# Patient Record
Sex: MALE | Race: WHITE | NOT HISPANIC OR LATINO | Employment: FULL TIME | URBAN - METROPOLITAN AREA
[De-identification: names, ages, dates, MRNs, and addresses within clinical notes are randomized per-mention and may not be internally consistent; named-entity substitution may affect disease eponyms.]

---

## 2017-03-05 DIAGNOSIS — I10 HYPERTENSION GOAL BP (BLOOD PRESSURE) < 140/90: ICD-10-CM

## 2017-03-05 NOTE — TELEPHONE ENCOUNTER
amLODIPine (NORVASC) 10 MG tablet  Last Written Prescription Date: 3/3/16  Last Fill Quantity: 90, # refills: 3    lisinopril (PRINIVIL,ZESTRIL) 20 MG tablet      Last Written Prescription Date: 2/18/16  Last Fill Quantity: 90, # refills: 3  Last Office Visit with [facility] [place] [facility], [facility] or [facility] prescribing provider: 10/27/16       Potassium   Date Value Ref Range Status   12/23/2015 4.0 3.4 - 5.3 mmol/L Final     Creatinine   Date Value Ref Range Status   12/23/2015 1.05 0.66 - 1.25 mg/dL Final     BP Readings from Last 3 Encounters:   10/27/16 136/80   06/21/16 128/80   03/29/16 128/76

## 2017-03-06 RX ORDER — LISINOPRIL 20 MG/1
20 TABLET ORAL DAILY
Qty: 90 TABLET | Refills: 1 | Status: SHIPPED | OUTPATIENT
Start: 2017-03-06 | End: 2017-05-30

## 2017-03-06 RX ORDER — AMLODIPINE BESYLATE 10 MG/1
10 TABLET ORAL DAILY
Qty: 90 TABLET | Refills: 1 | Status: SHIPPED | OUTPATIENT
Start: 2017-03-06 | End: 2017-05-30

## 2017-05-30 DIAGNOSIS — I10 HYPERTENSION GOAL BP (BLOOD PRESSURE) < 140/90: ICD-10-CM

## 2017-05-30 RX ORDER — LISINOPRIL 20 MG/1
20 TABLET ORAL DAILY
Qty: 90 TABLET | Refills: 1 | Status: SHIPPED | OUTPATIENT
Start: 2017-05-30 | End: 2018-03-27

## 2017-05-30 RX ORDER — AMLODIPINE BESYLATE 10 MG/1
10 TABLET ORAL DAILY
Qty: 90 TABLET | Refills: 1 | Status: SHIPPED | OUTPATIENT
Start: 2017-05-30 | End: 2018-03-27

## 2017-05-30 NOTE — TELEPHONE ENCOUNTER
Lisinopril 20 mg      Last Written Prescription Date: 3/6/17  Last Fill Quantity: 90, # refills: 1  Last Office Visit with [facility], [facility] or [facility] prescribing provider: 10/27/16       Potassium   Date Value Ref Range Status   12/23/2015 4.0 3.4 - 5.3 mmol/L Final     Creatinine   Date Value Ref Range Status   12/23/2015 1.05 0.66 - 1.25 mg/dL Final     BP Readings from Last 3 Encounters:   10/27/16 136/80   06/21/16 128/80   03/29/16 128/76     Amlodipine 10 mg      Last Written Prescription Date: 3/6/17  Last Fill Quantity: 90,  # refills: 1   Last Office Visit with [facility], [facility] or [facility] prescribing provider: 10/27/16

## 2017-07-25 ENCOUNTER — TELEPHONE (OUTPATIENT)
Dept: SURGERY | Facility: CLINIC | Age: 52
End: 2017-07-25

## 2017-07-25 NOTE — TELEPHONE ENCOUNTER
I spoke to Sarath today. He is a patient of Dr Prajapati's. He is S/P Lap bilateral inguinal hernia on 12-29-15 with Dr Prajapati. 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 He says that if he pushes on the right inguinal area , it is very tender. He does not have pain otherwise. He does not really see a bulge at that site. I let Sarath that this certainly could be musculoskeletal. He is going out of town but I made an appointment for him when he returns. Petra RODRIGUEZ RN

## 2017-07-25 NOTE — TELEPHONE ENCOUNTER
Reason for Call:  Patient is calling in states that he feels like he has another hernia     Is this possible? He had hernia surgery 2015    Detailed comments: please advise above and how to proceed. appt?    Phone Number Patient can be reached at: Home number on file [phone] (home)    Best Time: any    Can we leave a detailed message on this number? YES    Call taken on 7/25/2017 at 8:04 AM by Soraya Morales

## 2017-08-16 ENCOUNTER — OFFICE VISIT (OUTPATIENT)
Dept: SURGERY | Facility: CLINIC | Age: 52
End: 2017-08-16

## 2017-08-16 VITALS
SYSTOLIC BLOOD PRESSURE: 142 MMHG | HEART RATE: 91 BPM | BODY MASS INDEX: 27.77 KG/M2 | RESPIRATION RATE: 16 BRPM | HEIGHT: 78 IN | WEIGHT: 240 LBS | DIASTOLIC BLOOD PRESSURE: 78 MMHG

## 2017-08-16 DIAGNOSIS — G89.18 POST-OP PAIN: Primary | ICD-10-CM

## 2017-08-16 DIAGNOSIS — S76.211A GROIN STRAIN, RIGHT, INITIAL ENCOUNTER: ICD-10-CM

## 2017-08-16 PROCEDURE — 99212 OFFICE O/P EST SF 10 MIN: CPT | Performed by: SURGERY

## 2017-08-16 RX ORDER — HYDROCODONE BITARTRATE AND ACETAMINOPHEN 5; 325 MG/1; MG/1
1-2 TABLET ORAL EVERY 6 HOURS PRN
Qty: 45 TABLET | Refills: 0 | Status: SHIPPED | OUTPATIENT
Start: 2017-08-16 | End: 2018-05-11

## 2017-08-16 NOTE — NURSING NOTE
"Chief Complaint   Patient presents with     Consult     Right Inguinal Hernia- Surgery 12/29/2015       Initial /78 (BP Location: Right arm, Patient Position: Chair, Cuff Size: Adult Regular)  Pulse 91  Resp 16  Ht 2.083 m (6' 10\")  Wt 108.9 kg (240 lb)  BMI 25.09 kg/m2 Estimated body mass index is 25.09 kg/(m^2) as calculated from the following:    Height as of this encounter: 2.083 m (6' 10\").    Weight as of this encounter: 108.9 kg (240 lb).  BP completed using cuff size: regular      Marisabel Doherty CMA     "

## 2017-08-16 NOTE — MR AVS SNAPSHOT
"              After Visit Summary   2017    Sarath Gray    MRN: [id number]           Patient Information     Date Of Birth          1965        Visit Information        Provider Department      2017 7:30 AM Andrew Prajapati MD [facility]        Today's Diagnoses     Post-op pain    -  1    Groin strain, right, initial encounter          Care Instructions    Per Physician's instructions            Follow-ups after your visit        Who to contact     If you have questions or need follow up information about today's clinic visit or your schedule please contact [facility] directly at [phone].  Normal or non-critical lab and imaging results will be communicated to you by [employer], letter or phone within 4 business days after the clinic has received the results. If you do not hear from us within 7 days, please contact the clinic through [employer] or phone. If you have a critical or abnormal lab result, we will notify you by phone as soon as possible.  Submit refill requests through [facility] [employer] [facility] or call your pharmacy and they will forward the refill request to us. Please allow 3 business days for your refill to be completed.          Additional Information About Your Visit        MyChart Information     [facility] lets you send messages to your doctor, view your test results, renew your prescriptions, schedule appointments and more. To sign up, go to [email or web address] . Click on \"Log in\" on the left side of the screen, which will take you to the Welcome page. Then click on \"Sign up Now\" on the right side of the page.     You will be asked to enter the access code listed below, as well as some personal information. Please follow the directions to create your username and password.     Your access code is: [email or web address]  Expires: 2017  7:42 AM     Your access code will  in 90 days. If you need help or a new code, please call your [facility] or [phone].      " "  Care EveryWhere ID     This is your Care EveryWhere ID. This could be used by other organizations to access your [place] medical records  [id number]        Your Vitals Were     Pulse Respirations Height BMI (Body Mass Index)          91 16 2.083 m (6' 10\") 25.09 kg/m2         Blood Pressure from Last 3 Encounters:   08/16/17 142/78   10/27/16 136/80   06/21/16 128/80    Weight from Last 3 Encounters:   08/16/17 108.9 kg (240 lb)   10/27/16 108 kg (238 lb)   06/21/16 108 kg (238 lb)              Today, you had the following     No orders found for display         Today's Medication Changes          These changes are accurate as of: 8/16/17  7:42 AM.  If you have any questions, ask your nurse or doctor.               Start taking these medicines.        Dose/Directions    HYDROcodone-acetaminophen 5-325 MG per tablet   Commonly known as:  NORCO   Used for:  Post-op pain   Started by:  Andrew Prajapati MD        Dose:  1-2 tablet   Take 1-2 tablets by mouth every 6 hours as needed   Quantity:  45 tablet   Refills:  0            Where to get your medicines      Some of these will need a paper prescription and others can be bought over the counter.  Ask your nurse if you have questions.     Bring a paper prescription for each of these medications     HYDROcodone-acetaminophen 5-325 MG per tablet                Primary Care Provider Office Phone # Fax #    Maik Smith Smith [phone] [address] [place], [place] [address]        Equal Access to Services     QIANA MORAN AH: Hadolena messero Soaramis, waaxda luqadaha, qaybta kaalmada adeegyada, waxshannon dominik hemphill. So [place] [phone].    ATENCIÓN: Si habla español, tiene a alexandra disposición servicios gratuitos de asistencia lingüística. Llame al [phone].    We comply with applicable federal civil rights laws and [place] laws. We do not discriminate on the basis of race, color, national origin, age, disability sex, " sexual orientation or gender identity.            Thank you!     Thank you for choosing [facility]  for your care. Our goal is always to provide you with excellent care. Hearing back from our patients is one way we can continue to improve our services. Please take a few minutes to complete the written survey that you may receive in the mail after your visit with us. Thank you!             Your Updated Medication List - Protect others around you: Learn how to safely use, store and throw away your medicines at www.disposemymeds.org.          This list is accurate as of: 8/16/17  7:42 AM.  Always use your most recent med list.                   Brand Name Dispense Instructions for use Diagnosis    albuterol 108 (90 BASE) MCG/ACT Inhaler    albuterol    1 Inhaler    Inhale 2 puffs into the lungs every 4 hours as needed for shortness of breath / dyspnea or wheezing    Pneumonia of right lung due to infectious organism, unspecified part of lung       amLODIPine 10 MG tablet    NORVASC    90 tablet    Take 1 tablet (10 mg) by mouth daily    Hypertension goal BP (blood pressure) < 140/90       HYDROcodone-acetaminophen 5-325 MG per tablet    NORCO    45 tablet    Take 1-2 tablets by mouth every 6 hours as needed    Post-op pain       lisinopril 20 MG tablet    PRINIVIL/ZESTRIL    90 tablet    Take 1 tablet (20 mg) by mouth daily    Hypertension goal BP (blood pressure) < 140/90       order for DME      Equipment being ordered: CPAP Patient Sarath Gray was set up at [facility]  on April 21, 2016. Patient received a Resmed AirSense 10 Auto. Pressures were set at Auto 5 - 15 cm H2O.   Patient?s ramp is 5 cm H2O for Auto and FLEX/EPR is 2.  Patient received a [employer] Mask name: SIMPLUS  Full Face mask Size Large, heated tubing and heated humidifier.  Patient is enrolled in the STM Program and does not need to meet compliance. Patient has a follow up on June 14TH AT 9 AM with YADIRA Resendez.   Jennifer  Flaco        order for DME     1 Device    Equipment being ordered: Other: CPM machine for home use. Set max tolerance and increase 3-5 degrees/day as tolerated. Use up to 6-8 hours/day as tolerated. Treatment Diagnosis: left TKA    S/P total knee arthroplasty, left

## 2017-08-16 NOTE — PROGRESS NOTES
"51-year-old male is now 18 months out from a laparoscopic bilateral inguinal hernia repair. At that time, his right hernia was much larger than the left. He now reports right groin pain over the past several months. Patient also reports an occasional palpable cord running parallel to the inguinal ligament along with some pain in his right testicle. This usually comes on after a full day of working. Pain keeps him awake at night. He denies palpable masses.      Exam:  Patient Vitals for the past 24 hrs:   BP Pulse Resp Height Weight   08/16/17 0729 142/78 91 16 2.083 m (6' 10\") 108.9 kg (240 lb)     Bilateral groins without palpable reducible masses. No palpable cords or tissue swelling.      Assessment and plan: 51-year-old male with right groin pain. Patient does not remember any inciting event, this could still be results of a groin strain. I cannot appreciate a hernia recurrence. Patient only takes ibuprofen for occasional muscle aches so I gave him some Vicodin to aid in sleeping and with the localized pain. I advised him that I expect this would resolve on its own if it was a strain. If after another couple of months he continues to have the pain, which can possibly put him in the CT scanner to better evaluate the abdominal wall.    Andrew Prajapati MD   "

## 2017-10-03 ENCOUNTER — OFFICE VISIT (OUTPATIENT)
Dept: FAMILY MEDICINE | Facility: CLINIC | Age: 52
End: 2017-10-03

## 2017-10-03 VITALS
DIASTOLIC BLOOD PRESSURE: 88 MMHG | HEART RATE: 86 BPM | TEMPERATURE: 98 F | HEIGHT: 78 IN | WEIGHT: 240 LBS | SYSTOLIC BLOOD PRESSURE: 138 MMHG | BODY MASS INDEX: 27.77 KG/M2

## 2017-10-03 DIAGNOSIS — N41.0 ACUTE PROSTATITIS: ICD-10-CM

## 2017-10-03 DIAGNOSIS — R39.9 UTI SYMPTOMS: Primary | ICD-10-CM

## 2017-10-03 LAB
ALBUMIN UR-MCNC: NEGATIVE MG/DL
APPEARANCE UR: CLEAR
BILIRUB UR QL STRIP: NEGATIVE
COLOR UR AUTO: YELLOW
GLUCOSE UR STRIP-MCNC: NEGATIVE MG/DL
HGB UR QL STRIP: ABNORMAL
KETONES UR STRIP-MCNC: NEGATIVE MG/DL
LEUKOCYTE ESTERASE UR QL STRIP: NEGATIVE
NITRATE UR QL: NEGATIVE
PH UR STRIP: 6.5 PH (ref 5–7)
RBC #/AREA URNS AUTO: NORMAL /HPF
SOURCE: ABNORMAL
SP GR UR STRIP: 1.01 (ref 1–1.03)
UROBILINOGEN UR STRIP-ACNC: 0.2 EU/DL (ref 0.2–1)
WBC #/AREA URNS AUTO: NORMAL /HPF

## 2017-10-03 PROCEDURE — 81001 URINALYSIS AUTO W/SCOPE: CPT | Performed by: PHYSICIAN ASSISTANT

## 2017-10-03 PROCEDURE — 99213 OFFICE O/P EST LOW 20 MIN: CPT | Performed by: PHYSICIAN ASSISTANT

## 2017-10-03 RX ORDER — LEVOFLOXACIN 750 MG/1
750 TABLET, FILM COATED ORAL DAILY
Qty: 10 TABLET | Refills: 0 | Status: SHIPPED | OUTPATIENT
Start: 2017-10-03 | End: 2018-05-11

## 2017-10-03 ASSESSMENT — ENCOUNTER SYMPTOMS
DIARRHEA: 0
DYSURIA: 1
COUGH: 0
NERVOUS/ANXIOUS: 0
HALLUCINATIONS: 0
SENSORY CHANGE: 0
WHEEZING: 0
ABDOMINAL PAIN: 1
PALPITATIONS: 0
BACK PAIN: 0
FOCAL WEAKNESS: 0
DIAPHORESIS: 0
BLURRED VISION: 0
BLOOD IN STOOL: 0
SHORTNESS OF BREATH: 0
SEIZURES: 0
EYE REDNESS: 0
WEIGHT LOSS: 0
DIZZINESS: 0
WEAKNESS: 0
ORTHOPNEA: 0
HEADACHES: 0
SPUTUM PRODUCTION: 0
INSOMNIA: 0
CONSTIPATION: 0
NAUSEA: 0
HEARTBURN: 0
FREQUENCY: 1
MYALGIAS: 0
LOSS OF CONSCIOUSNESS: 0
FEVER: 0
HEMOPTYSIS: 0
TINGLING: 0
CHILLS: 0
DEPRESSION: 0
NEUROLOGICAL NEGATIVE: 1
VOMITING: 0
NECK PAIN: 0
EYE DISCHARGE: 0
EYE PAIN: 0
PHOTOPHOBIA: 0
DOUBLE VISION: 0
SORE THROAT: 0

## 2017-10-03 ASSESSMENT — LIFESTYLE VARIABLES: SUBSTANCE_ABUSE: 0

## 2017-10-03 NOTE — PROGRESS NOTES
HPI    SUBJECTIVE:   Sarath Gray is a 52 year old male who presents to clinic today for urinary frequency, dysuria for the last week or so. He developed sweats last night but is not sure if he had a fever      Genitourinary symptoms      Duration: 2-3 weeks, worse in the last week    Description:  dysuria, hesitancy and back pain, urgency, flank pain    Intensity:  moderate    Accompanying signs and symptoms (fever/discharge/nausea/vomiting/back or abdominal pain):  Sweating, lower abdominal pain    History (frequent UTI's/kidney stones/prostate problems): None  Sexually active: no     Precipitating or alleviating factors: None    Therapies tried and outcome: increase fluid intake   Outcome:             Problem list and histories reviewed & adjusted, as indicated.  Additional history: as documented    Patient Active Problem List   Diagnosis     Esophageal reflux     Tobacco use disorder     Impotence of organic origin     Low back pain     CARDIOVASCULAR SCREENING; LDL GOAL LESS THAN 130     History of PSVT (paroxysmal supraventricular tachycardia)     S/P total knee arthroplasty     Left knee DJD     Essential hypertension, benign     CARROLL (obstructive sleep apnea)     Past Surgical History:   Procedure Laterality Date     ARTHROPLASTY KNEE  6/11/2014    Procedure: ARTHROPLASTY KNEE;  Surgeon: Maik Jeong MD;  Location: [facility]     ARTHROSCOPY KNEE WITH MEDIAL MENISCECTOMY  12/19/2013    Procedure: ARTHROSCOPY KNEE WITH MEDIAL MENISCECTOMY;  Left Knee Arthroscopy With Intraarticular Debridement.;  Surgeon: Maik Jeong MD;  Location: [facility]     COLONOSCOPY  2002     EXAM UNDER ANESTHESIA, MANIPULATE JOINT (LOCATION)  6/26/2014    Procedure: EXAM UNDER ANESTHESIA, MANIPULATE JOINT (LOCATION);  Surgeon: Maik Jeong MD;  Location: [facility]     EXAM UNDER ANESTHESIA, MANIPULATE JOINT (LOCATION) Left 8/28/2014    Procedure: EXAM UNDER ANESTHESIA, MANIPULATE JOINT (LOCATION);  Surgeon: Maik Jeong  MD JULIANA;  Location: [facility]     EXAM UNDER ANESTHESIA, MANIPULATE JOINT (LOCATION) Left 12/31/2014    Procedure: EXAM UNDER ANESTHESIA, MANIPULATE JOINT (LOCATION);  Surgeon: Maik Jeong MD;  Location: [facility] OR     H ABLATION SVT  2008    AVNRT     INJECT EPIDURAL LUMBAR  12/16/2011    Procedure:INJECT EPIDURAL LUMBAR; MICKY-Dr. Smith; Surgeon:GENERIC ANESTHESIA PROVIDER; Location:[facility]     INJECT EPIDURAL LUMBAR  1/27/2012    Procedure:INJECT EPIDURAL LUMBAR; MICKY with Flouro--; Surgeon:GENERIC ANESTHESIA PROVIDER; Location:[facility]     LAPAROSCOPIC HERNIORRHAPHY INGUINAL BILATERAL Bilateral 12/29/2015    Procedure: LAPAROSCOPIC HERNIORRHAPHY INGUINAL BILATERAL;  Surgeon: Andrew Prajapati MD;  Location: [facility]     Lumbar back fusion  1995, 1998    first was A&P     Lumbar back procedure  1999    Hardware removal     spinal stimulator insertion  2004    also, revised it that year also. never seemed to help well.        Social History   Substance Use Topics     Smoking status: Light Tobacco Smoker     Packs/day: 1.00     Years: 18.00     Smokeless tobacco: Never Used      Comment: Started at age 30.      Alcohol use 0.0 oz/week     0 Standard drinks or equivalent per week      Comment: occassional beer     Family History   Problem Relation Age of Onset     Cardiovascular Paternal Grandfather      C.A.D. Father      CEREBROVASCULAR DISEASE Father      Allergies Father      Anesthesia Reaction Father      Cardiovascular Father      Circulatory Father      dvt     HEART DISEASE Father          Current Outpatient Prescriptions   Medication Sig Dispense Refill     levofloxacin (LEVAQUIN) 750 MG tablet Take 1 tablet (750 mg) by mouth daily 10 tablet 0     HYDROcodone-acetaminophen (NORCO) 5-325 MG per tablet Take 1-2 tablets by mouth every 6 hours as needed 45 tablet 0     amLODIPine (NORVASC) 10 MG tablet Take 1 tablet (10 mg) by mouth daily 90 tablet 1     lisinopril (PRINIVIL/ZESTRIL) 20 MG tablet Take 1 tablet (20 mg)  by mouth daily 90 tablet 1     albuterol (ALBUTEROL) 108 (90 BASE) MCG/ACT inhaler Inhale 2 puffs into the lungs every 4 hours as needed for shortness of breath / dyspnea or wheezing 1 Inhaler 11     order for DME Equipment being ordered: CPAP Patient Sarath Gray was [facility] up at [facility]  on April 21, 2016. Patient received a Resmed AirSense 10 Auto. Pressures were set at Auto 5 - 15 cm H2O.   Patient s ramp is 5 cm H2O for Auto and FLEX/EPR is 2.  Patient received a [employer] Mask name: SIMPLUS  Full Face mask Size Large, heated tubing and heated humidifier.  Patient is enrolled in the STM Program and does not need to meet compliance. Patient has a follow up on June 14TH AT 9 AM with YADIRA Resendez.    Jennifer Sam       ORDER FOR DME Equipment being ordered: Other: CPM machine for home use. Set max tolerance and increase 3-5 degrees/day as tolerated. Use up to 6-8 hours/day as tolerated.  Treatment Diagnosis: left TKA 1 Device 0     Allergies   Allergen Reactions     Persantine [Dipyridamole] Other (See Comments)     Nervous and anxiety     Labs reviewed in EPIC      Reviewed and updated as needed this visit by clinical staff     Reviewed and updated as needed this visit by Provider             Review of Systems   Constitutional: Negative for chills, diaphoresis, fever, malaise/fatigue and weight loss.   HENT: Negative for congestion, ear discharge, ear pain, hearing loss, nosebleeds and sore throat.    Eyes: Negative for blurred vision, double vision, photophobia, pain, discharge and redness.   Respiratory: Negative for cough, hemoptysis, sputum production, shortness of breath and wheezing.    Cardiovascular: Negative for chest pain, palpitations, orthopnea and leg swelling.   Gastrointestinal: Positive for abdominal pain. Negative for blood in stool, constipation, diarrhea, heartburn, melena, nausea and vomiting.   Genitourinary: Positive for dysuria, frequency and urgency.   Musculoskeletal: Negative  for back pain, joint pain, myalgias and neck pain.   Skin: Negative for itching and rash.   Neurological: Negative.  Negative for dizziness, tingling, sensory change, focal weakness, seizures, loss of consciousness, weakness and headaches.   Endo/Heme/Allergies: Negative.    Psychiatric/Behavioral: Negative for depression, hallucinations, substance abuse and suicidal ideas. The patient is not nervous/anxious and does not have insomnia.          Physical Exam   Constitutional: He is oriented to person, place, and time and well-developed, well-nourished, and in no distress.   HENT:   Head: Normocephalic and atraumatic.   Right Ear: External ear normal.   Left Ear: External ear normal.   Nose: Nose normal.   Mouth/Throat: Oropharynx is clear and moist.   Eyes: Conjunctivae and EOM are normal. Pupils are equal, round, and reactive to light. Right eye exhibits no discharge. Left eye exhibits no discharge. No scleral icterus.   Neck: Normal range of motion. Neck supple. No thyromegaly present.   Cardiovascular: Normal rate, regular rhythm, normal heart sounds and intact distal pulses.  Exam reveals no gallop and no friction rub.    No murmur heard.  Pulmonary/Chest: Effort normal and breath sounds normal. No respiratory distress. He has no wheezes. He has no rales. He exhibits no tenderness.   Abdominal: Soft. Bowel sounds are normal. He exhibits no distension and no mass. There is no hepatosplenomegaly. There is tenderness in the suprapubic area. There is no rebound, no guarding and no CVA tenderness. No hernia.   Musculoskeletal: Normal range of motion. He exhibits no edema or tenderness.   Lymphadenopathy:     He has no cervical adenopathy.   Neurological: He is alert and oriented to person, place, and time. He has normal reflexes. No cranial nerve deficit. He exhibits normal muscle tone. Gait normal. Coordination normal.   Skin: Skin is warm and dry. No rash noted. No erythema.   Psychiatric: Mood, memory, affect and  judgment normal.               (R39.9) UTI symptoms  (primary encounter diagnosis)  Comment:   Plan: UA reflex to Microscopic and Culture, Urine         Microscopic, levofloxacin (LEVAQUIN) 750 MG         tablet            (N41.0) Acute prostatitis  Comment:   Plan:

## 2017-10-03 NOTE — MR AVS SNAPSHOT
"              After Visit Summary   10/3/2017    Sarath Gray    MRN: [id number]           Patient Information     Date Of Birth          1965        Visit Information        Provider Department      10/3/2017 10:40 AM Jeffy Acosta PA-C [facility]        Today's Diagnoses     UTI symptoms    -  1    Acute prostatitis           Follow-ups after your visit        Follow-up notes from your care team     Return if symptoms worsen or fail to improve.      Who to contact     If you have questions or need follow up information about today's clinic visit or your schedule please contact [facility] directly at [phone].  Normal or non-critical lab and imaging results will be communicated to you by "[employer]hart, letter or phone within 4 business days after the clinic has received the results. If you do not hear from us within 7 days, please contact the clinic through "[employer]hart or phone. If you have a critical or abnormal lab result, we will notify you by phone as soon as possible.  Submit refill requests through [employer] or call your pharmacy and they will forward the refill request to us. Please allow 3 business days for your refill to be completed.          Additional Information About Your Visit        MyChart Information     [employer] lets you send messages to your doctor, view your test results, renew your prescriptions, schedule appointments and more. To sign up, go to [email or web address] . Click on \"Log in\" on the left side of the screen, which will take you to the Welcome page. Then click on \"Sign up Now\" on the right side of the page.     You will be asked to enter the access code listed below, as well as some personal information. Please follow the directions to create your username and password.     Your access code is: [email or web address]  Expires: 2017  7:42 AM     Your access code will  in 90 days. If you need help or a new code, please call your [place] " "clinic or [phone].        Care EveryWhere ID     This is your Care EveryWhere ID. This could be used by other organizations to access your [facility] medical records  [id number]        Your Vitals Were     Pulse Temperature Height BMI (Body Mass Index)          86 98  F (36.7  C) (Tympanic) 6' 10\" (2.083 m) 25.09 kg/m2         Blood Pressure from Last 3 Encounters:   10/03/17 138/88   08/16/17 142/78   10/27/16 136/80    Weight from Last 3 Encounters:   10/03/17 240 lb (108.9 kg)   08/16/17 240 lb (108.9 kg)   10/27/16 238 lb (108 kg)              We Performed the Following     UA reflex to Microscopic and Culture     Urine Microscopic          Today's Medication Changes          These changes are accurate as of: 10/3/17 12:20 PM.  If you have any questions, ask your nurse or doctor.               Start taking these medicines.        Dose/Directions    levofloxacin 750 MG tablet   Commonly known as:  LEVAQUIN   Used for:  UTI symptoms   Started by:  Jeffy Acosta PA-C        Dose:  750 mg   Take 1 tablet (750 mg) by mouth daily   Quantity:  10 tablet   Refills:  0            Where to get your medicines      These medications were sent to [address] [address]     Phone:  [phone]     levofloxacin 750 MG tablet                Primary Care Provider Office Phone # Fax #    Maik Smith [phone]       [address] [place] [address]        Equal Access to Services     ELIZABETH MORAN : Hadii joann Colby, waaxda luqadaha, qaybta kaaldallas parham. So [facility] [phone].    ATENCIÓN: Si habla español, tiene a alexandra disposición servicios gratuitos de asistencia lingüística. Llame al [phone].    We comply with applicable federal civil rights laws and [place] laws. We do not discriminate on the basis of race, color, national origin, age, disability, sex, " sexual orientation, or gender identity.            Thank you!     Thank you for choosing [facility]  for your care. Our goal is always to provide you with excellent care. Hearing back from our patients is one way we can continue to improve our services. Please take a few minutes to complete the written survey that you may receive in the mail after your visit with us. Thank you!             Your Updated Medication List - Protect others around you: Learn how to safely use, store and throw away your medicines at www.disposemymeds.org.          This list is accurate as of: 10/3/17 12:20 PM.  Always use your most recent med list.                   Brand Name Dispense Instructions for use Diagnosis    albuterol 108 (90 BASE) MCG/ACT Inhaler    PROAIR HFA    1 Inhaler    Inhale 2 puffs into the lungs every 4 hours as needed for shortness of breath / dyspnea or wheezing    Pneumonia of right lung due to infectious organism, unspecified part of lung       amLODIPine 10 MG tablet    NORVASC    90 tablet    Take 1 tablet (10 mg) by mouth daily    Hypertension goal BP (blood pressure) < 140/90       HYDROcodone-acetaminophen 5-325 MG per tablet    NORCO    45 tablet    Take 1-2 tablets by mouth every 6 hours as needed    Post-op pain       levofloxacin 750 MG tablet    LEVAQUIN    10 tablet    Take 1 tablet (750 mg) by mouth daily    UTI symptoms       lisinopril 20 MG tablet    PRINIVIL/ZESTRIL    90 tablet    Take 1 tablet (20 mg) by mouth daily    Hypertension goal BP (blood pressure) < 140/90       order for DME      Equipment being ordered: CPAP Patient Sarath Gray was set up at [facility]  on April 21, 2016. Patient received a Resmed AirSense 10 Auto. Pressures were set at Auto 5 - 15 cm H2O.   Patient?s ramp is 5 cm H2O for Auto and FLEX/EPR is 2.  Patient received a [employer] Mask name: SIMPLUS  Full Face mask Size Large, heated tubing and heated humidifier.  Patient is enrolled in the [facility]  Program and does not need to meet compliance. Patient has a follow up on June 14TH AT 9 AM with YADIRA Resendez.   Jennifer Sam        order for DME     1 Device    Equipment being ordered: Other: CPM machine for home use. Set max tolerance and increase 3-5 degrees/day as tolerated. Use up to 6-8 hours/day as tolerated. Treatment Diagnosis: left TKA    S/P total knee arthroplasty, left

## 2017-10-03 NOTE — NURSING NOTE
"Chief Complaint   Patient presents with     UTI       Initial /88 (BP Location: Right arm, Patient Position: Chair, Cuff Size: Adult Large)  Pulse 86  Temp 98  F (36.7  C) (Tympanic)  Ht 6' 10\" (2.083 m)  Wt 240 lb (108.9 kg)  BMI 25.09 kg/m2 Estimated body mass index is 25.09 kg/(m^2) as calculated from the following:    Height as of this encounter: 6' 10\" (2.083 m).    Weight as of this encounter: 240 lb (108.9 kg).  Medication Reconciliation: complete    Health Maintenance that is potentially due pending provider review:  NONE        Is there anyone who you would like to be able to receive your results? No  If yes have patient fill out ARIS      "

## 2018-03-26 ENCOUNTER — TELEPHONE (OUTPATIENT)
Dept: FAMILY MEDICINE | Facility: CLINIC | Age: 53
End: 2018-03-26

## 2018-03-26 DIAGNOSIS — Z12.12 SCREENING FOR MALIGNANT NEOPLASM OF THE RECTUM: Primary | ICD-10-CM

## 2018-03-26 NOTE — TELEPHONE ENCOUNTER
Order placed and signed.  left message for patient to return call.  I need to know if he wants instructions mailed to him or will he pick it up/  Alfreda Nam RN

## 2018-03-26 NOTE — TELEPHONE ENCOUNTER
Reason for Call:  Other     Detailed comments: Patient needs a order for a colonoscopy - please call patient with info     Phone Number Patient can be reached at: Home number on file [phone] (home)    Best Time:     Can we leave a detailed message on this number? YES    Call taken on 3/26/2018 at 3:53 PM by Jennifer Chou

## 2018-03-27 DIAGNOSIS — I10 HYPERTENSION GOAL BP (BLOOD PRESSURE) < 140/90: ICD-10-CM

## 2018-03-27 RX ORDER — LISINOPRIL 20 MG/1
20 TABLET ORAL DAILY
Qty: 30 TABLET | Refills: 0 | Status: SHIPPED | OUTPATIENT
Start: 2018-03-27 | End: 2018-05-11

## 2018-03-27 RX ORDER — AMLODIPINE BESYLATE 10 MG/1
10 TABLET ORAL DAILY
Qty: 30 TABLET | Refills: 0 | Status: SHIPPED | OUTPATIENT
Start: 2018-03-27 | End: 2018-05-11

## 2018-03-27 NOTE — LETTER
[facility]  [address] [place] [id number]  Phone: [phone]  Fax: [phone]       March 27, 2018         Sarath Gray  [address] [place] [id number]            Dear Sarath:    We are concerned about your health care.  We recently provided you with medication refills.  Many medications require routine follow-up with your doctor.    Your prescription(s) have been refilled for amlodipine and lisinopril so you may have time for the above noted follow-up. Please call to schedule soon so we can assure you have an appointment before your next refills are needed.    Thank you,      Maik Smith MD/ Alfreda Nam RN

## 2018-03-27 NOTE — TELEPHONE ENCOUNTER
"Requested Prescriptions   Pending Prescriptions Disp Refills     amLODIPine (NORVASC) 10 MG tablet 90 tablet 1     Sig: Take 1 tablet (10 mg) by mouth daily    Calcium Channel Blockers Protocol  Failed    3/27/2018  2:33 PM       Failed - Normal serum creatinine on file in past 12 months    Recent Labs   Lab Test  12/23/15   1514   CR  1.05            Passed - Blood pressure under 140/90 in past 12 months    BP Readings from Last 3 Encounters:   10/03/17 138/88   08/16/17 142/78   10/27/16 136/80                Passed - Recent (12 mo) or future (30 days) visit within the authorizing provider's specialty    Patient had office visit in the last 12 months or has a visit in the next 30 days with authorizing provider or within the authorizing provider's specialty.  See \"Patient Info\" tab in inbasket, or \"Choose Columns\" in Meds & Orders section of the refill encounter.           Passed - Patient is age 18 or older   Last Written Prescription Date:  05/30/17  Last Fill Quantity: 90,  # refills: 1   Last office visit: 10/3/2017 with prescribing provider:  10/03/17   Future Office Visit:         lisinopril (PRINIVIL/ZESTRIL) 20 MG tablet 90 tablet 1     Sig: Take 1 tablet (20 mg) by mouth daily    ACE Inhibitors (Including Combos) Protocol Failed    3/27/2018  2:33 PM       Failed - Normal serum creatinine on file in past 12 months    Recent Labs   Lab Test  12/23/15   1514   CR  1.05            Failed - Normal serum potassium on file in past 12 months    Recent Labs   Lab Test  12/23/15   1514   POTASSIUM  4.0            Passed - Blood pressure under 140/90 in past 12 months    BP Readings from Last 3 Encounters:   10/03/17 138/88   08/16/17 142/78   10/27/16 136/80                Passed - Recent (12 mo) or future (30 days) visit within the authorizing provider's specialty    Patient had office visit in the last 12 months or has a visit in the next 30 days with authorizing provider or within the authorizing provider's " "specialty.  See \"Patient Info\" tab in inbasket, or \"Choose Columns\" in Meds & Orders section of the refill encounter.           Passed - Patient is age 18 or older        Last Written Prescription Date:  05/30/17  Last Fill Quantity: 90,  # refills: 1   Last office visit: 10/3/2017 with prescribing provider:  10/03/17   Future Office Visit:      "

## 2018-04-14 ENCOUNTER — ANESTHESIA EVENT (OUTPATIENT)
Dept: GASTROENTEROLOGY | Facility: CLINIC | Age: 53
End: 2018-04-14
Payer: COMMERCIAL

## 2018-04-14 ASSESSMENT — LIFESTYLE VARIABLES: TOBACCO_USE: 1

## 2018-04-14 NOTE — ANESTHESIA PREPROCEDURE EVALUATION
Anesthesia Evaluation     . Pt has had prior anesthetic.     No history of anesthetic complications          ROS/MED HX    ENT/Pulmonary: Comment: Hypoxia  PE    (+)sleep apnea, tobacco use, Current use 1 packs/day  , . .    Neurologic:  - neg neurologic ROS     Cardiovascular: Comment: Ablation for SVT  Atypical CP    (+) hypertension----. : . . . :. . Previous cardiac testing date:results:Stress Testdate:9=17-09 results:IMPRESSIONS:   I.  Adenosine Test   1.  Adenosine infusion dictated under separate cover.   2.  ECG report done under separate cover.      II.  Myocardial Perfusion Scintigraphy   1.  Myocardial perfusion using single isotope technique was probably   normal.    2.  Gating demonstrated normal left ventricular systolic contraction   and wall thickening, both at rest and post stress.    3.  The ejection fraction was 49% at rest and 56% post stress.    4.  The left ventricle was enlarged (end diastolic volume 212 mL).     5.  No previous study was available for comparison. ECG reviewed date:12-23-15 results:Sinus Rhythm   -Right atrial enlargement.    Right atrial abnormality and rotation -possible pulmonary disease.     ABNORMAL    date: results:          METS/Exercise Tolerance:  >4 METS   Hematologic:  - neg hematologic  ROS       Musculoskeletal: Comment: LBP  Spinal cord stimulator  (+) , , other musculoskeletal- LBP      GI/Hepatic:     (+) GERD Asymptomatic on medication,       Renal/Genitourinary: Comment: impotence        Endo:  - neg endo ROS       Psychiatric:  - neg psychiatric ROS       Infectious Disease:  - neg infectious disease ROS       Malignancy:      - no malignancy   Other:    (+) H/O Chronic Pain,                   Physical Exam  Normal systems: cardiovascular and pulmonary    Airway   Mallampati: II  TM distance: >3 FB  Neck ROM: full    Dental   Comment: Poor dentition     Cardiovascular       Pulmonary                     Anesthesia Plan      History & Physical  Review  History and physical reviewed and following examination; no interval change.    ASA Status:  3 .    NPO Status:  > 8 hours    Plan for General and MAC with Propofol and Intravenous induction. Reason for MAC:  Deep or markedly invasive procedure (G8)  PONV prophylaxis:  Ondansetron (or other 5HT-3) and Dexamethasone or Solumedrol       Postoperative Care  Postoperative pain management:  IV analgesics and Oral pain medications.      Consents  Anesthetic plan, risks, benefits and alternatives discussed with:  Patient..                          .

## 2018-04-18 ENCOUNTER — ANESTHESIA (OUTPATIENT)
Dept: GASTROENTEROLOGY | Facility: CLINIC | Age: 53
End: 2018-04-18
Payer: COMMERCIAL

## 2018-04-18 ENCOUNTER — SURGERY (OUTPATIENT)
Age: 53
End: 2018-04-18

## 2018-04-18 ENCOUNTER — HOSPITAL ENCOUNTER (OUTPATIENT)
Facility: CLINIC | Age: 53
Discharge: HOME | End: 2018-04-18
Attending: SURGERY | Admitting: SURGERY
Payer: COMMERCIAL

## 2018-04-18 VITALS
TEMPERATURE: 98 F | WEIGHT: 245 LBS | BODY MASS INDEX: 28.35 KG/M2 | SYSTOLIC BLOOD PRESSURE: 140 MMHG | HEIGHT: 78 IN | RESPIRATION RATE: 20 BRPM | DIASTOLIC BLOOD PRESSURE: 90 MMHG | OXYGEN SATURATION: 97 %

## 2018-04-18 LAB — COLONOSCOPY: NORMAL

## 2018-04-18 PROCEDURE — 25000128 H RX IP 250 OP 636: Performed by: NURSE ANESTHETIST, CERTIFIED REGISTERED

## 2018-04-18 PROCEDURE — 25000125 ZZHC RX 250: Performed by: NURSE ANESTHETIST, CERTIFIED REGISTERED

## 2018-04-18 PROCEDURE — 45378 DIAGNOSTIC COLONOSCOPY: CPT | Performed by: SURGERY

## 2018-04-18 PROCEDURE — G0105 COLORECTAL SCRN; HI RISK IND: HCPCS | Performed by: SURGERY

## 2018-04-18 PROCEDURE — 37000008 ZZH ANESTHESIA TECHNICAL FEE, 1ST 30 MIN: Performed by: SURGERY

## 2018-04-18 RX ORDER — LIDOCAINE 40 MG/G
CREAM TOPICAL
Status: DISCONTINUED | OUTPATIENT
Start: 2018-04-18 | End: 2018-04-18 | Stop reason: HOSPADM

## 2018-04-18 RX ORDER — ONDANSETRON 2 MG/ML
4 INJECTION INTRAMUSCULAR; INTRAVENOUS
Status: DISCONTINUED | OUTPATIENT
Start: 2018-04-18 | End: 2018-04-18 | Stop reason: HOSPADM

## 2018-04-18 RX ORDER — PROPOFOL 10 MG/ML
INJECTION, EMULSION INTRAVENOUS CONTINUOUS PRN
Status: DISCONTINUED | OUTPATIENT
Start: 2018-04-18 | End: 2018-04-18

## 2018-04-18 RX ORDER — LIDOCAINE HYDROCHLORIDE 10 MG/ML
INJECTION, SOLUTION INFILTRATION; PERINEURAL PRN
Status: DISCONTINUED | OUTPATIENT
Start: 2018-04-18 | End: 2018-04-18

## 2018-04-18 RX ORDER — SODIUM CHLORIDE, SODIUM LACTATE, POTASSIUM CHLORIDE, CALCIUM CHLORIDE 600; 310; 30; 20 MG/100ML; MG/100ML; MG/100ML; MG/100ML
INJECTION, SOLUTION INTRAVENOUS CONTINUOUS
Status: DISCONTINUED | OUTPATIENT
Start: 2018-04-18 | End: 2018-04-18 | Stop reason: HOSPADM

## 2018-04-18 RX ORDER — PROPOFOL 10 MG/ML
INJECTION, EMULSION INTRAVENOUS PRN
Status: DISCONTINUED | OUTPATIENT
Start: 2018-04-18 | End: 2018-04-18

## 2018-04-18 RX ADMIN — LIDOCAINE HYDROCHLORIDE 50 MG: 10 INJECTION, SOLUTION INFILTRATION; PERINEURAL at 07:33

## 2018-04-18 RX ADMIN — LIDOCAINE HYDROCHLORIDE 1 ML: 10 INJECTION, SOLUTION EPIDURAL; INFILTRATION; INTRACAUDAL; PERINEURAL at 06:49

## 2018-04-18 RX ADMIN — PROPOFOL 100 MG: 10 INJECTION, EMULSION INTRAVENOUS at 07:33

## 2018-04-18 RX ADMIN — SODIUM CHLORIDE, POTASSIUM CHLORIDE, SODIUM LACTATE AND CALCIUM CHLORIDE: 600; 310; 30; 20 INJECTION, SOLUTION INTRAVENOUS at 06:49

## 2018-04-18 RX ADMIN — PROPOFOL 200 MCG/KG/MIN: 10 INJECTION, EMULSION INTRAVENOUS at 07:35

## 2018-04-18 NOTE — ANESTHESIA POSTPROCEDURE EVALUATION
Patient: Sarath Gray    Procedure(s):  colonoscopy - Wound Class: II-Clean Contaminated    Diagnosis:screening  Diagnosis Additional Information: No value filed.    Anesthesia Type:  General, MAC    Note:  Anesthesia Post Evaluation    Patient location during evaluation: Bedside  Patient participation: Able to fully participate in evaluation  Level of consciousness: sleepy but conscious  Pain management: adequate  Airway patency: patent  Cardiovascular status: stable  Respiratory status: nasal cannula  Hydration status: stable  PONV: none     Anesthetic complications: None          Last vitals:  Vitals:    04/18/18 0636 04/18/18 0800   BP: (!) 152/95 118/89   Resp: 18 20   Temp: 36.7  C (98  F)    SpO2: 98% 98%         Electronically Signed By: MADDIE Wells CRNA  April 18, 2018  8:13 AM

## 2018-04-18 NOTE — ANESTHESIA CARE TRANSFER NOTE
Patient: Sarath Gray    Procedure(s):  colonoscopy - Wound Class: II-Clean Contaminated    Diagnosis: screening  Diagnosis Additional Information: No value filed.    Anesthesia Type:   General, MAC     Note:  Airway :Nasal Cannula  Patient transferred to:Phase II  Handoff Report: Identifed the Patient, Identified the Reponsible Provider, Reviewed the pertinent medical history, Discussed the surgical course, Reviewed Intra-OP anesthesia mangement and issues during anesthesia, Set expectations for post-procedure period and Allowed opportunity for questions and acknowledgement of understanding      Vitals: (Last set prior to Anesthesia Care Transfer)    CRNA VITALS  4/18/2018 0724 - 4/18/2018 0754      4/18/2018             Pulse: 75    Ht Rate: 74    SpO2: 97 %                Electronically Signed By: MADDIE Wells CRNA  April 18, 2018  7:54 AM

## 2018-05-11 ENCOUNTER — OFFICE VISIT (OUTPATIENT)
Dept: FAMILY MEDICINE | Facility: CLINIC | Age: 53
End: 2018-05-11
Payer: COMMERCIAL

## 2018-05-11 ENCOUNTER — RADIANT APPOINTMENT (OUTPATIENT)
Dept: GENERAL RADIOLOGY | Facility: CLINIC | Age: 53
End: 2018-05-11
Attending: FAMILY MEDICINE
Payer: COMMERCIAL

## 2018-05-11 VITALS
HEART RATE: 84 BPM | SYSTOLIC BLOOD PRESSURE: 168 MMHG | DIASTOLIC BLOOD PRESSURE: 92 MMHG | HEIGHT: 78 IN | BODY MASS INDEX: 29.04 KG/M2 | WEIGHT: 251 LBS | TEMPERATURE: 97.7 F

## 2018-05-11 DIAGNOSIS — F17.200 TOBACCO USE DISORDER: ICD-10-CM

## 2018-05-11 DIAGNOSIS — M25.532 PAIN IN BOTH WRISTS: ICD-10-CM

## 2018-05-11 DIAGNOSIS — J18.9 PNEUMONIA OF RIGHT LUNG DUE TO INFECTIOUS ORGANISM, UNSPECIFIED PART OF LUNG: ICD-10-CM

## 2018-05-11 DIAGNOSIS — Z00.00 ROUTINE GENERAL MEDICAL EXAMINATION AT A HEALTH CARE FACILITY: Primary | ICD-10-CM

## 2018-05-11 DIAGNOSIS — M25.531 PAIN IN BOTH WRISTS: ICD-10-CM

## 2018-05-11 DIAGNOSIS — I10 HYPERTENSION GOAL BP (BLOOD PRESSURE) < 140/90: ICD-10-CM

## 2018-05-11 DIAGNOSIS — Z13.6 CARDIOVASCULAR SCREENING; LDL GOAL LESS THAN 130: Chronic | ICD-10-CM

## 2018-05-11 LAB
ANION GAP SERPL CALCULATED.3IONS-SCNC: 6 MMOL/L (ref 3–14)
BUN SERPL-MCNC: 15 MG/DL (ref 7–30)
CALCIUM SERPL-MCNC: 8.8 MG/DL (ref 8.5–10.1)
CHLORIDE SERPL-SCNC: 105 MMOL/L (ref 94–109)
CHOLEST SERPL-MCNC: 199 MG/DL
CO2 SERPL-SCNC: 25 MMOL/L (ref 20–32)
CREAT SERPL-MCNC: 0.99 MG/DL (ref 0.66–1.25)
GFR SERPL CREATININE-BSD FRML MDRD: 79 ML/MIN/1.7M2
GLUCOSE SERPL-MCNC: 85 MG/DL (ref 70–99)
HDLC SERPL-MCNC: 70 MG/DL
LDLC SERPL CALC-MCNC: 112 MG/DL
NONHDLC SERPL-MCNC: 129 MG/DL
POTASSIUM SERPL-SCNC: 3.8 MMOL/L (ref 3.4–5.3)
SODIUM SERPL-SCNC: 136 MMOL/L (ref 133–144)
TRIGL SERPL-MCNC: 83 MG/DL

## 2018-05-11 PROCEDURE — 80061 LIPID PANEL: CPT | Performed by: FAMILY MEDICINE

## 2018-05-11 PROCEDURE — 73110 X-RAY EXAM OF WRIST: CPT | Mod: LT

## 2018-05-11 PROCEDURE — 80048 BASIC METABOLIC PNL TOTAL CA: CPT | Performed by: FAMILY MEDICINE

## 2018-05-11 PROCEDURE — 87389 HIV-1 AG W/HIV-1&-2 AB AG IA: CPT | Performed by: FAMILY MEDICINE

## 2018-05-11 PROCEDURE — 36415 COLL VENOUS BLD VENIPUNCTURE: CPT | Performed by: FAMILY MEDICINE

## 2018-05-11 PROCEDURE — 99396 PREV VISIT EST AGE 40-64: CPT | Performed by: FAMILY MEDICINE

## 2018-05-11 RX ORDER — ALBUTEROL SULFATE 90 UG/1
2 AEROSOL, METERED RESPIRATORY (INHALATION) EVERY 4 HOURS PRN
Qty: 1 INHALER | Refills: 11 | Status: SHIPPED | OUTPATIENT
Start: 2018-05-11 | End: 2019-11-27

## 2018-05-11 RX ORDER — AMLODIPINE BESYLATE 10 MG/1
10 TABLET ORAL DAILY
Qty: 90 TABLET | Refills: 3 | Status: SHIPPED | OUTPATIENT
Start: 2018-05-11 | End: 2019-05-22

## 2018-05-11 RX ORDER — LISINOPRIL 20 MG/1
20 TABLET ORAL DAILY
Qty: 90 TABLET | Refills: 3 | Status: SHIPPED | OUTPATIENT
Start: 2018-05-11 | End: 2019-05-22

## 2018-05-11 ASSESSMENT — PATIENT HEALTH QUESTIONNAIRE - PHQ9
10. IF YOU CHECKED OFF ANY PROBLEMS, HOW DIFFICULT HAVE THESE PROBLEMS MADE IT FOR YOU TO DO YOUR WORK, TAKE CARE OF THINGS AT HOME, OR GET ALONG WITH OTHER PEOPLE: NOT DIFFICULT AT ALL
SUM OF ALL RESPONSES TO PHQ QUESTIONS 1-9: 3
SUM OF ALL RESPONSES TO PHQ QUESTIONS 1-9: 3

## 2018-05-11 NOTE — MR AVS SNAPSHOT
After Visit Summary   5/11/2018    Sarath Gray    MRN: [id number]           Patient Information     Date Of Birth          1965        Visit Information        Provider Department      5/11/2018 10:40 AM Maik Smith MD [facility]        Today's Diagnoses     Routine general medical examination at a health care facility    -  1    Hypertension goal BP (blood pressure) < 140/90        Pneumonia of right lung due to infectious organism, unspecified part of lung        Pain in both wrists        CARDIOVASCULAR SCREENING; LDL GOAL LESS THAN 130        Tobacco use disorder          Care Instructions      Preventive Health Recommendations  Male Ages 50 - 64    Yearly exam:             See your health care provider every year in order to  o   Review health changes.   o   Discuss preventive care.    o   Review your medicines if your doctor has prescribed any.     Have a cholesterol test every 5 years, or more frequently if you are at risk for high cholesterol/heart disease.     Have a diabetes test (fasting glucose) every three years. If you are at risk for diabetes, you should have this test more often.     Have a colonoscopy at age 50, or have a yearly FIT test (stool test). These exams will check for colon cancer.      Talk with your health care provider about whether or not a prostate cancer screening test (PSA) is right for you.    You should be tested each year for STDs (sexually transmitted diseases), if you re at risk.     Shots: Get a flu shot each year. Get a tetanus shot every 10 years.     Nutrition:    Eat at least 5 servings of fruits and vegetables daily.     Eat whole-grain bread, whole-wheat pasta and brown rice instead of white grains and rice.     Talk to your provider about Calcium and Vitamin D.     Lifestyle    Exercise for at least 150 minutes a week (30 minutes a day, 5 days a week). This will help you control your weight and prevent disease.     Limit  alcohol to one drink per day.     No smoking.     Wear sunscreen to prevent skin cancer.     See your dentist every six months for an exam and cleaning.     See your eye doctor every 1 to 2 years.  ASSESSMENT/PLAN:                                                    Lifestyle recommendations:stop smoking/tobacco use  regular exercise, at least 150 minutes per week (average 30 minutes 5 times a week)  The following exams/tests were recommended and discussed for health maintenance:  Colon cancer screening recommended 5-10 years  PSA optional screening for prostate cancer.     (Z00.00) Routine general medical examination at a health care facility  (primary encounter diagnosis)    (I10) Hypertension goal BP (blood pressure) < 140/90  Comment: high today  Plan: amLODIPine (NORVASC) 10 MG tablet, lisinopril         (PRINIVIL/ZESTRIL) 20 MG tablet        REfills on both medications.   MOnitor blood pressure when you are on your medications.   Monitor BP periodically.  This can be done at home, in clinic, at our pharmacy, at a store or by neighbor or relative with a blood pressure cuff.  You should be sitting and relaxed for several minutes when taking blood pressure.   Goal BP (most readings) should be less than 140/90    Normal blood pressure is 120/80.    If your blood pressure is consistently at or above the goal, some changes should be made with lifestyle or medication to keep blood pressure under good control.    High blood pressure over time can lead to eye and kidney damage and increase risk for heart attacks and strokes.   Let us know if readings are often high, so we can help get your blood pressure under good control.      (J18.9) Pneumonia of right lung due to infectious organism, unspecified part of lung  Comment:   Plan: albuterol (PROAIR HFA) 108 (90 Base) MCG/ACT         Inhaler        REfill on prn albuteral inhaler use.     (M25.531,  M25.532) Pain in both wrists  Comment: possible osteoarthritis of wrists.   There may be soft tissue component like tendonitis.   Plan: XR Wrist Left G/E 3 Views        Check xrays today of left wrist (the worst one) looking for arthritis.      We could do consult with hand surgeon.     Pain and anti-inflammatory medication options that can help for the wrist pain:  Ibuprofen 200mg OTC may be taken up to 4 pills 4 times a day to the maximum of 3200mg or 16 pills daily as needed for pain.     Naproxen (Aleve) OTC may be taken up to 2 pills 2 times a day to the maximum of 4 pills daily as needed for pain.     Aspirin may be taken up to 2-3 pills every 4 hours as needed for pain.     Take only one type of these at a time and take with food as they all can irritate stomach and cause ulcers.      Other pain medication:  Acetaminophen (Tylenol) may be taken as needed for pain and fever.  The maximum doses are listed below, around 3000mg a day.  Regular strength pills are 325mg.  The maximum daily dose is two pills (650mg) every 4 to 6 hours up to 3250mg a day.   Extra strength pills are 500mg each.  The maximum dose is two pills (1000mg) every 6 hours as needed up to 3000mg a day.   Extended release pills are 650mg each.  The maximum dose is two pills (1300mg) every 8 hours as needed up to 3900mg a day.     Watch out for acetaminophen in other over the counter or prescription medications.      Too much acetaminophen can lead to serious liver damage.  It is OK to take acetaminophen with above anti-inflammatory medications.      (Z13.6) CARDIOVASCULAR SCREENING; LDL GOAL LESS THAN 130  Comment:   Plan: Check fasting blood tests today.     (F17.200) Tobacco use disorder  Comment:   Plan: I recommended quitting smoking.  Reviewed various medications available to help quit smoking including Chantix, Zyban or nicotine replacement products.  Offered assistance in stopping smoking when needed.           Follow-ups after your visit        Future tests that were ordered for you today     Open Future  "Orders        Priority Expected Expires Ordered    **Hepatitis C Screen Reflex to RNA FUTURE anytime Routine 2018    XR Wrist Left G/E 3 Views Routine 2018            Who to contact     If you have questions or need follow up information about today's clinic visit or your schedule please contact [facility] directly at [phone].  Normal or non-critical lab and imaging results will be communicated to you by [employer], letter or phone within 4 business days after the clinic has received the results. If you do not hear from us within 7 days, please contact the clinic through [employer] or phone. If you have a critical or abnormal lab result, we will notify you by phone as soon as possible.  Submit refill requests through [employer] or call your pharmacy and they will forward the refill request to us. Please allow 3 business days for your refill to be completed.          Additional Information About Your Visit        [employer] Information     [employer] lets you send messages to your doctor, view your test results, renew your prescriptions, schedule appointments and more. To sign up, go to [email or web address] . Click on \"Log in\" on the left side of the screen, which will take you to the Welcome page. Then click on \"Sign up Now\" on the right side of the page.     You will be asked to enter the access code listed below, as well as some personal information. Please follow the directions to create your username and password.     Your access code is: [email or web address]  Expires: 2018 11:48 AM     Your access code will  in 90 days. If you need help or a new code, please call your [facility] clinic or [phone].        Care EveryWhere ID     This is your Care EveryWhere ID. This could be used by other organizations to access your [facility] medical records  [id number]        Your Vitals Were     Pulse Temperature Height BMI (Body Mass Index)          84 97.7 " " F (36.5  C) (Tympanic) 6' 10\" (2.083 m) 26.25 kg/m2         Blood Pressure from Last 3 Encounters:   05/11/18 (!) 168/92   04/18/18 140/90   10/03/17 138/88    Weight from Last 3 Encounters:   05/11/18 251 lb (113.9 kg)   04/18/18 245 lb (111.1 kg)   10/03/17 240 lb (108.9 kg)              We Performed the Following     Basic metabolic panel     HIV Antigen Antibody Combo     Lipid panel reflex to direct LDL Fasting          Today's Medication Changes          These changes are accurate as of 5/11/18 11:48 AM.  If you have any questions, ask your nurse or doctor.               Stop taking these medicines if you haven't already. Please contact your care team if you have questions.     HYDROcodone-acetaminophen 5-325 MG per tablet   Commonly known as:  NORCO   Stopped by:  Maik Smith MD           levofloxacin 750 MG tablet   Commonly known as:  LEVAQUIN   Stopped by:  Maik Smith MD                Where to get your medicines      These medications were sent to [address] [address]     Phone:  [phone]     albuterol 108 (90 Base) MCG/ACT Inhaler    amLODIPine 10 MG tablet    lisinopril 20 MG tablet                Primary Care Provider Office Phone # Fax #    Maik Smith [phone] [address] [address]        Equal Access to Services     [facility] Center[facility]OL : Hadii aad ku hadasho Soomaali, waaxda luqadaha, qaybta kaalmada adeegyada, waxay dominik haylibby herrmann . So [facility] [phone].    ATENCIÓN: Si habla español, tiene a alexandra disposición servicios gratuitos de asistencia lingüística. Llame al [phone].    We comply with applicable federal civil rights laws and [place] laws. We do not discriminate on the basis of race, color, national origin, age, disability, sex, sexual orientation, or gender identity.            Thank you!     Thank you for choosing [facility] [place]  for your care. Our goal is always to provide you with excellent care. Hearing back from our patients is one way we can continue to improve our services. Please take a few minutes to complete the written survey that you may receive in the mail after your visit with us. Thank you!             Your Updated Medication List - Protect others around you: Learn how to safely use, store and throw away your medicines at www.disposemymeds.org.          This list is accurate as of 5/11/18 11:48 AM.  Always use your most recent med list.                   Brand Name Dispense Instructions for use Diagnosis    albuterol 108 (90 Base) MCG/ACT Inhaler    PROAIR HFA    1 Inhaler    Inhale 2 puffs into the lungs every 4 hours as needed for shortness of breath / dyspnea or wheezing    Pneumonia of right lung due to infectious organism, unspecified part of lung       amLODIPine 10 MG tablet    NORVASC    90 tablet    Take 1 tablet (10 mg) by mouth daily    Hypertension goal BP (blood pressure) < 140/90       lisinopril 20 MG tablet    PRINIVIL/ZESTRIL    90 tablet    Take 1 tablet (20 mg) by mouth daily    Hypertension goal BP (blood pressure) < 140/90       order for DME      Equipment being ordered: CPAP Patient Sarath Gray was [facility]  on April 21, 2016. Patient received a Resmed AirSense 10 Auto. Pressures were set at Auto 5 - 15 cm H2O.   Patient?s ramp is 5 cm H2O for Auto and FLEX/EPR is 2.  Patient received a [employer] Mask name: SIMPLUS  Full Face mask Size Large, heated tubing and heated humidifier.  Patient is enrolled in the [employer] Program and does not need to meet compliance. Patient has a follow up on June 14TH AT 9 AM with YADIRA Resendez.   Jennifer Sam        order for DME     1 Device    Equipment being ordered: Other: CPM machine for home use. Set max tolerance and increase 3-5 degrees/day as tolerated. Use up to 6-8 hours/day as tolerated. Treatment Diagnosis: left TKA    S/P total  knee arthroplasty, left

## 2018-05-11 NOTE — PATIENT INSTRUCTIONS
Preventive Health Recommendations  Male Ages 50   64    Yearly exam:             See your health care provider every year in order to  o   Review health changes.   o   Discuss preventive care.    o   Review your medicines if your doctor has prescribed any.     Have a cholesterol test every 5 years, or more frequently if you are at risk for high cholesterol/heart disease.     Have a diabetes test (fasting glucose) every three years. If you are at risk for diabetes, you should have this test more often.     Have a colonoscopy at age 50, or have a yearly FIT test (stool test). These exams will check for colon cancer.      Talk with your health care provider about whether or not a prostate cancer screening test (PSA) is right for you.    You should be tested each year for STDs (sexually transmitted diseases), if you re at risk.     Shots: Get a flu shot each year. Get a tetanus shot every 10 years.     Nutrition:    Eat at least 5 servings of fruits and vegetables daily.     Eat whole-grain bread, whole-wheat pasta and brown rice instead of white grains and rice.     Talk to your provider about Calcium and Vitamin D.     Lifestyle    Exercise for at least 150 minutes a week (30 minutes a day, 5 days a week). This will help you control your weight and prevent disease.     Limit alcohol to one drink per day.     No smoking.     Wear sunscreen to prevent skin cancer.     See your dentist every six months for an exam and cleaning.     See your eye doctor every 1 to 2 years.  ASSESSMENT/PLAN:                                                    Lifestyle recommendations:stop smoking/tobacco use  regular exercise, at least 150 minutes per week (average 30 minutes 5 times a week)  The following exams/tests were recommended and discussed for health maintenance:  Colon cancer screening recommended 5-10 years  PSA optional screening for prostate cancer.     (Z00.00) Routine general medical examination at a health care facility   (primary encounter diagnosis)    (I10) Hypertension goal BP (blood pressure) < 140/90  Comment: high today  Plan: amLODIPine (NORVASC) 10 MG tablet, lisinopril         (PRINIVIL/ZESTRIL) 20 MG tablet        REfills on both medications.   MOnitor blood pressure when you are on your medications.   Monitor BP periodically.  This can be done at home, in clinic, at our pharmacy, at a store or by neighbor or relative with a blood pressure cuff.  You should be sitting and relaxed for several minutes when taking blood pressure.   Goal BP (most readings) should be less than 140/90    Normal blood pressure is 120/80.    If your blood pressure is consistently at or above the goal, some changes should be made with lifestyle or medication to keep blood pressure under good control.    High blood pressure over time can lead to eye and kidney damage and increase risk for heart attacks and strokes.   Let us know if readings are often high, so we can help get your blood pressure under good control.      (J18.9) Pneumonia of right lung due to infectious organism, unspecified part of lung  Comment:   Plan: albuterol (PROAIR HFA) 108 (90 Base) MCG/ACT         Inhaler        REfill on prn albuteral inhaler use.     (M25.531,  M25.532) Pain in both wrists  Comment: possible osteoarthritis of wrists.  There may be soft tissue component like tendonitis.   Plan: XR Wrist Left G/E 3 Views        Check xrays today of left wrist (the worst one) looking for arthritis.      We could do consult with hand surgeon.     Pain and anti-inflammatory medication options that can help for the wrist pain:  Ibuprofen 200mg OTC may be taken up to 4 pills 4 times a day to the maximum of 3200mg or 16 pills daily as needed for pain.     Naproxen (Aleve) OTC may be taken up to 2 pills 2 times a day to the maximum of 4 pills daily as needed for pain.     Aspirin may be taken up to 2-3 pills every 4 hours as needed for pain.     Take only one type of these at a  time and take with food as they all can irritate stomach and cause ulcers.      Other pain medication:  Acetaminophen (Tylenol) may be taken as needed for pain and fever.  The maximum doses are listed below, around 3000mg a day.  Regular strength pills are 325mg.  The maximum daily dose is two pills (650mg) every 4 to 6 hours up to 3250mg a day.   Extra strength pills are 500mg each.  The maximum dose is two pills (1000mg) every 6 hours as needed up to 3000mg a day.   Extended release pills are 650mg each.  The maximum dose is two pills (1300mg) every 8 hours as needed up to 3900mg a day.     Watch out for acetaminophen in other over the counter or prescription medications.      Too much acetaminophen can lead to serious liver damage.  It is OK to take acetaminophen with above anti-inflammatory medications.      (Z13.6) CARDIOVASCULAR SCREENING; LDL GOAL LESS THAN 130  Comment:   Plan: Check fasting blood tests today.     (F17.200) Tobacco use disorder  Comment:   Plan: I recommended quitting smoking.  Reviewed various medications available to help quit smoking including Chantix, Zyban or nicotine replacement products.  Offered assistance in stopping smoking when needed.

## 2018-05-11 NOTE — PROGRESS NOTES
SUBJECTIVE:   CC: Sarath Gray is an 52 year old male who presents for preventative health visit.   Chief Complaint   Patient presents with     Physical     Hypertension      Left knee after replacement does not bend well.   He has seen ortho about this.   Gets wrist pain from pushing himself off the ground.  Can get swelling in wrists.  Just bought some wrist sleeves.  Location: radial thumb.  He feels it is strain.    Taking ibuprofen 600-800 in AM.  Taking 2-3 times a day.     Needs blood pressure refilled.  He has been out of one medication for blood pressure for 5 days.   Has not checked blood pressure in a while.     Healthy Habits:  Annual Exam:  Getting at least 3 servings of Calcium per day:: Yes  Bi-annual eye exam:: NO  Dental care twice a year:: NO  Sleep apnea or symptoms of sleep apnea:: Sleep apnea-uses CPAP every night.   Diet:: Regular (no restrictions)  Taking medications regularly:: Yes  Medication side effects:: None  Additional concerns today:: YES  PHQ-2 Score: 3  If you checked off any problems, how difficult have these problems made it for you to do your work, take care of things at home, or get along with other people?: Not difficult at all  PHQ9 TOTAL SCORE: 3    No current exercise.  Works construction.     Hypertension Follow-up      Outpatient blood pressures are not being checked.    Low Salt Diet: no added salt    Today's PHQ-2 Score:   PHQ-2 ( 1999 [employer]) 5/11/2018 10/25/2013   Q1: Little interest or pleasure in doing things - 0   Q2: Feeling down, depressed or hopeless - 0   PHQ-2 Score - 0   Q1: Little interest or pleasure in doing things Nearly every day -   Q2: Feeling down, depressed or hopeless Not at all -   PHQ-2 Score 3 -       Abuse: Current or Past(Physical, Sexual or Emotional)- No  Do you feel safe in your environment - Yes    Social History   Substance Use Topics     Smoking status: Light Tobacco Smoker     Packs/day: 1.00     Years: 18.00     Smokeless tobacco:  Never Used      Comment: Started at age 30.      Alcohol use 0.0 oz/week     0 Standard drinks or equivalent per week      Comment: occassional beer    Continues to smoke 1ppd.   Has thoughts about quitting.  Has not tried medication in the past.  Had some prescription which he ha not used.     Feels he is under a lot of stress.     If you drink alcohol do you typically have >3 drinks per day or >7 drinks per week? No                      Last PSA: No results found for: PSA  Patient Active Problem List    Diagnosis Date Noted     CARROLL (obstructive sleep apnea) 03/29/2016     Priority: Medium     4/11/2016. AHI 57, positional effect noted, lowest oxygen saturation was 79, time below 88% was 32 minutes  1/25/2001. AHI was 8 events per hour. Respiratory arousal index was 45, lowest oxygen saturation was 85%. He weighed 235 lbs       Essential hypertension, benign 01/08/2016     Priority: Medium     S/P total knee arthroplasty 06/11/2014     Priority: Medium     Left knee DJD 06/11/2014     Priority: Medium     History of PSVT (paroxysmal supraventricular tachycardia) 05/01/2012     Priority: Medium     WITH ABLATION 2008       CARDIOVASCULAR SCREENING; LDL GOAL LESS THAN 130 10/31/2010     Priority: Medium     Low back pain 01/23/2009     Priority: Medium     12/5/2011:Three prior low back surgeries.          Impotence of organic origin 04/02/2008     Priority: Medium     Esophageal reflux 02/24/2005     Priority: Medium     Tobacco use disorder 02/24/2005     Priority: Medium      Family history:  CV disease: father  Prostate cancer: no  Colon cancer: no    Multivitamin or Vit D use: MVI daily, B complex.     Vaccines:current     Past Colon cancer screening:April, 2018, normal.  Was advised 5 year follow-up due to sister having polyps.     ROS:  General: No change in weight, sleep or appetite.  Normal energy.  No fever or chills, POSITIVE for:, tired all the time from working.  10 hour days and side work on weekends.  "  Eyes: Negative for vision changes or eye problems  ENT: No problems with ears, nose or throat.  No difficulty swallowing.  Resp: POSITIVE for:, dyspnea on exertion, not feeling limiited with his breathing.   CV: No chest pains or palpitations  GI: POSITIVE for:, heartburn or reflux, taking omeprazole daily.  Bowels OK.   : No urinary frequency or dysuria, bladder or kidney problems  Musculoskeletal: see above   Neurologic: POSITIVE for:, Hx headaches, infrequent  Psychiatric: No problems with anxiety, depression or mental health  Heme/immune/allergy: POSITIVE  for:, pulmonary embolism in distant past after surgery.   Endocrine: No history of thyroid disease, diabetes or other endocrine disorders  Skin: No rashes,worrisome lesions or skin problems    OBJECTIVE:                                                    OBJECTIVE:Blood pressure (!) 168/92, pulse 84, temperature 97.7  F (36.5  C), temperature source Tympanic, height 6' 10\" (2.083 m), weight 251 lb (113.9 kg). BMI=Body mass index is 26.25 kg/(m^2).  GENERAL APPEARANCE ADULT: Alert, no acute distress  EYES: PERRL, EOM normal, conjunctiva and lids normal  HENT: Ears and TMs normal, oral mucosa and posterior oropharynx normal  NECK: No adenopathy,masses or thyromegaly  RESP: lungs clear to auscultation   CV: normal rate, regular rhythm, no murmur or gallop  ABDOMEN: soft, no organomegaly or masses , scars mid abdomen from previous surgery.  Some ventral hernia.    (male): declined  MS: He has tenderness both radial wrists and decreased range of motion both wrists       with pain particularly in flexion.  Finkelstein's test equivocal.    No pain in finger joints.     ASSESSMENT/PLAN:                                                    Lifestyle recommendations:stop smoking/tobacco use  regular exercise, at least 150 minutes per week (average 30 minutes 5 times a week)  The following exams/tests were recommended and discussed for health maintenance:  Colon " cancer screening recommended 5-10 years  PSA optional screening for prostate cancer.     (Z00.00) Routine general medical examination at a health care facility  (primary encounter diagnosis)    (I10) Hypertension goal BP (blood pressure) < 140/90  Comment: high today  Plan: amLODIPine (NORVASC) 10 MG tablet, lisinopril         (PRINIVIL/ZESTRIL) 20 MG tablet        REfills on both medications.   MOnitor blood pressure when you are on your medications.   Monitor BP periodically.  This can be done at home, in clinic, at our pharmacy, at a store or by neighbor or relative with a blood pressure cuff.  You should be sitting and relaxed for several minutes when taking blood pressure.   Goal BP (most readings) should be less than 140/90    Normal blood pressure is 120/80.    If your blood pressure is consistently at or above the goal, some changes should be made with lifestyle or medication to keep blood pressure under good control.    High blood pressure over time can lead to eye and kidney damage and increase risk for heart attacks and strokes.   Let us know if readings are often high, so we can help get your blood pressure under good control.      (J18.9) Pneumonia of right lung due to infectious organism, unspecified part of lung  Comment:   Plan: albuterol (PROAIR HFA) 108 (90 Base) MCG/ACT         Inhaler        REfill on prn albuteral inhaler use.     (M25.531,  M25.532) Pain in both wrists  Comment: possible osteoarthritis of wrists.  There may be soft tissue component like tendonitis.   Plan: XR Wrist Left G/E 3 Views        Check xrays today of left wrist (the worst one) looking for arthritis.      We could do consult with hand surgeon.     Pain and anti-inflammatory medication options that can help for the wrist pain:  Ibuprofen 200mg OTC may be taken up to 4 pills 4 times a day to the maximum of 3200mg or 16 pills daily as needed for pain.     Naproxen (Aleve) OTC may be taken up to 2 pills 2 times a day to the  "maximum of 4 pills daily as needed for pain.     Aspirin may be taken up to 2-3 pills every 4 hours as needed for pain.     Take only one type of these at a time and take with food as they all can irritate stomach and cause ulcers.      Other pain medication:  Acetaminophen (Tylenol) may be taken as needed for pain and fever.  The maximum doses are listed below, around 3000mg a day.  Regular strength pills are 325mg.  The maximum daily dose is two pills (650mg) every 4 to 6 hours up to 3250mg a day.   Extra strength pills are 500mg each.  The maximum dose is two pills (1000mg) every 6 hours as needed up to 3000mg a day.   Extended release pills are 650mg each.  The maximum dose is two pills (1300mg) every 8 hours as needed up to 3900mg a day.     Watch out for acetaminophen in other over the counter or prescription medications.      Too much acetaminophen can lead to serious liver damage.  It is OK to take acetaminophen with above anti-inflammatory medications.      (Z13.6) CARDIOVASCULAR SCREENING; LDL GOAL LESS THAN 130  Comment:   Plan: Check fasting blood tests today.     (F17.200) Tobacco use disorder  Comment:   Plan: I recommended quitting smoking.  Reviewed various medications available to help quit smoking including Chantix, Zyban or nicotine replacement products.  Offered assistance in stopping smoking when needed.         COUNSELING:       reports that he has been smoking.  He has a 18.00 pack-year smoking history. He has never used smokeless tobacco.  Tobacco Cessation Action Plan: Information offered: Patient not interested at this time  Estimated body mass index is 25 kg/(m^2) as calculated from the following:    Height as of 4/18/18: 6' 11\" (2.108 m).    Weight as of 4/18/18: 245 lb (111.1 kg).   Weight management plan: Discussed healthy diet and exercise guidelines and patient will follow up in 12 months in clinic to re-evaluate.    Counseling Resources:  ATP IV Guidelines  Pooled Cohorts Equation " Calculator  FRAX Risk Assessment  ICSI Preventive Guidelines  Dietary Guidelines for Americans, 2010  USDA's MyPlate  ASA Prophylaxis  Lung CA Screening    Maik Smith MD    [facility]

## 2018-05-12 ASSESSMENT — PATIENT HEALTH QUESTIONNAIRE - PHQ9: SUM OF ALL RESPONSES TO PHQ QUESTIONS 1-9: 3

## 2018-05-15 LAB — HIV 1+2 AB+HIV1 P24 AG SERPL QL IA: NONREACTIVE

## 2018-05-16 DIAGNOSIS — E78.00 ELEVATED LDL CHOLESTEROL LEVEL: ICD-10-CM

## 2018-05-16 DIAGNOSIS — M19.031 OSTEOARTHRITIS OF RIGHT WRIST, UNSPECIFIED OSTEOARTHRITIS TYPE: Primary | ICD-10-CM

## 2018-05-16 DIAGNOSIS — E78.00 ELEVATED LDL CHOLESTEROL LEVEL: Primary | ICD-10-CM

## 2018-05-16 RX ORDER — ATORVASTATIN CALCIUM 20 MG/1
20 TABLET, FILM COATED ORAL DAILY
Qty: 30 TABLET | Refills: 11 | Status: CANCELLED | OUTPATIENT
Start: 2018-05-16

## 2018-05-16 RX ORDER — ATORVASTATIN CALCIUM 20 MG/1
20 TABLET, FILM COATED ORAL DAILY
Qty: 90 TABLET | Refills: 3 | Status: SHIPPED | OUTPATIENT
Start: 2018-05-16 | End: 2019-06-14

## 2018-05-16 NOTE — PROGRESS NOTES
Please call.  HIV test for AIDS virus is negative.   LDL is a little high, other lipids are OK.   New cholesterol guidelines (from AHA/ACC pooled risk calculation) estimates 10 year risk of having a heart attack at about 11.6%.  Any risk over 7.5% is considered significant and statin type medication is recommended to prevent heart attacks.    Things that can decrease his risk for heart attacks include quitting smoking and taking medication to lower cholesterol.   We could send prescription for atorvastatin 20mg daily if he would like.    prescription #30 with 11 refills.     The 10-year ASCVD risk score (Fentonjohn MINOR Jr, et al., 2013) is: 11.6%    Values used to calculate the score:      Age: 52 years      Sex: Male      Is Non- : No      Diabetic: No      Tobacco smoker: Yes      Systolic Blood Pressure: 168 mmHg      Is BP treated: Yes      HDL Cholesterol: 70 mg/dL      Total Cholesterol: 199 mg/dL

## 2018-05-16 NOTE — TELEPHONE ENCOUNTER
Per lab result on 5/15/2018:     Notes Recorded by Maik Smith MD on 5/15/2018 at 9:09 PM  Please call.  HIV test for AIDS virus is negative.   LDL is a little high, other lipids are OK.   New cholesterol guidelines (from AHA/ACC pooled risk calculation) estimates 10 year risk of having a heart attack at about 11.6%.  Any risk over 7.5% is considered significant and statin type medication is recommended to prevent heart attacks.    Things that can decrease his risk for heart attacks include quitting smoking and taking medication to lower cholesterol.   We could send prescription for atorvastatin 20mg daily if he would like.    prescription #30 with 11 refills.     Would like [facility].    Prescription is pended and pharmacy selected.     Jacquie Villagomez M.A.

## 2018-05-16 NOTE — PROGRESS NOTES
Please call.  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    PLAN: He could see hand surgeon if desired for consultation.  They can do injections.  We can do referral if desired.   The over the counter pain medications we discussed at his clinic visit can also help.  See AVS.

## 2018-07-30 ENCOUNTER — TELEPHONE (OUTPATIENT)
Dept: FAMILY MEDICINE | Facility: CLINIC | Age: 53
End: 2018-07-30

## 2018-07-30 NOTE — TELEPHONE ENCOUNTER
I am reviewing charts today and noticed that your blood pressure was high at your last visit in clinic. We are concerned about providing good health care. If you check your blood pressure at home, please send me a few readings.  If not, you can schedule an appointment with the clinic RN for a blood pressure check.  You can call [phone] to schedule.  There is no charge for the blood pressure check in clinic.   left message for patient to return call.  Alfreda Nam RN

## 2018-09-12 ENCOUNTER — TRANSFERRED RECORDS (OUTPATIENT)
Dept: HEALTH INFORMATION MANAGEMENT | Facility: CLINIC | Age: 53
End: 2018-09-12

## 2018-09-17 ENCOUNTER — TELEPHONE (OUTPATIENT)
Dept: FAMILY MEDICINE | Facility: CLINIC | Age: 53
End: 2018-09-17

## 2018-12-24 ENCOUNTER — TELEPHONE (OUTPATIENT)
Dept: FAMILY MEDICINE | Facility: CLINIC | Age: 53
End: 2018-12-24

## 2018-12-24 NOTE — TELEPHONE ENCOUNTER
Panel Management Review      Patient has the following on his problem list:     Hypertension   Last three blood pressure readings:  BP Readings from Last 3 Encounters:   05/11/18 (!) 168/92   04/18/18 140/90   10/03/17 138/88     Blood pressure: FAILED    HTN Guidelines:  Age 18-59 BP range:  Less than 140/90  Age 60-85 with Diabetes:  Less than 140/90  Age 60-85 without Diabetes:  less than 150/90      Composite cancer screening  Chart review shows that this patient is due/due soon for the following None  Summary:    Patient is due/failing the following:   BP CHECK    Action needed:   Patient needs nurse only appointment. Or to call with home readings.    Type of outreach:    Phone, left message for patient to call back.     Questions for provider review:    None                                                                                                                                    Roro Lancaster MA

## 2019-02-01 ENCOUNTER — ANCILLARY PROCEDURE (OUTPATIENT)
Dept: GENERAL RADIOLOGY | Facility: CLINIC | Age: 54
End: 2019-02-01
Payer: COMMERCIAL

## 2019-02-01 ENCOUNTER — HOSPITAL ENCOUNTER (OUTPATIENT)
Dept: ULTRASOUND IMAGING | Facility: CLINIC | Age: 54
Discharge: HOME | End: 2019-02-01
Attending: FAMILY MEDICINE | Admitting: FAMILY MEDICINE
Payer: COMMERCIAL

## 2019-02-01 ENCOUNTER — OFFICE VISIT (OUTPATIENT)
Dept: FAMILY MEDICINE | Facility: CLINIC | Age: 54
End: 2019-02-01
Payer: COMMERCIAL

## 2019-02-01 VITALS
SYSTOLIC BLOOD PRESSURE: 158 MMHG | OXYGEN SATURATION: 99 % | BODY MASS INDEX: 26.56 KG/M2 | WEIGHT: 254 LBS | DIASTOLIC BLOOD PRESSURE: 88 MMHG | HEART RATE: 79 BPM | TEMPERATURE: 97.3 F | RESPIRATION RATE: 14 BRPM

## 2019-02-01 DIAGNOSIS — M62.838 SPASM OF MUSCLE: ICD-10-CM

## 2019-02-01 DIAGNOSIS — R07.89 ATYPICAL CHEST PAIN: Primary | ICD-10-CM

## 2019-02-01 DIAGNOSIS — R10.11 RUQ ABDOMINAL PAIN: ICD-10-CM

## 2019-02-01 DIAGNOSIS — R07.89 ATYPICAL CHEST PAIN: ICD-10-CM

## 2019-02-01 DIAGNOSIS — R10.13 EPIGASTRIC PAIN: Primary | ICD-10-CM

## 2019-02-01 LAB
ALBUMIN SERPL-MCNC: 4.1 G/DL (ref 3.4–5)
ALP SERPL-CCNC: 41 U/L (ref 40–150)
ALT SERPL W P-5'-P-CCNC: 37 U/L (ref 0–70)
ANION GAP SERPL CALCULATED.3IONS-SCNC: 3 MMOL/L (ref 3–14)
AST SERPL W P-5'-P-CCNC: 22 U/L (ref 0–45)
BILIRUB SERPL-MCNC: 0.4 MG/DL (ref 0.2–1.3)
BUN SERPL-MCNC: 17 MG/DL (ref 7–30)
CALCIUM SERPL-MCNC: 9.1 MG/DL (ref 8.5–10.1)
CHLORIDE SERPL-SCNC: 102 MMOL/L (ref 94–109)
CO2 SERPL-SCNC: 30 MMOL/L (ref 20–32)
CREAT SERPL-MCNC: 0.98 MG/DL (ref 0.66–1.25)
D DIMER PPP FEU-MCNC: <0.3 UG/ML FEU (ref 0–0.5)
ERYTHROCYTE [DISTWIDTH] IN BLOOD BY AUTOMATED COUNT: 13.7 % (ref 10–15)
GFR SERPL CREATININE-BSD FRML MDRD: 87 ML/MIN/{1.73_M2}
GLUCOSE SERPL-MCNC: 90 MG/DL (ref 70–99)
HCT VFR BLD AUTO: 44.5 % (ref 40–53)
HGB BLD-MCNC: 15.1 G/DL (ref 13.3–17.7)
MCH RBC QN AUTO: 30.4 PG (ref 26.5–33)
MCHC RBC AUTO-ENTMCNC: 33.9 G/DL (ref 31.5–36.5)
MCV RBC AUTO: 90 FL (ref 78–100)
PLATELET # BLD AUTO: 218 10E9/L (ref 150–450)
POTASSIUM SERPL-SCNC: 4 MMOL/L (ref 3.4–5.3)
PROT SERPL-MCNC: 7.6 G/DL (ref 6.8–8.8)
RBC # BLD AUTO: 4.97 10E12/L (ref 4.4–5.9)
SODIUM SERPL-SCNC: 135 MMOL/L (ref 133–144)
WBC # BLD AUTO: 8.9 10E9/L (ref 4–11)

## 2019-02-01 PROCEDURE — 85379 FIBRIN DEGRADATION QUANT: CPT | Performed by: NURSE PRACTITIONER

## 2019-02-01 PROCEDURE — 85027 COMPLETE CBC AUTOMATED: CPT | Performed by: NURSE PRACTITIONER

## 2019-02-01 PROCEDURE — 76700 US EXAM ABDOM COMPLETE: CPT

## 2019-02-01 PROCEDURE — 99214 OFFICE O/P EST MOD 30 MIN: CPT | Performed by: NURSE PRACTITIONER

## 2019-02-01 PROCEDURE — 80053 COMPREHEN METABOLIC PANEL: CPT | Performed by: NURSE PRACTITIONER

## 2019-02-01 PROCEDURE — 71046 X-RAY EXAM CHEST 2 VIEWS: CPT

## 2019-02-01 PROCEDURE — 93000 ELECTROCARDIOGRAM COMPLETE: CPT | Performed by: NURSE PRACTITIONER

## 2019-02-01 PROCEDURE — 36415 COLL VENOUS BLD VENIPUNCTURE: CPT | Performed by: NURSE PRACTITIONER

## 2019-02-01 RX ORDER — OMEPRAZOLE 40 MG/1
40 CAPSULE, DELAYED RELEASE ORAL 2 TIMES DAILY
Qty: 60 CAPSULE | Refills: 3 | Status: SHIPPED | OUTPATIENT
Start: 2019-02-01 | End: 2019-06-12

## 2019-02-01 RX ORDER — OMEPRAZOLE 40 MG/1
40 CAPSULE, DELAYED RELEASE ORAL DAILY
Qty: 31 CAPSULE | Refills: 5 | Status: SHIPPED | OUTPATIENT
Start: 2019-02-01 | End: 2019-02-01

## 2019-02-01 RX ORDER — CYCLOBENZAPRINE HCL 10 MG
10 TABLET ORAL 3 TIMES DAILY PRN
Qty: 30 TABLET | Refills: 0 | Status: SHIPPED | OUTPATIENT
Start: 2019-02-01 | End: 2019-02-11

## 2019-02-01 RX ORDER — MELOXICAM 15 MG/1
TABLET ORAL
COMMUNITY
Start: 2019-01-29 | End: 2020-12-14

## 2019-02-01 NOTE — PROGRESS NOTES
SUBJECTIVE:   Sarath Gray is a 53 year old male who presents to clinic today for the following health issues:      Chest pain SYMPTOMS      Duration: 2 days     Description  Right sided chest pain    Severity: severe    Accompanying signs and symptoms: None    History (predisposing factors):  none    Precipitating or alleviating factors: None    Therapies tried and outcome:  none      PE after surgery in the 90's  No family history of clot.  Right sided   Movement doesn't affect it.   Feels like someone is stabbing in the right upper chest.   No chest pain   Smoker for years.   No cold like symptoms  No GI / symptoms.   Sleep ok. Not waking up.   Sleeping in a recliner. Heart burn occasionally.   No constipation. Looser stools lately  Epigastric and RUQ discomfort.     -------------------------------------    Problem list and histories reviewed & adjusted, as indicated.  Additional history: as documented    Labs reviewed in EPIC    Reviewed and updated as needed this visit by clinical staff  Allergies  Meds       Reviewed and updated as needed this visit by Provider         ROS:   ROS: 10 point ROS neg other than the symptoms noted above in the HPI.      OBJECTIVE:                                                    /88   Pulse 79   Temp 97.3  F (36.3  C) (Tympanic)   Resp 14   Wt 115.2 kg (254 lb)   SpO2 99%   BMI 26.56 kg/m    Body mass index is 26.56 kg/m .   GENERAL: healthy, alert, well nourished, well hydrated, no distress  HENT: ear canals- normal; TMs- normal; Nose- normal; Mouth- no ulcers, no lesions  NECK: no tenderness, no adenopathy, no asymmetry, no masses, no stiffness; thyroid- normal to palpation  RESP: lungs clear to auscultation - no rales, no rhonchi, no wheezes  CV: regular rates and rhythm, normal S1 S2, no S3 or S4 and no murmur, no click or rub -  ABDOMEN: RUQ and EPIGASTRIC  Tenderness, mild RLQ tenderness unchanged from post surgical pain.  no  hepatosplenomegaly, no  masses, normal bowel sounds  MUSC Spasming right anterior chest with movement.       Diagnostic Test Results:  EKG, CHEST XRAY, LABS pending      ASSESSMENT/PLAN:                                                    1. Atypical chest pain  - EKG 12-lead complete w/read - Clinics  - XR Chest 2 Views; Future  - CBC with platelets  - Comprehensive metabolic panel  - D dimer, quantitative    2. RUQ abdominal pain  - US Abdomen Complete; Future      Follow up with Provider - To [facility] for US. CT declined today.   Will contact with results as they become available.   Call or return to the clinic with any worsening of symptoms or no resolution. Patient/Parent verbalized understanding and is in agreement. Medication side effects reviewed.      See Patient Instructions    MADDIE Silveira [facility]

## 2019-02-01 NOTE — PROGRESS NOTES
Patient notified of results.   POC discussed.  Avoid NSAIDS stop meloxicam  Increase omeprazole to 40 mg bid.   Start flexeril 10 mg tid for spasming  simethicon for gas recommended.   Call or return to the clinic with any worsening of symptoms or no resolution. Patient/Parent verbalized understanding and is in agreement.   Thanks Rosalia Velasquez P-BC

## 2019-02-01 NOTE — PATIENT INSTRUCTIONS
Patient Education     * Abdominal Pain, Unknown Cause [Male]  Based on your visit today, the exact cause of your abdominal (stomach) pain is not clear. Your exam and tests do not indicate a dangerous cause at this time. However, the signs of a serious problem may take more time to appear. Although your exam was reassuring today, sometimes early in the course of many conditions, exam and lab tests can appear normal. Therefore, it is important for you to watch for any new symptoms or worsening of your condition.  Causes:  It may not be obvious what caused your symptoms. Pay attention to things that do seem to make your symptoms worse or better and discuss this with your doctor when you follow up.  Diagnosis:  The evaluation of abdominal pain in the emergency department may only require an exam by the doctor or it may include blood, urine or imaging studies, depending on many factors. Sometimes exams and tests can identify a cause but in many cases, a clear cause is not found. Further testing at follow up visits may help to suggest a clear diagnosis.  Home Care:    Rest as much as possible until your next exam.    Try to avoid any medications (unless otherwise directed by your doctor), foods, activities, or other factors that you may have contributed to your symptoms.    Try to eat foods that you know that you have tolerated well in the past. Certain diets may be recommended for some conditions that cause abdominal pain. However, since the cause of your symptoms may not be clear, discuss your diet more with your primary care provider or specialist for further recommendations.     Eating several small meals per day as opposed to 2 or 3 larger meals may help.    Monitor closely for anything that may make your symptoms worse or better. Pay close attention to symptoms below that may indicate worsening of your condition.  Follow Up And Precautions:  See your doctor or this facility as instructed (or sooner, if your  symptoms are not improving). In some cases, you may need more testing.  Contact Your Doctor Or Seek Medical Attention  if any of the following occur:    Pain is becoming worse    You are unable to take your medications because of too much vomiting    Swelling of the abdomen    Fever of 101 F (38.3 C) or higher, or as directed by your health care provider    Blood in vomit or bowel movements (dark red or black color)    Jaundice (yellow color of eyes and skin)    New onset of weakness, dizziness or fainting    New onset of chest, arm, back, neck or jaw pain    0692-5486 [employer]. [address] [place] [address]. All rights reserved. This information is not intended as a substitute for professional medical care. Always follow your healthcare professional's instructions.  This information has been modified by your health care provider with permission from the publisher.  Modifications clinically reviewed by Dr. Jamil Hicks on 7/20/18.

## 2019-04-02 ENCOUNTER — TRANSFERRED RECORDS (OUTPATIENT)
Dept: HEALTH INFORMATION MANAGEMENT | Facility: CLINIC | Age: 54
End: 2019-04-02

## 2019-05-22 DIAGNOSIS — I10 HYPERTENSION GOAL BP (BLOOD PRESSURE) < 140/90: ICD-10-CM

## 2019-05-22 RX ORDER — AMLODIPINE BESYLATE 10 MG/1
10 TABLET ORAL DAILY
Qty: 90 TABLET | Refills: 0 | Status: SHIPPED | OUTPATIENT
Start: 2019-05-22 | End: 2019-08-11

## 2019-05-22 RX ORDER — LISINOPRIL 20 MG/1
20 TABLET ORAL DAILY
Qty: 90 TABLET | Refills: 0 | Status: SHIPPED | OUTPATIENT
Start: 2019-05-22 | End: 2019-08-11

## 2019-06-12 DIAGNOSIS — R10.13 EPIGASTRIC PAIN: ICD-10-CM

## 2019-06-12 RX ORDER — OMEPRAZOLE 40 MG/1
40 CAPSULE, DELAYED RELEASE ORAL 2 TIMES DAILY
Qty: 180 CAPSULE | Refills: 3 | Status: SHIPPED | OUTPATIENT
Start: 2019-06-12 | End: 2020-07-28

## 2019-06-12 NOTE — TELEPHONE ENCOUNTER
"Requested Prescriptions   Pending Prescriptions Disp Refills     omeprazole (PRILOSEC) 40 MG DR capsule [Pharmacy Med Name: OMEPRAZOLE 40 MG CAPSULE DR] 60 capsule 3     Sig: Take 1 capsule (40 mg) by mouth 2 times daily       PPI Protocol Passed - 6/12/2019  8:57 AM        Passed - Not on Clopidogrel (unless Pantoprazole ordered)        Passed - No diagnosis of osteoporosis on record        Passed - Recent (12 mo) or future (30 days) visit within the authorizing provider's specialty     Patient had office visit in the last 12 months or has a visit in the next 30 days with authorizing provider or within the authorizing provider's specialty.  See \"Patient Info\" tab in inbasket, or \"Choose Columns\" in Meds & Orders section of the refill encounter.              Passed - Medication is active on med list        Passed - Patient is age 18 or older        Last Written Prescription Date:  2/1/12  Last Fill Quantity: 60,  # refills: 3   Last office visit: 2/1/2019 with prescribing provider:     Future Office Visit:      "

## 2019-06-14 DIAGNOSIS — E78.00 ELEVATED LDL CHOLESTEROL LEVEL: ICD-10-CM

## 2019-06-14 RX ORDER — ATORVASTATIN CALCIUM 20 MG/1
20 TABLET, FILM COATED ORAL DAILY
Qty: 90 TABLET | Refills: 0 | Status: SHIPPED | OUTPATIENT
Start: 2019-06-14 | End: 2019-12-23

## 2019-06-19 ENCOUNTER — TRANSFERRED RECORDS (OUTPATIENT)
Dept: HEALTH INFORMATION MANAGEMENT | Facility: CLINIC | Age: 54
End: 2019-06-19

## 2019-07-05 ENCOUNTER — ALLIED HEALTH/NURSE VISIT (OUTPATIENT)
Dept: FAMILY MEDICINE | Facility: CLINIC | Age: 54
End: 2019-07-05
Payer: COMMERCIAL

## 2019-07-05 VITALS — HEART RATE: 104 BPM | DIASTOLIC BLOOD PRESSURE: 70 MMHG | SYSTOLIC BLOOD PRESSURE: 136 MMHG

## 2019-07-05 DIAGNOSIS — I10 HYPERTENSION GOAL BP (BLOOD PRESSURE) < 140/90: Primary | ICD-10-CM

## 2019-07-05 PROCEDURE — 99207 ZZC NO CHARGE NURSE ONLY: CPT | Performed by: FAMILY MEDICINE

## 2019-08-11 DIAGNOSIS — I10 HYPERTENSION GOAL BP (BLOOD PRESSURE) < 140/90: ICD-10-CM

## 2019-08-12 RX ORDER — LISINOPRIL 20 MG/1
TABLET ORAL
Qty: 30 TABLET | Refills: 0 | Status: SHIPPED | OUTPATIENT
Start: 2019-08-12 | End: 2019-10-21

## 2019-08-12 RX ORDER — AMLODIPINE BESYLATE 10 MG/1
TABLET ORAL
Qty: 30 TABLET | Refills: 0 | Status: SHIPPED | OUTPATIENT
Start: 2019-08-12 | End: 2019-10-21

## 2019-08-12 NOTE — TELEPHONE ENCOUNTER
"Requested Prescriptions   Pending Prescriptions Disp Refills     amLODIPine (NORVASC) 10 MG tablet [Pharmacy Med Name: AMLODIPINE BESYLATE 10 MG TABLET] 45 tablet      Sig: Take 1 Tablet BY MOUTH EVERY DAY  Your Provider has asked you to make an Appointment for more refills of this medication*       Calcium Channel Blockers Protocol  Passed - 8/11/2019  8:00 AM        Passed - Blood pressure under 140/90 in past 12 months     BP Readings from Last 3 Encounters:   07/05/19 136/70   02/01/19 158/88   05/11/18 (!) 168/92                 Passed - Recent (12 mo) or future (30 days) visit within the authorizing provider's specialty     Patient had office visit in the last 12 months or has a visit in the next 30 days with authorizing provider or within the authorizing provider's specialty.  See \"Patient Info\" tab in inbasket, or \"Choose Columns\" in Meds & Orders section of the refill encounter.      Last Written Prescription Date:  5/25/19  Last Fill Quantity: 90,  # refills: 0   Last office visit: 2/1/2019 with prescribing provider:     Future Office Visit:                Passed - Medication is active on med list        Passed - Patient is age 18 or older        Passed - Normal serum creatinine on file in past 12 months     Recent Labs   Lab Test 02/01/19  1000   CR 0.98             lisinopril (PRINIVIL/ZESTRIL) 20 MG tablet [Pharmacy Med Name: LISINOPRIL 20 MG TABLET] 45 tablet      Sig: Take 1 Tablet BY MOUTH EVERY DAY  Your Provider has asked you to make an Appointment for more refills of this medication*       ACE Inhibitors (Including Combos) Protocol Passed - 8/11/2019  8:00 AM        Passed - Blood pressure under 140/90 in past 12 months     BP Readings from Last 3 Encounters:   07/05/19 136/70   02/01/19 158/88   05/11/18 (!) 168/92                 Passed - Recent (12 mo) or future (30 days) visit within the authorizing provider's specialty     Patient had office visit in the last 12 months or has a visit in the " "next 30 days with authorizing provider or within the authorizing provider's specialty.  See \"Patient Info\" tab in inbasket, or \"Choose Columns\" in Meds & Orders section of the refill encounter.      Last Written Prescription Date:  5/22/19  Last Fill Quantity: 90,  # refills: 0   Last office visit: 2/1/2019 with prescribing provider:     Future Office Visit:                Passed - Medication is active on med list        Passed - Patient is age 18 or older        Passed - Normal serum creatinine on file in past 12 months     Recent Labs   Lab Test 02/01/19  1000   CR 0.98             Passed - Normal serum potassium on file in past 12 months     Recent Labs   Lab Test 02/01/19  1000   POTASSIUM 4.0               "

## 2019-10-21 DIAGNOSIS — I10 HYPERTENSION GOAL BP (BLOOD PRESSURE) < 140/90: ICD-10-CM

## 2019-10-21 RX ORDER — AMLODIPINE BESYLATE 10 MG/1
TABLET ORAL
Qty: 30 TABLET | Refills: 0 | Status: SHIPPED | OUTPATIENT
Start: 2019-10-21 | End: 2019-11-22

## 2019-10-21 RX ORDER — LISINOPRIL 20 MG/1
TABLET ORAL
Qty: 30 TABLET | Refills: 0 | Status: SHIPPED | OUTPATIENT
Start: 2019-10-21 | End: 2019-11-22

## 2019-11-27 ENCOUNTER — OFFICE VISIT (OUTPATIENT)
Dept: FAMILY MEDICINE | Facility: CLINIC | Age: 54
End: 2019-11-27
Payer: COMMERCIAL

## 2019-11-27 ENCOUNTER — ANCILLARY PROCEDURE (OUTPATIENT)
Dept: GENERAL RADIOLOGY | Facility: CLINIC | Age: 54
End: 2019-11-27
Attending: NURSE PRACTITIONER
Payer: COMMERCIAL

## 2019-11-27 VITALS
SYSTOLIC BLOOD PRESSURE: 144 MMHG | WEIGHT: 249 LBS | OXYGEN SATURATION: 95 % | RESPIRATION RATE: 24 BRPM | TEMPERATURE: 98.3 F | HEART RATE: 82 BPM | BODY MASS INDEX: 26.04 KG/M2 | DIASTOLIC BLOOD PRESSURE: 88 MMHG

## 2019-11-27 DIAGNOSIS — Z72.0 TOBACCO ABUSE: ICD-10-CM

## 2019-11-27 DIAGNOSIS — R05.9 COUGH: Primary | ICD-10-CM

## 2019-11-27 DIAGNOSIS — J18.9 PNEUMONIA OF RIGHT MIDDLE LOBE DUE TO INFECTIOUS ORGANISM: ICD-10-CM

## 2019-11-27 DIAGNOSIS — R05.9 COUGH: ICD-10-CM

## 2019-11-27 LAB
ANION GAP SERPL CALCULATED.3IONS-SCNC: 5 MMOL/L (ref 3–14)
BUN SERPL-MCNC: 10 MG/DL (ref 7–30)
CALCIUM SERPL-MCNC: 9.1 MG/DL (ref 8.5–10.1)
CHLORIDE SERPL-SCNC: 105 MMOL/L (ref 94–109)
CO2 SERPL-SCNC: 28 MMOL/L (ref 20–32)
CREAT SERPL-MCNC: 0.85 MG/DL (ref 0.66–1.25)
ERYTHROCYTE [DISTWIDTH] IN BLOOD BY AUTOMATED COUNT: 14 % (ref 10–15)
GFR SERPL CREATININE-BSD FRML MDRD: >90 ML/MIN/{1.73_M2}
GLUCOSE SERPL-MCNC: 96 MG/DL (ref 70–99)
HCT VFR BLD AUTO: 43.9 % (ref 40–53)
HGB BLD-MCNC: 14.8 G/DL (ref 13.3–17.7)
MCH RBC QN AUTO: 30.3 PG (ref 26.5–33)
MCHC RBC AUTO-ENTMCNC: 33.7 G/DL (ref 31.5–36.5)
MCV RBC AUTO: 90 FL (ref 78–100)
PLATELET # BLD AUTO: 190 10E9/L (ref 150–450)
POTASSIUM SERPL-SCNC: 4.3 MMOL/L (ref 3.4–5.3)
RBC # BLD AUTO: 4.88 10E12/L (ref 4.4–5.9)
SODIUM SERPL-SCNC: 138 MMOL/L (ref 133–144)
WBC # BLD AUTO: 6.2 10E9/L (ref 4–11)

## 2019-11-27 PROCEDURE — 99214 OFFICE O/P EST MOD 30 MIN: CPT | Performed by: NURSE PRACTITIONER

## 2019-11-27 PROCEDURE — 85027 COMPLETE CBC AUTOMATED: CPT | Performed by: NURSE PRACTITIONER

## 2019-11-27 PROCEDURE — 80048 BASIC METABOLIC PNL TOTAL CA: CPT | Performed by: NURSE PRACTITIONER

## 2019-11-27 PROCEDURE — 36415 COLL VENOUS BLD VENIPUNCTURE: CPT | Performed by: NURSE PRACTITIONER

## 2019-11-27 PROCEDURE — 71046 X-RAY EXAM CHEST 2 VIEWS: CPT

## 2019-11-27 RX ORDER — CODEINE PHOSPHATE AND GUAIFENESIN 10; 100 MG/5ML; MG/5ML
1-2 SOLUTION ORAL
Qty: 180 ML | Refills: 0 | Status: SHIPPED | OUTPATIENT
Start: 2019-11-27 | End: 2020-04-29

## 2019-11-27 RX ORDER — DOXYCYCLINE 100 MG/1
100 CAPSULE ORAL 2 TIMES DAILY
Qty: 20 CAPSULE | Refills: 0 | Status: SHIPPED | OUTPATIENT
Start: 2019-11-27 | End: 2020-04-29

## 2019-11-27 RX ORDER — PREDNISONE 20 MG/1
TABLET ORAL
Qty: 20 TABLET | Refills: 0 | Status: ON HOLD | OUTPATIENT
Start: 2019-11-27 | End: 2020-05-03

## 2019-11-27 RX ORDER — ALBUTEROL SULFATE 90 UG/1
2 AEROSOL, METERED RESPIRATORY (INHALATION) EVERY 4 HOURS PRN
Qty: 1 INHALER | Refills: 11 | Status: SHIPPED | OUTPATIENT
Start: 2019-11-27 | End: 2020-06-22

## 2019-11-27 ASSESSMENT — PAIN SCALES - GENERAL: PAINLEVEL: NO PAIN (0)

## 2019-11-27 NOTE — PATIENT INSTRUCTIONS
CXR showed right middle lobe pneumonia   Waiting for final reviewed per radiology   Prednisone taper   Doxycycline twice a day for 10 days   Albuterol inhaler take scheduled four times a day for first 3 days   Robitussin w codeine for cough       Would recommend follow next week for recheck   Call to schedule     I recommend that you have pulmonary function testing   Would recommend physical and recheck BP

## 2019-11-27 NOTE — PROGRESS NOTES
robitussiSUBJECTIVE   Sarath Gray is a 54 year old male who presents with     ENT Symptoms             Symptoms: cc Present Absent Comment   Fever/Chills  x  At night   Fatigue  x     Muscle Aches   x    Eye Irritation   x    Sneezing   x    Nasal Herminio/Drg  x     Sinus Pressure/Pain  x     Loss of smell   x    Dental pain  x     Sore Throat  x  dry   Swollen Glands   x    Ear Pain/Fullness       Cough x      Wheeze x      Chest Pain x      Shortness of breath x      Rash       Other         Symptom duration:  worsen the last 4 days   Symptom severity:  severe   Treatments tried:  ibuprfen, dayquil and nyquil   Contacts:  none     Reports that he had a head couple weeks   Feeling very short of breath        PCP   Maik Smith [phone]    Health Maintenance        Health Maintenance Due   Topic Date Due     HEPATITIS C SCREENING  1965     ZOSTER IMMUNIZATION (1 of 2) 09/14/2015     PHQ-2  01/01/2019     PREVENTIVE CARE VISIT  05/11/2019     INFLUENZA VACCINE (1) 09/01/2019       HPI        Patient Active Problem List   Diagnosis     Esophageal reflux     Tobacco use disorder     Impotence of organic origin     Low back pain     CARDIOVASCULAR SCREENING; LDL GOAL LESS THAN 130     History of PSVT (paroxysmal supraventricular tachycardia)     S/P total knee arthroplasty     Left knee DJD     Essential hypertension, benign     CARROLL (obstructive sleep apnea)     Current Outpatient Medications   Medication     albuterol (PROAIR HFA) 108 (90 Base) MCG/ACT inhaler     amLODIPine (NORVASC) 10 MG tablet     atorvastatin (LIPITOR) 20 MG tablet     doxycycline hyclate (VIBRAMYCIN) 100 MG capsule     guaiFENesin-codeine (ROBITUSSIN AC) 100-10 MG/5ML solution     lisinopril (PRINIVIL/ZESTRIL) 20 MG tablet     meloxicam (MOBIC) 15 MG tablet     omeprazole (PRILOSEC) 40 MG DR capsule     order for DME     ORDER FOR DME     predniSONE (DELTASONE) 20 MG tablet     No current facility-administered medications for  this visit.      Facility-Administered Medications Ordered in Other Visits   Medication     iopamidol (ISOVUE-370) 76% solution 80 mL     sodium chloride 0.9 % for CT scan flush dose 80 mL       Patient Active Problem List   Diagnosis     Esophageal reflux     Tobacco use disorder     Impotence of organic origin     Low back pain     CARDIOVASCULAR SCREENING; LDL GOAL LESS THAN 130     History of PSVT (paroxysmal supraventricular tachycardia)     S/P total knee arthroplasty     Left knee DJD     Essential hypertension, benign     CARROLL (obstructive sleep apnea)     Past Surgical History:   Procedure Laterality Date     ARTHROPLASTY KNEE  6/11/2014    Procedure: ARTHROPLASTY KNEE;  Surgeon: Maik Jeong MD;  Location: [facility]     ARTHROSCOPY KNEE WITH MEDIAL MENISCECTOMY  12/19/2013    Procedure: ARTHROSCOPY KNEE WITH MEDIAL MENISCECTOMY;  Left Knee Arthroscopy With Intraarticular Debridement.;  Surgeon: Maik Jeong MD;  Location: [facility]     COLONOSCOPY  2002     COLONOSCOPY N/A 4/18/2018    Procedure: COLONOSCOPY;  colonoscopy;  Surgeon: Nirmal Schneider MD;  Location: [facility]     EXAM UNDER ANESTHESIA, MANIPULATE JOINT (LOCATION)  6/26/2014    Procedure: EXAM UNDER ANESTHESIA, MANIPULATE JOINT (LOCATION);  Surgeon: Maik Jeong MD;  Location: [facility]     EXAM UNDER ANESTHESIA, MANIPULATE JOINT (LOCATION) Left 8/28/2014    Procedure: EXAM UNDER ANESTHESIA, MANIPULATE JOINT (LOCATION);  Surgeon: Maik Jeong MD;  Location: [facility]     EXAM UNDER ANESTHESIA, MANIPULATE JOINT (LOCATION) Left 12/31/2014    Procedure: EXAM UNDER ANESTHESIA, MANIPULATE JOINT (LOCATION);  Surgeon: Maik Jeong MD;  Location: [facility]     H ABLATION SVT  2008    AVNRT     INJECT EPIDURAL LUMBAR  12/16/2011    Procedure:INJECT EPIDURAL LUMBAR; MICKY-Dr. Smith; Surgeon:GENERIC ANESTHESIA PROVIDER; Location:[facility]     INJECT EPIDURAL LUMBAR  1/27/2012    Procedure:INJECT EPIDURAL LUMBAR; MICKY with Flouro--;  Surgeon:GENERIC ANESTHESIA PROVIDER; Location:[facility]     LAPAROSCOPIC HERNIORRHAPHY INGUINAL BILATERAL Bilateral 12/29/2015    Procedure: LAPAROSCOPIC HERNIORRHAPHY INGUINAL BILATERAL;  Surgeon: Andrew Prajapati MD;  Location: [facility]     Lumbar back fusion  1995, 1998    first was A&P     Lumbar back procedure  1999    Hardware removal     spinal stimulator insertion  2004    also, revised it that year also. never seemed to help well.        Social History     Tobacco Use     Smoking status: Light Tobacco Smoker     Packs/day: 1.00     Years: 18.00     Pack years: 18.00     Smokeless tobacco: Never Used     Tobacco comment: Started at age 30.    Substance Use Topics     Alcohol use: Yes     Alcohol/week: 0.0 standard drinks     Comment: occassional beer     Family History   Problem Relation Age of Onset     Cardiovascular Paternal Grandfather      C.A.D. Father 36        MI at age 36-37     Cerebrovascular Disease Father      Allergies Father      Anesthesia Reaction Father      Prostate Cancer No family hx of      Colon Cancer No family hx of            Reviewed and updated:  Tobacco  Allergies  Meds  Med Hx  Surg Hx  Fam Hx  Soc Hx     ROS:  Constitutional, HEENT, cardiovascular, pulmonary, gi and gu systems are negative, except as otherwise noted.    PHYSICAL EXAM   BP (!) 144/88   Pulse 82   Temp 98.3  F (36.8  C) (Tympanic)   Resp 24   Wt 112.9 kg (249 lb)   SpO2 95%   BMI 26.04 kg/m    Body mass index is 26.04 kg/m .  GENERAL: healthy, alert and no distress  NECK: no adenopathy, no asymmetry, masses, or scars and thyroid normal to palpation  RESP: lungs clear to auscultation - no rales, rhonchi or wheezes and rhonchi bibasilar  CV: regular rate and rhythm, normal S1 S2, no S3 or S4, no murmur, click or rub, no peripheral edema and peripheral pulses strong  ABDOMEN: soft, nontender, no hepatosplenomegaly, no masses and bowel sounds normal  MS: no gross musculoskeletal defects noted, no edema  CHEST TWO  VIEWS   11/27/2019 9:07 AM      HISTORY: Cough.     COMPARISON: 2/1/2019 x-ray.                                                                      IMPRESSION: Small band of atelectasis or fibrosis in the right lung  base unchanged. No focal infiltrates or evidence of pleural fluid.  Possible mild hyperinflation. Nodular density left suprahilar region  is stable. No acute disease. No significant change.    Assessment & Plan   Cough  (primary encounter diagnosis)  Tobacco abuse  Pneumonia of right middle lobe due to infectious organism (H)    CXR showed right middle lobe pneumonia   Waiting for final reviewed per radiology   Prednisone taper   Doxycycline twice a day for 10 days   Albuterol inhaler take scheduled four times a day for first 3 days   Robitussin w codeine for cough       Would recommend follow next week for recheck   Call to schedule     I recommend that you have pulmonary function testing   Would recommend physical and recheck BP    Tobacco Cessation:   reports that he has been smoking. He has a 18.00 pack-year smoking history. He has never used smokeless tobacco.  Tobacco Cessation Action Plan: Information offered: Patient not interested at this time        Patient Instructions   CXR showed right middle lobe pneumonia   Waiting for final reviewed per radiology   Prednisone taper   Doxycycline twice a day for 10 days   Albuterol inhaler take scheduled four times a day for first 3 days   Robitussin w codeine for cough       Would recommend follow next week for recheck   Call to schedule     I recommend that you have pulmonary function testing   Would recommend physical and recheck BP             Return in about 2 weeks (around 12/11/2019).    Jana Velasquez NP  [facility]      Risks, benefits, side effects and rationale for treatment plan fully discussed with the patient and understanding expressed.

## 2019-11-27 NOTE — NURSING NOTE
"Chief Complaint   Patient presents with     Breathing Problem     cough     BP (!) 144/88   Pulse 82   Temp 98.3  F (36.8  C) (Tympanic)   Resp 24   Wt 112.9 kg (249 lb)   SpO2 95%   BMI 26.04 kg/m   Estimated body mass index is 26.04 kg/m  as calculated from the following:    Height as of 5/11/18: 2.083 m (6' 10\").    Weight as of this encounter: 112.9 kg (249 lb).  Patient presents to the clinic using No DME      Health Maintenance that is potentially due pending provider review:    Health Maintenance Due   Topic Date Due     HEPATITIS C SCREENING  1965     ZOSTER IMMUNIZATION (1 of 2) 09/14/2015     PHQ-2  01/01/2019     PREVENTIVE CARE VISIT  05/11/2019     INFLUENZA VACCINE (1) 09/01/2019        n/a        "

## 2019-12-16 ENCOUNTER — OFFICE VISIT (OUTPATIENT)
Dept: FAMILY MEDICINE | Facility: CLINIC | Age: 54
End: 2019-12-16
Payer: COMMERCIAL

## 2019-12-16 VITALS
DIASTOLIC BLOOD PRESSURE: 78 MMHG | WEIGHT: 249.6 LBS | OXYGEN SATURATION: 99 % | BODY MASS INDEX: 28.88 KG/M2 | HEART RATE: 77 BPM | TEMPERATURE: 96.6 F | HEIGHT: 78 IN | RESPIRATION RATE: 17 BRPM | SYSTOLIC BLOOD PRESSURE: 138 MMHG

## 2019-12-16 DIAGNOSIS — J18.9 PNEUMONIA OF RIGHT MIDDLE LOBE DUE TO INFECTIOUS ORGANISM: Primary | ICD-10-CM

## 2019-12-16 PROCEDURE — 99213 OFFICE O/P EST LOW 20 MIN: CPT | Performed by: PHYSICIAN ASSISTANT

## 2019-12-16 RX ORDER — CODEINE PHOSPHATE AND GUAIFENESIN 10; 100 MG/5ML; MG/5ML
1-2 SOLUTION ORAL EVERY 4 HOURS PRN
Qty: 50 ML | Refills: 0 | Status: SHIPPED | OUTPATIENT
Start: 2019-12-16 | End: 2020-04-29

## 2019-12-16 RX ORDER — METHYLPREDNISOLONE 4 MG
TABLET, DOSE PACK ORAL
Qty: 21 TABLET | Refills: 0 | Status: SHIPPED | OUTPATIENT
Start: 2019-12-16 | End: 2020-04-29

## 2019-12-16 RX ORDER — LEVOFLOXACIN 750 MG/1
750 TABLET, FILM COATED ORAL DAILY
Qty: 10 TABLET | Refills: 0 | Status: SHIPPED | OUTPATIENT
Start: 2019-12-16 | End: 2020-04-29

## 2019-12-16 ASSESSMENT — ENCOUNTER SYMPTOMS
CHILLS: 1
COUGH: 1
EYES NEGATIVE: 1
GASTROINTESTINAL NEGATIVE: 1
SHORTNESS OF BREATH: 1
HEADACHES: 1
MYALGIAS: 1
FEVER: 1
CARDIOVASCULAR NEGATIVE: 1

## 2019-12-16 ASSESSMENT — MIFFLIN-ST. JEOR: SCORE: 2168.93

## 2019-12-16 NOTE — PROGRESS NOTES
SUBJECTIVE:   Sarath Gray is a 54 year old male presenting with a chief complaint of   Chief Complaint   Patient presents with     URI     2 weeks ago was diagnosed with pneumonia on right frontal lobe, was better but doesn't think it went away been not feeling well 1 month.       He is an established patient of [facility].    URI Adult  Patient presents for follow up to his dx'd right mid lobe pneumonia.  Patient states he only took half the abx, all cough syrup and all steroids.  Patient states his symptoms are better but still has exact same symptoms.  No change in cough, yellow productive cough, still smoking, no fever. Some SOB but inhaler helps.        Review of Systems   Constitutional: Positive for chills and fever.   Eyes: Negative.    Respiratory: Positive for cough and shortness of breath.    Cardiovascular: Negative.  Negative for chest pain.   Gastrointestinal: Negative.    Genitourinary: Negative.    Musculoskeletal: Positive for myalgias.   Skin: Negative.    Neurological: Positive for headaches.   All other systems reviewed and are negative.      Past Medical History:   Diagnosis Date     PE (pulmonary embolism)     years ago, apparently no cause found, does smoke     Respiratory complications March 2005     Family History   Problem Relation Age of Onset     Cardiovascular Paternal Grandfather      C.A.D. Father 36        MI at age 36-37     Cerebrovascular Disease Father      Allergies Father      Anesthesia Reaction Father      Prostate Cancer No family hx of      Colon Cancer No family hx of        Social History     Tobacco Use     Smoking status: Light Tobacco Smoker     Packs/day: 1.00     Years: 18.00     Pack years: 18.00     Smokeless tobacco: Never Used     Tobacco comment: Started at age 30.    Substance Use Topics     Alcohol use: Yes     Alcohol/week: 0.0 standard drinks     Comment: occassional beer       OBJECTIVE  /78 (BP Location: Right arm, Patient Position: Sitting, Cuff  "Size: Adult Large)   Pulse 77   Temp 96.6  F (35.9  C) (Tympanic)   Resp 17   Ht 2.083 m (6' 10\")   Wt 113.2 kg (249 lb 9.6 oz)   SpO2 99%   BMI 26.10 kg/m      Physical Exam  Vitals signs and nursing note reviewed.   Constitutional:       Appearance: Normal appearance. He is normal weight.   HENT:      Head: Normocephalic and atraumatic.      Right Ear: Tympanic membrane, ear canal and external ear normal.      Left Ear: Tympanic membrane, ear canal and external ear normal.      Nose: Nose normal.      Mouth/Throat:      Mouth: Mucous membranes are moist.      Pharynx: Oropharynx is clear.   Eyes:      Extraocular Movements: Extraocular movements intact.      Conjunctiva/sclera: Conjunctivae normal.   Neck:      Musculoskeletal: Normal range of motion and neck supple. No neck rigidity or muscular tenderness.   Cardiovascular:      Rate and Rhythm: Normal rate and regular rhythm.      Pulses: Normal pulses.      Heart sounds: Normal heart sounds.   Pulmonary:      Effort: Pulmonary effort is normal. No respiratory distress.      Breath sounds: Normal breath sounds. No stridor. No wheezing, rhonchi or rales.   Lymphadenopathy:      Cervical: No cervical adenopathy.   Skin:     General: Skin is warm and dry.      Capillary Refill: Capillary refill takes less than 2 seconds.   Neurological:      General: No focal deficit present.      Mental Status: He is alert and oriented to person, place, and time.   Psychiatric:         Mood and Affect: Mood normal.         Behavior: Behavior normal.         Labs:  No results found for this or any previous visit (from the past 24 hour(s)).    X-Ray was not done.    ASSESSMENT:      ICD-10-CM    1. Pneumonia of right middle lobe due to infectious organism (H) J18.1 levofloxacin (LEVAQUIN) 750 MG tablet     methylPREDNISolone (MEDROL DOSEPAK) 4 MG tablet therapy pack     guaiFENesin-codeine (ROBITUSSIN AC) 100-10 MG/5ML solution        Medical Decision Making:    Differential " Diagnosis:  URI Adult/Peds:  Pneumonia    Serious Comorbid Conditions:  Adult:  COPD    PLAN:    Discussed the importance of finishing abx.  Levaquin 750 x 10 days, medrol dose mamta, rf on robitussin ac.      Followup:    If not improving or if condition worsens, follow up with your Primary Care Provider, If not improving or if conditions worsens over the next 12-24 hours, go to the Emergency Department    Patient Instructions     Patient Education     Pneumonia (Adult)  Pneumonia is an infection deep within the lungs. It is in the small air sacs (alveoli). Pneumonia may be caused by a virus or bacteria. Pneumonia caused by bacteria is usually treated with an antibiotic. Severe cases may need to be treated in the hospital. Milder cases can be treated at home. Symptoms usually start to get better during the first 2 days of treatment.    Home care  Follow these guidelines when caring for yourself at home:    Rest at home for the first 2 to 3 days, or until you feel stronger. Don t let yourself get overly tired when you go back to your activities.    Stay away from cigarette smoke - yours or other people s.    You may use acetaminophen or ibuprofen to control fever or pain, unless another medicine was prescribed. If you have chronic liver or kidney disease, talk with your healthcare provider before using these medicines. Also talk with your provider if you ve had a stomach ulcer or gastrointestinal bleeding. Don t give aspirin to anyone younger than 18 years of age who is ill with a fever. It may cause severe liver damage.    Your appetite may be poor, so a light diet is fine.    Drink 6 to 8 glasses of fluids every day to make sure you are getting enough fluids. Beverages can include water, sport drinks, sodas without caffeine, juices, tea, or soup. Fluids will help loosen secretions in the lung. This will make it easier for you to cough up the phlegm (sputum). If you also have heart or kidney disease, check with your  healthcare provider before you drink extra fluids.    Take antibiotic medicine prescribed until it is all gone, even if you are feeling better after a few days.  Follow-up care  Follow up with your healthcare provider in the next 2 to 3 days, or as advised. This is to be sure the medicine is helping you get better.  If you are 65 or older, you should get a pneumococcal vaccine and a yearly flu (influenza) shot. You should also get these vaccines if you have chronic lung disease like asthma, emphysema, or COPD. Recently, a second type of pneumonia vaccine has become available for everyone over 65 years old. This is in addition to the previous vaccine. Ask your provider about this.  When to seek medical advice  Call your healthcare provider right away if any of these occur:    You don t get better within the first 48 hours of treatment    Shortness of breath gets worse    Rapid breathing (more than 25 breaths per minute)    Coughing up blood    Chest pain gets worse with breathing    Fever of 100.4 F (38 C) or higher that doesn t get better with fever medicine    Weakness, dizziness, or fainting that gets worse    Thirst or dry mouth that gets worse    Sinus pain, headache, or a stiff neck    Chest pain not caused by coughing  Date Last Reviewed: 1/1/2017 2000-2018 [employer]. [address] [place] [address], [place] [address]. All rights reserved. This information is not intended as a substitute for professional medical care. Always follow your healthcare professional's instructions.

## 2019-12-16 NOTE — PATIENT INSTRUCTIONS
Patient Education     Pneumonia (Adult)  Pneumonia is an infection deep within the lungs. It is in the small air sacs (alveoli). Pneumonia may be caused by a virus or bacteria. Pneumonia caused by bacteria is usually treated with an antibiotic. Severe cases may need to be treated in the hospital. Milder cases can be treated at home. Symptoms usually start to get better during the first 2 days of treatment.    Home care  Follow these guidelines when caring for yourself at home:    Rest at home for the first 2 to 3 days, or until you feel stronger. Don t let yourself get overly tired when you go back to your activities.    Stay away from cigarette smoke - yours or other people s.    You may use acetaminophen or ibuprofen to control fever or pain, unless another medicine was prescribed. If you have chronic liver or kidney disease, talk with your healthcare provider before using these medicines. Also talk with your provider if you ve had a stomach ulcer or gastrointestinal bleeding. Don t give aspirin to anyone younger than 18 years of age who is ill with a fever. It may cause severe liver damage.    Your appetite may be poor, so a light diet is fine.    Drink 6 to 8 glasses of fluids every day to make sure you are getting enough fluids. Beverages can include water, sport drinks, sodas without caffeine, juices, tea, or soup. Fluids will help loosen secretions in the lung. This will make it easier for you to cough up the phlegm (sputum). If you also have heart or kidney disease, check with your healthcare provider before you drink extra fluids.    Take antibiotic medicine prescribed until it is all gone, even if you are feeling better after a few days.  Follow-up care  Follow up with your healthcare provider in the next 2 to 3 days, or as advised. This is to be sure the medicine is helping you get better.  If you are 65 or older, you should get a pneumococcal vaccine and a yearly flu (influenza) shot. You should also  get these vaccines if you have chronic lung disease like asthma, emphysema, or COPD. Recently, a second type of pneumonia vaccine has become available for everyone over 65 years old. This is in addition to the previous vaccine. Ask your provider about this.  When to seek medical advice  Call your healthcare provider right away if any of these occur:    You don t get better within the first 48 hours of treatment    Shortness of breath gets worse    Rapid breathing (more than 25 breaths per minute)    Coughing up blood    Chest pain gets worse with breathing    Fever of 100.4 F (38 C) or higher that doesn t get better with fever medicine    Weakness, dizziness, or fainting that gets worse    Thirst or dry mouth that gets worse    Sinus pain, headache, or a stiff neck    Chest pain not caused by coughing  Date Last Reviewed: 1/1/2017 2000-2018 [employer]. [address] [place] [address], [place] [address]. All rights reserved. This information is not intended as a substitute for professional medical care. Always follow your healthcare professional's instructions.

## 2019-12-16 NOTE — NURSING NOTE
"Chief Complaint   Patient presents with     URI     2 weeks ago was diagnosed with pneumonia on right frontal lobe, was better but doesn't think it went away been not feeling well 1 month.       Initial /78 (BP Location: Right arm, Patient Position: Sitting, Cuff Size: Adult Large)   Pulse 77   Temp 96.6  F (35.9  C) (Tympanic)   Resp 17   Ht 2.083 m (6' 10\")   Wt 113.2 kg (249 lb 9.6 oz)   SpO2 99%   BMI 26.10 kg/m   Estimated body mass index is 26.1 kg/m  as calculated from the following:    Height as of this encounter: 2.083 m (6' 10\").    Weight as of this encounter: 113.2 kg (249 lb 9.6 oz).    Patient presents to the clinic using No DME    Health Maintenance that is potentially due pending provider review:  NONE    n/a    Is there anyone who you would like to be able to receive your results? No  If yes have patient fill out [facility]  Nena Roa CMA      "

## 2019-12-23 DIAGNOSIS — E78.00 ELEVATED LDL CHOLESTEROL LEVEL: ICD-10-CM

## 2019-12-23 RX ORDER — ATORVASTATIN CALCIUM 20 MG/1
20 TABLET, FILM COATED ORAL DAILY
Qty: 30 TABLET | Refills: 0 | Status: SHIPPED | OUTPATIENT
Start: 2019-12-23 | End: 2020-01-24

## 2019-12-23 NOTE — TELEPHONE ENCOUNTER
"Requested Prescriptions   Pending Prescriptions Disp Refills     atorvastatin (LIPITOR) 20 MG tablet [Pharmacy Med Name: ATORVASTATIN CALCIUM 20 MG TABLET] 90 tablet 0     Sig: Take 1 tablet (20 mg) by mouth daily DUE FOR FASTING LABS June 2019. NO FURTHER REFILLs       Statins Protocol Failed - 12/23/2019  8:53 AM        Failed - LDL on file in past 12 months     Recent Labs   Lab Test 05/11/18  1156   *             Passed - No abnormal creatine kinase in past 12 months     No lab results found.             Passed - Recent (12 mo) or future (30 days) visit within the authorizing provider's specialty     Patient has had an office visit with the authorizing provider or a provider within the authorizing providers department within the previous 12 mos or has a future within next 30 days. See \"Patient Info\" tab in inbasket, or \"Choose Columns\" in Meds & Orders section of the refill encounter.              Passed - Medication is active on med list        Passed - Patient is age 18 or older        Last Written Prescription Date:  6/14/19  Last Fill Quantity: 90,  # refills: 0  Last office visit: 12/16/2019 with prescribing provider:     Future Office Visit:      "

## 2019-12-27 DIAGNOSIS — E78.00 ELEVATED LDL CHOLESTEROL LEVEL: ICD-10-CM

## 2019-12-27 LAB
CHOLEST SERPL-MCNC: 157 MG/DL
HDLC SERPL-MCNC: 72 MG/DL
LDLC SERPL CALC-MCNC: 74 MG/DL
NONHDLC SERPL-MCNC: 85 MG/DL
TRIGL SERPL-MCNC: 57 MG/DL

## 2019-12-27 PROCEDURE — 36415 COLL VENOUS BLD VENIPUNCTURE: CPT | Performed by: FAMILY MEDICINE

## 2019-12-27 PROCEDURE — 80061 LIPID PANEL: CPT | Performed by: FAMILY MEDICINE

## 2019-12-27 NOTE — RESULT ENCOUNTER NOTE
"Please call.  Lipid tests including total cholesterol, triglycerides, HDL (\"good cholesterol\") and LDL (\"bad cholesterol\") are normal."

## 2019-12-30 DIAGNOSIS — I10 HYPERTENSION GOAL BP (BLOOD PRESSURE) < 140/90: ICD-10-CM

## 2019-12-30 RX ORDER — LISINOPRIL 20 MG/1
20 TABLET ORAL DAILY
Qty: 90 TABLET | Refills: 0 | Status: SHIPPED | OUTPATIENT
Start: 2019-12-30 | End: 2020-03-30

## 2019-12-30 RX ORDER — AMLODIPINE BESYLATE 10 MG/1
10 TABLET ORAL DAILY
Qty: 90 TABLET | Refills: 0 | Status: SHIPPED | OUTPATIENT
Start: 2019-12-30 | End: 2020-03-30

## 2020-01-24 DIAGNOSIS — E78.00 ELEVATED LDL CHOLESTEROL LEVEL: ICD-10-CM

## 2020-01-24 RX ORDER — ATORVASTATIN CALCIUM 20 MG/1
20 TABLET, FILM COATED ORAL DAILY
Qty: 90 TABLET | Refills: 3 | Status: SHIPPED | OUTPATIENT
Start: 2020-01-24 | End: 2021-03-29

## 2020-01-24 NOTE — TELEPHONE ENCOUNTER
"Requested Prescriptions   Pending Prescriptions Disp Refills     atorvastatin (LIPITOR) 20 MG tablet [Pharmacy Med Name: ATORVASTATIN CALCIUM 20 MG TABLET] 30 tablet 0     Sig: Take 1 tablet (20 mg) by mouth daily       Statins Protocol Passed - 1/24/2020 12:52 PM        Passed - LDL on file in past 12 months     Recent Labs   Lab Test 12/27/19  0734   LDL 74             Passed - No abnormal creatine kinase in past 12 months     No lab results found.             Passed - Recent (12 mo) or future (30 days) visit within the authorizing provider's specialty     Patient has had an office visit with the authorizing provider or a provider within the authorizing providers department within the previous 12 mos or has a future within next 30 days. See \"Patient Info\" tab in inbasket, or \"Choose Columns\" in Meds & Orders section of the refill encounter.              Passed - Medication is active on med list        Passed - Patient is age 18 or older        atorvastatin (LIPITOR) 20 MG tablet  Last Written Prescription Date:  12/23/2019  Last Fill Quantity: 30 tablet,  # refills: 0   Last office visit: 12/16/2019 with prescribing provider:  DESMOND Posada   Future Office Visit:      Betina Ward RT (R) (M)    "

## 2020-03-03 ENCOUNTER — TRANSFERRED RECORDS (OUTPATIENT)
Dept: HEALTH INFORMATION MANAGEMENT | Facility: CLINIC | Age: 55
End: 2020-03-03

## 2020-03-30 DIAGNOSIS — I10 HYPERTENSION GOAL BP (BLOOD PRESSURE) < 140/90: ICD-10-CM

## 2020-03-30 RX ORDER — AMLODIPINE BESYLATE 10 MG/1
10 TABLET ORAL DAILY
Qty: 90 TABLET | Refills: 0 | Status: SHIPPED | OUTPATIENT
Start: 2020-03-30 | End: 2020-07-27

## 2020-03-30 RX ORDER — LISINOPRIL 20 MG/1
20 TABLET ORAL DAILY
Qty: 90 TABLET | Refills: 0 | Status: SHIPPED | OUTPATIENT
Start: 2020-03-30 | End: 2020-07-27

## 2020-04-29 ENCOUNTER — TELEPHONE (OUTPATIENT)
Dept: FAMILY MEDICINE | Facility: CLINIC | Age: 55
End: 2020-04-29

## 2020-04-29 ENCOUNTER — ANESTHESIA EVENT (OUTPATIENT)
Dept: SURGERY | Facility: CLINIC | Age: 55
DRG: 343 | End: 2020-04-29
Payer: COMMERCIAL

## 2020-04-29 ENCOUNTER — APPOINTMENT (OUTPATIENT)
Dept: CT IMAGING | Facility: CLINIC | Age: 55
DRG: 343 | End: 2020-04-29
Attending: PHYSICIAN ASSISTANT
Payer: COMMERCIAL

## 2020-04-29 ENCOUNTER — HOSPITAL ENCOUNTER (INPATIENT)
Facility: CLINIC | Age: 55
LOS: 1 days | Discharge: HOME | DRG: 343 | End: 2020-04-30
Attending: PHYSICIAN ASSISTANT | Admitting: SURGERY
Payer: COMMERCIAL

## 2020-04-29 ENCOUNTER — ANESTHESIA (OUTPATIENT)
Dept: SURGERY | Facility: CLINIC | Age: 55
DRG: 343 | End: 2020-04-29
Payer: COMMERCIAL

## 2020-04-29 DIAGNOSIS — K35.80 ACUTE APPENDICITIS: ICD-10-CM

## 2020-04-29 DIAGNOSIS — K35.209 ACUTE APPENDICITIS WITH GENERALIZED PERITONITIS, UNSPECIFIED WHETHER ABSCESS PRESENT, UNSPECIFIED WHETHER GANGRENE PRESENT, UNSPECIFIED WHETHER PERFORATION PRESENT: Primary | ICD-10-CM

## 2020-04-29 DIAGNOSIS — K35.80 ACUTE APPENDICITIS, UNSPECIFIED ACUTE APPENDICITIS TYPE: ICD-10-CM

## 2020-04-29 PROBLEM — K35.32 ACUTE PERFORATED APPENDICITIS: Status: ACTIVE | Noted: 2020-04-29

## 2020-04-29 LAB
ALBUMIN SERPL-MCNC: 3.5 G/DL (ref 3.4–5)
ALBUMIN UR-MCNC: NEGATIVE MG/DL
ALP SERPL-CCNC: 50 U/L (ref 40–150)
ALT SERPL W P-5'-P-CCNC: 28 U/L (ref 0–70)
ANION GAP SERPL CALCULATED.3IONS-SCNC: 5 MMOL/L (ref 3–14)
APPEARANCE UR: CLEAR
AST SERPL W P-5'-P-CCNC: 16 U/L (ref 0–45)
BASOPHILS # BLD AUTO: 0.1 10E9/L (ref 0–0.2)
BASOPHILS NFR BLD AUTO: 0.3 %
BILIRUB SERPL-MCNC: 0.5 MG/DL (ref 0.2–1.3)
BILIRUB UR QL STRIP: NEGATIVE
BUN SERPL-MCNC: 10 MG/DL (ref 7–30)
CALCIUM SERPL-MCNC: 9 MG/DL (ref 8.5–10.1)
CHLORIDE SERPL-SCNC: 104 MMOL/L (ref 94–109)
CO2 SERPL-SCNC: 27 MMOL/L (ref 20–32)
COLOR UR AUTO: ABNORMAL
CREAT SERPL-MCNC: 0.91 MG/DL (ref 0.66–1.25)
DIFFERENTIAL METHOD BLD: ABNORMAL
EOSINOPHIL # BLD AUTO: 0.2 10E9/L (ref 0–0.7)
EOSINOPHIL NFR BLD AUTO: 1.2 %
ERYTHROCYTE [DISTWIDTH] IN BLOOD BY AUTOMATED COUNT: 13.2 % (ref 10–15)
GFR SERPL CREATININE-BSD FRML MDRD: >90 ML/MIN/{1.73_M2}
GLUCOSE SERPL-MCNC: 93 MG/DL (ref 70–99)
GLUCOSE UR STRIP-MCNC: NEGATIVE MG/DL
HCT VFR BLD AUTO: 44.9 % (ref 40–53)
HEMOCCULT STL QL: NORMAL
HGB BLD-MCNC: 14.9 G/DL (ref 13.3–17.7)
HGB UR QL STRIP: NEGATIVE
IMM GRANULOCYTES # BLD: 0.1 10E9/L (ref 0–0.4)
IMM GRANULOCYTES NFR BLD: 0.4 %
INTERNAL QC OK POCT: YES
KETONES UR STRIP-MCNC: NEGATIVE MG/DL
LEUKOCYTE ESTERASE UR QL STRIP: NEGATIVE
LIPASE SERPL-CCNC: 67 U/L (ref 73–393)
LYMPHOCYTES # BLD AUTO: 1.3 10E9/L (ref 0.8–5.3)
LYMPHOCYTES NFR BLD AUTO: 7.7 %
MCH RBC QN AUTO: 30.5 PG (ref 26.5–33)
MCHC RBC AUTO-ENTMCNC: 33.2 G/DL (ref 31.5–36.5)
MCV RBC AUTO: 92 FL (ref 78–100)
MONOCYTES # BLD AUTO: 1.6 10E9/L (ref 0–1.3)
MONOCYTES NFR BLD AUTO: 9.7 %
MUCOUS THREADS #/AREA URNS LPF: PRESENT /LPF
NEUTROPHILS # BLD AUTO: 13.2 10E9/L (ref 1.6–8.3)
NEUTROPHILS NFR BLD AUTO: 80.7 %
NITRATE UR QL: NEGATIVE
NRBC # BLD AUTO: 0 10*3/UL
NRBC BLD AUTO-RTO: 0 /100
PH UR STRIP: 5 PH (ref 5–7)
PLATELET # BLD AUTO: 188 10E9/L (ref 150–450)
POTASSIUM SERPL-SCNC: 4 MMOL/L (ref 3.4–5.3)
PROT SERPL-MCNC: 7.5 G/DL (ref 6.8–8.8)
RBC # BLD AUTO: 4.89 10E12/L (ref 4.4–5.9)
RBC #/AREA URNS AUTO: 3 /HPF (ref 0–2)
SODIUM SERPL-SCNC: 136 MMOL/L (ref 133–144)
SOURCE: ABNORMAL
SP GR UR STRIP: 1.02 (ref 1–1.03)
TEST CARD LOT NUMBER: NORMAL
UROBILINOGEN UR STRIP-MCNC: 0 MG/DL (ref 0–2)
WBC # BLD AUTO: 16.3 10E9/L (ref 4–11)
WBC #/AREA URNS AUTO: 2 /HPF (ref 0–5)

## 2020-04-29 PROCEDURE — 83690 ASSAY OF LIPASE: CPT | Performed by: PHYSICIAN ASSISTANT

## 2020-04-29 PROCEDURE — 25000128 H RX IP 250 OP 636: Performed by: SURGERY

## 2020-04-29 PROCEDURE — 12000000 ZZH R&B MED SURG/OB

## 2020-04-29 PROCEDURE — 71000014 ZZH RECOVERY PHASE 1 LEVEL 2 FIRST HR: Performed by: SURGERY

## 2020-04-29 PROCEDURE — 80053 COMPREHEN METABOLIC PANEL: CPT | Performed by: PHYSICIAN ASSISTANT

## 2020-04-29 PROCEDURE — 25000132 ZZH RX MED GY IP 250 OP 250 PS 637: Performed by: SURGERY

## 2020-04-29 PROCEDURE — 25000128 H RX IP 250 OP 636: Performed by: PHYSICIAN ASSISTANT

## 2020-04-29 PROCEDURE — 88304 TISSUE EXAM BY PATHOLOGIST: CPT | Performed by: SURGERY

## 2020-04-29 PROCEDURE — 27110028 ZZH OR GENERAL SUPPLY NON-STERILE: Performed by: SURGERY

## 2020-04-29 PROCEDURE — 27210794 ZZH OR GENERAL SUPPLY STERILE: Performed by: SURGERY

## 2020-04-29 PROCEDURE — 82272 OCCULT BLD FECES 1-3 TESTS: CPT | Performed by: PHYSICIAN ASSISTANT

## 2020-04-29 PROCEDURE — 25000566 ZZH SEVOFLURANE, EA 15 MIN: Performed by: SURGERY

## 2020-04-29 PROCEDURE — 81001 URINALYSIS AUTO W/SCOPE: CPT | Performed by: PHYSICIAN ASSISTANT

## 2020-04-29 PROCEDURE — 44970 LAPAROSCOPY APPENDECTOMY: CPT | Mod: AS | Performed by: PHYSICIAN ASSISTANT

## 2020-04-29 PROCEDURE — 96375 TX/PRO/DX INJ NEW DRUG ADDON: CPT | Performed by: PHYSICIAN ASSISTANT

## 2020-04-29 PROCEDURE — 25800025 ZZH RX 258: Performed by: SURGERY

## 2020-04-29 PROCEDURE — 25000128 H RX IP 250 OP 636: Performed by: NURSE ANESTHETIST, CERTIFIED REGISTERED

## 2020-04-29 PROCEDURE — 25800030 ZZH RX IP 258 OP 636: Performed by: NURSE ANESTHETIST, CERTIFIED REGISTERED

## 2020-04-29 PROCEDURE — 96365 THER/PROPH/DIAG IV INF INIT: CPT | Mod: 59 | Performed by: PHYSICIAN ASSISTANT

## 2020-04-29 PROCEDURE — 99285 EMERGENCY DEPT VISIT HI MDM: CPT | Mod: Z6 | Performed by: PHYSICIAN ASSISTANT

## 2020-04-29 PROCEDURE — 25000125 ZZHC RX 250: Performed by: PHYSICIAN ASSISTANT

## 2020-04-29 PROCEDURE — 74177 CT ABD & PELVIS W/CONTRAST: CPT

## 2020-04-29 PROCEDURE — 37000009 ZZH ANESTHESIA TECHNICAL FEE, EACH ADDTL 15 MIN: Performed by: SURGERY

## 2020-04-29 PROCEDURE — 0DTJ4ZZ RESECTION OF APPENDIX, PERCUTANEOUS ENDOSCOPIC APPROACH: ICD-10-PCS | Performed by: SURGERY

## 2020-04-29 PROCEDURE — 25800030 ZZH RX IP 258 OP 636: Performed by: PHYSICIAN ASSISTANT

## 2020-04-29 PROCEDURE — 40000305 ZZH STATISTIC PRE PROC ASSESS I: Performed by: SURGERY

## 2020-04-29 PROCEDURE — 25000125 ZZHC RX 250: Performed by: NURSE ANESTHETIST, CERTIFIED REGISTERED

## 2020-04-29 PROCEDURE — 36000056 ZZH SURGERY LEVEL 3 1ST 30 MIN: Performed by: SURGERY

## 2020-04-29 PROCEDURE — 85025 COMPLETE CBC W/AUTO DIFF WBC: CPT | Performed by: PHYSICIAN ASSISTANT

## 2020-04-29 PROCEDURE — 99285 EMERGENCY DEPT VISIT HI MDM: CPT | Mod: 25 | Performed by: PHYSICIAN ASSISTANT

## 2020-04-29 PROCEDURE — 88304 TISSUE EXAM BY PATHOLOGIST: CPT | Mod: 26 | Performed by: SURGERY

## 2020-04-29 PROCEDURE — 25000125 ZZHC RX 250: Performed by: SURGERY

## 2020-04-29 PROCEDURE — 99221 1ST HOSP IP/OBS SF/LOW 40: CPT | Mod: 57 | Performed by: SURGERY

## 2020-04-29 PROCEDURE — 71000015 ZZH RECOVERY PHASE 1 LEVEL 2 EA ADDTL HR: Performed by: SURGERY

## 2020-04-29 PROCEDURE — 37000008 ZZH ANESTHESIA TECHNICAL FEE, 1ST 30 MIN: Performed by: SURGERY

## 2020-04-29 PROCEDURE — 25800030 ZZH RX IP 258 OP 636: Performed by: EMERGENCY MEDICINE

## 2020-04-29 PROCEDURE — 44970 LAPAROSCOPY APPENDECTOMY: CPT | Performed by: SURGERY

## 2020-04-29 PROCEDURE — 96376 TX/PRO/DX INJ SAME DRUG ADON: CPT | Performed by: PHYSICIAN ASSISTANT

## 2020-04-29 PROCEDURE — 96361 HYDRATE IV INFUSION ADD-ON: CPT | Performed by: PHYSICIAN ASSISTANT

## 2020-04-29 PROCEDURE — 36000058 ZZH SURGERY LEVEL 3 EA 15 ADDTL MIN: Performed by: SURGERY

## 2020-04-29 RX ORDER — NALOXONE HYDROCHLORIDE 0.4 MG/ML
.1-.4 INJECTION, SOLUTION INTRAMUSCULAR; INTRAVENOUS; SUBCUTANEOUS
Status: DISCONTINUED | OUTPATIENT
Start: 2020-04-29 | End: 2020-04-30 | Stop reason: HOSPADM

## 2020-04-29 RX ORDER — HYDROMORPHONE HYDROCHLORIDE 1 MG/ML
.3-.5 INJECTION, SOLUTION INTRAMUSCULAR; INTRAVENOUS; SUBCUTANEOUS EVERY 5 MIN PRN
Status: DISCONTINUED | OUTPATIENT
Start: 2020-04-29 | End: 2020-04-29 | Stop reason: HOSPADM

## 2020-04-29 RX ORDER — KETOROLAC TROMETHAMINE 30 MG/ML
30 INJECTION, SOLUTION INTRAMUSCULAR; INTRAVENOUS
Status: DISCONTINUED | OUTPATIENT
Start: 2020-04-29 | End: 2020-04-29

## 2020-04-29 RX ORDER — METOCLOPRAMIDE HYDROCHLORIDE 5 MG/ML
10 INJECTION INTRAMUSCULAR; INTRAVENOUS EVERY 6 HOURS PRN
Status: DISCONTINUED | OUTPATIENT
Start: 2020-04-29 | End: 2020-04-30 | Stop reason: HOSPADM

## 2020-04-29 RX ORDER — LIDOCAINE 40 MG/G
CREAM TOPICAL
Status: DISCONTINUED | OUTPATIENT
Start: 2020-04-29 | End: 2020-04-30 | Stop reason: HOSPADM

## 2020-04-29 RX ORDER — PROPOFOL 10 MG/ML
INJECTION, EMULSION INTRAVENOUS PRN
Status: DISCONTINUED | OUTPATIENT
Start: 2020-04-29 | End: 2020-04-29

## 2020-04-29 RX ORDER — LISINOPRIL 20 MG/1
20 TABLET ORAL DAILY
Status: DISCONTINUED | OUTPATIENT
Start: 2020-04-30 | End: 2020-04-30 | Stop reason: HOSPADM

## 2020-04-29 RX ORDER — HYDROMORPHONE HYDROCHLORIDE 1 MG/ML
.3-.5 INJECTION, SOLUTION INTRAMUSCULAR; INTRAVENOUS; SUBCUTANEOUS EVERY 10 MIN PRN
Status: DISCONTINUED | OUTPATIENT
Start: 2020-04-29 | End: 2020-04-29 | Stop reason: HOSPADM

## 2020-04-29 RX ORDER — NALOXONE HYDROCHLORIDE 0.4 MG/ML
.1-.4 INJECTION, SOLUTION INTRAMUSCULAR; INTRAVENOUS; SUBCUTANEOUS
Status: DISCONTINUED | OUTPATIENT
Start: 2020-04-29 | End: 2020-04-29

## 2020-04-29 RX ORDER — ALBUTEROL SULFATE 0.83 MG/ML
2.5 SOLUTION RESPIRATORY (INHALATION) EVERY 4 HOURS PRN
Status: DISCONTINUED | OUTPATIENT
Start: 2020-04-29 | End: 2020-04-30 | Stop reason: HOSPADM

## 2020-04-29 RX ORDER — GLYCOPYRROLATE 0.2 MG/ML
INJECTION, SOLUTION INTRAMUSCULAR; INTRAVENOUS PRN
Status: DISCONTINUED | OUTPATIENT
Start: 2020-04-29 | End: 2020-04-29

## 2020-04-29 RX ORDER — AMLODIPINE BESYLATE 10 MG/1
10 TABLET ORAL DAILY
Status: DISCONTINUED | OUTPATIENT
Start: 2020-04-30 | End: 2020-04-30 | Stop reason: HOSPADM

## 2020-04-29 RX ORDER — HYDROMORPHONE HYDROCHLORIDE 1 MG/ML
0.5 INJECTION, SOLUTION INTRAMUSCULAR; INTRAVENOUS; SUBCUTANEOUS ONCE
Status: COMPLETED | OUTPATIENT
Start: 2020-04-29 | End: 2020-04-29

## 2020-04-29 RX ORDER — NALOXONE HYDROCHLORIDE 0.4 MG/ML
.1-.4 INJECTION, SOLUTION INTRAMUSCULAR; INTRAVENOUS; SUBCUTANEOUS
Status: DISCONTINUED | OUTPATIENT
Start: 2020-04-29 | End: 2020-04-29 | Stop reason: HOSPADM

## 2020-04-29 RX ORDER — LIDOCAINE HYDROCHLORIDE 10 MG/ML
INJECTION, SOLUTION INFILTRATION; PERINEURAL PRN
Status: DISCONTINUED | OUTPATIENT
Start: 2020-04-29 | End: 2020-04-29

## 2020-04-29 RX ORDER — MELOXICAM 7.5 MG/1
15 TABLET ORAL DAILY
Status: DISCONTINUED | OUTPATIENT
Start: 2020-04-29 | End: 2020-04-29

## 2020-04-29 RX ORDER — PROCHLORPERAZINE MALEATE 5 MG
10 TABLET ORAL EVERY 6 HOURS PRN
Status: DISCONTINUED | OUTPATIENT
Start: 2020-04-29 | End: 2020-04-30 | Stop reason: HOSPADM

## 2020-04-29 RX ORDER — SODIUM CHLORIDE, SODIUM LACTATE, POTASSIUM CHLORIDE, CALCIUM CHLORIDE 600; 310; 30; 20 MG/100ML; MG/100ML; MG/100ML; MG/100ML
INJECTION, SOLUTION INTRAVENOUS CONTINUOUS
Status: DISCONTINUED | OUTPATIENT
Start: 2020-04-29 | End: 2020-04-29 | Stop reason: HOSPADM

## 2020-04-29 RX ORDER — ALBUTEROL SULFATE 0.83 MG/ML
2.5 SOLUTION RESPIRATORY (INHALATION) EVERY 4 HOURS PRN
Status: DISCONTINUED | OUTPATIENT
Start: 2020-04-29 | End: 2020-04-29 | Stop reason: HOSPADM

## 2020-04-29 RX ORDER — SODIUM CHLORIDE 9 MG/ML
1000 INJECTION, SOLUTION INTRAVENOUS CONTINUOUS
Status: DISCONTINUED | OUTPATIENT
Start: 2020-04-29 | End: 2020-04-30 | Stop reason: HOSPADM

## 2020-04-29 RX ORDER — ONDANSETRON 2 MG/ML
4 INJECTION INTRAMUSCULAR; INTRAVENOUS ONCE
Status: COMPLETED | OUTPATIENT
Start: 2020-04-29 | End: 2020-04-29

## 2020-04-29 RX ORDER — HYDRALAZINE HYDROCHLORIDE 20 MG/ML
2.5-5 INJECTION INTRAMUSCULAR; INTRAVENOUS EVERY 10 MIN PRN
Status: DISCONTINUED | OUTPATIENT
Start: 2020-04-29 | End: 2020-04-29 | Stop reason: HOSPADM

## 2020-04-29 RX ORDER — ONDANSETRON 2 MG/ML
4 INJECTION INTRAMUSCULAR; INTRAVENOUS EVERY 30 MIN PRN
Status: DISCONTINUED | OUTPATIENT
Start: 2020-04-29 | End: 2020-04-29 | Stop reason: HOSPADM

## 2020-04-29 RX ORDER — IOPAMIDOL 755 MG/ML
100 INJECTION, SOLUTION INTRAVASCULAR ONCE
Status: COMPLETED | OUTPATIENT
Start: 2020-04-29 | End: 2020-04-29

## 2020-04-29 RX ORDER — ACETAMINOPHEN 325 MG/1
975 TABLET ORAL EVERY 8 HOURS
Status: DISCONTINUED | OUTPATIENT
Start: 2020-04-29 | End: 2020-04-30 | Stop reason: HOSPADM

## 2020-04-29 RX ORDER — MEPERIDINE HYDROCHLORIDE 25 MG/ML
12.5 INJECTION INTRAMUSCULAR; INTRAVENOUS; SUBCUTANEOUS EVERY 5 MIN PRN
Status: DISCONTINUED | OUTPATIENT
Start: 2020-04-29 | End: 2020-04-29 | Stop reason: HOSPADM

## 2020-04-29 RX ORDER — ATORVASTATIN CALCIUM 20 MG/1
20 TABLET, FILM COATED ORAL EVERY EVENING
Status: DISCONTINUED | OUTPATIENT
Start: 2020-04-29 | End: 2020-04-30 | Stop reason: HOSPADM

## 2020-04-29 RX ORDER — KETOROLAC TROMETHAMINE 30 MG/ML
30 INJECTION, SOLUTION INTRAMUSCULAR; INTRAVENOUS EVERY 6 HOURS
Status: DISCONTINUED | OUTPATIENT
Start: 2020-04-29 | End: 2020-04-30 | Stop reason: HOSPADM

## 2020-04-29 RX ORDER — MEPERIDINE HYDROCHLORIDE 25 MG/ML
12.5 INJECTION INTRAMUSCULAR; INTRAVENOUS; SUBCUTANEOUS
Status: DISCONTINUED | OUTPATIENT
Start: 2020-04-29 | End: 2020-04-29 | Stop reason: HOSPADM

## 2020-04-29 RX ORDER — ONDANSETRON 2 MG/ML
4 INJECTION INTRAMUSCULAR; INTRAVENOUS EVERY 6 HOURS PRN
Status: DISCONTINUED | OUTPATIENT
Start: 2020-04-29 | End: 2020-04-30 | Stop reason: HOSPADM

## 2020-04-29 RX ORDER — HYDROMORPHONE HYDROCHLORIDE 1 MG/ML
0.5 INJECTION, SOLUTION INTRAMUSCULAR; INTRAVENOUS; SUBCUTANEOUS
Status: DISCONTINUED | OUTPATIENT
Start: 2020-04-29 | End: 2020-04-30 | Stop reason: HOSPADM

## 2020-04-29 RX ORDER — ALBUTEROL SULFATE 90 UG/1
2 AEROSOL, METERED RESPIRATORY (INHALATION) EVERY 4 HOURS PRN
Status: DISCONTINUED | OUTPATIENT
Start: 2020-04-29 | End: 2020-04-30 | Stop reason: HOSPADM

## 2020-04-29 RX ORDER — ACETAMINOPHEN 325 MG/1
650 TABLET ORAL EVERY 4 HOURS PRN
Status: DISCONTINUED | OUTPATIENT
Start: 2020-05-02 | End: 2020-04-30 | Stop reason: HOSPADM

## 2020-04-29 RX ORDER — FENTANYL CITRATE 50 UG/ML
INJECTION, SOLUTION INTRAMUSCULAR; INTRAVENOUS PRN
Status: DISCONTINUED | OUTPATIENT
Start: 2020-04-29 | End: 2020-04-29

## 2020-04-29 RX ORDER — DEXAMETHASONE SODIUM PHOSPHATE 4 MG/ML
INJECTION, SOLUTION INTRA-ARTICULAR; INTRALESIONAL; INTRAMUSCULAR; INTRAVENOUS; SOFT TISSUE PRN
Status: DISCONTINUED | OUTPATIENT
Start: 2020-04-29 | End: 2020-04-29

## 2020-04-29 RX ORDER — FENTANYL CITRATE 50 UG/ML
25-50 INJECTION, SOLUTION INTRAMUSCULAR; INTRAVENOUS
Status: DISCONTINUED | OUTPATIENT
Start: 2020-04-29 | End: 2020-04-29 | Stop reason: HOSPADM

## 2020-04-29 RX ORDER — NEOSTIGMINE METHYLSULFATE 1 MG/ML
VIAL (ML) INJECTION PRN
Status: DISCONTINUED | OUTPATIENT
Start: 2020-04-29 | End: 2020-04-29

## 2020-04-29 RX ORDER — METOCLOPRAMIDE 10 MG/1
10 TABLET ORAL EVERY 6 HOURS PRN
Status: DISCONTINUED | OUTPATIENT
Start: 2020-04-29 | End: 2020-04-30 | Stop reason: HOSPADM

## 2020-04-29 RX ORDER — ONDANSETRON 4 MG/1
4 TABLET, ORALLY DISINTEGRATING ORAL EVERY 30 MIN PRN
Status: DISCONTINUED | OUTPATIENT
Start: 2020-04-29 | End: 2020-04-29 | Stop reason: HOSPADM

## 2020-04-29 RX ORDER — ONDANSETRON 4 MG/1
4 TABLET, ORALLY DISINTEGRATING ORAL EVERY 6 HOURS PRN
Status: DISCONTINUED | OUTPATIENT
Start: 2020-04-29 | End: 2020-04-30 | Stop reason: HOSPADM

## 2020-04-29 RX ORDER — KETOROLAC TROMETHAMINE 30 MG/ML
30 INJECTION, SOLUTION INTRAMUSCULAR; INTRAVENOUS EVERY 6 HOURS PRN
Status: DISCONTINUED | OUTPATIENT
Start: 2020-04-29 | End: 2020-04-29 | Stop reason: HOSPADM

## 2020-04-29 RX ORDER — KETOROLAC TROMETHAMINE 15 MG/ML
15 INJECTION, SOLUTION INTRAMUSCULAR; INTRAVENOUS ONCE
Status: COMPLETED | OUTPATIENT
Start: 2020-04-29 | End: 2020-04-29

## 2020-04-29 RX ORDER — DOCUSATE SODIUM 100 MG/1
100 CAPSULE, LIQUID FILLED ORAL 2 TIMES DAILY
Status: DISCONTINUED | OUTPATIENT
Start: 2020-04-29 | End: 2020-04-30 | Stop reason: HOSPADM

## 2020-04-29 RX ORDER — OXYCODONE HYDROCHLORIDE 5 MG/1
5-10 TABLET ORAL
Status: DISCONTINUED | OUTPATIENT
Start: 2020-04-29 | End: 2020-04-30 | Stop reason: HOSPADM

## 2020-04-29 RX ORDER — ONDANSETRON 2 MG/ML
INJECTION INTRAMUSCULAR; INTRAVENOUS PRN
Status: DISCONTINUED | OUTPATIENT
Start: 2020-04-29 | End: 2020-04-29

## 2020-04-29 RX ADMIN — OMEPRAZOLE 40 MG: 20 CAPSULE, DELAYED RELEASE ORAL at 19:46

## 2020-04-29 RX ADMIN — Medication 5 MG: at 13:45

## 2020-04-29 RX ADMIN — KETOROLAC TROMETHAMINE 15 MG: 15 INJECTION, SOLUTION INTRAMUSCULAR; INTRAVENOUS at 09:51

## 2020-04-29 RX ADMIN — HYDROMORPHONE HYDROCHLORIDE 0.5 MG: 1 INJECTION, SOLUTION INTRAMUSCULAR; INTRAVENOUS; SUBCUTANEOUS at 10:37

## 2020-04-29 RX ADMIN — ONDANSETRON 4 MG: 2 INJECTION INTRAMUSCULAR; INTRAVENOUS at 09:36

## 2020-04-29 RX ADMIN — DOCUSATE SODIUM 100 MG: 100 CAPSULE, LIQUID FILLED ORAL at 19:46

## 2020-04-29 RX ADMIN — KETOROLAC TROMETHAMINE 30 MG: 30 INJECTION, SOLUTION INTRAMUSCULAR at 16:53

## 2020-04-29 RX ADMIN — FENTANYL CITRATE 100 MCG: 50 INJECTION, SOLUTION INTRAMUSCULAR; INTRAVENOUS at 13:53

## 2020-04-29 RX ADMIN — KETOROLAC TROMETHAMINE 30 MG: 30 INJECTION, SOLUTION INTRAMUSCULAR at 22:41

## 2020-04-29 RX ADMIN — ATORVASTATIN CALCIUM 20 MG: 20 TABLET, FILM COATED ORAL at 19:47

## 2020-04-29 RX ADMIN — FENTANYL CITRATE 100 MCG: 50 INJECTION, SOLUTION INTRAMUSCULAR; INTRAVENOUS at 12:13

## 2020-04-29 RX ADMIN — TAZOBACTAM SODIUM AND PIPERACILLIN SODIUM 4.5 G: 500; 4 INJECTION, SOLUTION INTRAVENOUS at 11:07

## 2020-04-29 RX ADMIN — ACETAMINOPHEN 975 MG: 325 TABLET, FILM COATED ORAL at 16:53

## 2020-04-29 RX ADMIN — MEPERIDINE HYDROCHLORIDE 50 MG: 25 INJECTION, SOLUTION INTRAMUSCULAR; INTRAVENOUS; SUBCUTANEOUS at 13:57

## 2020-04-29 RX ADMIN — TAZOBACTAM SODIUM AND PIPERACILLIN SODIUM 4.5 G: 500; 4 INJECTION, SOLUTION INTRAVENOUS at 16:53

## 2020-04-29 RX ADMIN — SODIUM CHLORIDE 71 ML: 9 INJECTION, SOLUTION INTRAVENOUS at 10:14

## 2020-04-29 RX ADMIN — ROCURONIUM BROMIDE 30 MG: 10 INJECTION INTRAVENOUS at 12:17

## 2020-04-29 RX ADMIN — DEXAMETHASONE SODIUM PHOSPHATE 4 MG: 4 INJECTION, SOLUTION INTRA-ARTICULAR; INTRALESIONAL; INTRAMUSCULAR; INTRAVENOUS; SOFT TISSUE at 13:38

## 2020-04-29 RX ADMIN — SODIUM CHLORIDE: 9 INJECTION, SOLUTION INTRAVENOUS at 12:13

## 2020-04-29 RX ADMIN — DEXTROSE AND SODIUM CHLORIDE: 5; 450 INJECTION, SOLUTION INTRAVENOUS at 16:53

## 2020-04-29 RX ADMIN — IOPAMIDOL 100 ML: 755 INJECTION, SOLUTION INTRAVENOUS at 10:14

## 2020-04-29 RX ADMIN — PROPOFOL 200 MG: 10 INJECTION, EMULSION INTRAVENOUS at 12:17

## 2020-04-29 RX ADMIN — OXYCODONE HYDROCHLORIDE 10 MG: 5 TABLET ORAL at 16:53

## 2020-04-29 RX ADMIN — SODIUM CHLORIDE 500 ML: 9 INJECTION, SOLUTION INTRAVENOUS at 09:36

## 2020-04-29 RX ADMIN — SODIUM CHLORIDE, POTASSIUM CHLORIDE, SODIUM LACTATE AND CALCIUM CHLORIDE: 600; 310; 30; 20 INJECTION, SOLUTION INTRAVENOUS at 13:47

## 2020-04-29 RX ADMIN — MIDAZOLAM 2 MG: 1 INJECTION INTRAMUSCULAR; INTRAVENOUS at 13:56

## 2020-04-29 RX ADMIN — HYDROMORPHONE HYDROCHLORIDE 1 MG: 1 INJECTION, SOLUTION INTRAMUSCULAR; INTRAVENOUS; SUBCUTANEOUS at 11:00

## 2020-04-29 RX ADMIN — MIDAZOLAM 2 MG: 1 INJECTION INTRAMUSCULAR; INTRAVENOUS at 12:13

## 2020-04-29 RX ADMIN — FENTANYL CITRATE 150 MCG: 50 INJECTION, SOLUTION INTRAMUSCULAR; INTRAVENOUS at 12:17

## 2020-04-29 RX ADMIN — TAZOBACTAM SODIUM AND PIPERACILLIN SODIUM 4.5 G: 500; 4 INJECTION, SOLUTION INTRAVENOUS at 22:43

## 2020-04-29 RX ADMIN — MIDAZOLAM 2 MG: 1 INJECTION INTRAMUSCULAR; INTRAVENOUS at 13:53

## 2020-04-29 RX ADMIN — LIDOCAINE HYDROCHLORIDE 50 MG: 10 INJECTION, SOLUTION INFILTRATION; PERINEURAL at 12:17

## 2020-04-29 RX ADMIN — ROCURONIUM BROMIDE 50 MG: 10 INJECTION INTRAVENOUS at 12:51

## 2020-04-29 RX ADMIN — HYDROMORPHONE HYDROCHLORIDE 0.5 MG: 1 INJECTION, SOLUTION INTRAMUSCULAR; INTRAVENOUS; SUBCUTANEOUS at 14:57

## 2020-04-29 RX ADMIN — OXYCODONE HYDROCHLORIDE 10 MG: 5 TABLET ORAL at 20:08

## 2020-04-29 RX ADMIN — SODIUM CHLORIDE 1000 ML: 9 INJECTION, SOLUTION INTRAVENOUS at 10:52

## 2020-04-29 RX ADMIN — ONDANSETRON 4 MG: 2 INJECTION INTRAMUSCULAR; INTRAVENOUS at 13:38

## 2020-04-29 RX ADMIN — GLYCOPYRROLATE 1 MG: 0.2 INJECTION, SOLUTION INTRAMUSCULAR; INTRAVENOUS at 13:45

## 2020-04-29 ASSESSMENT — ENCOUNTER SYMPTOMS
CONSTIPATION: 1
ABDOMINAL DISTENTION: 0
CARDIOVASCULAR NEGATIVE: 1
RESPIRATORY NEGATIVE: 1
NEUROLOGICAL NEGATIVE: 1
BACK PAIN: 1
DIARRHEA: 0
RECTAL PAIN: 0
BLOOD IN STOOL: 0
APPETITE CHANGE: 1
ANAL BLEEDING: 0
VOMITING: 0
ACTIVITY CHANGE: 1
NAUSEA: 1
ABDOMINAL PAIN: 1
FEVER: 0

## 2020-04-29 ASSESSMENT — MIFFLIN-ST. JEOR: SCORE: 2216.1

## 2020-04-29 ASSESSMENT — LIFESTYLE VARIABLES: TOBACCO_USE: 1

## 2020-04-29 ASSESSMENT — ACTIVITIES OF DAILY LIVING (ADL): ADLS_ACUITY_SCORE: 10

## 2020-04-29 NOTE — OP NOTE
Procedure Date: 04/29/2020      PREOPERATIVE DIAGNOSIS:  Acute appendicitis with perforation.      POSTOPERATIVE DIAGNOSIS:  Acute appendicitis with perforation.      PROCEDURE PERFORMED:  Laparoscopic appendectomy.      SURGEON:  Alexis Maher MD      FIRST ASSISTANT:  TASHI Novak.  His assistance was crucial in completing this case laparoscopically due to his expertise with laparoscopic instrumentation, exposure, and assistance with wound closure.      ANESTHESIA:  General.      INDICATIONS:  This 54-year-old man presented to the hospital complaining of back pain for 2 weeks, but in the last 2 days, he developed right lower quadrant pain which was quite severe and brought him to the hospital.  A CT scan showed acute appendicitis with likely perforation.  He was also noted to have a white count of 16,000.  A diagnosis of acute appendicitis was made and the patient was counseled about risks, benefits, alternatives and postoperative expectations.  He consented to the procedure and all questions were answered.      DESCRIPTION OF PROCEDURE:  The patient was brought to the operating room and placed on the table in the supine position.  After induction of adequate general endotracheal anesthesia, and a mandatory 15-minute pause, the patient's abdomen was clipped of extraneous hairs and then prepped and draped in the usual sterile fashion.  A surgical timeout was performed at this point to identify both the patient and the proposed procedure.  All present were in agreement.      A small midline incision was made about 5 cm above the umbilicus and dissection carried down through subcutaneous tissues to the level of the fascia.  The fascia was incised in the midline and the peritoneal cavity bluntly entered.  The Donna trocar was placed and the abdomen insufflated to 15 mm of pressure with CO2 gas.  Two further 5 mm ports were placed.  We could see where his mesh had been placed for his previous laparoscopic  preperitoneal inguinal hernia repair.  We stayed away from that and placed a 5 mm port in the midline well above the bladder.  This was done under direct vision.  A second 5 mm port was placed in the left lower quadrant under direct vision.  The patient was then placed in a reverse Trendelenburg position with the left side rotated downward.  The cecum was immediately noted and we were able to follow it inferiorly to an obviously indurated area of tissue.  The terminal ileum was stuck to an inflammatory process somewhere between it and the pelvic sidewall.  The terminal ileum was dissected medially.  The appendix was identified.  It was intensely adherent to the surrounding tissues.  Very carefully and slowly, we dissected the appendix off of surrounding tissues using either blunt dissection or the LigaSure as necessary.  We started actually by transecting the appendix at the cecum using the Endo-MAGDALENA stapler.  We then worked from distal to proximal and proximal to distal in removing the appendix, being careful to identify any nearby structures.  Eventually, the gallbladder was removed and placed into an Endobag.  The appendix was removed from the body and taken off the field.  We then inspected the staple line, and found it to be intact.  At this point, we irrigated the entire abdomen with about 4 liters of saline.  I should mention that as we dissected the appendix from the terminal ileum, we did encounter a perforated segment of the appendix, which had some stool in it.  This was suctioned away.  We concentrated all of our irrigation in this area.  At the end of the case, there was no bleeding and all irrigant returned as clear.  Both of the 5 mm ports were removed under direct vision.  No bleeding was seen.  The Donna was removed and the abdomen deflated.  The supraumbilical midline fascia was closed with a couple of interrupted 0 Vicryl figure-of-eight sutures.  The previously placed stay sutures were tied.  All  the wounds were infiltrated with Marcaine with epinephrine and closed with subcuticular 4-0 Monocryl sutures.  Surgical glue was applied to act as the final dressing.  The patient was then awakened from the anesthetic and after mandatory 15 minute wait in the operating room, he was transported to the Same Day Surgery Unit to wake up and recover.  Because his appendix was perforated, I elected to admit him to the hospital for antibiotic treatment.  I anticipate a 2- to 3-day hospitalization.  At the time of this dictation, the patient was awake, alert and had tolerated the procedure well.  There were no complications.  Needle, sponge, and instrument counts were correct x2.      ESTIMATED BLOOD LOSS:  5 mL or less.         CASTILLO HOOKS MD             D: 2020   T: 2020   MT: VICKY      Name:     SAMEERA MUNOZ   MRN:      [id number]        Account:        [id number]   :      1965           Procedure Date: 2020      Document: [id number]

## 2020-04-29 NOTE — PROGRESS NOTES
"[facility] ADMISSION NOTE    Patient admitted to room 2400 at approximately 1605 via cart from surgery. Patient was accompanied by transport tech.     Verbal SBAR report received from RACHID Alvarez prior to patient arrival.     Patient ambulated to bed with stand-by assist. Patient alert and oriented X 3. Pain is not well controlled.  Medication(s) being used: narcotic analgesics including hydromorphone (Dilaudid). 0-10 Pain Scale: 6. Admission vital signs: Blood pressure (!) 157/86, pulse 80, temperature 98.2  F (36.8  C), temperature source Oral, resp. rate 18, height 2.083 m (6' 10\"), weight 117.9 kg (260 lb), SpO2 96 %. Patient was oriented to plan of care, call light, bed controls, tv, telephone, bathroom and visiting hours.     Risk Assessment    The following safety risks were identified during admission: fall. Yellow risk band applied: YES.     Skin Initial Assessment    Patient refused skin assessment.     Education    Patient has a Spivey to Observation order: No  Observation education completed and documented: N/A      Latoya Louis RN    "

## 2020-04-29 NOTE — ED PROVIDER NOTES
History     Chief Complaint   Patient presents with     Abdominal Pain     RLQ pain started Monday, worse today, thinks appy, sent down from [facility]     Constipation     HPI  Sarath Gray is a 54 year old male with history of obstructive sleep apnea, essential hypertension, history of paroxysmal supraventricular tachycardia, low back pain, esophageal reflux, and history of tobacco use.  Past surgeries include knee arthroplasty, lumbar back procedure, spinal stimulator insertion, laparoscopic inguinal hernia repair bilaterally, and history of SVT ablation. Patient presents to the Emergency department today with right lower quadrant pain that has progressively worsened over the past 3 days. Patient states he has noticed lower back pain that started last week and has also worsened over the past 3 days. Patient also states that the pain is radiating into his right groin today. Patient states some nausea and darker stools with concerns for possible constipation, but states he had a darker small stool this morning. Patient states decreased appetite and activity due to pain. Patient denies fevers, shortness of breath, heart racing or skipping beats, chest pain, bloody stools, vomiting or diarrhea, hematuria, urinary symptoms.  Patient denies any testicular pain or swelling.      Allergies:  Allergies   Allergen Reactions     Persantine [Dipyridamole] Other (See Comments)     Nervous and anxiety       Problem List:    Patient Active Problem List    Diagnosis Date Noted     Acute appendicitis with generalized peritonitis, unspecified whether abscess present, unspecified whether gangrene present, unspecified whether perforation present 04/29/2020     Priority: Medium     Added automatically from request for surgery [id number]       CARROLL (obstructive sleep apnea) 03/29/2016     Priority: Medium     4/11/2016. AHI 57, positional effect noted, lowest oxygen saturation was 79, time below 88% was 32 minutes  1/25/2001. AHI was 8  events per hour. Respiratory arousal index was 45, lowest oxygen saturation was 85%. He weighed 235 lbs       Essential hypertension, benign 01/08/2016     Priority: Medium     S/P total knee arthroplasty 06/11/2014     Priority: Medium     Left knee DJD 06/11/2014     Priority: Medium     History of PSVT (paroxysmal supraventricular tachycardia) 05/01/2012     Priority: Medium     WITH ABLATION 2008       CARDIOVASCULAR SCREENING; LDL GOAL LESS THAN 130 10/31/2010     Priority: Medium     Low back pain 01/23/2009     Priority: Medium     12/5/2011:Three prior low back surgeries.          Impotence of organic origin 04/02/2008     Priority: Medium     Esophageal reflux 02/24/2005     Priority: Medium     Tobacco use disorder 02/24/2005     Priority: Medium        Past Medical History:    Past Medical History:   Diagnosis Date     PE (pulmonary embolism)      Respiratory complications March 2005       Past Surgical History:    Past Surgical History:   Procedure Laterality Date     ARTHROPLASTY KNEE  6/11/2014    Procedure: ARTHROPLASTY KNEE;  Surgeon: Maik Jeong MD;  Location: [facility]     ARTHROSCOPY KNEE WITH MEDIAL MENISCECTOMY  12/19/2013    Procedure: ARTHROSCOPY KNEE WITH MEDIAL MENISCECTOMY;  Left Knee Arthroscopy With Intraarticular Debridement.;  Surgeon: Maik Jeong MD;  Location: [facility] OR     COLONOSCOPY  2002     COLONOSCOPY N/A 4/18/2018    Procedure: COLONOSCOPY;  colonoscopy;  Surgeon: Nirmal Schneider MD;  Location: [facility] GI     EXAM UNDER ANESTHESIA, MANIPULATE JOINT (LOCATION)  6/26/2014    Procedure: EXAM UNDER ANESTHESIA, MANIPULATE JOINT (LOCATION);  Surgeon: Maik Jeong MD;  Location: [facility] OR     EXAM UNDER ANESTHESIA, MANIPULATE JOINT (LOCATION) Left 8/28/2014    Procedure: EXAM UNDER ANESTHESIA, MANIPULATE JOINT (LOCATION);  Surgeon: Maik Jeong MD;  Location: [facility] OR     EXAM UNDER ANESTHESIA, MANIPULATE JOINT (LOCATION) Left 12/31/2014    Procedure: EXAM UNDER  ANESTHESIA, MANIPULATE JOINT (LOCATION);  Surgeon: Maik Jeong MD;  Location: [facility] OR     H ABLATION SVT  2008    AVNRT     INJECT EPIDURAL LUMBAR  12/16/2011    Procedure:INJECT EPIDURAL LUMBAR; MICKY-Dr. Smith; Surgeon:GENERIC ANESTHESIA PROVIDER; Location:[facility] OR     INJECT EPIDURAL LUMBAR  1/27/2012    Procedure:INJECT EPIDURAL LUMBAR; MICKY with Flouro--; Surgeon:GENERIC ANESTHESIA PROVIDER; Location:[facility]     LAPAROSCOPIC HERNIORRHAPHY INGUINAL BILATERAL Bilateral 12/29/2015    Procedure: LAPAROSCOPIC HERNIORRHAPHY INGUINAL BILATERAL;  Surgeon: Andrew Prajapati MD;  Location: [facility]     Lumbar back fusion  1995, 1998    first was A&P     Lumbar back procedure  1999    Hardware removal     spinal stimulator insertion  2004    also, revised it that year also. never seemed to help well.        Family History:    Family History   Problem Relation Age of Onset     Cardiovascular Paternal Grandfather      C.A.D. Father 36        MI at age 36-37     Cerebrovascular Disease Father      Allergies Father      Anesthesia Reaction Father      Prostate Cancer No family hx of      Colon Cancer No family hx of        Social History:  Marital Status:  Single [1]  Social History     Tobacco Use     Smoking status: Light Tobacco Smoker     Packs/day: 1.00     Years: 18.00     Pack years: 18.00     Smokeless tobacco: Never Used     Tobacco comment: Started at age 30.    Substance Use Topics     Alcohol use: Yes     Alcohol/week: 0.0 standard drinks     Comment: occassional beer     Drug use: No        Medications:    No current outpatient medications on file.        Review of Systems   Constitutional: Positive for activity change and appetite change. Negative for fever.   Respiratory: Negative.    Cardiovascular: Negative.    Gastrointestinal: Positive for abdominal pain, constipation (possibly; patient states he has been taking stool softener for this over the past few days. ) and nausea. Negative for abdominal distention,  "anal bleeding, blood in stool, diarrhea, rectal pain and vomiting.   Genitourinary: Negative.    Musculoskeletal: Positive for back pain.   Neurological: Negative.    All other systems reviewed and are negative.      Physical Exam   BP: (!) 156/97  Pulse: 98  Heart Rate: 106  Temp: 98.6  F (37  C)  Resp: 16  Height: 208.3 cm (6' 10\")  Weight: 115.7 kg (255 lb)  SpO2: 98 %      Physical Exam  Vitals signs and nursing note reviewed.   Constitutional:       General: He is not in acute distress.     Appearance: He is well-developed and normal weight. He is ill-appearing. He is not toxic-appearing.   HENT:      Head: Normocephalic and atraumatic.   Eyes:      General: No scleral icterus.     Extraocular Movements: Extraocular movements intact.      Pupils: Pupils are equal, round, and reactive to light.   Cardiovascular:      Rate and Rhythm: Normal rate and regular rhythm.      Heart sounds: Normal heart sounds. No murmur. No friction rub. No gallop.    Pulmonary:      Effort: Pulmonary effort is normal.      Breath sounds: Normal breath sounds.   Abdominal:      General: Abdomen is flat. Bowel sounds are decreased. There is no distension or abdominal bruit.      Palpations: Abdomen is soft. There is no hepatomegaly, splenomegaly, mass or pulsatile mass.      Tenderness: There is abdominal tenderness in the right lower quadrant and suprapubic area. There is guarding and rebound. There is no right CVA tenderness or left CVA tenderness. Positive signs include McBurney's sign, psoas sign and obturator sign. Negative signs include Karimi's sign and Rovsing's sign.      Hernia: No hernia is present.   Genitourinary:     Rectum: Normal. Guaiac result negative. No mass or tenderness.   Skin:     General: Skin is warm.      Capillary Refill: Capillary refill takes less than 2 seconds.      Findings: No rash.   Neurological:      General: No focal deficit present.      Mental Status: He is alert and oriented to person, place, " and time.   Psychiatric:         Mood and Affect: Mood normal.         Behavior: Behavior normal.         ED Course        Procedures              Critical Care time:    Consulted with Dr. Maher at 10:45am regarding acute appendicitis with likely perforation.  Dr. Maher states he would come down and evaluate the emergency department and plan to take him to the OR for surgery.  Patient given Zosyn 4.5mg IV one time dose in the Emergency Room per Dr. Maher recommendation.            Results for orders placed or performed during the hospital encounter of 04/29/20 (from the past 24 hour(s))   CBC with platelets differential   Result Value Ref Range    WBC 16.3 (H) 4.0 - 11.0 10e9/L    RBC Count 4.89 4.4 - 5.9 10e12/L    Hemoglobin 14.9 13.3 - 17.7 g/dL    Hematocrit 44.9 40.0 - 53.0 %    MCV 92 78 - 100 fl    MCH 30.5 26.5 - 33.0 pg    MCHC 33.2 31.5 - 36.5 g/dL    RDW 13.2 10.0 - 15.0 %    Platelet Count 188 150 - 450 10e9/L    Diff Method Automated Method     % Neutrophils 80.7 %    % Lymphocytes 7.7 %    % Monocytes 9.7 %    % Eosinophils 1.2 %    % Basophils 0.3 %    % Immature Granulocytes 0.4 %    Nucleated RBCs 0 0 /100    Absolute Neutrophil 13.2 (H) 1.6 - 8.3 10e9/L    Absolute Lymphocytes 1.3 0.8 - 5.3 10e9/L    Absolute Monocytes 1.6 (H) 0.0 - 1.3 10e9/L    Absolute Eosinophils 0.2 0.0 - 0.7 10e9/L    Absolute Basophils 0.1 0.0 - 0.2 10e9/L    Abs Immature Granulocytes 0.1 0 - 0.4 10e9/L    Absolute Nucleated RBC 0.0    Comprehensive metabolic panel   Result Value Ref Range    Sodium 136 133 - 144 mmol/L    Potassium 4.0 3.4 - 5.3 mmol/L    Chloride 104 94 - 109 mmol/L    Carbon Dioxide 27 20 - 32 mmol/L    Anion Gap 5 3 - 14 mmol/L    Glucose 93 70 - 99 mg/dL    Urea Nitrogen 10 7 - 30 mg/dL    Creatinine 0.91 0.66 - 1.25 mg/dL    GFR Estimate >90 >60 mL/min/[1.73_m2]    GFR Estimate If Black >90 >60 mL/min/[1.73_m2]    Calcium 9.0 8.5 - 10.1 mg/dL    Bilirubin Total 0.5 0.2 - 1.3 mg/dL    Albumin 3.5 3.4 -  5.0 g/dL    Protein Total 7.5 6.8 - 8.8 g/dL    Alkaline Phosphatase 50 40 - 150 U/L    ALT 28 0 - 70 U/L    AST 16 0 - 45 U/L   Lipase   Result Value Ref Range    Lipase 67 (L) 73 - 393 U/L   UA with Microscopic   Result Value Ref Range    Color Urine Sandra     Appearance Urine Clear     Glucose Urine Negative NEG^Negative mg/dL    Bilirubin Urine Negative NEG^Negative    Ketones Urine Negative NEG^Negative mg/dL    Specific Gravity Urine 1.025 1.003 - 1.035    Blood Urine Negative NEG^Negative    pH Urine 5.0 5.0 - 7.0 pH    Protein Albumin Urine Negative NEG^Negative mg/dL    Urobilinogen mg/dL 0.0 0.0 - 2.0 mg/dL    Nitrite Urine Negative NEG^Negative    Leukocyte Esterase Urine Negative NEG^Negative    Source Midstream Urine     WBC Urine 2 0 - 5 /HPF    RBC Urine 3 (H) 0 - 2 /HPF    Mucous Urine Present (A) NEG^Negative /LPF   Occult blood stool POCT   Result Value Ref Range    Occult Blood NEG neg    Internal QC OK Yes     Test Card Lot Number [id number]    CT Abdomen Pelvis w Contrast    Narrative    CT ABDOMEN PELVIS W CONTRAST 4/29/2020 10:22 AM    CLINICAL HISTORY: lower back pain last week with worsening RLQ pain  over the past 3 days.    TECHNIQUE: CT scan of the abdomen and pelvis was performed following  injection of IV contrast. Multiplanar reformats were obtained. Dose  reduction techniques were used.  CONTRAST: 100 mL Isovue 370    COMPARISON: None.    FINDINGS:   LOWER CHEST: Normal.    HEPATOBILIARY: Tiny cyst in the left hepatic lobe.    Otherwise normal.    PANCREAS: Normal.    SPLEEN: Normal.    ADRENAL GLANDS: Normal.    KIDNEYS/BLADDER: Normal.    BOWEL: Obstructing appendicolith at the base of the appendix. The  appendiceal wall is poorly visualized, with marked inflammation and  ill-defined fluid in the adjacent fat. Small amount of fluid in the  pelvic cul-de-sac as well. No well-formed abscess. There are air-fluid  levels in multiple nondilated loops of bowel, likely indicating an  adynamic  ileus. Apparent gastric wall thickening in the fundus.    PELVIC ORGANS: Normal.    ADDITIONAL FINDINGS: None.    MUSCULOSKELETAL: Postop lumbosacral fusion.      Impression    IMPRESSION:   1.  Acute appendicitis, likely with perforation. Appendiceal wall is  poorly visualized, with marked surrounding inflammation and  ill-defined fluid. No well formed abscess.  2.  Gastric wall thickening may be artifactual. Gastritis not  excluded.    MELY BARROS MD       Medications   0.9% sodium chloride BOLUS (0 mLs Intravenous Stopped 4/29/20 1051)     Followed by   sodium chloride 0.9% infusion (has no administration in time range)   ondansetron (ZOFRAN) injection 4 mg (4 mg Intravenous Given 4/29/20 0936)   ketorolac (TORADOL) injection 15 mg (15 mg Intravenous Given 4/29/20 0951)   iopamidol (ISOVUE-370) solution 100 mL (100 mLs Intravenous Given 4/29/20 1014)   sodium chloride 0.9 % bag 500mL for CT scan flush use (71 mLs Intravenous Given 4/29/20 1014)   HYDROmorphone (PF) (DILAUDID) injection 0.5 mg (0.5 mg Intravenous Given 4/29/20 1037)   0.9% sodium chloride BOLUS (1,000 mLs Intravenous New Bag 4/29/20 1052)   piperacillin-tazobactam (ZOSYN) intermittent infusion 4.5 g (4.5 g Intravenous New Bag 4/29/20 1107)   HYDROmorphone (DILAUDID) injection 1 mg (1 mg Intravenous Given 4/29/20 1100)       Assessments & Plan (with Medical Decision Making)     I have reviewed the nursing notes.    I have reviewed the findings, diagnosis, plan and need for follow up with the patient.  Sarath Gray is a 54 year old male with history of obstructive sleep apnea, essential hypertension, history of paroxysmal supraventricular tachycardia, low back pain, esophageal reflux, and history of tobacco use.  Past surgeries include knee arthroplasty, lumbar back procedure, spinal stimulator insertion, laparoscopic inguinal hernia repair bilaterally, and history of SVT ablation. Patient presents to the Emergency department today with  right lower quadrant pain that has progressively worsened over the past 3 days. Patient states he has noticed lower back pain that started last week and has also worsened over the past 3 days. Patient also states that the pain is radiating into his right groin today. Patient states some nausea and darker stools with concerns for possible constipation, but states he had a darker small stool this morning. Patient states decreased appetite and activity due to pain. Patient denies fevers, shortness of breath, heart racing or skipping beats, chest pain, bloody stools, vomiting or diarrhea, hematuria, urinary symptoms.  Patient denies any testicular pain or swelling.      See exam findings above.  CBC shows elevated WBC at 16.3 with absolute neutrophils at 13.2 and absolute monocytes at 1.6.  CMP within normal limits.  Lipase low at 67.  UA obtained which shows no acute finding for hematuria or infection.  Guaiac on rectal exam was negative.  CT abdomen pelvis was obtained in the emergency department today which shows acute appendicitis, likely with perforation.  Appendiceal wall is poorly visualized, with marked surrounding inflammation and ill-defined fluid.  No well-formed abscess.  Gastric wall thickening may be artifactual.  Gastritis not excluded.  Consulted with Dr. Maher, general surgery, regarding patient's findings. Patient sent to OR from Emergency Room  Per Dr. Maher. Dr. Gonzalez in the Emergency Room agrees with treatment and plan of care while patient was in the Emergency Room. Patient given IV Zosyn prior to surgery.     Current Discharge Medication List          Final diagnoses:   Acute appendicitis       4/29/2020   [facility] EMERGENCY DEPARTMENT     Marisabel Vazquez PA-C  04/29/20 1153    Marisabel Vazquez PA-C  04/29/20 1154           Pre-OP  Clearance     Please see the body of above note or dictation for the history and physical.      No medical contraindication to emergent surgery or  anesthesia identified. No additional modifiable risk identified.  Informed consent deferred to surgeon.    Physician Attestation   I, Adelfo Gonzalez MD, saw and evaluated the patient as part of a shared visit.  I have reviewed and discussed with the advanced practice provider their history, physical and plan.    I discussed/reviewed the patient's evaluation, medical decision making and treatment plan.   I personally reviewed the vital signs, medications, labs and imaging.    My key history or physical exam findings: 3 days of progressive RLQ and right low back pain. Uncomfortable appearing. Mild tachycardia. RLQ pain with peritoneal signs.    Key management decisions made by me: CT, IVF bolus, IV abx, surgical consult.    I agree with the PA's evaluation, assessment and treatment plan.  Face to face time with the patient = 15 minutes.    Adelfo Gonzalez MD  Date of Service (when I saw the patient): 4/29/20                   Adelfo Gonzalez MD  05/01/20 4308

## 2020-04-29 NOTE — PROGRESS NOTES
"Patient tolerated regular dinner. Reports mild back pain. PRN oxycodone administered, along with scheduled toradol and tylenol. IV abx infusing currently. /81   Pulse 80   Temp 99  F (37.2  C) (Oral)   Resp 16   Ht 2.083 m (6' 10\")   Wt 117.9 kg (260 lb)   SpO2 94%   BMI 27.19 kg/m      "

## 2020-04-29 NOTE — PROGRESS NOTES
Patient transferred from emergency department to pre op room 22 in preperation for procedure. Handoff report received from ER nurse.

## 2020-04-29 NOTE — CONSULTS
PCP:  Maik Smith    Chief complaint: Right lower quadrant pain, acute appendicitis    History of Present Illness: Patient is a 54-year-old man who has a long history of back problems and has had several procedures related to it.  Approximately 2 weeks ago he started having more back problems kind of focused on the right side.  He was not having any abdominal symptoms at that point.    About 48 hours ago he started having abdominal pain that was generalized and then moved to the right lower quadrant.  He has had some nausea and anorexia, but no vomiting.  He is noted some more firm stools, but otherwise no other symptoms.  No cough or fevers.    In the emergency room his white count was noted to be 16,000.  CT scan showed acute appendicitis with possible perforation.  No abscess formation.  No sofy peritonitis.    Histories:  Past Medical History:   Diagnosis Date     PE (pulmonary embolism)     years ago, apparently no cause found, does smoke     Respiratory complications March 2005       Past Surgical History:   Procedure Laterality Date     ARTHROPLASTY KNEE  6/11/2014    Procedure: ARTHROPLASTY KNEE;  Surgeon: Maik Jeong MD;  Location: [facility]     ARTHROSCOPY KNEE WITH MEDIAL MENISCECTOMY  12/19/2013    Procedure: ARTHROSCOPY KNEE WITH MEDIAL MENISCECTOMY;  Left Knee Arthroscopy With Intraarticular Debridement.;  Surgeon: Maik Jeong MD;  Location: [facility]     COLONOSCOPY  2002     COLONOSCOPY N/A 4/18/2018    Procedure: COLONOSCOPY;  colonoscopy;  Surgeon: Nirmal Schneider MD;  Location: [facility]     EXAM UNDER ANESTHESIA, MANIPULATE JOINT (LOCATION)  6/26/2014    Procedure: EXAM UNDER ANESTHESIA, MANIPULATE JOINT (LOCATION);  Surgeon: Maik Jeong MD;  Location: [facility]     EXAM UNDER ANESTHESIA, MANIPULATE JOINT (LOCATION) Left 8/28/2014    Procedure: EXAM UNDER ANESTHESIA, MANIPULATE JOINT (LOCATION);  Surgeon: Maik Jeong MD;  Location: [facility]     EXAM UNDER ANESTHESIA,  MANIPULATE JOINT (LOCATION) Left 12/31/2014    Procedure: EXAM UNDER ANESTHESIA, MANIPULATE JOINT (LOCATION);  Surgeon: Maik Jeong MD;  Location: [facility]     H ABLATION SVT  2008    AVNRT     INJECT EPIDURAL LUMBAR  12/16/2011    Procedure:INJECT EPIDURAL LUMBAR; MICKY-Dr. Smith; Surgeon:GENERIC ANESTHESIA PROVIDER; Location:[facility]     INJECT EPIDURAL LUMBAR  1/27/2012    Procedure:INJECT EPIDURAL LUMBAR; MICKY with Flouro--; Surgeon:GENERIC ANESTHESIA PROVIDER; Location:[facility]     LAPAROSCOPIC HERNIORRHAPHY INGUINAL BILATERAL Bilateral 12/29/2015    Procedure: LAPAROSCOPIC HERNIORRHAPHY INGUINAL BILATERAL;  Surgeon: Andrew Prajapati MD;  Location: [facility]     Lumbar back fusion  1995, 1998    first was A&P     Lumbar back procedure  1999    Hardware removal     spinal stimulator insertion  2004    also, revised it that year also. never seemed to help well.        Family History   Problem Relation Age of Onset     Cardiovascular Paternal Grandfather      C.A.D. Father 36        MI at age 36-37     Cerebrovascular Disease Father      Allergies Father      Anesthesia Reaction Father      Prostate Cancer No family hx of      Colon Cancer No family hx of        Social History     Tobacco Use     Smoking status: Light Tobacco Smoker     Packs/day: 1.00     Years: 18.00     Pack years: 18.00     Smokeless tobacco: Never Used     Tobacco comment: Started at age 30.    Substance Use Topics     Alcohol use: Yes     Alcohol/week: 0.0 standard drinks     Comment: occassional beer       Current Outpatient Medications   Medication Sig Dispense Refill     albuterol (PROAIR HFA) 108 (90 Base) MCG/ACT inhaler Inhale 2 puffs into the lungs every 4 hours as needed for shortness of breath / dyspnea or wheezing 1 Inhaler 11     amLODIPine (NORVASC) 10 MG tablet Take 1 tablet (10 mg) by mouth daily 90 tablet 0     atorvastatin (LIPITOR) 20 MG tablet Take 1 tablet (20 mg) by mouth daily 90 tablet 3     lisinopril (ZESTRIL) 20 MG  tablet Take 1 tablet (20 mg) by mouth daily 90 tablet 0     meloxicam (MOBIC) 15 MG tablet        omeprazole (PRILOSEC) 40 MG DR capsule Take 1 capsule (40 mg) by mouth 2 times daily 180 capsule 3     order for DME Equipment being ordered: CPAP Patient Sarath Gray was [facility]  on April 21, 2016. Patient received a Resmed AirSense 10 Auto. Pressures were set at Auto 5 - 15 cm H2O.   Patient s ramp is 5 cm H2O for Auto and FLEX/EPR is 2.  Patient received a [employer] Mask name: SIMPLUS  Full Face mask Size Large, heated tubing and heated humidifier.  Patient is enrolled in the STM Program and does not need to meet compliance. Patient has a follow up on June 14TH AT 9 AM with YADIRA Resendez.    Jennifer Sam       ORDER FOR DME Equipment being ordered: Other: CPM machine for home use. Set max tolerance and increase 3-5 degrees/day as tolerated. Use up to 6-8 hours/day as tolerated.  Treatment Diagnosis: left TKA 1 Device 0     predniSONE (DELTASONE) 20 MG tablet Take 3 tabs by mouth daily x 3 days, then 2 tabs daily x 3 days, then 1 tab daily x 3 days, then 1/2 tab daily x 3 days. (Patient not taking: Reported on 12/16/2019) 20 tablet 0       Allergies   Allergen Reactions     Persantine [Dipyridamole] Other (See Comments)     Nervous and anxiety       Images:  Recent Results (from the past 744 hour(s))   CT Abdomen Pelvis w Contrast    Narrative    CT ABDOMEN PELVIS W CONTRAST 4/29/2020 10:22 AM    CLINICAL HISTORY: lower back pain last week with worsening RLQ pain  over the past 3 days.    TECHNIQUE: CT scan of the abdomen and pelvis was performed following  injection of IV contrast. Multiplanar reformats were obtained. Dose  reduction techniques were used.  CONTRAST: 100 mL Isovue 370    COMPARISON: None.    FINDINGS:   LOWER CHEST: Normal.    HEPATOBILIARY: Tiny cyst in the left hepatic lobe.    Otherwise normal.    PANCREAS: Normal.    SPLEEN: Normal.    ADRENAL GLANDS:  Normal.    KIDNEYS/BLADDER: Normal.    BOWEL: Obstructing appendicolith at the base of the appendix. The  appendiceal wall is poorly visualized, with marked inflammation and  ill-defined fluid in the adjacent fat. Small amount of fluid in the  pelvic cul-de-sac as well. No well-formed abscess. There are air-fluid  levels in multiple nondilated loops of bowel, likely indicating an  adynamic ileus. Apparent gastric wall thickening in the fundus.    PELVIC ORGANS: Normal.    ADDITIONAL FINDINGS: None.    MUSCULOSKELETAL: Postop lumbosacral fusion.      Impression    IMPRESSION:   1.  Acute appendicitis, likely with perforation. Appendiceal wall is  poorly visualized, with marked surrounding inflammation and  ill-defined fluid. No well formed abscess.  2.  Gastric wall thickening may be artifactual. Gastritis not  excluded.    MELY BARORS MD       Labs:  Results for orders placed or performed during the hospital encounter of 04/29/20   CT Abdomen Pelvis w Contrast     Status: None    Narrative    CT ABDOMEN PELVIS W CONTRAST 4/29/2020 10:22 AM    CLINICAL HISTORY: lower back pain last week with worsening RLQ pain  over the past 3 days.    TECHNIQUE: CT scan of the abdomen and pelvis was performed following  injection of IV contrast. Multiplanar reformats were obtained. Dose  reduction techniques were used.  CONTRAST: 100 mL Isovue 370    COMPARISON: None.    FINDINGS:   LOWER CHEST: Normal.    HEPATOBILIARY: Tiny cyst in the left hepatic lobe.    Otherwise normal.    PANCREAS: Normal.    SPLEEN: Normal.    ADRENAL GLANDS: Normal.    KIDNEYS/BLADDER: Normal.    BOWEL: Obstructing appendicolith at the base of the appendix. The  appendiceal wall is poorly visualized, with marked inflammation and  ill-defined fluid in the adjacent fat. Small amount of fluid in the  pelvic cul-de-sac as well. No well-formed abscess. There are air-fluid  levels in multiple nondilated loops of bowel, likely indicating an  adynamic ileus.  Apparent gastric wall thickening in the fundus.    PELVIC ORGANS: Normal.    ADDITIONAL FINDINGS: None.    MUSCULOSKELETAL: Postop lumbosacral fusion.      Impression    IMPRESSION:   1.  Acute appendicitis, likely with perforation. Appendiceal wall is  poorly visualized, with marked surrounding inflammation and  ill-defined fluid. No well formed abscess.  2.  Gastric wall thickening may be artifactual. Gastritis not  excluded.    MELY BARROS MD   CBC with platelets differential     Status: Abnormal   Result Value Ref Range    WBC 16.3 (H) 4.0 - 11.0 10e9/L    RBC Count 4.89 4.4 - 5.9 10e12/L    Hemoglobin 14.9 13.3 - 17.7 g/dL    Hematocrit 44.9 40.0 - 53.0 %    MCV 92 78 - 100 fl    MCH 30.5 26.5 - 33.0 pg    MCHC 33.2 31.5 - 36.5 g/dL    RDW 13.2 10.0 - 15.0 %    Platelet Count 188 150 - 450 10e9/L    Diff Method Automated Method     % Neutrophils 80.7 %    % Lymphocytes 7.7 %    % Monocytes 9.7 %    % Eosinophils 1.2 %    % Basophils 0.3 %    % Immature Granulocytes 0.4 %    Nucleated RBCs 0 0 /100    Absolute Neutrophil 13.2 (H) 1.6 - 8.3 10e9/L    Absolute Lymphocytes 1.3 0.8 - 5.3 10e9/L    Absolute Monocytes 1.6 (H) 0.0 - 1.3 10e9/L    Absolute Eosinophils 0.2 0.0 - 0.7 10e9/L    Absolute Basophils 0.1 0.0 - 0.2 10e9/L    Abs Immature Granulocytes 0.1 0 - 0.4 10e9/L    Absolute Nucleated RBC 0.0    Comprehensive metabolic panel     Status: None   Result Value Ref Range    Sodium 136 133 - 144 mmol/L    Potassium 4.0 3.4 - 5.3 mmol/L    Chloride 104 94 - 109 mmol/L    Carbon Dioxide 27 20 - 32 mmol/L    Anion Gap 5 3 - 14 mmol/L    Glucose 93 70 - 99 mg/dL    Urea Nitrogen 10 7 - 30 mg/dL    Creatinine 0.91 0.66 - 1.25 mg/dL    GFR Estimate >90 >60 mL/min/[1.73_m2]    GFR Estimate If Black >90 >60 mL/min/[1.73_m2]    Calcium 9.0 8.5 - 10.1 mg/dL    Bilirubin Total 0.5 0.2 - 1.3 mg/dL    Albumin 3.5 3.4 - 5.0 g/dL    Protein Total 7.5 6.8 - 8.8 g/dL    Alkaline Phosphatase 50 40 - 150 U/L    ALT 28 0 - 70  U/L    AST 16 0 - 45 U/L   Lipase     Status: Abnormal   Result Value Ref Range    Lipase 67 (L) 73 - 393 U/L   UA with Microscopic     Status: Abnormal   Result Value Ref Range    Color Urine Sandra     Appearance Urine Clear     Glucose Urine Negative NEG^Negative mg/dL    Bilirubin Urine Negative NEG^Negative    Ketones Urine Negative NEG^Negative mg/dL    Specific Gravity Urine 1.025 1.003 - 1.035    Blood Urine Negative NEG^Negative    pH Urine 5.0 5.0 - 7.0 pH    Protein Albumin Urine Negative NEG^Negative mg/dL    Urobilinogen mg/dL 0.0 0.0 - 2.0 mg/dL    Nitrite Urine Negative NEG^Negative    Leukocyte Esterase Urine Negative NEG^Negative    Source Midstream Urine     WBC Urine 2 0 - 5 /HPF    RBC Urine 3 (H) 0 - 2 /HPF    Mucous Urine Present (A) NEG^Negative /LPF   Occult blood stool POCT     Status: None   Result Value Ref Range    Occult Blood NEG neg    Internal QC OK Yes     Test Card Lot Number [id number]        ROS:  Constitutional - Denies fevers, weight loss, malaise, lethargy  Neuro - Denies tremors or seizures  Pulmon - Denies SOB, dyspnea, hemoptysis  CV - Denies CP, SOB  GI - Denies hematemesis, BRBPR, melena   - Denies hematuria, difficulty voiding  Hematology - Denies blood clotting disorders, chronic anemias  Dermatology - No melanomas or skin cancers  Pysch - Denies depression, bipolar d/o or schizophrenia    BP (!) 161/92   Pulse 78   Temp 98.8  F (37.1  C) (Oral)   Resp 16   Wt 115.7 kg (255 lb)   SpO2 99%   BMI 26.66 kg/m      Exam:  General - Alert and Oriented X4, NAD, well nourished  HEENT - Normocephalic, atraumatic  Lungs -respirations unlabored, chest wall excursion normal   CV - Heart RRR  Abdomen - Soft, nondistended, exquisite pain to palpation in the right lower quadrant, left lower quadrant incision from previous anterior spinal fusion, laparoscopic ports from previous hernia repair-all well-healed  Neuro -grossly intact  Extremities - No cyanosis, clubbing or  edema.      Assessment and Plan: Acute appendicitis based on history, exam, and CT scan.  I would be surprised if this is perforated.  He was advised of that possibility.  This may need to be an open procedure, however that is unlikely.  I would anticipate that we will get this out laparoscopically and that he will likely spend 2 to 3 days in the hospital on antibiotics.  I discussed with him the anatomy, pathophysiology, and treatment of acute appendicitis.    The plan is for laparoscopic appendectomy.  Risks, benefits, and alternatives were discussed with him in detail.  He is willing to proceed.  He has been given a dose of Zosyn preoperatively.  We discussed his anticipated recovery.  My only concerns regarding his previous surgery are how low I can go with the suprapubic port and avoid the mesh, and whether the exposure for his back surgery was intraperitoneal or retroperitoneal.  I think both of these issues are manageable and we will plan to proceed.  All questions answered.        Alexis Maher MD FACS

## 2020-04-29 NOTE — ANESTHESIA CARE TRANSFER NOTE
Patient: Sarath Gray    Procedure(s):  APPENDECTOMY, LAPAROSCOPIC    Diagnosis: Acute appendicitis with generalized peritonitis, unspecified whether abscess present, unspecified whether gangrene present, unspecified whether perforation present [K35.20]  Diagnosis Additional Information: No value filed.    Anesthesia Type:   General     Note:  Airway :Nasal Cannula  Patient transferred to:PACU  Handoff Report: Identifed the Patient, Identified the Reponsible Provider, Reviewed the pertinent medical history, Discussed the surgical course, Reviewed Intra-OP anesthesia mangement and issues during anesthesia, Set expectations for post-procedure period and Allowed opportunity for questions and acknowledgement of understanding      Vitals: (Last set prior to Anesthesia Care Transfer)    CRNA VITALS  4/29/2020 1334 - 4/29/2020 1418      4/29/2020             Pulse:  77    SpO2:  (!) 83 %                Electronically Signed By: Khushi Nicholson CRNA, APRN CRNA  April 29, 2020  2:18 PM

## 2020-04-29 NOTE — ANESTHESIA POSTPROCEDURE EVALUATION
Patient: Sarath Gray    Procedure(s):  APPENDECTOMY, LAPAROSCOPIC    Diagnosis:Acute appendicitis with generalized peritonitis, unspecified whether abscess present, unspecified whether gangrene present, unspecified whether perforation present [K35.20]  Diagnosis Additional Information: No value filed.    Anesthesia Type:  General    Note:  Anesthesia Post Evaluation    Patient location during evaluation: PACU  Patient participation: Able to fully participate in evaluation  Level of consciousness: awake and alert  Pain management: adequate  Airway patency: patent  Cardiovascular status: acceptable  Respiratory status: acceptable  Hydration status: acceptable  PONV: none     Anesthetic complications: None          Last vitals:  Vitals:    04/29/20 1038 04/29/20 1143 04/29/20 1415   BP: (!) 161/92 (!) 163/79 (!) 163/89   Pulse: 78 96 106   Resp: 16 18 16   Temp:  36.9  C (98.5  F) 37.1  C (98.8  F)   SpO2: 99% 97% (!) 70%         Electronically Signed By: Khushi Nicholson CRNA, APRN CRNA  April 29, 2020  2:18 PM

## 2020-04-29 NOTE — ANESTHESIA PREPROCEDURE EVALUATION
Anesthesia Evaluation     . Pt has had prior anesthetic.     No history of anesthetic complications          ROS/MED HX    ENT/Pulmonary: Comment: Hypoxia  PE    (+)sleep apnea, tobacco use, Current use 1 packs/day  , . .    Neurologic:  - neg neurologic ROS     Cardiovascular: Comment: Ablation for SVT  Atypical CP    (+) hypertension----. : . . . :. . Previous cardiac testing date:results:Stress Testdate:9=17-09 results:IMPRESSIONS:   I.  Adenosine Test   1.  Adenosine infusion dictated under separate cover.   2.  ECG report done under separate cover.      II.  Myocardial Perfusion Scintigraphy   1.  Myocardial perfusion using single isotope technique was probably   normal.    2.  Gating demonstrated normal left ventricular systolic contraction   and wall thickening, both at rest and post stress.    3.  The ejection fraction was 49% at rest and 56% post stress.    4.  The left ventricle was enlarged (end diastolic volume 212 mL).     5.  No previous study was available for comparison. ECG reviewed date:12-23-15 results:Sinus Rhythm   -Right atrial enlargement.    Right atrial abnormality and rotation -possible pulmonary disease.     ABNORMAL    date: results:          METS/Exercise Tolerance:  >4 METS   Hematologic:  - neg hematologic  ROS       Musculoskeletal: Comment: LBP  Spinal cord stimulator  (+)  other musculoskeletal- LBP      GI/Hepatic:     (+) GERD Asymptomatic on medication,       Renal/Genitourinary: Comment: impotence        Endo:  - neg endo ROS       Psychiatric:  - neg psychiatric ROS       Infectious Disease:  - neg infectious disease ROS       Malignancy:      - no malignancy   Other:    (+) H/O Chronic Pain,                   Physical Exam  Normal systems: cardiovascular and pulmonary    Airway   Mallampati: II  TM distance: >3 FB  Neck ROM: full    Dental   Comment: Poor dentition     Cardiovascular       Pulmonary                         Anesthesia Plan      History & Physical  Review  History and physical reviewed and following examination; no interval change.    ASA Status:  3 emergent.    NPO Status:  > 8 hours    Plan for General with Propofol and Intravenous induction.   PONV prophylaxis:  Ondansetron (or other 5HT-3) and Dexamethasone or Solumedrol  Additional equipment: Videolaryngoscope        Postoperative Care  Postoperative pain management:  IV analgesics and Oral pain medications.      Consents  Anesthetic plan, risks, benefits and alternatives discussed with:  Patient..                          .

## 2020-04-29 NOTE — OR NURSING
Pt arrived restless, wanting to stand up.  After waking more, allowed pt to stand briefly, with 2 standby assists. Loosened cords and tubing, changed to cloth gown and pt settled down.  Urinal placed with no results.  Medicated for back pain of #6, warm p[ack applied.  Pt. Now dozing off and on.  Appears more comfortable.  VSS.

## 2020-04-29 NOTE — TELEPHONE ENCOUNTER
Sarath Gray is a 54 year old male  who calls with abdominal pain.    NURSING ASSESSMENT:  The pain began 3 days ago.    Pain scale (0-10): 5/10.  If up and moving around, pain scale goes up    The pain is described as sharp and hot and is located RLQ, which is with radiation to R hip.  Symptom associated with the abdominal pain: constipation, bloating and belching.  Patient has not had previous abdominal surgery, including none.  Pain is aggravated by movement and eating, and relieved by recumbency.  Allergies:   Allergies   Allergen Reactions     Persantine [Dipyridamole] Other (See Comments)     Nervous and anxiety       MEDICATIONS:   Taking medication(s) as prescribed? Yes  Taking over the counter medication(s)? Yes  Any medication side effects? No significant side effects    Any barriers to taking medication(s) as prescribed?  No  Medication(s) improving/managing symptoms?  No  Medication reconciliation completed: Yes    NURSING PLAN: Nursing advice to patient ED with a     RECOMMENDED DISPOSITION:  To ED, another person to drive   Will comply with recommendation: Yes  If further questions/concerns or if symptoms do not improve, worsen or new symptoms develop, call your PCP or [facility] as soon as possible.        Alfreda Nam RN

## 2020-04-29 NOTE — BRIEF OP NOTE
[facility]    Brief Operative Note    Pre-operative diagnosis: Acute appendicitis with generalized peritonitis, unspecified whether abscess present, unspecified whether gangrene present, unspecified whether perforation present [K35.20]  Post-operative diagnosis Acute perforated appendicitis    Procedure: Procedure(s):  APPENDECTOMY, LAPAROSCOPIC  Surgeon: Surgeon(s) and Role:     * Alexis Maher MD - Primary     * Nirmal Richard PA-C - Assisting  Anesthesia: General   Estimated blood loss: Less than 10 ml  Drains: None  Specimens:   ID Type Source Tests Collected by Time Destination   A :  Organ Appendix SURGICAL PATHOLOGY EXAM Alexis Maher MD 4/29/2020  1:21 PM      Findings:   Acute gangrenous appendicitis with perforation and small walled off abscess.  Removed laparoscopically.  Blood loss was minimal.  No drains placed..  Complications: None.  Implants: * No implants in log *

## 2020-04-30 VITALS
SYSTOLIC BLOOD PRESSURE: 136 MMHG | TEMPERATURE: 97.6 F | HEIGHT: 78 IN | RESPIRATION RATE: 18 BRPM | DIASTOLIC BLOOD PRESSURE: 83 MMHG | BODY MASS INDEX: 30.08 KG/M2 | HEART RATE: 69 BPM | WEIGHT: 260 LBS | OXYGEN SATURATION: 98 %

## 2020-04-30 LAB
ERYTHROCYTE [DISTWIDTH] IN BLOOD BY AUTOMATED COUNT: 13.2 % (ref 10–15)
GLUCOSE SERPL-MCNC: 110 MG/DL (ref 70–99)
HCT VFR BLD AUTO: 42.8 % (ref 40–53)
HGB BLD-MCNC: 13.8 G/DL (ref 13.3–17.7)
MCH RBC QN AUTO: 30.6 PG (ref 26.5–33)
MCHC RBC AUTO-ENTMCNC: 32.2 G/DL (ref 31.5–36.5)
MCV RBC AUTO: 95 FL (ref 78–100)
PLATELET # BLD AUTO: 140 10E9/L (ref 150–450)
RBC # BLD AUTO: 4.51 10E12/L (ref 4.4–5.9)
WBC # BLD AUTO: 12.5 10E9/L (ref 4–11)

## 2020-04-30 PROCEDURE — 25800025 ZZH RX 258: Performed by: SURGERY

## 2020-04-30 PROCEDURE — 85027 COMPLETE CBC AUTOMATED: CPT | Performed by: SURGERY

## 2020-04-30 PROCEDURE — 25000132 ZZH RX MED GY IP 250 OP 250 PS 637: Performed by: SURGERY

## 2020-04-30 PROCEDURE — 25000128 H RX IP 250 OP 636: Performed by: SURGERY

## 2020-04-30 PROCEDURE — 82947 ASSAY GLUCOSE BLOOD QUANT: CPT | Performed by: NURSE ANESTHETIST, CERTIFIED REGISTERED

## 2020-04-30 PROCEDURE — 36415 COLL VENOUS BLD VENIPUNCTURE: CPT | Performed by: SURGERY

## 2020-04-30 RX ORDER — METRONIDAZOLE 500 MG/1
500 TABLET ORAL 3 TIMES DAILY
Qty: 30 TABLET | Refills: 0 | Status: SHIPPED | OUTPATIENT
Start: 2020-04-30 | End: 2020-06-22

## 2020-04-30 RX ORDER — HYDROCODONE BITARTRATE AND ACETAMINOPHEN 5; 325 MG/1; MG/1
1-2 TABLET ORAL EVERY 6 HOURS PRN
Qty: 20 TABLET | Refills: 0 | Status: SHIPPED | OUTPATIENT
Start: 2020-04-30 | End: 2020-06-22

## 2020-04-30 RX ORDER — CIPROFLOXACIN 500 MG/1
500 TABLET, FILM COATED ORAL 2 TIMES DAILY
Qty: 20 TABLET | Refills: 0 | Status: SHIPPED | OUTPATIENT
Start: 2020-04-30 | End: 2020-06-22

## 2020-04-30 RX ADMIN — AMLODIPINE BESYLATE 10 MG: 10 TABLET ORAL at 08:02

## 2020-04-30 RX ADMIN — DOCUSATE SODIUM 100 MG: 100 CAPSULE, LIQUID FILLED ORAL at 08:02

## 2020-04-30 RX ADMIN — DEXTROSE AND SODIUM CHLORIDE: 5; 450 INJECTION, SOLUTION INTRAVENOUS at 08:07

## 2020-04-30 RX ADMIN — ACETAMINOPHEN 975 MG: 325 TABLET, FILM COATED ORAL at 00:41

## 2020-04-30 RX ADMIN — HYDROMORPHONE HYDROCHLORIDE 0.5 MG: 1 INJECTION, SOLUTION INTRAMUSCULAR; INTRAVENOUS; SUBCUTANEOUS at 00:42

## 2020-04-30 RX ADMIN — KETOROLAC TROMETHAMINE 30 MG: 30 INJECTION, SOLUTION INTRAMUSCULAR at 04:30

## 2020-04-30 RX ADMIN — ACETAMINOPHEN 975 MG: 325 TABLET, FILM COATED ORAL at 08:02

## 2020-04-30 RX ADMIN — LISINOPRIL 20 MG: 20 TABLET ORAL at 08:02

## 2020-04-30 RX ADMIN — TAZOBACTAM SODIUM AND PIPERACILLIN SODIUM 4.5 G: 500; 4 INJECTION, SOLUTION INTRAVENOUS at 04:35

## 2020-04-30 RX ADMIN — OMEPRAZOLE 40 MG: 20 CAPSULE, DELAYED RELEASE ORAL at 08:02

## 2020-04-30 ASSESSMENT — ACTIVITIES OF DAILY LIVING (ADL)
ADLS_ACUITY_SCORE: 10

## 2020-04-30 NOTE — PLAN OF CARE
Pt has ambulated in hallway twice this shift. Last evening RN walked with him, this am he is up independently, gait steady. He has mentioned a few times during the night how he would really like to go home. It was explained to pt that he is needing to receive IV antibiotics and to be monitored for potential post-op complications. Pt has used his IS on & off t/o night, only gets it up to about 1000mls. Encouraged to keep trying to increase as able. WBC to 12.5 this am. Tolerating regular diet.

## 2020-04-30 NOTE — DISCHARGE SUMMARY
Physician Discharge Summary     Patient ID:  Sarath Gray  [id number]  54 year old  1965    Admit date: 4/29/2020    Discharge date and time: 4/30/2020     Admitting Physician: Alexis Maher MD     Discharge Physician: Alo ROBLES    Admission Diagnoses: Acute appendicitis with generalized peritonitis, unspecified whether abscess present, unspecified whether gangrene present, unspecified whether perforation present [K35.20]    Discharge Diagnoses: Acute appendicitis    Admission Condition: poor    Discharged Condition: fair    Indication for Admission: RLQ abd pain    Hospital Course: Patient admitted to OR for lap appy.  Kept overnight for IV abx and idscharged the following day with 10 days of oral abx.    Consults: none    Significant Diagnostic Studies: none    Treatments: surgery: lap appy    Discharge Exam:  See daily progress notes    Disposition: home    Patient Instructions:   Current Discharge Medication List      START taking these medications    Details   ciprofloxacin (CIPRO) 500 MG tablet Take 1 tablet (500 mg) by mouth 2 times daily  Qty: 20 tablet, Refills: 0    Associated Diagnoses: Acute appendicitis with generalized peritonitis, unspecified whether abscess present, unspecified whether gangrene present, unspecified whether perforation present      HYDROcodone-acetaminophen (NORCO) 5-325 MG tablet Take 1-2 tablets by mouth every 6 hours as needed for pain  Qty: 20 tablet, Refills: 0    Associated Diagnoses: Acute appendicitis with generalized peritonitis, unspecified whether abscess present, unspecified whether gangrene present, unspecified whether perforation present      metroNIDAZOLE (FLAGYL) 500 MG tablet Take 1 tablet (500 mg) by mouth 3 times daily for 7 days  Qty: 30 tablet, Refills: 0    Associated Diagnoses: Acute appendicitis with generalized peritonitis, unspecified whether abscess present, unspecified whether gangrene present, unspecified whether perforation present          CONTINUE these medications which have NOT CHANGED    Details   albuterol (PROAIR HFA) 108 (90 Base) MCG/ACT inhaler Inhale 2 puffs into the lungs every 4 hours as needed for shortness of breath / dyspnea or wheezing  Qty: 1 Inhaler, Refills: 11    Comments: Pharmacy may dispense brand covered by insurance (Proair, or proventil or ventolin or generic albuterol inhaler)      amLODIPine (NORVASC) 10 MG tablet Take 1 tablet (10 mg) by mouth daily  Qty: 90 tablet, Refills: 0    Associated Diagnoses: Hypertension goal BP (blood pressure) < 140/90      atorvastatin (LIPITOR) 20 MG tablet Take 1 tablet (20 mg) by mouth daily  Qty: 90 tablet, Refills: 3    Associated Diagnoses: Elevated LDL cholesterol level      lisinopril (ZESTRIL) 20 MG tablet Take 1 tablet (20 mg) by mouth daily  Qty: 90 tablet, Refills: 0    Associated Diagnoses: Hypertension goal BP (blood pressure) < 140/90      omeprazole (PRILOSEC) 40 MG DR capsule Take 1 capsule (40 mg) by mouth 2 times daily  Qty: 180 capsule, Refills: 3    Associated Diagnoses: Epigastric pain      meloxicam (MOBIC) 15 MG tablet       !! order for DME Equipment being ordered: CPAP Patient Sarath Gray was set up at [facility]  on April 21, 2016. Patient received a Resmed AirSense 10 Auto. Pressures were set at Auto 5 - 15 cm H2O.   Patient s ramp is 5 cm H2O for Auto and FLEX/EPR is 2.  Patient received a [employer] Mask name: SIMPLUS  Full Face mask Size Large, heated tubing and heated humidifier.  Patient is enrolled in the STM Program and does not need to meet compliance. Patient has a follow up on June 14TH AT 9 AM with YADIRA Resendez.    Jennifer Sam      !! ORDER FOR DME Equipment being ordered: Other: CPM machine for home use. Set max tolerance and increase 3-5 degrees/day as tolerated. Use up to 6-8 hours/day as tolerated.  Treatment Diagnosis: left TKA  Qty: 1 Device, Refills: 0    Associated Diagnoses: S/P total knee arthroplasty, left      predniSONE  (DELTASONE) 20 MG tablet Take 3 tabs by mouth daily x 3 days, then 2 tabs daily x 3 days, then 1 tab daily x 3 days, then 1/2 tab daily x 3 days.  Qty: 20 tablet, Refills: 0    Associated Diagnoses: Pneumonia of right middle lobe due to infectious organism (H)       !! - Potential duplicate medications found. Please discuss with provider.        Activity: no heavy lifting for 4 weeks  Diet: regular diet  Wound Care: keep wound clean and dry    Follow-up with Dr Maher in 1 week.    Signed:  Andrew Prajapati MD  4/30/2020  10:07 AM

## 2020-04-30 NOTE — PLAN OF CARE
[facility] DISCHARGE NOTE    Patient discharged to home at 11:09 AM via wheel chair. Accompanied by  staff. Discharge instructions reviewed with patient, opportunity offered to ask questions. Prescriptions sent to patients preferred pharmacy. All belongings sent with patient.    Nahomi Duenas RN

## 2020-04-30 NOTE — PROGRESS NOTES
"POD#1    Feeling better. Tolerating diet. No nausea. Pain improved.    I/O last 3 completed shifts:  In: 2400 [I.V.:1400; IV Piggyback:1000]  Out: 635 [Urine:625; Blood:10]    Patient Vitals for the past 24 hrs:   BP Temp Temp src Pulse Heart Rate Resp SpO2 Height Weight   04/30/20 0742 136/83 97.6  F (36.4  C) Oral 69 -- 18 98 % -- --   04/30/20 0419 135/72 97.6  F (36.4  C) Oral -- 76 18 97 % -- --   04/30/20 0030 139/72 97.9  F (36.6  C) Oral -- 75 18 98 % -- --   04/29/20 2013 -- -- -- -- -- -- 94 % -- --   04/29/20 1847 -- 99  F (37.2  C) Oral -- -- -- -- -- --   04/29/20 1844 139/81 -- -- -- 101 16 94 % -- --   04/29/20 1704 (!) 156/82 -- -- -- 87 16 97 % -- --   04/29/20 1653 -- -- -- -- -- 16 -- -- --   04/29/20 1607 (!) 157/86 98.2  F (36.8  C) Oral 80 -- 18 96 % -- --   04/29/20 1530 (!) 152/72 (!) 39.2  F (4  C) Oral 89 85 20 95 % -- --   04/29/20 1515 (!) 141/88 -- -- 85 90 18 96 % -- --   04/29/20 1500 (!) 157/81 -- -- 92 90 16 97 % -- --   04/29/20 1445 (!) 161/74 -- -- 93 85 16 96 % -- --   04/29/20 1430 (!) 154/53 -- -- 108 101 10 97 % -- --   04/29/20 1415 (!) 163/89 98.8  F (37.1  C) Oral 106 101 16 97 % -- --   04/29/20 1143 (!) 163/79 98.5  F (36.9  C) Oral 96 89 18 97 % 2.083 m (6' 10\") 117.9 kg (260 lb)   04/29/20 1038 (!) 161/92 -- -- 78 -- 16 99 % -- --     CBC  Recent Labs   Lab Test 04/30/20  0451   WBC 12.5*   RBC 4.51   HGB 13.8   HCT 42.8   MCV 95   MCH 30.6   MCHC 32.2   RDW 13.2   *       Exam:  AXO3 NAD  Neuro - Strength 5/5 all major groups, sensation intact, PERRL  Lungs - CTA  CV - RRR  Abd - Soft, non-distended, non-tender, +BS, wounds clean, dry, and intact with no erythema.   Extr - No edema    A/P:s/p lap appy.  Doing well with no fevers. WBC normalizing.  Home today on 10 days of oral abx and pain meds.    Andrew Prajapati MD   "

## 2020-04-30 NOTE — DISCHARGE INSTRUCTIONS
Activity: no heavy lifting for 4 weeks  Diet: regular diet  Wound Care: keep wound clean and dry     Follow-up with Dr Maher in 1 week.

## 2020-05-01 ENCOUNTER — TELEPHONE (OUTPATIENT)
Dept: FAMILY MEDICINE | Facility: CLINIC | Age: 55
End: 2020-05-01

## 2020-05-01 LAB — COPATH REPORT: NORMAL

## 2020-05-01 NOTE — TELEPHONE ENCOUNTER
ED / Discharge Outreach Protocol    Patient Contact    Attempt # 1    Was call answered?  No.  Left message on voicemail with information to call me back.  KIKI Rao RN

## 2020-05-01 NOTE — TELEPHONE ENCOUNTER
ED/UC/IP follow up phone call:    RN please call to follow up.    Number of ED visits in past 12 months = 1  [facility]

## 2020-05-03 ENCOUNTER — HOSPITAL ENCOUNTER (INPATIENT)
Facility: CLINIC | Age: 55
LOS: 2 days | Discharge: HOME | DRG: 862 | End: 2020-05-05
Attending: FAMILY MEDICINE | Admitting: INTERNAL MEDICINE
Payer: COMMERCIAL

## 2020-05-03 ENCOUNTER — APPOINTMENT (OUTPATIENT)
Dept: CT IMAGING | Facility: CLINIC | Age: 55
DRG: 862 | End: 2020-05-03
Attending: FAMILY MEDICINE
Payer: COMMERCIAL

## 2020-05-03 ENCOUNTER — NURSE TRIAGE (OUTPATIENT)
Dept: NURSING | Facility: CLINIC | Age: 55
End: 2020-05-03

## 2020-05-03 DIAGNOSIS — K35.32 ACUTE PERFORATED APPENDICITIS: Primary | ICD-10-CM

## 2020-05-03 DIAGNOSIS — T81.43XA INFECTION OF ORGAN OR ORGAN SPACE AFTER SURGERY, INITIAL ENCOUNTER: ICD-10-CM

## 2020-05-03 PROBLEM — T81.40XA POSTOPERATIVE INFECTION: Status: ACTIVE | Noted: 2020-05-03

## 2020-05-03 PROBLEM — K65.1 POSTOPERATIVE INTRA-ABDOMINAL ABSCESS (H): Status: ACTIVE | Noted: 2020-05-03

## 2020-05-03 LAB
ALBUMIN SERPL-MCNC: 3.4 G/DL (ref 3.4–5)
ALBUMIN UR-MCNC: NEGATIVE MG/DL
ALP SERPL-CCNC: 51 U/L (ref 40–150)
ALT SERPL W P-5'-P-CCNC: 32 U/L (ref 0–70)
ANION GAP SERPL CALCULATED.3IONS-SCNC: 6 MMOL/L (ref 3–14)
APPEARANCE UR: CLEAR
AST SERPL W P-5'-P-CCNC: 26 U/L (ref 0–45)
BILIRUB SERPL-MCNC: 0.3 MG/DL (ref 0.2–1.3)
BILIRUB UR QL STRIP: NEGATIVE
BUN SERPL-MCNC: 11 MG/DL (ref 7–30)
CALCIUM SERPL-MCNC: 9.3 MG/DL (ref 8.5–10.1)
CHLORIDE SERPL-SCNC: 103 MMOL/L (ref 94–109)
CO2 SERPL-SCNC: 28 MMOL/L (ref 20–32)
COLOR UR AUTO: YELLOW
CREAT SERPL-MCNC: 0.9 MG/DL (ref 0.66–1.25)
DIFFERENTIAL METHOD BLD: NORMAL
EOSINOPHIL # BLD AUTO: 0.1 10E9/L (ref 0–0.7)
EOSINOPHIL NFR BLD AUTO: 1 %
ERYTHROCYTE [DISTWIDTH] IN BLOOD BY AUTOMATED COUNT: 13.3 % (ref 10–15)
GFR SERPL CREATININE-BSD FRML MDRD: >90 ML/MIN/{1.73_M2}
GLUCOSE SERPL-MCNC: 88 MG/DL (ref 70–99)
GLUCOSE UR STRIP-MCNC: NEGATIVE MG/DL
HCT VFR BLD AUTO: 45.1 % (ref 40–53)
HGB BLD-MCNC: 15.2 G/DL (ref 13.3–17.7)
HGB UR QL STRIP: NEGATIVE
KETONES UR STRIP-MCNC: NEGATIVE MG/DL
LEUKOCYTE ESTERASE UR QL STRIP: ABNORMAL
LYMPHOCYTES # BLD AUTO: 2.6 10E9/L (ref 0.8–5.3)
LYMPHOCYTES NFR BLD AUTO: 25 %
MCH RBC QN AUTO: 30.2 PG (ref 26.5–33)
MCHC RBC AUTO-ENTMCNC: 33.7 G/DL (ref 31.5–36.5)
MCV RBC AUTO: 90 FL (ref 78–100)
MONOCYTES # BLD AUTO: 0.9 10E9/L (ref 0–1.3)
MONOCYTES NFR BLD AUTO: 9 %
MUCOUS THREADS #/AREA URNS LPF: PRESENT /LPF
NEUTROPHILS # BLD AUTO: 6.8 10E9/L (ref 1.6–8.3)
NEUTROPHILS NFR BLD AUTO: 65 %
NITRATE UR QL: NEGATIVE
PH UR STRIP: 5 PH (ref 5–7)
PLATELET # BLD AUTO: 269 10E9/L (ref 150–450)
POTASSIUM SERPL-SCNC: 3.9 MMOL/L (ref 3.4–5.3)
PROT SERPL-MCNC: 7.9 G/DL (ref 6.8–8.8)
RBC # BLD AUTO: 5.04 10E12/L (ref 4.4–5.9)
RBC #/AREA URNS AUTO: 1 /HPF (ref 0–2)
SODIUM SERPL-SCNC: 137 MMOL/L (ref 133–144)
SOURCE: ABNORMAL
SP GR UR STRIP: 1.02 (ref 1–1.03)
UROBILINOGEN UR STRIP-MCNC: 0 MG/DL (ref 0–2)
WBC # BLD AUTO: 10.4 10E9/L (ref 4–11)
WBC #/AREA URNS AUTO: 1 /HPF (ref 0–5)

## 2020-05-03 PROCEDURE — 25000132 ZZH RX MED GY IP 250 OP 250 PS 637: Performed by: INTERNAL MEDICINE

## 2020-05-03 PROCEDURE — 96376 TX/PRO/DX INJ SAME DRUG ADON: CPT | Performed by: FAMILY MEDICINE

## 2020-05-03 PROCEDURE — 99285 EMERGENCY DEPT VISIT HI MDM: CPT | Mod: 25 | Performed by: FAMILY MEDICINE

## 2020-05-03 PROCEDURE — 96365 THER/PROPH/DIAG IV INF INIT: CPT | Mod: 59 | Performed by: FAMILY MEDICINE

## 2020-05-03 PROCEDURE — 25000128 H RX IP 250 OP 636: Performed by: FAMILY MEDICINE

## 2020-05-03 PROCEDURE — 25800030 ZZH RX IP 258 OP 636: Performed by: FAMILY MEDICINE

## 2020-05-03 PROCEDURE — 12000000 ZZH R&B MED SURG/OB

## 2020-05-03 PROCEDURE — 25000125 ZZHC RX 250: Performed by: FAMILY MEDICINE

## 2020-05-03 PROCEDURE — 96361 HYDRATE IV INFUSION ADD-ON: CPT | Performed by: FAMILY MEDICINE

## 2020-05-03 PROCEDURE — 74177 CT ABD & PELVIS W/CONTRAST: CPT

## 2020-05-03 PROCEDURE — 81001 URINALYSIS AUTO W/SCOPE: CPT | Performed by: FAMILY MEDICINE

## 2020-05-03 PROCEDURE — 80053 COMPREHEN METABOLIC PANEL: CPT | Performed by: FAMILY MEDICINE

## 2020-05-03 PROCEDURE — 93005 ELECTROCARDIOGRAM TRACING: CPT | Performed by: FAMILY MEDICINE

## 2020-05-03 PROCEDURE — 87635 SARS-COV-2 COVID-19 AMP PRB: CPT | Performed by: FAMILY MEDICINE

## 2020-05-03 PROCEDURE — 99284 EMERGENCY DEPT VISIT MOD MDM: CPT | Mod: Z6 | Performed by: FAMILY MEDICINE

## 2020-05-03 PROCEDURE — 99223 1ST HOSP IP/OBS HIGH 75: CPT | Mod: AI | Performed by: INTERNAL MEDICINE

## 2020-05-03 PROCEDURE — 85025 COMPLETE CBC W/AUTO DIFF WBC: CPT | Performed by: FAMILY MEDICINE

## 2020-05-03 PROCEDURE — 96375 TX/PRO/DX INJ NEW DRUG ADDON: CPT | Performed by: FAMILY MEDICINE

## 2020-05-03 PROCEDURE — 99207 ZZC CDG-MDM COMPONENT: MEETS MODERATE - UP CODED: CPT | Performed by: INTERNAL MEDICINE

## 2020-05-03 RX ORDER — ONDANSETRON 2 MG/ML
4 INJECTION INTRAMUSCULAR; INTRAVENOUS EVERY 6 HOURS PRN
Status: DISCONTINUED | OUTPATIENT
Start: 2020-05-03 | End: 2020-05-05 | Stop reason: HOSPADM

## 2020-05-03 RX ORDER — POLYETHYLENE GLYCOL 3350 17 G/17G
17 POWDER, FOR SOLUTION ORAL DAILY PRN
Status: DISCONTINUED | OUTPATIENT
Start: 2020-05-03 | End: 2020-05-05 | Stop reason: HOSPADM

## 2020-05-03 RX ORDER — NALOXONE HYDROCHLORIDE 0.4 MG/ML
.1-.4 INJECTION, SOLUTION INTRAMUSCULAR; INTRAVENOUS; SUBCUTANEOUS
Status: DISCONTINUED | OUTPATIENT
Start: 2020-05-03 | End: 2020-05-05 | Stop reason: HOSPADM

## 2020-05-03 RX ORDER — ACETAMINOPHEN 325 MG/1
650 TABLET ORAL EVERY 4 HOURS PRN
Status: DISCONTINUED | OUTPATIENT
Start: 2020-05-03 | End: 2020-05-05 | Stop reason: HOSPADM

## 2020-05-03 RX ORDER — ONDANSETRON 4 MG/1
4 TABLET, ORALLY DISINTEGRATING ORAL EVERY 6 HOURS PRN
Status: DISCONTINUED | OUTPATIENT
Start: 2020-05-03 | End: 2020-05-04

## 2020-05-03 RX ORDER — DIPHENHYDRAMINE HCL 25 MG
25-50 CAPSULE ORAL AT BEDTIME
Status: DISCONTINUED | OUTPATIENT
Start: 2020-05-03 | End: 2020-05-05 | Stop reason: HOSPADM

## 2020-05-03 RX ORDER — ONDANSETRON 2 MG/ML
4 INJECTION INTRAMUSCULAR; INTRAVENOUS EVERY 6 HOURS PRN
Status: DISCONTINUED | OUTPATIENT
Start: 2020-05-03 | End: 2020-05-04

## 2020-05-03 RX ORDER — AMLODIPINE BESYLATE 10 MG/1
10 TABLET ORAL DAILY
Status: DISCONTINUED | OUTPATIENT
Start: 2020-05-03 | End: 2020-05-04

## 2020-05-03 RX ORDER — ONDANSETRON 2 MG/ML
8 INJECTION INTRAMUSCULAR; INTRAVENOUS ONCE
Status: COMPLETED | OUTPATIENT
Start: 2020-05-03 | End: 2020-05-03

## 2020-05-03 RX ORDER — OXYCODONE HYDROCHLORIDE 5 MG/1
5 TABLET ORAL EVERY 4 HOURS PRN
Status: DISCONTINUED | OUTPATIENT
Start: 2020-05-03 | End: 2020-05-05 | Stop reason: ALTCHOICE

## 2020-05-03 RX ORDER — AMOXICILLIN 250 MG
2 CAPSULE ORAL 2 TIMES DAILY
Status: DISCONTINUED | OUTPATIENT
Start: 2020-05-03 | End: 2020-05-05 | Stop reason: HOSPADM

## 2020-05-03 RX ORDER — AMOXICILLIN 250 MG
1 CAPSULE ORAL 2 TIMES DAILY
Status: DISCONTINUED | OUTPATIENT
Start: 2020-05-03 | End: 2020-05-05 | Stop reason: HOSPADM

## 2020-05-03 RX ORDER — NALOXONE HYDROCHLORIDE 0.4 MG/ML
.1-.4 INJECTION, SOLUTION INTRAMUSCULAR; INTRAVENOUS; SUBCUTANEOUS
Status: DISCONTINUED | OUTPATIENT
Start: 2020-05-03 | End: 2020-05-04

## 2020-05-03 RX ORDER — IOPAMIDOL 755 MG/ML
100 INJECTION, SOLUTION INTRAVASCULAR ONCE
Status: COMPLETED | OUTPATIENT
Start: 2020-05-03 | End: 2020-05-03

## 2020-05-03 RX ORDER — LISINOPRIL 20 MG/1
20 TABLET ORAL DAILY
Status: DISCONTINUED | OUTPATIENT
Start: 2020-05-06 | End: 2020-05-05 | Stop reason: HOSPADM

## 2020-05-03 RX ORDER — HYDROMORPHONE HYDROCHLORIDE 1 MG/ML
0.5 INJECTION, SOLUTION INTRAMUSCULAR; INTRAVENOUS; SUBCUTANEOUS
Status: COMPLETED | OUTPATIENT
Start: 2020-05-03 | End: 2020-05-03

## 2020-05-03 RX ORDER — HYDROMORPHONE HYDROCHLORIDE 1 MG/ML
.3-.5 INJECTION, SOLUTION INTRAMUSCULAR; INTRAVENOUS; SUBCUTANEOUS
Status: DISCONTINUED | OUTPATIENT
Start: 2020-05-03 | End: 2020-05-04

## 2020-05-03 RX ORDER — ONDANSETRON 4 MG/1
4 TABLET, ORALLY DISINTEGRATING ORAL EVERY 6 HOURS PRN
Status: DISCONTINUED | OUTPATIENT
Start: 2020-05-03 | End: 2020-05-05 | Stop reason: HOSPADM

## 2020-05-03 RX ORDER — LIDOCAINE 40 MG/G
CREAM TOPICAL
Status: DISCONTINUED | OUTPATIENT
Start: 2020-05-03 | End: 2020-05-05 | Stop reason: HOSPADM

## 2020-05-03 RX ORDER — KETOROLAC TROMETHAMINE 15 MG/ML
15 INJECTION, SOLUTION INTRAMUSCULAR; INTRAVENOUS ONCE
Status: COMPLETED | OUTPATIENT
Start: 2020-05-03 | End: 2020-05-03

## 2020-05-03 RX ORDER — ATORVASTATIN CALCIUM 20 MG/1
20 TABLET, FILM COATED ORAL DAILY
Status: DISCONTINUED | OUTPATIENT
Start: 2020-05-03 | End: 2020-05-05 | Stop reason: HOSPADM

## 2020-05-03 RX ADMIN — SODIUM CHLORIDE 71 ML: 9 INJECTION, SOLUTION INTRAVENOUS at 19:28

## 2020-05-03 RX ADMIN — SODIUM CHLORIDE 1000 ML: 9 INJECTION, SOLUTION INTRAVENOUS at 17:53

## 2020-05-03 RX ADMIN — HYDROMORPHONE HYDROCHLORIDE 0.5 MG: 1 INJECTION, SOLUTION INTRAMUSCULAR; INTRAVENOUS; SUBCUTANEOUS at 21:21

## 2020-05-03 RX ADMIN — IOPAMIDOL 100 ML: 755 INJECTION, SOLUTION INTRAVENOUS at 19:28

## 2020-05-03 RX ADMIN — TAZOBACTAM SODIUM AND PIPERACILLIN SODIUM 3.38 G: 375; 3 INJECTION, SOLUTION INTRAVENOUS at 20:40

## 2020-05-03 RX ADMIN — HYDROMORPHONE HYDROCHLORIDE 0.5 MG: 1 INJECTION, SOLUTION INTRAMUSCULAR; INTRAVENOUS; SUBCUTANEOUS at 19:08

## 2020-05-03 RX ADMIN — OMEPRAZOLE 40 MG: 20 CAPSULE, DELAYED RELEASE ORAL at 22:48

## 2020-05-03 RX ADMIN — SENNOSIDES AND DOCUSATE SODIUM 1 TABLET: 8.6; 5 TABLET ORAL at 22:47

## 2020-05-03 RX ADMIN — OXYCODONE HYDROCHLORIDE 5 MG: 5 TABLET ORAL at 22:48

## 2020-05-03 RX ADMIN — DIPHENHYDRAMINE HYDROCHLORIDE 50 MG: 25 CAPSULE ORAL at 22:48

## 2020-05-03 RX ADMIN — HYDROMORPHONE HYDROCHLORIDE 1 MG: 1 INJECTION, SOLUTION INTRAMUSCULAR; INTRAVENOUS; SUBCUTANEOUS at 17:55

## 2020-05-03 RX ADMIN — ATORVASTATIN CALCIUM 20 MG: 20 TABLET, FILM COATED ORAL at 22:48

## 2020-05-03 RX ADMIN — HYDROMORPHONE HYDROCHLORIDE 0.5 MG: 1 INJECTION, SOLUTION INTRAMUSCULAR; INTRAVENOUS; SUBCUTANEOUS at 20:04

## 2020-05-03 RX ADMIN — KETOROLAC TROMETHAMINE 15 MG: 15 INJECTION, SOLUTION INTRAMUSCULAR; INTRAVENOUS at 19:07

## 2020-05-03 RX ADMIN — ONDANSETRON 8 MG: 2 INJECTION INTRAMUSCULAR; INTRAVENOUS at 17:53

## 2020-05-03 RX ADMIN — AMLODIPINE BESYLATE 10 MG: 10 TABLET ORAL at 22:48

## 2020-05-03 ASSESSMENT — MIFFLIN-ST. JEOR: SCORE: 2193.42

## 2020-05-03 ASSESSMENT — PAIN DESCRIPTION - DESCRIPTORS: DESCRIPTORS: SHARP

## 2020-05-03 NOTE — ED PROVIDER NOTES
"  HPI   The patient is a 54-year-old male presenting with progressively worsened right flank pain.  He had an appendectomy performed on Wednesday, 4 days ago.  The pathological specimen showed evidence for perforation of the appendix.  The patient was given postoperative IV antibiotics and has continued to take oral cipro and flagyl as directed.  He has been taking hydrocodone for pain control.  The patient describes progressively worsened pain since about 2 days ago.  He tells me the incision is improving as expected.  He says, \"the surgical site does not hurt as much but my flank is a lot worse.\"  He tells me the pain is slowly radiating farther back along the right flank.  He denies nausea or vomiting.  He denies new testicular pain.  He denies testicular tenderness or swelling.  He denies dysuria, urgency, or frequency of urination.  He has been having bowel movements.  No hematochezia or melena.  No trauma or injury.  No skin changes.        Allergies:  Allergies   Allergen Reactions     Persantine [Dipyridamole] Other (See Comments)     Nervous and anxiety     Problem List:    Patient Active Problem List    Diagnosis Date Noted     Acute appendicitis with generalized peritonitis, unspecified whether abscess present, unspecified whether gangrene present, unspecified whether perforation present 04/29/2020     Priority: Medium     Added automatically from request for surgery [id number]       Acute perforated appendicitis 04/29/2020     Priority: Medium     CARROLL (obstructive sleep apnea) 03/29/2016     Priority: Medium     4/11/2016. AHI 57, positional effect noted, lowest oxygen saturation was 79, time below 88% was 32 minutes  1/25/2001. AHI was 8 events per hour. Respiratory arousal index was 45, lowest oxygen saturation was 85%. He weighed 235 lbs       Essential hypertension, benign 01/08/2016     Priority: Medium     S/P total knee arthroplasty 06/11/2014     Priority: Medium     Left knee DJD 06/11/2014     " Priority: Medium     History of PSVT (paroxysmal supraventricular tachycardia) 05/01/2012     Priority: Medium     WITH ABLATION 2008       CARDIOVASCULAR SCREENING; LDL GOAL LESS THAN 130 10/31/2010     Priority: Medium     Low back pain 01/23/2009     Priority: Medium     12/5/2011:Three prior low back surgeries.          Impotence of organic origin 04/02/2008     Priority: Medium     Esophageal reflux 02/24/2005     Priority: Medium     Tobacco use disorder 02/24/2005     Priority: Medium      Past Medical History:    Past Medical History:   Diagnosis Date     PE (pulmonary embolism)      Respiratory complications March 2005     Past Surgical History:    Past Surgical History:   Procedure Laterality Date     ARTHROPLASTY KNEE  6/11/2014    Procedure: ARTHROPLASTY KNEE;  Surgeon: Maik Jeong MD;  Location: [facility]     ARTHROSCOPY KNEE WITH MEDIAL MENISCECTOMY  12/19/2013    Procedure: ARTHROSCOPY KNEE WITH MEDIAL MENISCECTOMY;  Left Knee Arthroscopy With Intraarticular Debridement.;  Surgeon: Maik Jeong MD;  Location: [facility]     COLONOSCOPY  2002     COLONOSCOPY N/A 4/18/2018    Procedure: COLONOSCOPY;  colonoscopy;  Surgeon: Nirmal Schneider MD;  Location: [facility] GI     EXAM UNDER ANESTHESIA, MANIPULATE JOINT (LOCATION)  6/26/2014    Procedure: EXAM UNDER ANESTHESIA, MANIPULATE JOINT (LOCATION);  Surgeon: Maik Jeong MD;  Location: [facility]     EXAM UNDER ANESTHESIA, MANIPULATE JOINT (LOCATION) Left 8/28/2014    Procedure: EXAM UNDER ANESTHESIA, MANIPULATE JOINT (LOCATION);  Surgeon: Maik Jeong MD;  Location: [facility]     EXAM UNDER ANESTHESIA, MANIPULATE JOINT (LOCATION) Left 12/31/2014    Procedure: EXAM UNDER ANESTHESIA, MANIPULATE JOINT (LOCATION);  Surgeon: Maik Jeong MD;  Location: [facility] OR     H ABLATION SVT  2008    AVNRT     INJECT EPIDURAL LUMBAR  12/16/2011    Procedure:INJECT EPIDURAL LUMBAR; MICKY-Dr. Smith; Surgeon:GENERIC ANESTHESIA PROVIDER; Location:[facility] OR     INJECT  EPIDURAL LUMBAR  1/27/2012    Procedure:INJECT EPIDURAL LUMBAR; MICKY with Flouro--; Surgeon:GENERIC ANESTHESIA PROVIDER; Location:[facility]     LAPAROSCOPIC APPENDECTOMY N/A 4/29/2020    Procedure: APPENDECTOMY, LAPAROSCOPIC;  Surgeon: Alexis Maher MD;  Location: [facility]     LAPAROSCOPIC HERNIORRHAPHY INGUINAL BILATERAL Bilateral 12/29/2015    Procedure: LAPAROSCOPIC HERNIORRHAPHY INGUINAL BILATERAL;  Surgeon: Andrew Prajapati MD;  Location: [facility]     Lumbar back fusion  1995, 1998    first was A&P     Lumbar back procedure  1999    Hardware removal     spinal stimulator insertion  2004    also, revised it that year also. never seemed to help well.      Family History:    Family History   Problem Relation Age of Onset     Cardiovascular Paternal Grandfather      C.A.D. Father 36        MI at age 36-37     Cerebrovascular Disease Father      Allergies Father      Anesthesia Reaction Father      Prostate Cancer No family hx of      Colon Cancer No family hx of      Social History:  Marital Status:  Single [1]  Social History     Tobacco Use     Smoking status: Light Tobacco Smoker     Packs/day: 1.00     Years: 18.00     Pack years: 18.00     Smokeless tobacco: Never Used     Tobacco comment: Started at age 30.    Substance Use Topics     Alcohol use: Yes     Alcohol/week: 0.0 standard drinks     Comment: occassional beer     Drug use: No      Medications:    albuterol (PROAIR HFA) 108 (90 Base) MCG/ACT inhaler  amLODIPine (NORVASC) 10 MG tablet  atorvastatin (LIPITOR) 20 MG tablet  ciprofloxacin (CIPRO) 500 MG tablet  HYDROcodone-acetaminophen (NORCO) 5-325 MG tablet  lisinopril (ZESTRIL) 20 MG tablet  meloxicam (MOBIC) 15 MG tablet  metroNIDAZOLE (FLAGYL) 500 MG tablet  omeprazole (PRILOSEC) 40 MG DR capsule  order for DME  ORDER FOR DME  predniSONE (DELTASONE) 20 MG tablet      Review of Systems   All other systems reviewed and are negative.      PE   BP: (!) 155/89  Pulse: 98  Temp: 98.8  F (37.1  C)  Resp:  "20  Height: 208.3 cm (6' 10\")  Weight: 115.7 kg (255 lb)  SpO2: 98 %  Physical Exam  Vitals signs and nursing note reviewed.   Constitutional:       General: He is in acute distress.      Appearance: He is not diaphoretic.      Comments: The patient is obviously uncomfortable.  He is tearful.  He is grimacing.  He is holding his right flank.   HENT:      Head: Atraumatic.      Right Ear: External ear normal.      Left Ear: External ear normal.      Nose: Nose normal.   Eyes:      General: No scleral icterus.     Pupils: Pupils are equal, round, and reactive to light.   Neck:      Musculoskeletal: Normal range of motion.   Cardiovascular:      Rate and Rhythm: Tachycardia present.      Heart sounds: Normal heart sounds.   Pulmonary:      Effort: No respiratory distress.      Breath sounds: Normal breath sounds.   Abdominal:      General: Bowel sounds are normal.      Palpations: Abdomen is soft.      Comments: Tender along the right abdomen and right flank.   Musculoskeletal: Normal range of motion.         General: No tenderness.   Skin:     General: Skin is warm.      Findings: No rash.   Neurological:      Mental Status: He is alert and oriented to person, place, and time.   Psychiatric:         Behavior: Behavior normal.         ED COURSE and [facility]   1750.  The patient is postoperative day number 4.  He has progressively worsened right flank pain.  Lab values pending.  Urine analysis pending.  CT scan with IV contrast will be ordered.  No history of ureteral stone and no stone seen on the prior CT scan.    2038.  The patient has a new abscess identified in the lower pelvis.  He also has constipation seen on CT scan.  This constipation may be related to some of his pain as well given his location.  Zosyn recommended by the general surgeon, Dr. Prajapati.  NPO after midnight.  Pain control.  The patient will be admitted for further cares.  Coronavirus testing requested by Dr. Sadler.  Patient accepted to the hospitalist " service.    LABS  Labs Ordered and Resulted from Time of ED Arrival Up to the Time of Departure from the ED   ROUTINE UA WITH MICROSCOPIC REFLEX TO CULTURE - Abnormal; Notable for the following components:       Result Value    Leukocyte Esterase Urine Trace (*)     Mucous Urine Present (*)     All other components within normal limits   CBC WITH PLATELETS DIFFERENTIAL   COMPREHENSIVE METABOLIC PANEL   COVID-19 VIRUS (CORONAVIRUS) BY PCR       IMAGING  Images reviewed by me.  Radiology report also reviewed.  Abd/pelvis CT, IV contrast only TRAUMA  / AAA   Final Result   IMPRESSION:    1.  Interval appendectomy.   2.  Fluid-filled mildly dilated loops of bowel suggesting ileus.   3.  New rim-enhancing cystic lesion within the deep pelvis suspicious for abscess.             Procedures    Medications   HYDROmorphone (PF) (DILAUDID) injection 0.5 mg (0.5 mg Intravenous Given 5/3/20 2004)   piperacillin-tazobactam (ZOSYN) infusion 3.375 g (3.375 g Intravenous New Bag 5/3/20 2040)   HYDROmorphone (DILAUDID) injection 1 mg (1 mg Intravenous Given 5/3/20 1755)   ondansetron (ZOFRAN) injection 8 mg (8 mg Intravenous Given 5/3/20 1753)   0.9% sodium chloride BOLUS (1,000 mLs Intravenous New Bag 5/3/20 1753)   iopamidol (ISOVUE-370) solution 100 mL (100 mLs Intravenous Given 5/3/20 1928)   sodium chloride 0.9 % bag 500mL for CT scan flush use (71 mLs Intravenous Given 5/3/20 1928)   ketorolac (TORADOL) injection 15 mg (15 mg Intravenous Given 5/3/20 1907)         IMPRESSION       ICD-10-CM    1. Infection of organ or organ space after surgery, initial encounter  T81.43XA             Medication List      There are no discharge medications for this visit.                       Anastacio Lawler MD  05/03/20 2040

## 2020-05-03 NOTE — TELEPHONE ENCOUNTER
Sarath reports new onset pain to his right side and back.   He rates the pain as 8-9/10. Taking a deep breath causes a sharp, stabbing pain.    He had an emergency appendectomy on Wed.    On Fri, he started experiencing the right side and back pain.    He reports his surgical pain as almost gone.    Per protocol, ER advised.    COVID 19 Nurse Triage Plan/Patient Instructions    Please be aware that novel coronavirus (COVID-19) may be circulating in the community. If you develop symptoms such as fever, cough, or SOB or if you have concerns about the presence of another infection including coronavirus (COVID-19), please contact your health care provider or visit [email or web address].     Disposition/Instructions    Patient to go to ED and follow protocol based instructions. Follow System Ambulatory Workflow for COVID 19.     Bring Your Own Device:  Please also bring your smart device(s) (smart phones, tablets, laptops) and their charging cables for your personal use and to communicate with your care team during your visit.    Thank you for limiting contact with others, wearing a simple mask to cover your cough, practice good hand hygiene habits and accessing our virtual services where possible to limit the spread of this virus.    For more information about COVID19 and options for caring for yourself at home, please visit the CDC website at https://www.cdc.gov/coronavirus/2019-ncov/about/steps-when-sick.html  For more options for care at [facility], please visit our website at [email or web address]    For more information, please use the [place] Department of Health COVID-19 Website: https://www.health.state.[place]/diseases/coronavirus/index.html  [place] Department of Health ([facility]) COVID-19 Hotlines (Interpreters available):      Health questions: Phone Number: [phone] or [phone] and Hours: 7 a.m. to 7 p.m.    Schools and  questions: Phone Number: [phone] or  [phone] and Hours 7 a.m. to 7 p.m.    Jazmyne Torres RN  [facility]      Reason for Disposition    Sounds like a serious complication to the triager    Additional Information    Negative: [1] Widespread rash AND [2] bright red, sunburn-like    Negative: [1] SEVERE headache AND [2] after spinal (epidural) anesthesia    Negative: [1] Vomiting AND [2] persists > 4 hours    Negative: [1] Vomiting AND [2] abdomen looks much more swollen than usual    Negative: [1] Drinking very little AND [2] dehydration suspected (e.g., no urine > 12 hours, very dry mouth, very lightheaded)    Negative: Patient sounds very sick or weak to the triager    Protocols used: POST-OP SYMPTOMS AND [id number]

## 2020-05-03 NOTE — ED TRIAGE NOTES
Pt has surgery on Wednesday for ruptured appy, went home Thursday, R side abd and flank pain have increased since then.

## 2020-05-04 LAB
ANION GAP SERPL CALCULATED.3IONS-SCNC: 5 MMOL/L (ref 3–14)
BUN SERPL-MCNC: 14 MG/DL (ref 7–30)
CALCIUM SERPL-MCNC: 8.6 MG/DL (ref 8.5–10.1)
CHLORIDE SERPL-SCNC: 106 MMOL/L (ref 94–109)
CO2 SERPL-SCNC: 28 MMOL/L (ref 20–32)
CREAT SERPL-MCNC: 1.07 MG/DL (ref 0.66–1.25)
ERYTHROCYTE [DISTWIDTH] IN BLOOD BY AUTOMATED COUNT: 13.3 % (ref 10–15)
GFR SERPL CREATININE-BSD FRML MDRD: 78 ML/MIN/{1.73_M2}
GLUCOSE SERPL-MCNC: 83 MG/DL (ref 70–99)
HCT VFR BLD AUTO: 37.6 % (ref 40–53)
HGB BLD-MCNC: 12.3 G/DL (ref 13.3–17.7)
MCH RBC QN AUTO: 30.1 PG (ref 26.5–33)
MCHC RBC AUTO-ENTMCNC: 32.7 G/DL (ref 31.5–36.5)
MCV RBC AUTO: 92 FL (ref 78–100)
PLATELET # BLD AUTO: 253 10E9/L (ref 150–450)
POTASSIUM SERPL-SCNC: 4.4 MMOL/L (ref 3.4–5.3)
RBC # BLD AUTO: 4.09 10E12/L (ref 4.4–5.9)
SARS-COV-2 PCR COMMENT: NORMAL
SARS-COV-2 RNA SPEC QL NAA+PROBE: NEGATIVE
SARS-COV-2 RNA SPEC QL NAA+PROBE: NORMAL
SODIUM SERPL-SCNC: 139 MMOL/L (ref 133–144)
SPECIMEN SOURCE: NORMAL
SPECIMEN SOURCE: NORMAL
WBC # BLD AUTO: 9.8 10E9/L (ref 4–11)

## 2020-05-04 PROCEDURE — 25000132 ZZH RX MED GY IP 250 OP 250 PS 637: Performed by: INTERNAL MEDICINE

## 2020-05-04 PROCEDURE — 25000128 H RX IP 250 OP 636: Performed by: SURGERY

## 2020-05-04 PROCEDURE — 25000132 ZZH RX MED GY IP 250 OP 250 PS 637: Performed by: SURGERY

## 2020-05-04 PROCEDURE — 99233 SBSQ HOSP IP/OBS HIGH 50: CPT | Performed by: FAMILY MEDICINE

## 2020-05-04 PROCEDURE — 80048 BASIC METABOLIC PNL TOTAL CA: CPT | Performed by: INTERNAL MEDICINE

## 2020-05-04 PROCEDURE — 25000128 H RX IP 250 OP 636: Performed by: FAMILY MEDICINE

## 2020-05-04 PROCEDURE — 36415 COLL VENOUS BLD VENIPUNCTURE: CPT | Performed by: INTERNAL MEDICINE

## 2020-05-04 PROCEDURE — 12000000 ZZH R&B MED SURG/OB

## 2020-05-04 PROCEDURE — 85027 COMPLETE CBC AUTOMATED: CPT | Performed by: INTERNAL MEDICINE

## 2020-05-04 PROCEDURE — 25800030 ZZH RX IP 258 OP 636: Performed by: FAMILY MEDICINE

## 2020-05-04 RX ORDER — KETOROLAC TROMETHAMINE 30 MG/ML
30 INJECTION, SOLUTION INTRAMUSCULAR; INTRAVENOUS EVERY 6 HOURS
Status: DISCONTINUED | OUTPATIENT
Start: 2020-05-04 | End: 2020-05-05 | Stop reason: HOSPADM

## 2020-05-04 RX ORDER — SODIUM CHLORIDE, SODIUM LACTATE, POTASSIUM CHLORIDE, CALCIUM CHLORIDE 600; 310; 30; 20 MG/100ML; MG/100ML; MG/100ML; MG/100ML
INJECTION, SOLUTION INTRAVENOUS CONTINUOUS
Status: DISCONTINUED | OUTPATIENT
Start: 2020-05-04 | End: 2020-05-05

## 2020-05-04 RX ORDER — OXYCODONE AND ACETAMINOPHEN 5; 325 MG/1; MG/1
1-2 TABLET ORAL EVERY 4 HOURS PRN
Status: DISCONTINUED | OUTPATIENT
Start: 2020-05-04 | End: 2020-05-05 | Stop reason: HOSPADM

## 2020-05-04 RX ORDER — AMLODIPINE BESYLATE 10 MG/1
10 TABLET ORAL DAILY
Status: DISCONTINUED | OUTPATIENT
Start: 2020-05-05 | End: 2020-05-05 | Stop reason: HOSPADM

## 2020-05-04 RX ORDER — CYCLOBENZAPRINE HCL 10 MG
10 TABLET ORAL 3 TIMES DAILY
Status: DISCONTINUED | OUTPATIENT
Start: 2020-05-04 | End: 2020-05-05 | Stop reason: HOSPADM

## 2020-05-04 RX ORDER — HYDROMORPHONE HYDROCHLORIDE 1 MG/ML
.3-.5 INJECTION, SOLUTION INTRAMUSCULAR; INTRAVENOUS; SUBCUTANEOUS
Status: DISCONTINUED | OUTPATIENT
Start: 2020-05-04 | End: 2020-05-05 | Stop reason: HOSPADM

## 2020-05-04 RX ADMIN — TAZOBACTAM SODIUM AND PIPERACILLIN SODIUM 3.38 G: 375; 3 INJECTION, SOLUTION INTRAVENOUS at 14:36

## 2020-05-04 RX ADMIN — Medication 1 MG: at 02:18

## 2020-05-04 RX ADMIN — TAZOBACTAM SODIUM AND PIPERACILLIN SODIUM 3.38 G: 375; 3 INJECTION, SOLUTION INTRAVENOUS at 20:21

## 2020-05-04 RX ADMIN — SENNOSIDES AND DOCUSATE SODIUM 1 TABLET: 8.6; 5 TABLET ORAL at 19:25

## 2020-05-04 RX ADMIN — HYDROMORPHONE HYDROCHLORIDE 0.5 MG: 1 INJECTION, SOLUTION INTRAMUSCULAR; INTRAVENOUS; SUBCUTANEOUS at 08:38

## 2020-05-04 RX ADMIN — TAZOBACTAM SODIUM AND PIPERACILLIN SODIUM 3.38 G: 375; 3 INJECTION, SOLUTION INTRAVENOUS at 08:22

## 2020-05-04 RX ADMIN — HYDROMORPHONE HYDROCHLORIDE 0.5 MG: 1 INJECTION, SOLUTION INTRAMUSCULAR; INTRAVENOUS; SUBCUTANEOUS at 11:33

## 2020-05-04 RX ADMIN — DIPHENHYDRAMINE HYDROCHLORIDE 50 MG: 25 CAPSULE ORAL at 19:29

## 2020-05-04 RX ADMIN — TAZOBACTAM SODIUM AND PIPERACILLIN SODIUM 3.38 G: 375; 3 INJECTION, SOLUTION INTRAVENOUS at 02:19

## 2020-05-04 RX ADMIN — OXYCODONE HYDROCHLORIDE 5 MG: 5 TABLET ORAL at 02:19

## 2020-05-04 RX ADMIN — HYDROMORPHONE HYDROCHLORIDE 0.5 MG: 1 INJECTION, SOLUTION INTRAMUSCULAR; INTRAVENOUS; SUBCUTANEOUS at 01:05

## 2020-05-04 RX ADMIN — SODIUM CHLORIDE, POTASSIUM CHLORIDE, SODIUM LACTATE AND CALCIUM CHLORIDE: 600; 310; 30; 20 INJECTION, SOLUTION INTRAVENOUS at 20:27

## 2020-05-04 RX ADMIN — OXYCODONE HYDROCHLORIDE AND ACETAMINOPHEN 1 TABLET: 5; 325 TABLET ORAL at 16:06

## 2020-05-04 RX ADMIN — HYDROMORPHONE HYDROCHLORIDE 0.5 MG: 1 INJECTION, SOLUTION INTRAMUSCULAR; INTRAVENOUS; SUBCUTANEOUS at 05:48

## 2020-05-04 RX ADMIN — KETOROLAC TROMETHAMINE 30 MG: 30 INJECTION, SOLUTION INTRAMUSCULAR at 13:17

## 2020-05-04 RX ADMIN — KETOROLAC TROMETHAMINE 30 MG: 30 INJECTION, SOLUTION INTRAMUSCULAR at 19:19

## 2020-05-04 RX ADMIN — OXYCODONE HYDROCHLORIDE AND ACETAMINOPHEN 1 TABLET: 5; 325 TABLET ORAL at 20:21

## 2020-05-04 RX ADMIN — CYCLOBENZAPRINE HYDROCHLORIDE 10 MG: 10 TABLET, FILM COATED ORAL at 19:26

## 2020-05-04 RX ADMIN — ATORVASTATIN CALCIUM 20 MG: 20 TABLET, FILM COATED ORAL at 19:26

## 2020-05-04 RX ADMIN — SODIUM CHLORIDE, POTASSIUM CHLORIDE, SODIUM LACTATE AND CALCIUM CHLORIDE: 600; 310; 30; 20 INJECTION, SOLUTION INTRAVENOUS at 11:28

## 2020-05-04 RX ADMIN — OXYCODONE HYDROCHLORIDE 5 MG: 5 TABLET ORAL at 05:51

## 2020-05-04 RX ADMIN — OMEPRAZOLE 40 MG: 20 CAPSULE, DELAYED RELEASE ORAL at 19:24

## 2020-05-04 RX ADMIN — HYDROMORPHONE HYDROCHLORIDE 0.5 MG: 1 INJECTION, SOLUTION INTRAMUSCULAR; INTRAVENOUS; SUBCUTANEOUS at 10:34

## 2020-05-04 RX ADMIN — CYCLOBENZAPRINE HYDROCHLORIDE 10 MG: 10 TABLET, FILM COATED ORAL at 13:17

## 2020-05-04 ASSESSMENT — ACTIVITIES OF DAILY LIVING (ADL)
ADLS_ACUITY_SCORE: 11

## 2020-05-04 NOTE — PROGRESS NOTES
"[facility] ADMISSION NOTE    Patient admitted to room 2403 at approximately 2200 via cart from emergency room. Patient was accompanied by transport tech.     Verbal SBAR report received from Pita RASHID prior to patient arrival.     Patient ambulated to bed with stand-by assist. Patient alert and oriented X 3. Pain is controlled with current analgesics.  Medication(s) being used: narcotic analgesics including hydromorphone (Dilaudid). 0-10 Pain Scale: 8. Admission vital signs: Blood pressure (!) 151/92, pulse 81, temperature 98.4  F (36.9  C), temperature source Oral, resp. rate 22, height 2.083 m (6' 10\"), weight 115.7 kg (255 lb), SpO2 96 %. Patient was oriented to plan of care, call light, bed controls, tv, telephone, bathroom and visiting hours.     Risk Assessment    The following safety risks were identified during admission: none. Yellow risk band applied: NO.     Skin Initial Assessment    This writer admitted this patient and completed a full skin assessment and Alexander score in the Adult PCS flowsheet. Appropriate interventions initiated as needed.     Secondary skin check declined. Reliable and states there is no issues    Alexander Risk Assessment  Sensory Perception: 4-->no impairment  Moisture: 4-->rarely moist  Activity: 3-->walks occasionally  Mobility: 4-->no limitation  Nutrition: 4-->excellent  Friction and Shear: 3-->no apparent problem  Alexander Score: 22    Education    Patient has a [place] to Observation order: No  Observation education completed and documented: N/A      Shira Garcia RN    "

## 2020-05-04 NOTE — H&P
[facility]    History and Physical - Hospitalist Service       Date of Admission:  5/3/2020    Assessment & Plan   Sarath Gray is a 54 year old male with recent acute ruptured appy admitted on 5/3 due to abdominal pain, found to have abscess      Intra-abdominal Abscess as complication of Acute Ruputured Appendicitis: patient had acute ruptured appendicitis s/p appendectomy on 4/29.  Was kept overnight for IV antibiotic, and discharged on 4/30.  He was discharged on cipro/Flagyl x 10 days.  He now presents with worsening RLQ/RUQ abdominal pain that radiates to the flank. CT in ER shows abscess near rectum along with ileus.  --Zosyn IV Q 6 hours  --NPO midnight for possible surgical treatment of abscess.  On-call surgeon 5/3 is aware, but confirm consultation with surgeon on 5/4   --Pain control with Tylenol, oxycodone and Dilaudid IV    Surgery Clearance and RCRI Risk Assessment:   Anesthesia issues: none  Baseline Activity: 4  METS (1 self-care, 4 flight of stairs, >4 heavy house work)  Chest Pain: none   Shortness of breath: none  Cardiac Risk Factors/Assessment:                High Risk Surgery: no (Ex: vascular, open intraperitoneal, intrathoracic)              History Ischemic Heart Disease: no (MI, +stress, nitrate use, current CP thought to be ischemia, ECG with pathological Qs)              History of Congestive Heart Failure: no              History of CVA: no              Preoperative Treatment with Insulin: no              Preoperative Creatinine greater than 2.0: no              Total Number of Points: 0 = 0.5% risk of major cardiac event      --he reports history of pulmonary embolism after spine surgery in the 1980s.  He has no hypoxia or tachypnea this admission.  However, should this develop, consider pulmonary embolism, given the somewhat pleuritic nature of his RUQ abdominal pain.  --pre-op COVID testing in process  --pre-op EKG with non-specific ST changes, no  acute ischemic changes        - order inpatient EKG pending preoperative evaluation    Patient is medically optimized for surgery    Possible Ileus: CT shows multiple fluid filled loops of bowel. Patient reports having bowel movement daily since discharge  --bowel regimen    CARROLL: is supposed to use CPAP but only uses intermittently     GERD: Prior to admission takes omeprazole 40 mg twice daily    Hypertension: Prior to admission is on lisinopril 20 mg, amlodipine 10 mg daily. Hold lisinopril today     Hyperlipidemia: Prior to admission is on atorvastatin 20 mg daily    OA: prior to admission is on meloxicam 15 mg daily, hold         Diet: NPO midnight  DVT Prophylaxis: VTE Prophylaxis contraindicated due to probably surgery  Velázquez Catheter: not present  Code Status: Full    Disposition Plan   Expected discharge: 2 - 3 days, recommended to prior living arrangement once antibiotic plan established.  Entered: Nancy Sadler DO 05/03/2020, 8:51 PM     The patient's care was discussed with the Patient.    Nancy Sadler DO  [facility]    ______________________________________________________________________    Chief Complaint   Abdominal pain    History is obtained from the patient    History of Present Illness   Sarath Gray is a 54 year old male who was admitted 4/29 for ruptured appendix, s/p appendectomy.  He had ongoing right-sided pain that radiated to his right flank and worsened with taking a deep breath.  The pain continued to worsen hence presented to the ER.  He is having regular bowel movements every day since surgery.  He denies any fever.  Denies any chest pain, shortness of breath, urinary symptoms, new skin changes.  He was discharged with oral antibiotics which he has been taking    Review of Systems    The 10 point Review of Systems is negative other than noted in the HPI or here.     Past Medical History    I have reviewed this patient's medical history and  updated it with pertinent information if needed.   Past Medical History:   Diagnosis Date     PE (pulmonary embolism)     years ago, apparently no cause found, does smoke     Respiratory complications March 2005       Past Surgical History   I have reviewed this patient's surgical history and updated it with pertinent information if needed.  Past Surgical History:   Procedure Laterality Date     ARTHROPLASTY KNEE  6/11/2014    Procedure: ARTHROPLASTY KNEE;  Surgeon: Maik Jeong MD;  Location: [facility]     ARTHROSCOPY KNEE WITH MEDIAL MENISCECTOMY  12/19/2013    Procedure: ARTHROSCOPY KNEE WITH MEDIAL MENISCECTOMY;  Left Knee Arthroscopy With Intraarticular Debridement.;  Surgeon: Maik Jeong MD;  Location: [facility]     COLONOSCOPY  2002     COLONOSCOPY N/A 4/18/2018    Procedure: COLONOSCOPY;  colonoscopy;  Surgeon: Nirmal Schneider MD;  Location: [facility]     EXAM UNDER ANESTHESIA, MANIPULATE JOINT (LOCATION)  6/26/2014    Procedure: EXAM UNDER ANESTHESIA, MANIPULATE JOINT (LOCATION);  Surgeon: Maik Jeong MD;  Location: [facility]     EXAM UNDER ANESTHESIA, MANIPULATE JOINT (LOCATION) Left 8/28/2014    Procedure: EXAM UNDER ANESTHESIA, MANIPULATE JOINT (LOCATION);  Surgeon: Maik Jeong MD;  Location: [facility]     EXAM UNDER ANESTHESIA, MANIPULATE JOINT (LOCATION) Left 12/31/2014    Procedure: EXAM UNDER ANESTHESIA, MANIPULATE JOINT (LOCATION);  Surgeon: Maik Jeong MD;  Location: [facility]     H ABLATION SVT  2008    AVNRT     INJECT EPIDURAL LUMBAR  12/16/2011    Procedure:INJECT EPIDURAL LUMBAR; MICKY-Dr. Smith; Surgeon:GENERIC ANESTHESIA PROVIDER; Location:[facility]     INJECT EPIDURAL LUMBAR  1/27/2012    Procedure:INJECT EPIDURAL LUMBAR; MICKY with Flouro--; Surgeon:GENERIC ANESTHESIA PROVIDER; Location:[facility]     LAPAROSCOPIC APPENDECTOMY N/A 4/29/2020    Procedure: APPENDECTOMY, LAPAROSCOPIC;  Surgeon: Alexis Maher MD;  Location: [facility]     LAPAROSCOPIC HERNIORRHAPHY INGUINAL BILATERAL  Bilateral 12/29/2015    Procedure: LAPAROSCOPIC HERNIORRHAPHY INGUINAL BILATERAL;  Surgeon: Andrew Prajapati MD;  Location: [facility]     Lumbar back fusion  1995, 1998    first was A&P     Lumbar back procedure  1999    Hardware removal     spinal stimulator insertion  2004    also, revised it that year also. never seemed to help well.        Social History   I have reviewed this patient's social history and updated it with pertinent information if needed.  Social History     Tobacco Use     Smoking status: Light Tobacco Smoker     Packs/day: 1.00     Years: 18.00     Pack years: 18.00     Smokeless tobacco: Never Used     Tobacco comment: Started at age 30.    Substance Use Topics     Alcohol use: Yes     Alcohol/week: 0.0 standard drinks     Comment: occassional beer     Drug use: No       Family History   I have reviewed this patient's family history and updated it with pertinent information if needed.   Family History   Problem Relation Age of Onset     Cardiovascular Paternal Grandfather      C.A.D. Father 36        MI at age 36-37     Cerebrovascular Disease Father      Allergies Father      Anesthesia Reaction Father      Prostate Cancer No family hx of      Colon Cancer No family hx of        Prior to Admission Medications   Prior to Admission Medications   Prescriptions Last Dose Informant Patient Reported? Taking?   HYDROcodone-acetaminophen (NORCO) 5-325 MG tablet   No No   Sig: Take 1-2 tablets by mouth every 6 hours as needed for pain   ORDER FOR DME   No No   Sig: Equipment being ordered: Other: CPM machine for home use. Set max tolerance and increase 3-5 degrees/day as tolerated. Use up to 6-8 hours/day as tolerated.  Treatment Diagnosis: left TKA   albuterol (PROAIR HFA) 108 (90 Base) MCG/ACT inhaler   No No   Sig: Inhale 2 puffs into the lungs every 4 hours as needed for shortness of breath / dyspnea or wheezing   amLODIPine (NORVASC) 10 MG tablet   No No   Sig: Take 1 tablet (10 mg) by mouth daily    atorvastatin (LIPITOR) 20 MG tablet   No No   Sig: Take 1 tablet (20 mg) by mouth daily   ciprofloxacin (CIPRO) 500 MG tablet   No No   Sig: Take 1 tablet (500 mg) by mouth 2 times daily   lisinopril (ZESTRIL) 20 MG tablet   No No   Sig: Take 1 tablet (20 mg) by mouth daily   meloxicam (MOBIC) 15 MG tablet   Yes No   metroNIDAZOLE (FLAGYL) 500 MG tablet   No No   Sig: Take 1 tablet (500 mg) by mouth 3 times daily for 7 days   omeprazole (PRILOSEC) 40 MG DR capsule   No No   Sig: Take 1 capsule (40 mg) by mouth 2 times daily   order for DME   Yes No   Sig: Equipment being ordered: CPAP Patient Sarath Gray was set up at [facility]  on April 21, 2016. Patient received a [employer] AirSense 10 Auto. Pressures were set at Auto 5 - 15 cm H2O.   Patient s ramp is 5 cm H2O for Auto and FLEX/EPR is 2.  Patient received a [employer] Mask name: SIMPLUS  Full Face mask Size Large, heated tubing and heated humidifier.  Patient is enrolled in the STM Program and does not need to meet compliance. Patient has a follow up on June 14TH AT 9 AM with YADIRA Resendez.    Jennifer Flaco   predniSONE (DELTASONE) 20 MG tablet   No No   Sig: Take 3 tabs by mouth daily x 3 days, then 2 tabs daily x 3 days, then 1 tab daily x 3 days, then 1/2 tab daily x 3 days.   Patient not taking: Reported on 12/16/2019      Facility-Administered Medications: None     Allergies   Allergies   Allergen Reactions     Persantine [Dipyridamole] Other (See Comments)     Nervous and anxiety       Physical Exam   Vital Signs: Temp: 98.8  F (37.1  C) Temp src: Temporal BP: (!) 144/82 Pulse: 81   Resp: 20 SpO2: 96 %      Weight: 255 lbs 0 oz    Constitutional: awake, alert, cooperative and appears stated age  Eyes: Lids and lashes normal, pupils equal, round and reactive to light, extra ocular muscles intact, sclera clear, conjunctiva normal  ENT: MMM  Respiratory: No increased work of breathing, good air exchange, clear to auscultation bilaterally, no crackles  or wheezing  Cardiovascular: regular rate and rhythm, normal S1 and S2, no murmur noted and no edema  GI: scars noted, healing as expected, normal bowel sounds, soft, mildly distended, severe RLQ and moderate RUQ pain and no guarding   Skin: healing ecchymosis of abodminal wall  Musculoskeletal: no lower extremity pitting edema present  full range of motion noted  motor strength is 5 out of 5 all extremities bilaterally  Neurologic: Awake, alert, oriented to name, place and time.  Cranial nerves II-XII are grossly intact    Data   Data reviewed today: I reviewed all medications, new labs and imaging results over the last 24 hours. I personally reviewed the EKG tracing showing non-specific T wave changes, no acute ischemic changes and the abdominal CT image(s) showing abscess near rectum, fluid filled bowel loops.    Recent Labs   Lab 05/03/20 1745 04/30/20 0451 04/29/20  0930   WBC 10.4 12.5* 16.3*   HGB 15.2 13.8 14.9   MCV 90 95 92    140* 188     --  136   POTASSIUM 3.9  --  4.0   CHLORIDE 103  --  104   CO2 28  --  27   BUN 11  --  10   CR 0.90  --  0.91   ANIONGAP 6  --  5   DEEPIKA 9.3  --  9.0   GLC 88 110* 93   ALBUMIN 3.4  --  3.5   PROTTOTAL 7.9  --  7.5   BILITOTAL 0.3  --  0.5   ALKPHOS 51  --  50   ALT 32  --  28   AST 26  --  16   LIPASE  --   --  67*     Most Recent 3 CBC's:  Recent Labs   Lab Test 05/03/20 1745 04/30/20 0451 04/29/20  0930   WBC 10.4 12.5* 16.3*   HGB 15.2 13.8 14.9   MCV 90 95 92    140* 188     Most Recent 3 BMP's:  Recent Labs   Lab Test 05/03/20 1745 04/30/20 0451 04/29/20  0930 11/27/19  0853     --  136 138   POTASSIUM 3.9  --  4.0 4.3   CHLORIDE 103  --  104 105   CO2 28  --  27 28   BUN 11  --  10 10   CR 0.90  --  0.91 0.85   ANIONGAP 6  --  5 5   DEEPIKA 9.3  --  9.0 9.1   GLC 88 110* 93 96     Recent Results (from the past 24 hour(s))   Abd/pelvis CT, IV contrast only TRAUMA  / AAA    Narrative    EXAM: CT ABDOMEN PELVIS W CONTRAST  LOCATION:  Health system  DATE/TIME: 5/3/2020 7:21 PM    INDICATION: Right flank pain, postoperative day #4 from appendectomy, pathological specimen showed perforation, on antibiotics, pain is severe and progressive.;  COMPARISON: 04/29/2020  TECHNIQUE: CT scan of the abdomen and pelvis was performed following injection of IV contrast. Multiplanar reformats were obtained. Dose reduction techniques were used.  CONTRAST: 100mL [id number]    FINDINGS:   LOWER CHEST: Mild subsegmental atelectasis or scarring at the lung bases. Mild right basilar pleural calcification incidentally noted.    HEPATOBILIARY: Normal.    PANCREAS: Normal.    SPLEEN: Normal.    ADRENAL GLANDS: Normal.    KIDNEYS/BLADDER: Normal.    BOWEL: Mildly dilated loops of bowel are present in the lower abdomen and pelvis without transition point. Postoperative change in the right lower quadrant consistent with history of recent appendectomy. No free fluid. No free air. Large amount of stool   in the proximal colon.    LYMPH NODES: Normal.    VASCULATURE: Unremarkable.    PELVIC ORGANS: There is a new 5 cm subtly enhancing cystic lesion centrally in the pelvis just anterior to the rectum.    MUSCULOSKELETAL: No suspicious bone lesion. Postsurgical change in the lumbar spine.      Impression    IMPRESSION:   1.  Interval appendectomy.  2.  Fluid-filled mildly dilated loops of bowel suggesting ileus.  3.  New rim-enhancing cystic lesion within the deep pelvis suspicious for abscess.

## 2020-05-04 NOTE — PLAN OF CARE
Patient received dilaudid about every hour this am for pain RLQ rapping around to back. He received a dose of toradol and flexeril and has had no further request for dilaudid since. He tolerated regular diet for lunch. He is receiving zosyn every 6 hours.

## 2020-05-04 NOTE — PROGRESS NOTES
Patient is alert and oriented. Up independently in room. C/o 8/10 right flank pain that burns and is sharp intermittently. 5 oxycodone and dilaudid for breakthrough pain. Lap sites are clean dry and intact w/some pinkness and yellow bruising. Abdomen is firm and tender. NPO at midnight w/sips of water w/meds. Zosyn abx. Possible sxy today.

## 2020-05-04 NOTE — PROGRESS NOTES
"[facility]      Subjective:  Having significant pain in right lower back and into the right lower quadrant anteriorly.  He has no fever, chills, nausea or vomiting.  The Dilaudid wears off shortly after an hour.    Review of Systems:  CONSTITUTIONAL: NEGATIVE for fever, chills, change in weight  INTEGUMENTARY/SKIN: NEGATIVE for worrisome rashes, moles or lesions  EYES: NEGATIVE for vision changes or irritation  ENT/MOUTH: NEGATIVE for ear, mouth and throat problems  RESP: NEGATIVE for significant cough or SOB  BREAST: NEGATIVE for masses, tenderness or discharge  CV: NEGATIVE for chest pain, palpitations or peripheral edema  GI: Above  : NEGATIVE for frequency, dysuria, or hematuria  MUSCULOSKELETAL: NEGATIVE for significant arthralgias or myalgia  NEURO: NEGATIVE for weakness, dizziness or paresthesias  ENDOCRINE: NEGATIVE for temperature intolerance, skin/hair changes  HEME: NEGATIVE for bleeding problems  PSYCHIATRIC: NEGATIVE for changes in mood or affect    Physical Exam:  Vitals Were Reviewed    Patient Vitals for the past 16 hrs:   BP Temp Temp src Pulse Resp SpO2 Height   05/04/20 0657 138/75 97.1  F (36.2  C) Oral 67 18 98 % --   05/04/20 0440 (!) 144/86 97.4  F (36.3  C) Oral 75 18 97 % --   05/04/20 0039 (!) 140/76 96.7  F (35.9  C) Oral 70 18 96 % --   05/03/20 2254 -- -- -- -- 18 -- --   05/03/20 2145 (!) 151/92 98.4  F (36.9  C) Oral 81 22 96 % --   05/03/20 2139 -- -- -- -- -- -- 2.083 m (6' 10\")   05/03/20 2135 (!) 146/89 98.2  F (36.8  C) Oral 84 20 95 % --   05/03/20 2115 (!) 146/89 -- -- 87 -- 93 % --   05/03/20 2100 (!) 156/91 -- -- 86 -- 96 % --   05/03/20 2000 (!) 144/82 -- -- 81 -- 96 % --   05/03/20 1915 (!) 159/96 -- -- 81 -- 96 % --         Intake/Output Summary (Last 24 hours) at 5/4/2020 1107  Last data filed at 5/3/2020 2200  Gross per 24 hour   Intake 360 ml   Output --   Net 360 ml       GENERAL APPEARANCE: healthy, alert and no distress  RESP: lungs clear to " auscultation - no rales, rhonchi or wheezes  CV: regular rate and rhythm, normal S1 S2, no S3 or S4 and no murmur, click or rub   ABDOMEN: Abdomen is distended and tender in the right lower quadrant, there are healing surgical scars, bowel sounds are reduced.  There was no rebound.  MS: no clubbing, cyanosis; no edema  SKIN: clear without significant rashes or lesions    Lab:  Recent Labs   Lab Test 05/04/20  0439 05/03/20  1745    137   POTASSIUM 4.4 3.9   CHLORIDE 106 103   CO2 28 28   ANIONGAP 5 6   GLC 83 88   BUN 14 11   CR 1.07 0.90   DEEPIKA 8.6 9.3     CBC RESULTS:   Recent Labs   Lab Test 05/04/20 0439 05/03/20  1745   WBC 9.8 10.4   RBC 4.09* 5.04   HGB 12.3* 15.2   HCT 37.6* 45.1    269       Results for orders placed or performed during the hospital encounter of 05/03/20 (from the past 24 hour(s))   CBC with platelets, differential   Result Value Ref Range    WBC 10.4 4.0 - 11.0 10e9/L    RBC Count 5.04 4.4 - 5.9 10e12/L    Hemoglobin 15.2 13.3 - 17.7 g/dL    Hematocrit 45.1 40.0 - 53.0 %    MCV 90 78 - 100 fl    MCH 30.2 26.5 - 33.0 pg    MCHC 33.7 31.5 - 36.5 g/dL    RDW 13.3 10.0 - 15.0 %    Platelet Count 269 150 - 450 10e9/L    % Neutrophils 65.0 %    % Lymphocytes 25.0 %    % Monocytes 9.0 %    % Eosinophils 1.0 %    Absolute Neutrophil 6.8 1.6 - 8.3 10e9/L    Absolute Lymphocytes 2.6 0.8 - 5.3 10e9/L    Absolute Monocytes 0.9 0.0 - 1.3 10e9/L    Absolute Eosinophils 0.1 0.0 - 0.7 10e9/L    Diff Method Manual Differential    Comprehensive metabolic panel   Result Value Ref Range    Sodium 137 133 - 144 mmol/L    Potassium 3.9 3.4 - 5.3 mmol/L    Chloride 103 94 - 109 mmol/L    Carbon Dioxide 28 20 - 32 mmol/L    Anion Gap 6 3 - 14 mmol/L    Glucose 88 70 - 99 mg/dL    Urea Nitrogen 11 7 - 30 mg/dL    Creatinine 0.90 0.66 - 1.25 mg/dL    GFR Estimate >90 >60 mL/min/[1.73_m2]    GFR Estimate If Black >90 >60 mL/min/[1.73_m2]    Calcium 9.3 8.5 - 10.1 mg/dL    Bilirubin Total 0.3 0.2 - 1.3  mg/dL    Albumin 3.4 3.4 - 5.0 g/dL    Protein Total 7.9 6.8 - 8.8 g/dL    Alkaline Phosphatase 51 40 - 150 U/L    ALT 32 0 - 70 U/L    AST 26 0 - 45 U/L   UA with Microscopic reflex to Culture    Specimen: Midstream Urine   Result Value Ref Range    Color Urine Yellow     Appearance Urine Clear     Glucose Urine Negative NEG^Negative mg/dL    Bilirubin Urine Negative NEG^Negative    Ketones Urine Negative NEG^Negative mg/dL    Specific Gravity Urine 1.017 1.003 - 1.035    Blood Urine Negative NEG^Negative    pH Urine 5.0 5.0 - 7.0 pH    Protein Albumin Urine Negative NEG^Negative mg/dL    Urobilinogen mg/dL 0.0 0.0 - 2.0 mg/dL    Nitrite Urine Negative NEG^Negative    Leukocyte Esterase Urine Trace (A) NEG^Negative    Source Midstream Urine     WBC Urine 1 0 - 5 /HPF    RBC Urine 1 0 - 2 /HPF    Mucous Urine Present (A) NEG^Negative /LPF   Abd/pelvis CT, IV contrast only TRAUMA  / AAA    Narrative    EXAM: CT ABDOMEN PELVIS W CONTRAST  LOCATION: [facility]  DATE/TIME: 5/3/2020 7:21 PM    INDICATION: Right flank pain, postoperative day #4 from appendectomy, pathological specimen showed perforation, on antibiotics, pain is severe and progressive.;  COMPARISON: 04/29/2020  TECHNIQUE: CT scan of the abdomen and pelvis was performed following injection of IV contrast. Multiplanar reformats were obtained. Dose reduction techniques were used.  CONTRAST: 100mL Isovue-370    FINDINGS:   LOWER CHEST: Mild subsegmental atelectasis or scarring at the lung bases. Mild right basilar pleural calcification incidentally noted.    HEPATOBILIARY: Normal.    PANCREAS: Normal.    SPLEEN: Normal.    ADRENAL GLANDS: Normal.    KIDNEYS/BLADDER: Normal.    BOWEL: Mildly dilated loops of bowel are present in the lower abdomen and pelvis without transition point. Postoperative change in the right lower quadrant consistent with history of recent appendectomy. No free fluid. No free air. Large amount of stool   in the proximal  colon.    LYMPH NODES: Normal.    VASCULATURE: Unremarkable.    PELVIC ORGANS: There is a new 5 cm subtly enhancing cystic lesion centrally in the pelvis just anterior to the rectum.    MUSCULOSKELETAL: No suspicious bone lesion. Postsurgical change in the lumbar spine.      Impression    IMPRESSION:   1.  Interval appendectomy.  2.  Fluid-filled mildly dilated loops of bowel suggesting ileus.  3.  New rim-enhancing cystic lesion within the deep pelvis suspicious for abscess.     COVID-19 Virus (Coronavirus) by PCR Nasopharyngeal    Specimen: Nasopharyngeal   Result Value Ref Range    COVID-19 Virus PCR to [facility] - Source Nasopharyngeal     COVID-19 Virus PCR to [facility] - Result       Test received-See reflex to IDDL test SARS CoV2 (COVID-19) Virus RT-PCR   SARS-CoV-2 COVID-19 Virus (Coronavirus) RT-PCR Nasopharyngeal    Specimen: Nasopharyngeal   Result Value Ref Range    SARS-CoV-2 Virus Specimen Source Nasopharyngeal     SARS-CoV-2 PCR Result NEGATIVE     SARS-CoV-2 PCR Comment       The Simplexa COVID-19 direct PCR assay by [employer] on the [employer] instrument has been   given Emergency Use Authorization (EUA) for the in vitro qualitative detection of RNA from   the SARS-CoV2 virus in nasopharyngeal swabs in viral transport medium from patients with   signs and symptoms of infection who are suspected of COVID-19. Performance is unknown in   asymptomatic patients.     Basic metabolic panel   Result Value Ref Range    Sodium 139 133 - 144 mmol/L    Potassium 4.4 3.4 - 5.3 mmol/L    Chloride 106 94 - 109 mmol/L    Carbon Dioxide 28 20 - 32 mmol/L    Anion Gap 5 3 - 14 mmol/L    Glucose 83 70 - 99 mg/dL    Urea Nitrogen 14 7 - 30 mg/dL    Creatinine 1.07 0.66 - 1.25 mg/dL    GFR Estimate 78 >60 mL/min/[1.73_m2]    GFR Estimate If Black >90 >60 mL/min/[1.73_m2]    Calcium 8.6 8.5 - 10.1 mg/dL   CBC with platelets   Result Value Ref Range    WBC 9.8 4.0 - 11.0 10e9/L    RBC Count 4.09 (L) 4.4 - 5.9 10e12/L     Hemoglobin 12.3 (L) 13.3 - 17.7 g/dL    Hematocrit 37.6 (L) 40.0 - 53.0 %    MCV 92 78 - 100 fl    MCH 30.1 26.5 - 33.0 pg    MCHC 32.7 31.5 - 36.5 g/dL    RDW 13.3 10.0 - 15.0 %    Platelet Count 253 150 - 450 10e9/L       Assessment and Plan:    Sarath Gray is a 54 year old male with recent acute ruptured appy admitted on 5/3 due to abdominal pain, found to have abscess        Intra-abdominal Abscess as complication of Acute Ruputured Appendicitis: patient had acute ruptured appendicitis s/p appendectomy on 4/29.  Was kept overnight for IV antibiotic, and discharged on 4/30.  He was discharged on cipro/Flagyl x 10 days.  He now presents with worsening RLQ/RUQ abdominal pain that radiates to the flank. CT in ER shows abscess near rectum along with ileus.  On Zosyn IV Q 6 hours  NPO midnight for possible surgical treatment of abscess.  On-call surgeon 5/3 is aware, but confirm consultation with surgeon on 5/4   Pain control with Tylenol, oxycodone and Dilaudid IV    Afebrile overnight  Npo  Wbc 9.8         Surgery Clearance and RCRI Risk Assessment:   COVID neg status upon admit  Anesthesia issues: none  Baseline Activity: 4  METS (1 self-care, 4 flight of stairs, >4 heavy house work)  Chest Pain: none   Shortness of breath: none  Cardiac Risk Factors/Assessment:                High Risk Surgery: no (Ex: vascular, open intraperitoneal, intrathoracic)              History Ischemic Heart Disease: no (MI, +stress, nitrate use, current CP thought to be ischemia, ECG with pathological Qs)              History of Congestive Heart Failure: no              History of CVA: no              Preoperative Treatment with Insulin: no              Preoperative Creatinine greater than 2.0: no              Total Number of Points: 0 = 0.5% risk of major cardiac event        --he reports history of pulmonary embolism after spine surgery in the 1980s.  He has no hypoxia or tachypnea this admission.  However, should this develop,  consider pulmonary embolism, given the somewhat pleuritic nature of his RUQ abdominal pain.  -- EKG with non-specific ST changes, no acute ischemic changes       Possible Ileus: CT shows multiple fluid filled loops of bowel. Patient reports having bowel movement daily since discharge  --bowel regimen     CARROLL: is supposed to use CPAP but only uses intermittently      GERD: Prior to admission takes omeprazole 40 mg twice daily--continued     Hypertension: Prior to admission is on lisinopril 20 mg, amlodipine 10 mg daily. Hold lisinopril today      Hyperlipidemia: Prior to admission is on atorvastatin 20 mg daily--continued     OA: prior to admission is on meloxicam 15 mg daily, hold           Diet: NPO midnight  DVT Prophylaxis: VTE Prophylaxis contraindicated due to probably surgery/procedure  Velázquez Catheter: not present  Code Status: Full     Plan--surgical consult today, likely needs some type of drainage procedure.I will discuss with radiology and Dr Maher. Starting iv fluids at 100 ml per hour.    12:02 PM   Discussed with both Dr Spencer and Dr Maher.   Dr Maher will see and make final plan/recommendation.

## 2020-05-04 NOTE — PROGRESS NOTES
Subjective: Patient seen and examined.  Results of recent testing reviewed.  Patient reports that he feels somewhat better since admission.  He is pain was more in his right flank and back.  He did not present with fevers.  He is hungry.        I/O last 3 completed shifts:  In: 360 [P.O.:360]  Out: -      No current outpatient medications on file.         ROUTINE IP LABS (Last four results)  BMP  Recent Labs   Lab 05/04/20 0439 05/03/20 1745 04/30/20 0451 04/29/20  0930    137  --  136   POTASSIUM 4.4 3.9  --  4.0   CHLORIDE 106 103  --  104   DEEPIKA 8.6 9.3  --  9.0   CO2 28 28  --  27   BUN 14 11  --  10   CR 1.07 0.90  --  0.91   GLC 83 88 110* 93     CBC  Recent Labs   Lab 05/04/20 0439 05/03/20 1745 04/30/20 0451 04/29/20  0930   WBC 9.8 10.4 12.5* 16.3*   RBC 4.09* 5.04 4.51 4.89   HGB 12.3* 15.2 13.8 14.9   HCT 37.6* 45.1 42.8 44.9   MCV 92 90 95 92   MCH 30.1 30.2 30.6 30.5   MCHC 32.7 33.7 32.2 33.2   RDW 13.3 13.3 13.2 13.2    269 140* 188     INRNo lab results found in last 7 days.         Afebrile --->  Tcurrent: 97.5   B/P: 142/84  P: 69  R: 18          EXAM  Awake, alert, minimal distress noted.  Lungs  Respirations are unlabored, chest wall excursion normal  Pulses regular, heart rate is normal  Abdomen is soft, appropriately tender in the right lower quadrant postop day #5.  No masses, nondistended.  Incisions are healing well.          A/P: Patient presented with increasing right-sided abdominal pain and was found to have a 5 cm fluid collection in the pelvis.  I reviewed the CT scans with our radiologist.  While this could be a abscess in the early stages, it could also be a walled off fluid collection there is sterile only 4 days since his recent operation.  There is no free air.  He has a lot of stool in his colon.  He does admit to having constipation difficulties, but he is been proactive in that regard since surgery.    He is hungry now.  His white count is normal.  He is  afebrile.  At this point I do not see a reason not to feed him.  There is no indication for urgent surgical intervention.  I do not think we even need to pursue drainage of this fluid collection at this point.    My plan would be to continue his IV antibiotics at this time and see how he is doing in the next 48 hours.  I have changed his medications around by giving him some oral narcotics and some IV Toradol.  I restarted today regular diet and also added some Flexeril.  I will see him personally again Wednesday at which point we will reevaluate and consider our options.  If he is not dramatically improved I would plan to repeat his CT scan and drained his fluid collection.  If he is improved, we can send him home.  He seems comfortable with that plan.  Dr. Prajapati will see him tomorrow.  I will follow-up on Wednesday.    Alexis Maher MD FACS

## 2020-05-04 NOTE — PROGRESS NOTES
DATE:  5/4/2020   TIME OF RECEIPT FROM LAB:  1055  LAB TEST:  Covid  LAB VALUE:  negative  RESULTS GIVEN WITH READ-BACK TO (PROVIDER):  Rick Rod MD  TIME LAB VALUE REPORTED TO PROVIDER:   1100

## 2020-05-05 VITALS
HEIGHT: 78 IN | SYSTOLIC BLOOD PRESSURE: 138 MMHG | WEIGHT: 255 LBS | DIASTOLIC BLOOD PRESSURE: 86 MMHG | BODY MASS INDEX: 29.5 KG/M2 | RESPIRATION RATE: 18 BRPM | TEMPERATURE: 97.2 F | OXYGEN SATURATION: 97 % | HEART RATE: 71 BPM

## 2020-05-05 LAB
ANION GAP SERPL CALCULATED.3IONS-SCNC: 6 MMOL/L (ref 3–14)
BUN SERPL-MCNC: 14 MG/DL (ref 7–30)
CALCIUM SERPL-MCNC: 9 MG/DL (ref 8.5–10.1)
CHLORIDE SERPL-SCNC: 105 MMOL/L (ref 94–109)
CO2 SERPL-SCNC: 28 MMOL/L (ref 20–32)
CREAT SERPL-MCNC: 1.16 MG/DL (ref 0.66–1.25)
ERYTHROCYTE [DISTWIDTH] IN BLOOD BY AUTOMATED COUNT: 13.2 % (ref 10–15)
GFR SERPL CREATININE-BSD FRML MDRD: 71 ML/MIN/{1.73_M2}
GLUCOSE SERPL-MCNC: 86 MG/DL (ref 70–99)
HCT VFR BLD AUTO: 38.4 % (ref 40–53)
HGB BLD-MCNC: 12.7 G/DL (ref 13.3–17.7)
MCH RBC QN AUTO: 29.9 PG (ref 26.5–33)
MCHC RBC AUTO-ENTMCNC: 33.1 G/DL (ref 31.5–36.5)
MCV RBC AUTO: 90 FL (ref 78–100)
PLATELET # BLD AUTO: 259 10E9/L (ref 150–450)
POTASSIUM SERPL-SCNC: 4.2 MMOL/L (ref 3.4–5.3)
RBC # BLD AUTO: 4.25 10E12/L (ref 4.4–5.9)
SODIUM SERPL-SCNC: 139 MMOL/L (ref 133–144)
WBC # BLD AUTO: 8.8 10E9/L (ref 4–11)

## 2020-05-05 PROCEDURE — 25000132 ZZH RX MED GY IP 250 OP 250 PS 637: Performed by: SURGERY

## 2020-05-05 PROCEDURE — 85027 COMPLETE CBC AUTOMATED: CPT | Performed by: FAMILY MEDICINE

## 2020-05-05 PROCEDURE — 80048 BASIC METABOLIC PNL TOTAL CA: CPT | Performed by: FAMILY MEDICINE

## 2020-05-05 PROCEDURE — 25000132 ZZH RX MED GY IP 250 OP 250 PS 637: Performed by: INTERNAL MEDICINE

## 2020-05-05 PROCEDURE — 25000128 H RX IP 250 OP 636: Performed by: FAMILY MEDICINE

## 2020-05-05 PROCEDURE — 25800030 ZZH RX IP 258 OP 636: Performed by: FAMILY MEDICINE

## 2020-05-05 PROCEDURE — 25000128 H RX IP 250 OP 636: Performed by: SURGERY

## 2020-05-05 PROCEDURE — 25000132 ZZH RX MED GY IP 250 OP 250 PS 637: Performed by: FAMILY MEDICINE

## 2020-05-05 PROCEDURE — 36415 COLL VENOUS BLD VENIPUNCTURE: CPT | Performed by: FAMILY MEDICINE

## 2020-05-05 PROCEDURE — 99233 SBSQ HOSP IP/OBS HIGH 50: CPT | Performed by: FAMILY MEDICINE

## 2020-05-05 RX ORDER — KETOROLAC TROMETHAMINE 10 MG/1
10 TABLET, FILM COATED ORAL EVERY 6 HOURS PRN
Qty: 28 TABLET | Refills: 0 | Status: SHIPPED | OUTPATIENT
Start: 2020-05-05 | End: 2020-05-22

## 2020-05-05 RX ORDER — HYDROCODONE BITARTRATE AND ACETAMINOPHEN 5; 325 MG/1; MG/1
1-2 TABLET ORAL EVERY 6 HOURS PRN
Qty: 20 TABLET | Refills: 0 | Status: SHIPPED | OUTPATIENT
Start: 2020-05-05 | End: 2020-06-22

## 2020-05-05 RX ADMIN — TAZOBACTAM SODIUM AND PIPERACILLIN SODIUM 3.38 G: 375; 3 INJECTION, SOLUTION INTRAVENOUS at 02:31

## 2020-05-05 RX ADMIN — SENNOSIDES AND DOCUSATE SODIUM 2 TABLET: 8.6; 5 TABLET ORAL at 08:16

## 2020-05-05 RX ADMIN — OXYCODONE HYDROCHLORIDE AND ACETAMINOPHEN 1 TABLET: 5; 325 TABLET ORAL at 08:26

## 2020-05-05 RX ADMIN — OXYCODONE HYDROCHLORIDE AND ACETAMINOPHEN 1 TABLET: 5; 325 TABLET ORAL at 02:30

## 2020-05-05 RX ADMIN — KETOROLAC TROMETHAMINE 30 MG: 30 INJECTION, SOLUTION INTRAMUSCULAR at 06:35

## 2020-05-05 RX ADMIN — OMEPRAZOLE 40 MG: 20 CAPSULE, DELAYED RELEASE ORAL at 08:16

## 2020-05-05 RX ADMIN — CYCLOBENZAPRINE HYDROCHLORIDE 10 MG: 10 TABLET, FILM COATED ORAL at 08:16

## 2020-05-05 RX ADMIN — CYCLOBENZAPRINE HYDROCHLORIDE 10 MG: 10 TABLET, FILM COATED ORAL at 13:28

## 2020-05-05 RX ADMIN — TAZOBACTAM SODIUM AND PIPERACILLIN SODIUM 3.38 G: 375; 3 INJECTION, SOLUTION INTRAVENOUS at 08:15

## 2020-05-05 RX ADMIN — AMLODIPINE BESYLATE 10 MG: 10 TABLET ORAL at 08:15

## 2020-05-05 RX ADMIN — SODIUM CHLORIDE, POTASSIUM CHLORIDE, SODIUM LACTATE AND CALCIUM CHLORIDE: 600; 310; 30; 20 INJECTION, SOLUTION INTRAVENOUS at 06:40

## 2020-05-05 RX ADMIN — KETOROLAC TROMETHAMINE 30 MG: 30 INJECTION, SOLUTION INTRAMUSCULAR at 13:28

## 2020-05-05 RX ADMIN — KETOROLAC TROMETHAMINE 30 MG: 30 INJECTION, SOLUTION INTRAMUSCULAR at 00:50

## 2020-05-05 RX ADMIN — ENOXAPARIN SODIUM 40 MG: 40 INJECTION SUBCUTANEOUS at 10:47

## 2020-05-05 ASSESSMENT — ACTIVITIES OF DAILY LIVING (ADL)
ADLS_ACUITY_SCORE: 11

## 2020-05-05 NOTE — PROGRESS NOTES
CC: abd pain    Interval HPI: Pain improved. Tolerating diet and no fevers.  Wants to go home.      Patient Vitals for the past 24 hrs:   BP Temp Temp src Pulse Resp SpO2   05/05/20 0631 (!) 148/87 97.7  F (36.5  C) Oral 75 18 96 %   05/05/20 0232 (!) 145/81 97.8  F (36.6  C) Oral 65 18 96 %   05/04/20 2240 (!) 144/80 97.6  F (36.4  C) Oral 72 18 94 %   05/04/20 2106 -- -- -- -- 18 --   05/04/20 1925 (!) 140/87 -- -- 82 18 95 %   05/04/20 1641 -- -- -- -- 18 --   05/04/20 1552 (!) 143/78 97.6  F (36.4  C) Oral 69 20 97 %   05/04/20 1116 (!) 142/84 97.5  F (36.4  C) Oral 69 18 97 %     CBC  Recent Labs   Lab Test 05/05/20  0516   WBC 8.8   RBC 4.25*   HGB 12.7*   HCT 38.4*   MCV 90   MCH 29.9   MCHC 33.1   RDW 13.2          BMP  Recent Labs   Lab Test 05/05/20  0516      POTASSIUM 4.2   DEEPIKA 9.0   CHLORIDE 105   CO2 28   BUN 14   CR 1.16   GLC 86       LFTs  Recent Labs   Lab Test 05/03/20  1745   PROTTOTAL 7.9   ALBUMIN 3.4   BILITOTAL 0.3   ALKPHOS 51   AST 26   ALT 32     ROS  CV - No CP or SOB  Resp - No SOB or dyspnea  GI - No nausea or vomiting   - No difficulty with urination    Exam:  AXO3 NAD  Neuro - Strength 5/5 all major groups, sensation intact, PERRL  Lungs - CTA  CV - RRR  Abd - soft, and NDNT, wounds clean, dry, and intact with no erythema.   Extr - No edema    A/P: s/p lap appy with presumed IAA.  Patient feeling better.  Home today on oral Cipro/Flagyl.    Andrew Prajapati MD

## 2020-05-05 NOTE — PLAN OF CARE
[facility] DISCHARGE NOTE    Patient discharged to home at 2:57 PM via wheel chair. Accompanied by mother and staff. Discharge instructions reviewed with patient, opportunity offered to ask questions. Prescriptions sent to patients preferred pharmacy. All belongings sent with patient.    Samira العراقي RN

## 2020-05-05 NOTE — PROGRESS NOTES
[facility]      Subjective:  No fever or chills.  He is feeling better.  He would like to discharge.  He has had stool and flatus out.  There is less pain- he has some pain in the back.    Review of Systems:  CONSTITUTIONAL: NEGATIVE for fever, chills, change in weight  INTEGUMENTARY/SKIN: NEGATIVE for worrisome rashes, moles or lesions  EYES: NEGATIVE for vision changes or irritation  ENT/MOUTH: NEGATIVE for ear, mouth and throat problems  RESP: NEGATIVE for significant cough or SOB  BREAST: NEGATIVE for masses, tenderness or discharge  CV: NEGATIVE for chest pain, palpitations or peripheral edema  GI: above  : NEGATIVE for frequency, dysuria, or hematuria  MUSCULOSKELETAL:above  NEURO: NEGATIVE for weakness, dizziness or paresthesias  ENDOCRINE: NEGATIVE for temperature intolerance, skin/hair changes  HEME: NEGATIVE for bleeding problems  PSYCHIATRIC: NEGATIVE for changes in mood or affect    Physical Exam:  Vitals Were Reviewed    Patient Vitals for the past 16 hrs:   BP Temp Temp src Pulse Resp SpO2   05/05/20 0631 (!) 148/87 97.7  F (36.5  C) Oral 75 18 96 %   05/05/20 0232 (!) 145/81 97.8  F (36.6  C) Oral 65 18 96 %   05/04/20 2240 (!) 144/80 97.6  F (36.4  C) Oral 72 18 94 %   05/04/20 2106 -- -- -- -- 18 --   05/04/20 1925 (!) 140/87 -- -- 82 18 95 %         Intake/Output Summary (Last 24 hours) at 5/5/2020 1001  Last data filed at 5/4/2020 2215  Gross per 24 hour   Intake 1078.33 ml   Output --   Net 1078.33 ml       GENERAL APPEARANCE: healthy, alert and no distress  EYES: conjunctiva clear, eyes grossly normal  RESP: lungs clear to auscultation - no rales, rhonchi or wheezes  CV: regular rate and rhythm, normal S1 S2, no S3 or S4 and no murmur, click or rub   ABDOMEN: Less distended, no tenderness, bowel sounds present  MS: no clubbing, cyanosis; no edema  SKIN: clear without significant rashes or lesions    Lab:  Recent Labs   Lab Test 05/05/20  0516 05/04/20  0439    139    POTASSIUM 4.2 4.4   CHLORIDE 105 106   CO2 28 28   ANIONGAP 6 5   GLC 86 83   BUN 14 14   CR 1.16 1.07   DEEPIKA 9.0 8.6     CBC RESULTS:   Recent Labs   Lab Test 05/05/20  0516 05/04/20  0439   WBC 8.8 9.8   RBC 4.25* 4.09*   HGB 12.7* 12.3*   HCT 38.4* 37.6*    253       Results for orders placed or performed during the hospital encounter of 05/03/20 (from the past 24 hour(s))   CBC with platelets   Result Value Ref Range    WBC 8.8 4.0 - 11.0 10e9/L    RBC Count 4.25 (L) 4.4 - 5.9 10e12/L    Hemoglobin 12.7 (L) 13.3 - 17.7 g/dL    Hematocrit 38.4 (L) 40.0 - 53.0 %    MCV 90 78 - 100 fl    MCH 29.9 26.5 - 33.0 pg    MCHC 33.1 31.5 - 36.5 g/dL    RDW 13.2 10.0 - 15.0 %    Platelet Count 259 150 - 450 10e9/L   Basic metabolic panel   Result Value Ref Range    Sodium 139 133 - 144 mmol/L    Potassium 4.2 3.4 - 5.3 mmol/L    Chloride 105 94 - 109 mmol/L    Carbon Dioxide 28 20 - 32 mmol/L    Anion Gap 6 3 - 14 mmol/L    Glucose 86 70 - 99 mg/dL    Urea Nitrogen 14 7 - 30 mg/dL    Creatinine 1.16 0.66 - 1.25 mg/dL    GFR Estimate 71 >60 mL/min/[1.73_m2]    GFR Estimate If Black 82 >60 mL/min/[1.73_m2]    Calcium 9.0 8.5 - 10.1 mg/dL       Assessment and Plan:    Sarath Gray is a 54 year old male with recent acute ruptured appy admitted on 5/3 due to abdominal pain, found to have abscess        Intra-abdominal Abscess vs fluid collection as complication of Acute Ruputured Appendicitis: patient had acute ruptured appendicitis s/p appendectomy on 4/29.  Was kept overnight for IV antibiotic, and discharged on 4/30.  He was discharged on cipro/Flagyl x 10 days.  He now presents with worsening RLQ/RUQ abdominal pain that radiates to the flank. CT in ER shows abscess near rectum along with ileus.  On Zosyn IV Q 6 hours     Afebrile overnight  Regular diet  Wbc 8.8  Surgery is planning to follow this at this time. Dr Maher will see him 5/6/20--if he not better he would plan a CT drainage of fluid.This was discussed  with Dr Spencer 5/4/20. Dr Spencer will be here 5/7     COVID neg status upon admit    history of pulmonary embolism after spine surgery in the 1980s.   He has no hypoxia or tachypnea this admission.       Possible Ileus: CT shows multiple fluid filled loops of bowel. Patient reports having bowel movement daily since discharge  --bowel regimen     CARROLL: is supposed to use CPAP but only uses intermittently      GERD: Prior to admission takes omeprazole 40 mg twice daily--continued     Hypertension: Prior to admission is on lisinopril 20 mg, amlodipine 10 mg daily. Hold lisinopril today      Hyperlipidemia: Prior to admission is on atorvastatin 20 mg daily--continued     OA: prior to admission is on meloxicam 15 mg daily, hold       Diet: NPO midnight  DVT Prophylaxis: lovenox times one today, will review tomorrow to see if procedure indicated.  Velázquez Catheter: not present  Code Status: Full     Plan--lovenox times one now. Surgery recheck by Dr Maher tomorrow is the tentative plan-however the patient is requesting discharge.  Await Dr. Prajapati's input.

## 2020-05-05 NOTE — DISCHARGE SUMMARY
Physician Discharge Summary     Patient ID:  Sarath Gray  [id number]  54 year old  1965    Admit date: 5/3/2020    Discharge date and time: 5/5/2020     Admitting Physician: Nancy Sadler DO     Discharge Physician: Alo ROBLES    Admission Diagnoses: Infection of organ or organ space after surgery, initial encounter [T81.43XA]    Discharge Diagnoses: Post op pain following lap appy    Admission Condition: fair    Discharged Condition: fair    Indication for Admission: Back pain following lap appy.  Ct showing fluid collection in pelvis.    Hospital Course: Patient admitted and started on IV abx.  WBC normal throughout stay and no fevers.  Discharged on day two with no pain.     Consults: none    Significant Diagnostic Studies: CT abdomen    Treatments: IV Abx    Discharge Exam:  See daily progress notes    Disposition: home    Patient Instructions:   Current Discharge Medication List      START taking these medications    Details   !! HYDROcodone-acetaminophen (NORCO) 5-325 MG tablet Take 1-2 tablets by mouth every 6 hours as needed for pain  Qty: 20 tablet, Refills: 0    Associated Diagnoses: Acute perforated appendicitis      ketorolac (TORADOL) 10 MG tablet Take 1 tablet (10 mg) by mouth every 6 hours as needed for moderate pain  Qty: 28 tablet, Refills: 0    Associated Diagnoses: Acute perforated appendicitis       !! - Potential duplicate medications found. Please discuss with provider.      CONTINUE these medications which have NOT CHANGED    Details   albuterol (PROAIR HFA) 108 (90 Base) MCG/ACT inhaler Inhale 2 puffs into the lungs every 4 hours as needed for shortness of breath / dyspnea or wheezing  Qty: 1 Inhaler, Refills: 11    Comments: Pharmacy may dispense brand covered by insurance (Proair, or proventil or ventolin or generic albuterol inhaler)      amLODIPine (NORVASC) 10 MG tablet Take 1 tablet (10 mg) by mouth daily  Qty: 90 tablet, Refills: 0    Associated Diagnoses: Hypertension  goal BP (blood pressure) < 140/90      atorvastatin (LIPITOR) 20 MG tablet Take 1 tablet (20 mg) by mouth daily  Qty: 90 tablet, Refills: 3    Associated Diagnoses: Elevated LDL cholesterol level      ciprofloxacin (CIPRO) 500 MG tablet Take 1 tablet (500 mg) by mouth 2 times daily  Qty: 20 tablet, Refills: 0    Associated Diagnoses: Acute appendicitis with generalized peritonitis, unspecified whether abscess present, unspecified whether gangrene present, unspecified whether perforation present      !! HYDROcodone-acetaminophen (NORCO) 5-325 MG tablet Take 1-2 tablets by mouth every 6 hours as needed for pain  Qty: 20 tablet, Refills: 0    Associated Diagnoses: Acute appendicitis with generalized peritonitis, unspecified whether abscess present, unspecified whether gangrene present, unspecified whether perforation present      lisinopril (ZESTRIL) 20 MG tablet Take 1 tablet (20 mg) by mouth daily  Qty: 90 tablet, Refills: 0    Associated Diagnoses: Hypertension goal BP (blood pressure) < 140/90      metroNIDAZOLE (FLAGYL) 500 MG tablet Take 1 tablet (500 mg) by mouth 3 times daily for 7 days  Qty: 30 tablet, Refills: 0    Associated Diagnoses: Acute appendicitis with generalized peritonitis, unspecified whether abscess present, unspecified whether gangrene present, unspecified whether perforation present      omeprazole (PRILOSEC) 40 MG DR capsule Take 1 capsule (40 mg) by mouth 2 times daily  Qty: 180 capsule, Refills: 3    Associated Diagnoses: Epigastric pain      meloxicam (MOBIC) 15 MG tablet       !! order for DME Equipment being ordered: CPAP Patient Sarath Gray was set up at [facility]  on April 21, 2016. Patient received a Resmed AirSense 10 Auto. Pressures were set at Auto 5 - 15 cm H2O.   Patient s ramp is 5 cm H2O for Auto and FLEX/EPR is 2.  Patient received a [employer] Mask name: SIMPLUS  Full Face mask Size Large, heated tubing and heated humidifier.  Patient is enrolled in the STM Program and  does not need to meet compliance. Patient has a follow up on June 14TH AT 9 AM with YADIRA Resendez.    Jennifer Sam      !! ORDER FOR DME Equipment being ordered: Other: CPM machine for home use. Set max tolerance and increase 3-5 degrees/day as tolerated. Use up to 6-8 hours/day as tolerated.  Treatment Diagnosis: left TKA  Qty: 1 Device, Refills: 0    Associated Diagnoses: S/P total knee arthroplasty, left       !! - Potential duplicate medications found. Please discuss with provider.        Activity: activity as tolerated  Diet: regular diet  Wound Care: keep wound clean and dry    Follow-up with Dr prajapati as necessary.    Signed:  Andrew Prajapati MD  5/5/2020  2:22 PM

## 2020-05-05 NOTE — PLAN OF CARE
Patient A & O. Up independently in his room. Pain control improving with scheduled Toradol, Flexeril and PRN Percocet 1 tab. Surgical incisions C/D/I with some edema around 3 sites. Tolerating a regular diet.

## 2020-05-05 NOTE — PLAN OF CARE
Pt has remained afebrile tonight. He reports satisfactory pain control w/ use of scheduled IV toradol & prn percocet. Abd stab sites are dry & intact, good BS x 4 quadrants. Pt tolerating regular diet, up independent in room.

## 2020-05-05 NOTE — PLAN OF CARE
Patient is eating and drinking ok. IVF at [facility] for antibiotics and Toradol. Patient denies discomfort of abdomen with current pain med regime(Toradol, flexeril and oxycodone 5 mg). He continues to receive zosyn every 6 hours.

## 2020-05-05 NOTE — DISCHARGE INSTRUCTIONS
DISCHARGE INSTRUCTIONS     1. You may resume your regular diet when you feel you are ready    2. No heavy lifting (>30lbs)  for 2 weeks.    3. You will have some discomfort at the incision sites. This is expected. This should improve over the next 2-3 days. Ice and pain medication will help with this pain. Use prescribed pain medication as instructed.     4. Some bruising and mild swelling is normal after surgery. The area below and around the incision(s) may be hard and elevated. This is part of the normal healing process. This will resolve slowly over the next several months.     5. Your wounds are covered with glue. The glue is water tight and so you can shower or bathe immediately following surgery.     6. Use the following medications (in addition to your normal meds) as shown:      Tylenol (acetaminophen) 500 mg every 6 hours as needed for pain.    Do not take more than 1000 mg of Tylenol every 6 hours -OR- 4g in a day    Motrin (ibuprophen) 600 mg every 6 hours as needed for pain. Take with food.     8. Notify Clinic at [phone] if:     Your discomfort is not relieved by your pain medication     You have signs of infection such as temperature above 100.4 degrees orally,  chills, or increasing daily discomfort.     Incision site is becoming more red and/or there is purulent drainage.      9. Follow up with Dr Prajapati as necessary.

## 2020-05-06 ENCOUNTER — TELEPHONE (OUTPATIENT)
Dept: FAMILY MEDICINE | Facility: CLINIC | Age: 55
End: 2020-05-06

## 2020-05-06 NOTE — TELEPHONE ENCOUNTER
"ED/Discharge Protocol    \"Hi, my name is Alfreda Nam RN, a registered nurse, and I am calling on behalf of Dr. Smith's office at [place].  I am calling to follow up and see how things are going for you after your recent visit.\"      How are you doing now that you are home?\" pretty good    Is patient experiencing symptoms that may require a hospital visit?   no    Discharge Instructions    \"Let's review your discharge instructions.  What is/are the follow-up recommendations?  Pt. Response: follow up if problems    \"Were you instructed to make a follow-up appointment?\"  Pt. Response: No.       \"When you see the provider, I would recommend that you bring your discharge instructions with you.    Medications    \"How many new medications are you on since your hospitalization/ED visit?\"    0-1  \"How many of your current medicines changed (dose, timing, name, etc.) while you were in the hospital/ED visit?\"   0-1  \"Do you have questions about your medications?\"   No  \"Were you newly diagnosed with heart failure, COPD, diabetes or did you have a heart attack?\"   No  For patients on insulin: \"Did you start on insulin in the hospital or did you have your insulin dose changed?\"   No  Post Discharge Medication Reconciliation Status: discharge medications reconciled, continue medications without change.    Was MTM referral placed (*Make sure to put transitions as reason for referral)?   No    Call Summary    \"Do you have any questions or concerns about your condition or care plan at the moment?\"    No  Triage nurse advice given: call if questions          \"If you have questions or things don't continue to improve, we encourage you contact us through the main clinic number,  [phone].  Even if the clinic is not open, triage nurses are available 24/7 to help you.     We would like you to know that our clinic has extended hours (provide information).  We also have urgent care (provide details on closest location and " "hours/contact info)\"      \"Thank you for your time and take care!\"  Alfreda Nam RN          "

## 2020-05-06 NOTE — TELEPHONE ENCOUNTER
ED/UC/IP follow up phone call: [facility] discharge 5/5/20 Infection of organ or organ space after surgery, Initial encounter, Acute perforated appendicitis    RN please call to follow up.    Number of ED visits in past 12 months = 1

## 2020-05-12 ENCOUNTER — VIRTUAL VISIT (OUTPATIENT)
Dept: SURGERY | Facility: CLINIC | Age: 55
End: 2020-05-12
Payer: COMMERCIAL

## 2020-05-12 DIAGNOSIS — Z09 POSTOP CHECK: Primary | ICD-10-CM

## 2020-05-12 PROCEDURE — 99024 POSTOP FOLLOW-UP VISIT: CPT | Performed by: SURGERY

## 2020-05-21 DIAGNOSIS — K35.32 ACUTE PERFORATED APPENDICITIS: ICD-10-CM

## 2020-05-22 RX ORDER — KETOROLAC TROMETHAMINE 10 MG/1
10 TABLET, FILM COATED ORAL EVERY 6 HOURS PRN
Qty: 28 TABLET | Refills: 0 | Status: SHIPPED | OUTPATIENT
Start: 2020-05-22 | End: 2020-12-14

## 2020-05-22 NOTE — TELEPHONE ENCOUNTER
Lap Appy 4-29-20 with Dr Prajapati. ER 5-3-20 fluid collection in pelvic by CT. Unable to reach by phone. Is asking for refill of Toradol. Dr Moncada , how do you advise?

## 2020-05-22 NOTE — TELEPHONE ENCOUNTER
I spoke to Sarath. I asked how he is feeling. He said that he is slowly getting better but did some lifting and just wanted a little more medicine to feel better. He has no fever or redness. I told him he would need to turn to clinic or go to the ER if he had increasing pain, redness, drainage or fever. He is in agreement with the plan. I told him that Dr Moncada did refill his pain medication for him. Petra RODRIGUEZ Rn

## 2020-06-19 ENCOUNTER — NURSE TRIAGE (OUTPATIENT)
Dept: FAMILY MEDICINE | Facility: CLINIC | Age: 55
End: 2020-06-19

## 2020-06-19 NOTE — TELEPHONE ENCOUNTER
Reason for call:  Patient reporting a symptom    Symptom or request: Coughing, SOB - No Temp   This started last week getting worse    Phone Number patient can be reached at:  Home number on file [phone] (home)    Best Time:  Any Time      Can we leave a detailed message on this number:  YES    Call taken on 6/19/2020 at 3:37 PM by Dariela Flowers

## 2020-06-19 NOTE — TELEPHONE ENCOUNTER
"  Additional Information    Negative: Severe difficulty breathing (e.g., struggling for each breath, speaks in single words)    Negative: Bluish (or gray) lips or face now    Negative: [1] Difficulty breathing AND [2] exposure to flames, smoke, or fumes    Negative: [1] Stridor AND [2] difficulty breathing    Negative: Sounds like a life-threatening emergency to the triager    Negative: [1] Previous asthma attacks AND [2] this feels like asthma attack    Negative: Dry (non-productive) cough (i.e., no sputum or minimal clear sputum)    Negative: Chest pain  (Exception: MILD central chest pain, present only when coughing)    Negative: Difficulty breathing    Answer Assessment - Initial Assessment Questions  1. ONSET: \"When did the cough begin?\"       Started Monday or Tuesday  2. SEVERITY: \"How bad is the cough today?\"       Just getting worse  3. RESPIRATORY DISTRESS: \"Describe your breathing.\"       He is not in distress.   4. FEVER: \"Do you have a fever?\" If so, ask: \"What is your temperature, how was it measured, and when did it start?\"      Denies  5. SPUTUM: \"Describe the color of your sputum\" (clear, white, yellow, green)      Coughing yellow stuff up  6. HEMOPTYSIS: \"Are you coughing up any blood?\" If so ask: \"How much?\" (flecks, streaks, tablespoons, etc.)      No blood  7. CARDIAC HISTORY: \"Do you have any history of heart disease?\" (e.g., heart attack, congestive heart failure)       No  8. LUNG HISTORY: \"Do you have any history of lung disease?\"  (e.g., pulmonary embolus, asthma, emphysema)      Hx pneumonia. \"Bronchial problems and stuff.\"  9. PE RISK FACTORS: \"Do you have a history of blood clots?\" (or: recent major surgery, recent prolonged travel, bedridden)      Denies hx PE  10. OTHER SYMPTOMS: \"Do you have any other symptoms?\" (e.g., runny nose, wheezing, chest pain)        Denies   11. PREGNANCY: \"Is there any chance you are pregnant?\" \"When was your last menstrual period?\"        male  12. TRAVEL: " "\"Have you traveled out of the country in the last month?\" (e.g., travel history, exposures)        No    Protocols used: COUGH - ACUTE [id number]    R (recommendations): Pt was instructed to be seen this evening in UC. He agreed with plan.    Of note - Epic shut down as this RN was charting this pt's assessment - some information may have been lost - -     Bekah CHOW RN, BSN      "

## 2020-06-22 ENCOUNTER — VIRTUAL VISIT (OUTPATIENT)
Dept: FAMILY MEDICINE | Facility: CLINIC | Age: 55
End: 2020-06-22
Payer: COMMERCIAL

## 2020-06-22 DIAGNOSIS — R05.9 COUGH: Primary | ICD-10-CM

## 2020-06-22 PROCEDURE — 99213 OFFICE O/P EST LOW 20 MIN: CPT | Mod: TEL | Performed by: FAMILY MEDICINE

## 2020-06-22 RX ORDER — PREDNISONE 20 MG/1
TABLET ORAL
Qty: 16 TABLET | Refills: 0 | Status: SHIPPED | OUTPATIENT
Start: 2020-06-22 | End: 2020-12-14

## 2020-06-22 RX ORDER — AZITHROMYCIN 250 MG/1
TABLET, FILM COATED ORAL
Qty: 6 TABLET | Refills: 0 | Status: SHIPPED | OUTPATIENT
Start: 2020-06-22 | End: 2020-12-14

## 2020-06-22 RX ORDER — ALBUTEROL SULFATE 90 UG/1
2 AEROSOL, METERED RESPIRATORY (INHALATION) EVERY 4 HOURS PRN
Qty: 1 INHALER | Refills: 11 | Status: SHIPPED | OUTPATIENT
Start: 2020-06-22

## 2020-06-22 NOTE — PROGRESS NOTES
"Sarath Gray is a 54 year old male who is being evaluated via a billable telephone visit.      The patient has been notified of following:     \"This telephone visit will be conducted via a call between you and your physician/provider. We have found that certain health care needs can be provided without the need for a physical exam.  This service lets us provide the care you need with a short phone conversation.  If a prescription is necessary we can send it directly to your pharmacy.  If lab work is needed we can place an order for that and you can then stop by our lab to have the test done at a later time.    Telephone visits are billed at different rates depending on your insurance coverage. During this emergency period, for some insurers they may be billed the same as an in-person visit.  Please reach out to your insurance provider with any questions.    If during the course of the call the physician/provider feels a telephone visit is not appropriate, you will not be charged for this service.\"    Patient has given verbal consent for Telephone visit?  Yes    What phone number would you like to be contacted at? [phone]    How would you like to obtain your AVS? Mail a copy    SUBJECTIVE: Sarath Gray is a 54 year old male  Chief Complaint   Patient presents with     Cough        ENT Symptoms    Ill for a week.   Seems like previous infection when he had pneumonia.    Seen 11/27.  Started on doxycycline which caused diarrhea.   follow-up visit on 12/16.   Started on Levaquin and medrol.     Now has cough.  Ribs sore.  Yellow phlegm.    Coughs to point of shortness of breath.   Never has had fever.    slight dyspnea on exertion.   No ENT symptoms.  Minimal nasal symptoms more with mowing lawn.    No known exposure to COVID-19 virus.    No GI symptoms.     He has been using albuteral inhaler.                  Symptoms: cc Present Absent Comment   Fever/Chills   x    Fatigue  x     Muscle Aches   x    Eye " Irritation  x     Sneezing   x    Nasal Herminio/Drg   x    Sinus Pressure/Pain   x    Loss of smell   x    Dental pain   x    Sore Throat   x    Swollen Glands   x    Ear Pain/Fullness   x    Cough  x     Wheeze  x     Chest Pain  x     Shortness of breath   x    Rash  x     Other  x       Symptom duration:  about one week   Symptom severity:  moderate   Treatments tried:  albuterol inhaler   Contacts:  none      Patient states that he is having the same symptoms he had when he had pneumonia last fall.    History   Smoking Status     Light Tobacco Smoker     Packs/day: 1.00     Years: 18.00   Smokeless Tobacco     Never Used     Comment: Started at age 30.      Smokes some.  Not many per day.     ASSESSMENT:   (R05) Cough  (primary encounter diagnosis)  Comment: prolonged cough in a smoker.   Plan: albuterol (PROAIR HFA) 108 (90 Base) MCG/ACT         inhaler, predniSONE (DELTASONE) 20 MG tablet,         azithromycin (ZITHROMAX) 250 MG tablet          Albuteral inhaler can be used taking 2 inhalations every 4 hours as needed for coughing, wheezing or shortness of breath.   Take prednisone 20mg 2 pills daily for 6 days, then 1 pill daily for another 4 days, anti-inflammatory medication.   Azithromycin 250mg, 2 pills day #1, then 1 pill daily for 4 more days, antibiotic.    IF not better, more breathing problems, fever, let us know.  COVID-19 virus is a consideration and we can order test if needed.     Telephone time=15 minutes    JUDY GONZALEZ MD

## 2020-06-22 NOTE — PATIENT INSTRUCTIONS
ASSESSMENT:   (R05) Cough  (primary encounter diagnosis)  Comment: prolonged cough in a smoker.   Plan: albuterol (PROAIR HFA) 108 (90 Base) MCG/ACT         inhaler, predniSONE (DELTASONE) 20 MG tablet,         azithromycin (ZITHROMAX) 250 MG tablet          Albuteral inhaler can be used taking 2 inhalations every 4 hours as needed for coughing, wheezing or shortness of breath.   Take prednisone 20mg 2 pills daily for 6 days, then 1 pill daily for another 4 days, anti-inflammatory medication.   Azithromycin 250mg, 2 pills day #1, then 1 pill daily for 4 more days, antibiotic.    IF not better, more breathing problems, fever, let us know.  COVID-19 virus is a consideration and we can order test if needed.

## 2020-07-24 DIAGNOSIS — I10 HYPERTENSION GOAL BP (BLOOD PRESSURE) < 140/90: ICD-10-CM

## 2020-07-27 RX ORDER — AMLODIPINE BESYLATE 10 MG/1
10 TABLET ORAL DAILY
Qty: 90 TABLET | Refills: 0 | Status: SHIPPED | OUTPATIENT
Start: 2020-07-27 | End: 2020-10-28

## 2020-07-27 RX ORDER — LISINOPRIL 20 MG/1
20 TABLET ORAL DAILY
Qty: 90 TABLET | Refills: 0 | Status: SHIPPED | OUTPATIENT
Start: 2020-07-27 | End: 2020-10-28

## 2020-07-28 DIAGNOSIS — R10.13 EPIGASTRIC PAIN: ICD-10-CM

## 2020-07-28 RX ORDER — OMEPRAZOLE 40 MG/1
CAPSULE, DELAYED RELEASE ORAL
Qty: 180 CAPSULE | Refills: 3 | Status: SHIPPED | OUTPATIENT
Start: 2020-07-28 | End: 2021-08-24

## 2020-10-28 DIAGNOSIS — I10 HYPERTENSION GOAL BP (BLOOD PRESSURE) < 140/90: ICD-10-CM

## 2020-10-28 RX ORDER — AMLODIPINE BESYLATE 10 MG/1
10 TABLET ORAL DAILY
Qty: 90 TABLET | Refills: 0 | Status: SHIPPED | OUTPATIENT
Start: 2020-10-28 | End: 2021-01-18

## 2020-10-28 RX ORDER — LISINOPRIL 20 MG/1
20 TABLET ORAL DAILY
Qty: 90 TABLET | Refills: 0 | Status: SHIPPED | OUTPATIENT
Start: 2020-10-28 | End: 2021-01-18

## 2020-12-09 ENCOUNTER — TRANSFERRED RECORDS (OUTPATIENT)
Dept: HEALTH INFORMATION MANAGEMENT | Facility: CLINIC | Age: 55
End: 2020-12-09

## 2020-12-11 DIAGNOSIS — Z11.59 ENCOUNTER FOR SCREENING FOR OTHER VIRAL DISEASES: Primary | ICD-10-CM

## 2020-12-12 DIAGNOSIS — Z11.59 ENCOUNTER FOR SCREENING FOR OTHER VIRAL DISEASES: ICD-10-CM

## 2020-12-12 LAB
SARS-COV-2 RNA SPEC QL NAA+PROBE: NORMAL
SPECIMEN SOURCE: NORMAL

## 2020-12-12 PROCEDURE — U0003 INFECTIOUS AGENT DETECTION BY NUCLEIC ACID (DNA OR RNA); SEVERE ACUTE RESPIRATORY SYNDROME CORONAVIRUS 2 (SARS-COV-2) (CORONAVIRUS DISEASE [COVID-19]), AMPLIFIED PROBE TECHNIQUE, MAKING USE OF HIGH THROUGHPUT TECHNOLOGIES AS DESCRIBED BY CMS-2020-01-R: HCPCS | Performed by: ORTHOPAEDIC SURGERY

## 2020-12-13 LAB
LABORATORY COMMENT REPORT: NORMAL
SARS-COV-2 RNA SPEC QL NAA+PROBE: NEGATIVE
SPECIMEN SOURCE: NORMAL

## 2020-12-14 ENCOUNTER — OFFICE VISIT (OUTPATIENT)
Dept: FAMILY MEDICINE | Facility: CLINIC | Age: 55
End: 2020-12-14
Payer: COMMERCIAL

## 2020-12-14 VITALS
HEIGHT: 78 IN | RESPIRATION RATE: 18 BRPM | HEART RATE: 72 BPM | WEIGHT: 253 LBS | TEMPERATURE: 97.3 F | SYSTOLIC BLOOD PRESSURE: 124 MMHG | BODY MASS INDEX: 29.27 KG/M2 | DIASTOLIC BLOOD PRESSURE: 80 MMHG

## 2020-12-14 DIAGNOSIS — M70.51 BURSITIS OF RIGHT KNEE, UNSPECIFIED BURSA: ICD-10-CM

## 2020-12-14 DIAGNOSIS — F17.200 NICOTINE DEPENDENCE, UNCOMPLICATED, UNSPECIFIED NICOTINE PRODUCT TYPE: ICD-10-CM

## 2020-12-14 DIAGNOSIS — Z01.818 PRE-OP EXAM: Primary | ICD-10-CM

## 2020-12-14 LAB
ANION GAP SERPL CALCULATED.3IONS-SCNC: 3 MMOL/L (ref 3–14)
BUN SERPL-MCNC: 16 MG/DL (ref 7–30)
CALCIUM SERPL-MCNC: 9.5 MG/DL (ref 8.5–10.1)
CHLORIDE SERPL-SCNC: 104 MMOL/L (ref 94–109)
CO2 SERPL-SCNC: 30 MMOL/L (ref 20–32)
CREAT SERPL-MCNC: 0.98 MG/DL (ref 0.66–1.25)
ERYTHROCYTE [DISTWIDTH] IN BLOOD BY AUTOMATED COUNT: 14.7 % (ref 10–15)
GFR SERPL CREATININE-BSD FRML MDRD: 86 ML/MIN/{1.73_M2}
GLUCOSE SERPL-MCNC: 90 MG/DL (ref 70–99)
HCT VFR BLD AUTO: 43.8 % (ref 40–53)
HGB BLD-MCNC: 14.7 G/DL (ref 13.3–17.7)
MCH RBC QN AUTO: 29.3 PG (ref 26.5–33)
MCHC RBC AUTO-ENTMCNC: 33.6 G/DL (ref 31.5–36.5)
MCV RBC AUTO: 87 FL (ref 78–100)
PLATELET # BLD AUTO: 224 10E9/L (ref 150–450)
POTASSIUM SERPL-SCNC: 3.9 MMOL/L (ref 3.4–5.3)
RBC # BLD AUTO: 5.01 10E12/L (ref 4.4–5.9)
SODIUM SERPL-SCNC: 137 MMOL/L (ref 133–144)
WBC # BLD AUTO: 11.2 10E9/L (ref 4–11)

## 2020-12-14 PROCEDURE — 99214 OFFICE O/P EST MOD 30 MIN: CPT | Performed by: NURSE PRACTITIONER

## 2020-12-14 PROCEDURE — 36415 COLL VENOUS BLD VENIPUNCTURE: CPT | Performed by: NURSE PRACTITIONER

## 2020-12-14 PROCEDURE — 80048 BASIC METABOLIC PNL TOTAL CA: CPT | Performed by: NURSE PRACTITIONER

## 2020-12-14 PROCEDURE — 85027 COMPLETE CBC AUTOMATED: CPT | Performed by: NURSE PRACTITIONER

## 2020-12-14 ASSESSMENT — MIFFLIN-ST. JEOR: SCORE: 2179.35

## 2020-12-14 NOTE — LETTER
December 15, 2020      Sarath Gray  [id number] [facility] [id number]        Dear ,    We are writing to inform you of your test results.    Your test results fall within the expected range(s) or remain unchanged from previous results.  Please continue with current treatment plan.    Resulted Orders   Basic metabolic panel   Result Value Ref Range    Sodium 137 133 - 144 mmol/L    Potassium 3.9 3.4 - 5.3 mmol/L    Chloride 104 94 - 109 mmol/L    Carbon Dioxide 30 20 - 32 mmol/L    Anion Gap 3 3 - 14 mmol/L    Glucose 90 70 - 99 mg/dL    Urea Nitrogen 16 7 - 30 mg/dL    Creatinine 0.98 0.66 - 1.25 mg/dL    GFR Estimate 86 >60 mL/min/[1.73_m2]      Comment:      Non  GFR Calc  Starting 12/18/2018, serum creatinine based estimated GFR (eGFR) will be   calculated using the Chronic Kidney Disease Epidemiology Collaboration   (CKD-EPI) equation.      GFR Estimate If Black >90 >60 mL/min/[1.73_m2]      Comment:       GFR Calc  Starting 12/18/2018, serum creatinine based estimated GFR (eGFR) will be   calculated using the Chronic Kidney Disease Epidemiology Collaboration   (CKD-EPI) equation.      Calcium 9.5 8.5 - 10.1 mg/dL   CBC with platelets   Result Value Ref Range    WBC 11.2 (H) 4.0 - 11.0 10e9/L    RBC Count 5.01 4.4 - 5.9 10e12/L    Hemoglobin 14.7 13.3 - 17.7 g/dL    Hematocrit 43.8 40.0 - 53.0 %    MCV 87 78 - 100 fl    MCH 29.3 26.5 - 33.0 pg    MCHC 33.6 31.5 - 36.5 g/dL    RDW 14.7 10.0 - 15.0 %    Platelet Count 224 150 - 450 10e9/L       If you have any questions or concerns, please call the clinic at the number listed above.       Sincerely,      MADDIE Catalan CNP

## 2020-12-14 NOTE — H&P (VIEW-ONLY)
[facility]  [address] [place] [id number]  Phone: [phone]  Fax: [phone]  Primary Provider: Maik Smith  Pre-op Performing Provider: MIRNA LAUREANO    PREOPERATIVE EVALUATION:  Today's date: 12/14/2020    Sarath Gray is a 55 year old male who presents for a preoperative evaluation.    Surgical Information:  Surgery/Procedure: Knee Open Excision of Prepatellar Bursa  Surgery Location: [facility]  Surgeon: Comfort  Surgery Date: 12/16/20  Time of Surgery: TBD  Where patient plans to recover: At home with family  Fax number for surgical facility: Note does not need to be faxed, will be available electronically in Epic.    Type of Anesthesia Anticipated: Choice    Subjective     HPI related to upcoming procedure: Knee Open Excision of Prepatellar Bursa      Preop Questions 12/14/2020   1. Have you ever had a heart attack or stroke? No   2. Have you ever had surgery on your heart or blood vessels, such as a stent placement, a coronary artery bypass, or surgery on an artery in your head, neck, heart, or legs? YES ablasion    3. Do you have chest pain with activity? No   4. Do you have a history of  heart failure? No   5. Do you currently have a cold, bronchitis or symptoms of other infection? No   6. Do you have a cough, shortness of breath, or wheezing? No   7. Do you or anyone in your family have previous history of blood clots? YES dvt 20 years ago    8. Do you or does anyone in your family have a serious bleeding problem such as prolonged bleeding following surgeries or cuts? No   9. Have you ever had problems with anemia or been told to take iron pills? No   10. Have you had any abnormal blood loss such as black, tarry or bloody stools? No   11. Have you ever had a blood transfusion? No   12. Are you willing to have a blood transfusion if it is medically needed before, during, or after your surgery? Yes   13. Have you or  any of your relatives ever had problems with anesthesia? No   14. Do you have sleep apnea, excessive snoring or daytime drowsiness? YES    14a. Do you have a CPAP machine? Yes   15. Do you have any artifical heart valves or other implanted medical devices like a pacemaker, defibrillator, or continuous glucose monitor? No   16. Do you have artificial joints? YES    17. Are you allergic to latex? No       Health Care Directive:  Patient does not have a Health Care Directive or Living Will: Discussed advance care planning with patient; however, patient declined at this time.    Preoperative Review of :   reviewed - no record of controlled substances prescribed.      Status of Chronic Conditions:  See problem list for active medical problems.  Problems all longstanding and stable, except as noted/documented.  See ROS for pertinent symptoms related to these conditions.      Review of Systems  Constitutional, neuro, ENT, endocrine, pulmonary, cardiac, gastrointestinal, genitourinary, musculoskeletal, integument and psychiatric systems are negative, except as otherwise noted.    Patient Active Problem List    Diagnosis Date Noted     Postoperative infection 05/03/2020     Priority: Medium     Postoperative intra-abdominal abscess 05/03/2020     Priority: Medium     Acute appendicitis with generalized peritonitis, unspecified whether abscess present, unspecified whether gangrene present, unspecified whether perforation present 04/29/2020     Priority: Medium     Added automatically from request for surgery [id number]       Acute perforated appendicitis 04/29/2020     Priority: Medium     CARROLL (obstructive sleep apnea) 03/29/2016     Priority: Medium     4/11/2016. AHI 57, positional effect noted, lowest oxygen saturation was 79, time below 88% was 32 minutes  1/25/2001. AHI was 8 events per hour. Respiratory arousal index was 45, lowest oxygen saturation was 85%. He weighed 235 lbs       Essential hypertension, benign  01/08/2016     Priority: Medium     S/P total knee arthroplasty 06/11/2014     Priority: Medium     Left knee DJD 06/11/2014     Priority: Medium     History of PSVT (paroxysmal supraventricular tachycardia) 05/01/2012     Priority: Medium     WITH ABLATION 2008       CARDIOVASCULAR SCREENING; LDL GOAL LESS THAN 130 10/31/2010     Priority: Medium     Low back pain 01/23/2009     Priority: Medium     12/5/2011:Three prior low back surgeries.          Impotence of organic origin 04/02/2008     Priority: Medium     Esophageal reflux 02/24/2005     Priority: Medium     Tobacco use disorder 02/24/2005     Priority: Medium      Past Medical History:   Diagnosis Date     Heart disease      Hypertension      PE (pulmonary embolism)     years ago, apparently no cause found, does smoke     Respiratory complications March 2005     Sleep apnea      Past Surgical History:   Procedure Laterality Date     ARTHROPLASTY KNEE  6/11/2014    Procedure: ARTHROPLASTY KNEE;  Surgeon: Maik Jeong MD;  Location: [facility]     ARTHROSCOPY KNEE WITH MEDIAL MENISCECTOMY  12/19/2013    Procedure: ARTHROSCOPY KNEE WITH MEDIAL MENISCECTOMY;  Left Knee Arthroscopy With Intraarticular Debridement.;  Surgeon: Maik Jeong MD;  Location: [facility]     COLONOSCOPY  2002     COLONOSCOPY N/A 4/18/2018    Procedure: COLONOSCOPY;  colonoscopy;  Surgeon: Nirmal Schneider MD;  Location: [facility]     EXAM UNDER ANESTHESIA, MANIPULATE JOINT (LOCATION)  6/26/2014    Procedure: EXAM UNDER ANESTHESIA, MANIPULATE JOINT (LOCATION);  Surgeon: Maik Jeong MD;  Location: [facility]     EXAM UNDER ANESTHESIA, MANIPULATE JOINT (LOCATION) Left 8/28/2014    Procedure: EXAM UNDER ANESTHESIA, MANIPULATE JOINT (LOCATION);  Surgeon: Maik Jeong MD;  Location: [facility]     EXAM UNDER ANESTHESIA, MANIPULATE JOINT (LOCATION) Left 12/31/2014    Procedure: EXAM UNDER ANESTHESIA, MANIPULATE JOINT (LOCATION);  Surgeon: Maik Jeong MD;  Location: [facility]       ABLATION SVT  2008    AVNRT     INJECT EPIDURAL LUMBAR  12/16/2011    Procedure:INJECT EPIDURAL LUMBAR; MICKY-Dr. Smith; Surgeon:GENERIC ANESTHESIA PROVIDER; Location:[facility]     INJECT EPIDURAL LUMBAR  1/27/2012    Procedure:INJECT EPIDURAL LUMBAR; MICKY with Flouro--; Surgeon:GENERIC ANESTHESIA PROVIDER; Location:[facility]     LAPAROSCOPIC APPENDECTOMY N/A 4/29/2020    Procedure: APPENDECTOMY, LAPAROSCOPIC;  Surgeon: Alexis Maher MD;  Location: [facility]     LAPAROSCOPIC HERNIORRHAPHY INGUINAL BILATERAL Bilateral 12/29/2015    Procedure: LAPAROSCOPIC HERNIORRHAPHY INGUINAL BILATERAL;  Surgeon: Andrew Prajapati MD;  Location: [facility]     Lumbar back fusion  1995, 1998    first was A&P     Lumbar back procedure  1999    Hardware removal     spinal stimulator insertion  2004    also, revised it that year also. never seemed to help well.      Current Outpatient Medications   Medication Sig Dispense Refill     albuterol (PROAIR HFA) 108 (90 Base) MCG/ACT inhaler Inhale 2 puffs into the lungs every 4 hours as needed for shortness of breath / dyspnea or wheezing 1 Inhaler 11     amLODIPine (NORVASC) 10 MG tablet Take 1 tablet (10 mg) by mouth daily 90 tablet 0     atorvastatin (LIPITOR) 20 MG tablet Take 1 tablet (20 mg) by mouth daily 90 tablet 3     azithromycin (ZITHROMAX) 250 MG tablet Azithromycin 250mg, 2 pills day #1, then 1 pill daily for 4 more days. 6 tablet 0     ketorolac (TORADOL) 10 MG tablet Take 1 tablet (10 mg) by mouth every 6 hours as needed for moderate pain 28 tablet 0     lisinopril (ZESTRIL) 20 MG tablet Take 1 tablet (20 mg) by mouth daily 90 tablet 0     meloxicam (MOBIC) 15 MG tablet        omeprazole (PRILOSEC) 40 MG DR capsule Take 1 Capsule BY MOUTH TWICE DAILY 180 capsule 3     order for DME Equipment being ordered: CPAP Patient Sarath Gray was set up at [facility]  on April 21, 2016. Patient received a [employer] AirSense 10 Auto. Pressures were set at Auto 5 - 15 cm H2O.   Patient s ramp is 5 cm  H2O for Auto and FLEX/EPR is 2.  Patient received a [employer] Mask name: SIMPLUS  Full Face mask Size Large, heated tubing and heated humidifier.  Patient is enrolled in the STM Program and does not need to meet compliance. Patient has a follow up on June 14TH AT 9 AM with YADIRA Resendez.    Jennifer Sam       ORDER FOR DME Equipment being ordered: Other: CPM machine for home use. Set max tolerance and increase 3-5 degrees/day as tolerated. Use up to 6-8 hours/day as tolerated.  Treatment Diagnosis: left TKA 1 Device 0     predniSONE (DELTASONE) 20 MG tablet Take prednisone 20mg 2 pills daily for 6 days, then 1 pill daily for another 4 days. 16 tablet 0       Allergies   Allergen Reactions     Persantine [Dipyridamole] Other (See Comments)     Nervous and anxiety        Social History     Tobacco Use     Smoking status: Light Tobacco Smoker     Packs/day: 1.00     Years: 18.00     Pack years: 18.00     Smokeless tobacco: Never Used     Tobacco comment: Started at age 30.    Substance Use Topics     Alcohol use: Yes     Alcohol/week: 0.0 standard drinks     Comment: occassional beer     Family History   Problem Relation Age of Onset     Cardiovascular Paternal Grandfather      C.A.D. Father 36        MI at age 36-37     Cerebrovascular Disease Father      Allergies Father      Anesthesia Reaction Father      Prostate Cancer No family hx of      Colon Cancer No family hx of      History   Drug Use No         Objective     There were no vitals taken for this visit.    Physical Exam    GENERAL APPEARANCE: healthy, alert and no distress     EYES: EOMI,  PERRL     HENT: ear canals and TM's normal and nose and mouth without ulcers or lesions     NECK: no adenopathy, no asymmetry, masses, or scars and thyroid normal to palpation     RESP: lungs clear to auscultation - no rales, rhonchi or wheezes     CV: regular rates and rhythm, normal S1 S2, no S3 or S4 and no murmur, click or rub     ABDOMEN:  soft, nontender, no  HSM or masses and bowel sounds normal     MS: extremities normal- no gross deformities noted, no evidence of inflammation in joints, FROM in all extremities.     SKIN: no suspicious lesions or rashes     NEURO: Normal strength and tone, sensory exam grossly normal, mentation intact and speech normal     PSYCH: mentation appears normal. and affect normal/bright     LYMPHATICS: No cervical adenopathy    Recent Labs   Lab Test 05/05/20  0516 05/04/20  0439   HGB 12.7* 12.3*    253    139   POTASSIUM 4.2 4.4   CR 1.16 1.07        Diagnostics:  Recent Results (from the past 48 hour(s))   Basic metabolic panel    Collection Time: 12/14/20  2:56 PM   Result Value Ref Range    Sodium 137 133 - 144 mmol/L    Potassium 3.9 3.4 - 5.3 mmol/L    Chloride 104 94 - 109 mmol/L    Carbon Dioxide 30 20 - 32 mmol/L    Anion Gap 3 3 - 14 mmol/L    Glucose 90 70 - 99 mg/dL    Urea Nitrogen 16 7 - 30 mg/dL    Creatinine 0.98 0.66 - 1.25 mg/dL    GFR Estimate 86 >60 mL/min/[1.73_m2]    GFR Estimate If Black >90 >60 mL/min/[1.73_m2]    Calcium 9.5 8.5 - 10.1 mg/dL   CBC with platelets    Collection Time: 12/14/20  2:56 PM   Result Value Ref Range    WBC 11.2 (H) 4.0 - 11.0 10e9/L    RBC Count 5.01 4.4 - 5.9 10e12/L    Hemoglobin 14.7 13.3 - 17.7 g/dL    Hematocrit 43.8 40.0 - 53.0 %    MCV 87 78 - 100 fl    MCH 29.3 26.5 - 33.0 pg    MCHC 33.6 31.5 - 36.5 g/dL    RDW 14.7 10.0 - 15.0 %    Platelet Count 224 150 - 450 10e9/L      No EKG required, no history of coronary heart disease, significant arrhythmia, peripheral arterial disease or other structural heart disease.    Revised Cardiac Risk Index (RCRI):  The patient has the following serious cardiovascular risks for perioperative complications:   - No serious cardiac risks = 0 points     RCRI Interpretation: 0 points: Class I (very low risk - 0.4% complication rate)           Assessment & Plan   The proposed surgical procedure is considered LOW risk.    Sarath was seen  today for pre-op exam.    Diagnoses and all orders for this visit:    Pre-op exam  -     Basic metabolic panel  -     CBC with platelets    Bursitis of right knee, unspecified bursa    Nicotine dependence, uncomplicated, unspecified nicotine product type           Risks and Recommendations:  The patient has the following additional risks and recommendations for perioperative complications:   - No identified additional risk factors other than previously addressed    Medication Instructions:  Patient is to take all scheduled medications on the day of surgery no Asprin or Ibuprofen 1 week before surgery      RECOMMENDATION:  APPROVAL GIVEN to proceed with proposed procedure, without further diagnostic evaluation.    Signed Electronically by: MADDIE Catalan CNP    Copy of this evaluation report is provided to requesting physician.    Preop [facility] Preop Guidelines    Revised Cardiac Risk Index

## 2020-12-14 NOTE — PROGRESS NOTES
[facility]  [address] [place] [id number]  Phone: [phone]  Fax: [phone]  Primary Provider: Maik Smith  Pre-op Performing Provider: MIRNA LAUREANO    PREOPERATIVE EVALUATION:  Today's date: 12/14/2020    Sarath Gray is a 55 year old male who presents for a preoperative evaluation.    Surgical Information:  Surgery/Procedure: Knee Open Excision of Prepatellar Bursa  Surgery Location: [facility]  Surgeon: Comfort  Surgery Date: 12/16/20  Time of Surgery: TBD  Where patient plans to recover: At home with family  Fax number for surgical facility: Note does not need to be faxed, will be available electronically in Epic.    Type of Anesthesia Anticipated: Choice    Subjective     HPI related to upcoming procedure: Knee Open Excision of Prepatellar Bursa      Preop Questions 12/14/2020   1. Have you ever had a heart attack or stroke? No   2. Have you ever had surgery on your heart or blood vessels, such as a stent placement, a coronary artery bypass, or surgery on an artery in your head, neck, heart, or legs? YES ablasion    3. Do you have chest pain with activity? No   4. Do you have a history of  heart failure? No   5. Do you currently have a cold, bronchitis or symptoms of other infection? No   6. Do you have a cough, shortness of breath, or wheezing? No   7. Do you or anyone in your family have previous history of blood clots? YES dvt 20 years ago    8. Do you or does anyone in your family have a serious bleeding problem such as prolonged bleeding following surgeries or cuts? No   9. Have you ever had problems with anemia or been told to take iron pills? No   10. Have you had any abnormal blood loss such as black, tarry or bloody stools? No   11. Have you ever had a blood transfusion? No   12. Are you willing to have a blood transfusion if it is medically needed before, during, or after your surgery? Yes   13. Have you or  any of your relatives ever had problems with anesthesia? No   14. Do you have sleep apnea, excessive snoring or daytime drowsiness? YES    14a. Do you have a CPAP machine? Yes   15. Do you have any artifical heart valves or other implanted medical devices like a pacemaker, defibrillator, or continuous glucose monitor? No   16. Do you have artificial joints? YES    17. Are you allergic to latex? No       Health Care Directive:  Patient does not have a Health Care Directive or Living Will: Discussed advance care planning with patient; however, patient declined at this time.    Preoperative Review of :   reviewed - no record of controlled substances prescribed.      Status of Chronic Conditions:  See problem list for active medical problems.  Problems all longstanding and stable, except as noted/documented.  See ROS for pertinent symptoms related to these conditions.      Review of Systems  Constitutional, neuro, ENT, endocrine, pulmonary, cardiac, gastrointestinal, genitourinary, musculoskeletal, integument and psychiatric systems are negative, except as otherwise noted.    Patient Active Problem List    Diagnosis Date Noted     Postoperative infection 05/03/2020     Priority: Medium     Postoperative intra-abdominal abscess 05/03/2020     Priority: Medium     Acute appendicitis with generalized peritonitis, unspecified whether abscess present, unspecified whether gangrene present, unspecified whether perforation present 04/29/2020     Priority: Medium     Added automatically from request for surgery [id number]       Acute perforated appendicitis 04/29/2020     Priority: Medium     CARROLL (obstructive sleep apnea) 03/29/2016     Priority: Medium     4/11/2016. AHI 57, positional effect noted, lowest oxygen saturation was 79, time below 88% was 32 minutes  1/25/2001. AHI was 8 events per hour. Respiratory arousal index was 45, lowest oxygen saturation was 85%. He weighed 235 lbs       Essential hypertension, benign  01/08/2016     Priority: Medium     S/P total knee arthroplasty 06/11/2014     Priority: Medium     Left knee DJD 06/11/2014     Priority: Medium     History of PSVT (paroxysmal supraventricular tachycardia) 05/01/2012     Priority: Medium     WITH ABLATION 2008       CARDIOVASCULAR SCREENING; LDL GOAL LESS THAN 130 10/31/2010     Priority: Medium     Low back pain 01/23/2009     Priority: Medium     12/5/2011:Three prior low back surgeries.          Impotence of organic origin 04/02/2008     Priority: Medium     Esophageal reflux 02/24/2005     Priority: Medium     Tobacco use disorder 02/24/2005     Priority: Medium      Past Medical History:   Diagnosis Date     Heart disease      Hypertension      PE (pulmonary embolism)     years ago, apparently no cause found, does smoke     Respiratory complications March 2005     Sleep apnea      Past Surgical History:   Procedure Laterality Date     ARTHROPLASTY KNEE  6/11/2014    Procedure: ARTHROPLASTY KNEE;  Surgeon: Maik Jeong MD;  Location: [facility]     ARTHROSCOPY KNEE WITH MEDIAL MENISCECTOMY  12/19/2013    Procedure: ARTHROSCOPY KNEE WITH MEDIAL MENISCECTOMY;  Left Knee Arthroscopy With Intraarticular Debridement.;  Surgeon: Maik Jeong MD;  Location: [facility]     COLONOSCOPY  2002     COLONOSCOPY N/A 4/18/2018    Procedure: COLONOSCOPY;  colonoscopy;  Surgeon: Nirmal Schneider MD;  Location: [facility]     EXAM UNDER ANESTHESIA, MANIPULATE JOINT (LOCATION)  6/26/2014    Procedure: EXAM UNDER ANESTHESIA, MANIPULATE JOINT (LOCATION);  Surgeon: Maik Jeong MD;  Location: [facility]     EXAM UNDER ANESTHESIA, MANIPULATE JOINT (LOCATION) Left 8/28/2014    Procedure: EXAM UNDER ANESTHESIA, MANIPULATE JOINT (LOCATION);  Surgeon: Maik Jeong MD;  Location: [facility]     EXAM UNDER ANESTHESIA, MANIPULATE JOINT (LOCATION) Left 12/31/2014    Procedure: EXAM UNDER ANESTHESIA, MANIPULATE JOINT (LOCATION);  Surgeon: Maik Jeong MD;  Location: [facility]       ABLATION SVT  2008    AVNRT     INJECT EPIDURAL LUMBAR  12/16/2011    Procedure:INJECT EPIDURAL LUMBAR; MICKY-Dr. Smith; Surgeon:GENERIC ANESTHESIA PROVIDER; Location:[facility]     INJECT EPIDURAL LUMBAR  1/27/2012    Procedure:INJECT EPIDURAL LUMBAR; MICKY with Flouro--; Surgeon:GENERIC ANESTHESIA PROVIDER; Location:[facility]     LAPAROSCOPIC APPENDECTOMY N/A 4/29/2020    Procedure: APPENDECTOMY, LAPAROSCOPIC;  Surgeon: Alexis Maher MD;  Location: [facility]     LAPAROSCOPIC HERNIORRHAPHY INGUINAL BILATERAL Bilateral 12/29/2015    Procedure: LAPAROSCOPIC HERNIORRHAPHY INGUINAL BILATERAL;  Surgeon: Andrew Prajapati MD;  Location: [facility]     Lumbar back fusion  1995, 1998    first was A&P     Lumbar back procedure  1999    Hardware removal     spinal stimulator insertion  2004    also, revised it that year also. never seemed to help well.      Current Outpatient Medications   Medication Sig Dispense Refill     albuterol (PROAIR HFA) 108 (90 Base) MCG/ACT inhaler Inhale 2 puffs into the lungs every 4 hours as needed for shortness of breath / dyspnea or wheezing 1 Inhaler 11     amLODIPine (NORVASC) 10 MG tablet Take 1 tablet (10 mg) by mouth daily 90 tablet 0     atorvastatin (LIPITOR) 20 MG tablet Take 1 tablet (20 mg) by mouth daily 90 tablet 3     azithromycin (ZITHROMAX) 250 MG tablet Azithromycin 250mg, 2 pills day #1, then 1 pill daily for 4 more days. 6 tablet 0     ketorolac (TORADOL) 10 MG tablet Take 1 tablet (10 mg) by mouth every 6 hours as needed for moderate pain 28 tablet 0     lisinopril (ZESTRIL) 20 MG tablet Take 1 tablet (20 mg) by mouth daily 90 tablet 0     meloxicam (MOBIC) 15 MG tablet        omeprazole (PRILOSEC) 40 MG DR capsule Take 1 Capsule BY MOUTH TWICE DAILY 180 capsule 3     order for DME Equipment being ordered: CPAP Patient Sarath Gray was set up at [facility]  on April 21, 2016. Patient received a [employer] AirSense 10 Auto. Pressures were set at Auto 5 - 15 cm H2O.   Patient s ramp is 5 cm  H2O for Auto and FLEX/EPR is 2.  Patient received a [employer] Mask name: SIMPLUS  Full Face mask Size Large, heated tubing and heated humidifier.  Patient is enrolled in the STM Program and does not need to meet compliance. Patient has a follow up on June 14TH AT 9 AM with YADIRA Resendez.    Jennifer Sam       ORDER FOR DME Equipment being ordered: Other: CPM machine for home use. Set max tolerance and increase 3-5 degrees/day as tolerated. Use up to 6-8 hours/day as tolerated.  Treatment Diagnosis: left TKA 1 Device 0     predniSONE (DELTASONE) 20 MG tablet Take prednisone 20mg 2 pills daily for 6 days, then 1 pill daily for another 4 days. 16 tablet 0       Allergies   Allergen Reactions     Persantine [Dipyridamole] Other (See Comments)     Nervous and anxiety        Social History     Tobacco Use     Smoking status: Light Tobacco Smoker     Packs/day: 1.00     Years: 18.00     Pack years: 18.00     Smokeless tobacco: Never Used     Tobacco comment: Started at age 30.    Substance Use Topics     Alcohol use: Yes     Alcohol/week: 0.0 standard drinks     Comment: occassional beer     Family History   Problem Relation Age of Onset     Cardiovascular Paternal Grandfather      C.A.D. Father 36        MI at age 36-37     Cerebrovascular Disease Father      Allergies Father      Anesthesia Reaction Father      Prostate Cancer No family hx of      Colon Cancer No family hx of      History   Drug Use No         Objective     There were no vitals taken for this visit.    Physical Exam    GENERAL APPEARANCE: healthy, alert and no distress     EYES: EOMI,  PERRL     HENT: ear canals and TM's normal and nose and mouth without ulcers or lesions     NECK: no adenopathy, no asymmetry, masses, or scars and thyroid normal to palpation     RESP: lungs clear to auscultation - no rales, rhonchi or wheezes     CV: regular rates and rhythm, normal S1 S2, no S3 or S4 and no murmur, click or rub     ABDOMEN:  soft, nontender, no  HSM or masses and bowel sounds normal     MS: extremities normal- no gross deformities noted, no evidence of inflammation in joints, FROM in all extremities.     SKIN: no suspicious lesions or rashes     NEURO: Normal strength and tone, sensory exam grossly normal, mentation intact and speech normal     PSYCH: mentation appears normal. and affect normal/bright     LYMPHATICS: No cervical adenopathy    Recent Labs   Lab Test 05/05/20  0516 05/04/20  0439   HGB 12.7* 12.3*    253    139   POTASSIUM 4.2 4.4   CR 1.16 1.07        Diagnostics:  Recent Results (from the past 48 hour(s))   Basic metabolic panel    Collection Time: 12/14/20  2:56 PM   Result Value Ref Range    Sodium 137 133 - 144 mmol/L    Potassium 3.9 3.4 - 5.3 mmol/L    Chloride 104 94 - 109 mmol/L    Carbon Dioxide 30 20 - 32 mmol/L    Anion Gap 3 3 - 14 mmol/L    Glucose 90 70 - 99 mg/dL    Urea Nitrogen 16 7 - 30 mg/dL    Creatinine 0.98 0.66 - 1.25 mg/dL    GFR Estimate 86 >60 mL/min/[1.73_m2]    GFR Estimate If Black >90 >60 mL/min/[1.73_m2]    Calcium 9.5 8.5 - 10.1 mg/dL   CBC with platelets    Collection Time: 12/14/20  2:56 PM   Result Value Ref Range    WBC 11.2 (H) 4.0 - 11.0 10e9/L    RBC Count 5.01 4.4 - 5.9 10e12/L    Hemoglobin 14.7 13.3 - 17.7 g/dL    Hematocrit 43.8 40.0 - 53.0 %    MCV 87 78 - 100 fl    MCH 29.3 26.5 - 33.0 pg    MCHC 33.6 31.5 - 36.5 g/dL    RDW 14.7 10.0 - 15.0 %    Platelet Count 224 150 - 450 10e9/L      No EKG required, no history of coronary heart disease, significant arrhythmia, peripheral arterial disease or other structural heart disease.    Revised Cardiac Risk Index (RCRI):  The patient has the following serious cardiovascular risks for perioperative complications:   - No serious cardiac risks = 0 points     RCRI Interpretation: 0 points: Class I (very low risk - 0.4% complication rate)           Assessment & Plan   The proposed surgical procedure is considered LOW risk.    Sarath was seen  today for pre-op exam.    Diagnoses and all orders for this visit:    Pre-op exam  -     Basic metabolic panel  -     CBC with platelets    Bursitis of right knee, unspecified bursa    Nicotine dependence, uncomplicated, unspecified nicotine product type           Risks and Recommendations:  The patient has the following additional risks and recommendations for perioperative complications:   - No identified additional risk factors other than previously addressed    Medication Instructions:  Patient is to take all scheduled medications on the day of surgery no Asprin or Ibuprofen 1 week before surgery      RECOMMENDATION:  APPROVAL GIVEN to proceed with proposed procedure, without further diagnostic evaluation.    Signed Electronically by: MADDIE Catalan CNP    Copy of this evaluation report is provided to requesting physician.    Preop [facility] Preop Guidelines    Revised Cardiac Risk Index

## 2020-12-15 ENCOUNTER — ANESTHESIA EVENT (OUTPATIENT)
Dept: SURGERY | Facility: CLINIC | Age: 55
End: 2020-12-15
Payer: COMMERCIAL

## 2020-12-15 ASSESSMENT — ENCOUNTER SYMPTOMS: DYSRHYTHMIAS: 1

## 2020-12-15 ASSESSMENT — LIFESTYLE VARIABLES: TOBACCO_USE: 1

## 2020-12-15 NOTE — RESULT ENCOUNTER NOTE
Please Notify Sarath  of test results; Your pre-op labs were within normal limits and your pre-op has been completed.     Laly Paredes CNP

## 2020-12-15 NOTE — ANESTHESIA PREPROCEDURE EVALUATION
Anesthesia Pre-Procedure Evaluation    Patient: Sarath Gray   MRN: [id number] : 1965          Preoperative Diagnosis: Prepatellar bursitis of right knee [M70.41]    Procedure(s):  Knee Open Excision of Prepatellar Bursa    Past Medical History:   Diagnosis Date     Heart disease      Hypertension      PE (pulmonary embolism)     years ago, apparently no cause found, does smoke     Respiratory complications 2005     Sleep apnea      Past Surgical History:   Procedure Laterality Date     ARTHROPLASTY KNEE  2014    Procedure: ARTHROPLASTY KNEE;  Surgeon: Maik Jeong MD;  Location: [facility]     ARTHROSCOPY KNEE WITH MEDIAL MENISCECTOMY  2013    Procedure: ARTHROSCOPY KNEE WITH MEDIAL MENISCECTOMY;  Left Knee Arthroscopy With Intraarticular Debridement.;  Surgeon: Maik Jeong MD;  Location: [facility]     COLONOSCOPY       COLONOSCOPY N/A 2018    Procedure: COLONOSCOPY;  colonoscopy;  Surgeon: Nirmal Schneider MD;  Location: [facility]     EXAM UNDER ANESTHESIA, MANIPULATE JOINT (LOCATION)  2014    Procedure: EXAM UNDER ANESTHESIA, MANIPULATE JOINT (LOCATION);  Surgeon: Maik Jeong MD;  Location: [facility]     EXAM UNDER ANESTHESIA, MANIPULATE JOINT (LOCATION) Left 2014    Procedure: EXAM UNDER ANESTHESIA, MANIPULATE JOINT (LOCATION);  Surgeon: Maik Jeong MD;  [facility]: [facility]     EXAM UNDER ANESTHESIA, MANIPULATE JOINT (LOCATION) Left 2014    Procedure: EXAM UNDER ANESTHESIA, MANIPULATE JOINT (LOCATION);  Surgeon: Maik Jeong MD;  Location: [facility]     H ABLATION SVT      AVNRT     INJECT EPIDURAL LUMBAR  2011    Procedure:INJECT EPIDURAL LUMBAR; MICKY-Dr. Smith; Surgeon:GENERIC ANESTHESIA PROVIDER; Location:[facility]     INJECT EPIDURAL LUMBAR  2012    Procedure:INJECT EPIDURAL LUMBAR; MICKY with Flouro--; Surgeon:GENERIC ANESTHESIA PROVIDER; [facility]     LAPAROSCOPIC APPENDECTOMY N/A 2020    Procedure: APPENDECTOMY,  LAPAROSCOPIC;  Surgeon: Alexis Maher MD;  [facility]: [facility]     LAPAROSCOPIC HERNIORRHAPHY INGUINAL BILATERAL Bilateral 12/29/2015    Procedure: LAPAROSCOPIC HERNIORRHAPHY INGUINAL BILATERAL;  Surgeon: Andrew Prajapati MD;  Location: [facility]     Lumbar back fusion  1995, 1998    first was A&P     Lumbar back procedure  1999    Hardware removal     spinal stimulator insertion  2004    also, revised it that year also. never seemed to help well.        Anesthesia Evaluation     . Pt has had prior anesthetic. Type: General, MAC and Regional    No history of anesthetic complications          ROS/MED HX    ENT/Pulmonary: Comment: Hypoxia  PE    (+)sleep apnea, tobacco use, Current use 1 packs/day  , . Other pulmonary disease Hx PE.    Neurologic:  - neg neurologic ROS     Cardiovascular: Comment: Ablation in 2008    (+) Dyslipidemia, hypertension--CAD, --. : . . . :. dysrhythmias (PSVT) Other, . Previous cardiac testing date:results:Stress Testdate:9=17-09 results:IMPRESSIONS:   I.  Adenosine Test   1.  Adenosine infusion dictated under separate cover.   2.  ECG report done under separate cover.      II.  Myocardial Perfusion Scintigraphy   1.  Myocardial perfusion using single isotope technique was probably   normal.    2.  Gating demonstrated normal left ventricular systolic contraction   and wall thickening, both at rest and post stress.    3.  The ejection fraction was 49% at rest and 56% post stress.    4.  The left ventricle was enlarged (end diastolic volume 212 mL).     5.  No previous study was available for comparison. ECG reviewed date:5/3/20 results:Sinus Rhythm   -RSR(V1) -nondiagnostic.    -Left atrial enlargement.    - Nonspecific T-abnormality.     ABNORMAL date: results:          METS/Exercise Tolerance:  >4 METS   Hematologic:  - neg hematologic  ROS       Musculoskeletal: Comment: LBP  Spinal cord stimulator  (+) arthritis,  other musculoskeletal- DJD; S/P lumbar fusion      GI/Hepatic:     (+) GERD  "Asymptomatic on medication,       Renal/Genitourinary: Comment: impotence        Endo:  - neg endo ROS       Psychiatric:  - neg psychiatric ROS       Infectious Disease:  - neg infectious disease ROS       Malignancy:      - no malignancy   Other:    (+) H/O Chronic Pain,                        Physical Exam  Normal systems: cardiovascular, pulmonary and dental    Airway   Mallampati: II  TM distance: >3 FB  Neck ROM: full    Dental     Cardiovascular       Pulmonary             Lab Results   Component Value Date    WBC 11.2 (H) 12/14/2020    HGB 14.7 12/14/2020    HCT 43.8 12/14/2020     12/14/2020    CRP <2.9 11/05/2014    SED 5 11/05/2014     12/14/2020    POTASSIUM 3.9 12/14/2020    CHLORIDE 104 12/14/2020    CO2 30 12/14/2020    BUN 16 12/14/2020    CR 0.98 12/14/2020    GLC 90 12/14/2020    DEEPIKA 9.5 12/14/2020    ALBUMIN 3.4 05/03/2020    PROTTOTAL 7.9 05/03/2020    ALT 32 05/03/2020    AST 26 05/03/2020    ALKPHOS 51 05/03/2020    BILITOTAL 0.3 05/03/2020    LIPASE 67 (L) 04/29/2020    PTT 33 05/01/2012    INR 0.98 05/01/2012    TSH 0.77 04/02/2008       Preop Vitals  BP Readings from Last 3 Encounters:   12/14/20 124/80   05/05/20 138/86   04/30/20 136/83    Pulse Readings from Last 3 Encounters:   12/14/20 72   05/05/20 71   04/30/20 69      Resp Readings from Last 3 Encounters:   12/14/20 18   05/05/20 18   04/30/20 18    SpO2 Readings from Last 3 Encounters:   05/05/20 97%   04/30/20 98%   12/16/19 99%      Temp Readings from Last 1 Encounters:   12/14/20 36.3  C (97.3  F) (Tympanic)    Ht Readings from Last 1 Encounters:   12/14/20 2.083 m (6' 10\")      Wt Readings from Last 1 Encounters:   12/14/20 114.8 kg (253 lb)    Estimated body mass index is 26.45 kg/m  as calculated from the following:    Height as of 12/14/20: 2.083 m (6' 10\").    Weight as of 12/14/20: 114.8 kg (253 lb).       Anesthesia Plan      History & Physical Review  History and physical reviewed and following examination; " no interval change.    ASA Status:  3 .    NPO Status:  > 6 hours    Plan for General Maintenance will be Balanced.      Additional equipment: Videolaryngoscope        Postoperative Care  Postoperative pain management:  IV analgesics.      Consents  Anesthetic plan, risks, benefits and alternatives discussed with:  Patient..                 MADDIE Wells CRNA

## 2020-12-15 NOTE — PATIENT INSTRUCTIONS
HOW TO QUIT SMOKING  Smoking is one of the hardest habits to break. About half of all those who have ever smoked have been able to quit, and most of those (about 70%) who still smoke want to quit. Here are some of the best ways to stop smoking.     KEEP TRYING:  It takes most smokers about 8 tries before they are finally able to fully quit. So, the more often you try and fail, the better your chance of quitting the next time! So, don't give up!    GO COLD TURKEY:  Most ex-smokers quit cold turkey. Trying to cut back gradually doesn't seem to work as well, perhaps because it continues the smoking habit. Also, it is possible to fool yourself by inhaling more while smoking fewer cigarettes. This results in the same amount of nicotine in your body!    GET SUPPORT:  Support programs can make an important difference, especially for the heavy smoker. These groups offer lectures, methods to change your behavior and peer support. Call the free national Quitline for more information. 800-QUIT-NOW (846-021-6925). Low-cost or free programs are offered by many hospitals, local chapters of the American Lung Association (855-638-2223) and the American Cancer Society (067-940-6935). Support at home is important too. Non-smokers can help by offering praise and encouragement. If the smoker fails to quit, encourage them to try again!    OVER-THE-COUNTER MEDICINES:  For those who can't quit on their own, Nicotine Replacement Therapy (NRT) may make quitting much easier. Certain aids such as the nicotine patch, gum and lozenge are available without a prescription. However, it is best to use these under the guidance of your doctor. The skin patch provides a steady supply of nicotine to the body. Nicotine gum and lozenge gives temporary bursts of low levels of nicotine. Both methods take the edge off the craving for cigarettes. WARNING: If you feel symptoms of nicotine overdose, such as nausea, vomiting, dizziness, weakness, or fast  heartbeat, stop using these and see your doctor.    PRESCRIPTION MEDICINES:  After evaluating your smoking patterns and prior attempts at quitting, your doctor may offer a prescription medicine such as bupropion (Zyban, Wellbutrin), varenicline (Chantix, Champix), a niocotine inhaler or nasal spray. Each has its unique advantage and side effects which your doctor can review with you.    HEALTH BENEFITS OF QUITTING:  The benefits of quitting start right away and keep improving the longer you go without smokin minutes: blood pressure and pulse return to normal  8 hours: oxygen levels return to normal  2 days: ability to smell and taste begins to improve as damaged nerves start to regrow  2-3 weeks: circulation and lung function improves  1-9 months: decreased cough, congestion and shortness of breath; less tired  1 year: risk of heart attack decreases by half  5 years: risk of lung cancer decreases by half; risk of stroke becomes the same as a non-smoker  For information about how to quit smoking, visit the following links:  National [employer] ,   Clearing the Air, Quit Smoking Today   - an online booklet. http://www.smokefree.gov/pubs/clearing_the_air.pdf  Smokefree.gov http://smokefree.gov/  QuitNet http://www.quitnet.com/    2582-5170 Dalton Nieto, [address] [place] [address], [place] [address]. All rights reserved. This information is not intended as a substitute for professional medical care. Always follow your healthcare professional's instructions.

## 2020-12-16 ENCOUNTER — HOSPITAL ENCOUNTER (OUTPATIENT)
Facility: CLINIC | Age: 55
Discharge: HOME | End: 2020-12-16
Attending: ORTHOPAEDIC SURGERY | Admitting: ORTHOPAEDIC SURGERY
Payer: COMMERCIAL

## 2020-12-16 ENCOUNTER — ANESTHESIA (OUTPATIENT)
Dept: SURGERY | Facility: CLINIC | Age: 55
End: 2020-12-16
Payer: COMMERCIAL

## 2020-12-16 VITALS
OXYGEN SATURATION: 99 % | HEART RATE: 84 BPM | TEMPERATURE: 97.9 F | WEIGHT: 253 LBS | DIASTOLIC BLOOD PRESSURE: 76 MMHG | RESPIRATION RATE: 16 BRPM | SYSTOLIC BLOOD PRESSURE: 124 MMHG | BODY MASS INDEX: 29.27 KG/M2 | HEIGHT: 78 IN

## 2020-12-16 DIAGNOSIS — M70.41 PREPATELLAR BURSITIS, RIGHT KNEE: Primary | ICD-10-CM

## 2020-12-16 LAB
CREAT SERPL-MCNC: 0.89 MG/DL (ref 0.66–1.25)
GFR SERPL CREATININE-BSD FRML MDRD: >90 ML/MIN/{1.73_M2}
HGB BLD-MCNC: 14.9 G/DL (ref 13.3–17.7)
POTASSIUM SERPL-SCNC: 3.8 MMOL/L (ref 3.4–5.3)

## 2020-12-16 PROCEDURE — 250N000013 HC RX MED GY IP 250 OP 250 PS 637: Performed by: NURSE ANESTHETIST, CERTIFIED REGISTERED

## 2020-12-16 PROCEDURE — 88305 TISSUE EXAM BY PATHOLOGIST: CPT | Mod: TC | Performed by: ORTHOPAEDIC SURGERY

## 2020-12-16 PROCEDURE — 761N000007 HC RECOVERY PHASE 2 EACH 15 MINS: Performed by: ORTHOPAEDIC SURGERY

## 2020-12-16 PROCEDURE — 258N000003 HC RX IP 258 OP 636: Performed by: NURSE ANESTHETIST, CERTIFIED REGISTERED

## 2020-12-16 PROCEDURE — 250N000003 HC SEVOFLURANE, EA 15 MIN: Performed by: ORTHOPAEDIC SURGERY

## 2020-12-16 PROCEDURE — 271N000001 HC OR GENERAL SUPPLY NON-STERILE: Performed by: ORTHOPAEDIC SURGERY

## 2020-12-16 PROCEDURE — 272N000001 HC OR GENERAL SUPPLY STERILE: Performed by: ORTHOPAEDIC SURGERY

## 2020-12-16 PROCEDURE — 761N000001 HC RECOVERY PHASE 1 LEVEL 1 FIRST HR: Performed by: ORTHOPAEDIC SURGERY

## 2020-12-16 PROCEDURE — 250N000011 HC RX IP 250 OP 636: Performed by: NURSE ANESTHETIST, CERTIFIED REGISTERED

## 2020-12-16 PROCEDURE — 82565 ASSAY OF CREATININE: CPT | Performed by: PHYSICIAN ASSISTANT

## 2020-12-16 PROCEDURE — 360N000020 HC SURGERY LEVEL 3 1ST 30 MIN: Performed by: ORTHOPAEDIC SURGERY

## 2020-12-16 PROCEDURE — 250N000011 HC RX IP 250 OP 636: Performed by: ORTHOPAEDIC SURGERY

## 2020-12-16 PROCEDURE — 999N000135 HC STATISTIC PRE PROC ASSESS I: Performed by: ORTHOPAEDIC SURGERY

## 2020-12-16 PROCEDURE — 88305 TISSUE EXAM BY PATHOLOGIST: CPT | Mod: 26 | Performed by: PATHOLOGY

## 2020-12-16 PROCEDURE — 250N000009 HC RX 250: Performed by: NURSE ANESTHETIST, CERTIFIED REGISTERED

## 2020-12-16 PROCEDURE — 258N000003 HC RX IP 258 OP 636: Performed by: ORTHOPAEDIC SURGERY

## 2020-12-16 PROCEDURE — 85018 HEMOGLOBIN: CPT | Performed by: PHYSICIAN ASSISTANT

## 2020-12-16 PROCEDURE — 370N000002 HC ANESTHESIA TECHNICAL FEE, EACH ADDTL 15 MIN: Performed by: ORTHOPAEDIC SURGERY

## 2020-12-16 PROCEDURE — 84132 ASSAY OF SERUM POTASSIUM: CPT | Performed by: PHYSICIAN ASSISTANT

## 2020-12-16 PROCEDURE — 250N000011 HC RX IP 250 OP 636: Performed by: PHYSICIAN ASSISTANT

## 2020-12-16 PROCEDURE — 360N000021 HC SURGERY LEVEL 3 EA 15 ADDTL MIN: Performed by: ORTHOPAEDIC SURGERY

## 2020-12-16 PROCEDURE — 370N000001 HC ANESTHESIA TECHNICAL FEE, 1ST 30 MIN: Performed by: ORTHOPAEDIC SURGERY

## 2020-12-16 RX ORDER — NALOXONE HYDROCHLORIDE 0.4 MG/ML
0.4 INJECTION, SOLUTION INTRAMUSCULAR; INTRAVENOUS; SUBCUTANEOUS
Status: DISCONTINUED | OUTPATIENT
Start: 2020-12-16 | End: 2020-12-16 | Stop reason: HOSPADM

## 2020-12-16 RX ORDER — FENTANYL CITRATE 50 UG/ML
INJECTION, SOLUTION INTRAMUSCULAR; INTRAVENOUS PRN
Status: DISCONTINUED | OUTPATIENT
Start: 2020-12-16 | End: 2020-12-16

## 2020-12-16 RX ORDER — HYDROMORPHONE HYDROCHLORIDE 1 MG/ML
.3-.5 INJECTION, SOLUTION INTRAMUSCULAR; INTRAVENOUS; SUBCUTANEOUS EVERY 10 MIN PRN
Status: DISCONTINUED | OUTPATIENT
Start: 2020-12-16 | End: 2020-12-16 | Stop reason: HOSPADM

## 2020-12-16 RX ORDER — NALOXONE HYDROCHLORIDE 0.4 MG/ML
0.2 INJECTION, SOLUTION INTRAMUSCULAR; INTRAVENOUS; SUBCUTANEOUS
Status: DISCONTINUED | OUTPATIENT
Start: 2020-12-16 | End: 2020-12-16 | Stop reason: HOSPADM

## 2020-12-16 RX ORDER — ACETAMINOPHEN 325 MG/1
975 TABLET ORAL ONCE
Status: COMPLETED | OUTPATIENT
Start: 2020-12-16 | End: 2020-12-16

## 2020-12-16 RX ORDER — ACETAMINOPHEN 325 MG/1
975 TABLET ORAL ONCE
Status: DISCONTINUED | OUTPATIENT
Start: 2020-12-16 | End: 2020-12-16

## 2020-12-16 RX ORDER — PHENYLEPHRINE HYDROCHLORIDE 10 MG/ML
INJECTION INTRAVENOUS PRN
Status: DISCONTINUED | OUTPATIENT
Start: 2020-12-16 | End: 2020-12-16

## 2020-12-16 RX ORDER — LIDOCAINE 40 MG/G
CREAM TOPICAL
Status: DISCONTINUED | OUTPATIENT
Start: 2020-12-16 | End: 2020-12-16 | Stop reason: HOSPADM

## 2020-12-16 RX ORDER — HYDRALAZINE HYDROCHLORIDE 20 MG/ML
2.5-5 INJECTION INTRAMUSCULAR; INTRAVENOUS EVERY 10 MIN PRN
Status: DISCONTINUED | OUTPATIENT
Start: 2020-12-16 | End: 2020-12-16 | Stop reason: HOSPADM

## 2020-12-16 RX ORDER — GABAPENTIN 300 MG/1
300 CAPSULE ORAL ONCE
Status: COMPLETED | OUTPATIENT
Start: 2020-12-16 | End: 2020-12-16

## 2020-12-16 RX ORDER — DEXAMETHASONE SODIUM PHOSPHATE 4 MG/ML
INJECTION, SOLUTION INTRA-ARTICULAR; INTRALESIONAL; INTRAMUSCULAR; INTRAVENOUS; SOFT TISSUE PRN
Status: DISCONTINUED | OUTPATIENT
Start: 2020-12-16 | End: 2020-12-16

## 2020-12-16 RX ORDER — ONDANSETRON 4 MG/1
4 TABLET, ORALLY DISINTEGRATING ORAL EVERY 30 MIN PRN
Status: DISCONTINUED | OUTPATIENT
Start: 2020-12-16 | End: 2020-12-16 | Stop reason: HOSPADM

## 2020-12-16 RX ORDER — ONDANSETRON 2 MG/ML
INJECTION INTRAMUSCULAR; INTRAVENOUS PRN
Status: DISCONTINUED | OUTPATIENT
Start: 2020-12-16 | End: 2020-12-16

## 2020-12-16 RX ORDER — CEFAZOLIN SODIUM 1 G/50ML
1 INJECTION, SOLUTION INTRAVENOUS SEE ADMIN INSTRUCTIONS
Status: DISCONTINUED | OUTPATIENT
Start: 2020-12-16 | End: 2020-12-16 | Stop reason: HOSPADM

## 2020-12-16 RX ORDER — ALBUTEROL SULFATE 0.83 MG/ML
2.5 SOLUTION RESPIRATORY (INHALATION) EVERY 4 HOURS PRN
Status: DISCONTINUED | OUTPATIENT
Start: 2020-12-16 | End: 2020-12-16 | Stop reason: HOSPADM

## 2020-12-16 RX ORDER — CEFAZOLIN SODIUM 2 G/100ML
2 INJECTION, SOLUTION INTRAVENOUS
Status: COMPLETED | OUTPATIENT
Start: 2020-12-16 | End: 2020-12-16

## 2020-12-16 RX ORDER — PROPOFOL 10 MG/ML
INJECTION, EMULSION INTRAVENOUS PRN
Status: DISCONTINUED | OUTPATIENT
Start: 2020-12-16 | End: 2020-12-16

## 2020-12-16 RX ORDER — FENTANYL CITRATE 50 UG/ML
25-50 INJECTION, SOLUTION INTRAMUSCULAR; INTRAVENOUS
Status: DISCONTINUED | OUTPATIENT
Start: 2020-12-16 | End: 2020-12-16 | Stop reason: HOSPADM

## 2020-12-16 RX ORDER — ONDANSETRON 2 MG/ML
4 INJECTION INTRAMUSCULAR; INTRAVENOUS EVERY 30 MIN PRN
Status: DISCONTINUED | OUTPATIENT
Start: 2020-12-16 | End: 2020-12-16 | Stop reason: HOSPADM

## 2020-12-16 RX ORDER — HYDROCODONE BITARTRATE AND ACETAMINOPHEN 5; 325 MG/1; MG/1
1-2 TABLET ORAL EVERY 4 HOURS PRN
Qty: 30 TABLET | Refills: 0 | Status: SHIPPED | OUTPATIENT
Start: 2020-12-16 | End: 2022-01-21

## 2020-12-16 RX ORDER — SODIUM CHLORIDE, SODIUM LACTATE, POTASSIUM CHLORIDE, CALCIUM CHLORIDE 600; 310; 30; 20 MG/100ML; MG/100ML; MG/100ML; MG/100ML
INJECTION, SOLUTION INTRAVENOUS CONTINUOUS
Status: DISCONTINUED | OUTPATIENT
Start: 2020-12-16 | End: 2020-12-16 | Stop reason: HOSPADM

## 2020-12-16 RX ORDER — LIDOCAINE HYDROCHLORIDE 10 MG/ML
INJECTION, SOLUTION INFILTRATION; PERINEURAL PRN
Status: DISCONTINUED | OUTPATIENT
Start: 2020-12-16 | End: 2020-12-16

## 2020-12-16 RX ORDER — KETOROLAC TROMETHAMINE 30 MG/ML
30 INJECTION, SOLUTION INTRAMUSCULAR; INTRAVENOUS EVERY 6 HOURS PRN
Status: DISCONTINUED | OUTPATIENT
Start: 2020-12-16 | End: 2020-12-16 | Stop reason: HOSPADM

## 2020-12-16 RX ORDER — METOPROLOL TARTRATE 1 MG/ML
1-2 INJECTION, SOLUTION INTRAVENOUS EVERY 5 MIN PRN
Status: DISCONTINUED | OUTPATIENT
Start: 2020-12-16 | End: 2020-12-16 | Stop reason: HOSPADM

## 2020-12-16 RX ORDER — MEPERIDINE HYDROCHLORIDE 25 MG/ML
12.5 INJECTION INTRAMUSCULAR; INTRAVENOUS; SUBCUTANEOUS
Status: DISCONTINUED | OUTPATIENT
Start: 2020-12-16 | End: 2020-12-16 | Stop reason: HOSPADM

## 2020-12-16 RX ADMIN — DEXAMETHASONE SODIUM PHOSPHATE 4 MG: 4 INJECTION, SOLUTION INTRA-ARTICULAR; INTRALESIONAL; INTRAMUSCULAR; INTRAVENOUS; SOFT TISSUE at 13:16

## 2020-12-16 RX ADMIN — FENTANYL CITRATE 50 MCG: 50 INJECTION, SOLUTION INTRAMUSCULAR; INTRAVENOUS at 13:17

## 2020-12-16 RX ADMIN — FENTANYL CITRATE 50 MCG: 50 INJECTION, SOLUTION INTRAMUSCULAR; INTRAVENOUS at 14:15

## 2020-12-16 RX ADMIN — LIDOCAINE HYDROCHLORIDE 0.1 ML: 10 INJECTION, SOLUTION EPIDURAL; INFILTRATION; INTRACAUDAL; PERINEURAL at 11:39

## 2020-12-16 RX ADMIN — LIDOCAINE HYDROCHLORIDE 50 MG: 10 INJECTION, SOLUTION INFILTRATION; PERINEURAL at 12:42

## 2020-12-16 RX ADMIN — SODIUM CHLORIDE, POTASSIUM CHLORIDE, SODIUM LACTATE AND CALCIUM CHLORIDE: 600; 310; 30; 20 INJECTION, SOLUTION INTRAVENOUS at 11:39

## 2020-12-16 RX ADMIN — FENTANYL CITRATE 50 MCG: 50 INJECTION, SOLUTION INTRAMUSCULAR; INTRAVENOUS at 13:42

## 2020-12-16 RX ADMIN — ROCURONIUM BROMIDE 20 MG: 10 INJECTION INTRAVENOUS at 12:42

## 2020-12-16 RX ADMIN — MIDAZOLAM 2 MG: 1 INJECTION INTRAMUSCULAR; INTRAVENOUS at 12:35

## 2020-12-16 RX ADMIN — GABAPENTIN 300 MG: 300 CAPSULE ORAL at 11:30

## 2020-12-16 RX ADMIN — PHENYLEPHRINE HYDROCHLORIDE 200 MCG: 10 INJECTION INTRAVENOUS at 13:16

## 2020-12-16 RX ADMIN — KETOROLAC TROMETHAMINE 30 MG: 30 INJECTION, SOLUTION INTRAMUSCULAR at 14:19

## 2020-12-16 RX ADMIN — CEFAZOLIN SODIUM 2 G: 2 INJECTION, SOLUTION INTRAVENOUS at 12:35

## 2020-12-16 RX ADMIN — PROPOFOL 200 MG: 10 INJECTION, EMULSION INTRAVENOUS at 12:42

## 2020-12-16 RX ADMIN — FENTANYL CITRATE 50 MCG: 50 INJECTION, SOLUTION INTRAMUSCULAR; INTRAVENOUS at 13:43

## 2020-12-16 RX ADMIN — FENTANYL CITRATE 100 MCG: 50 INJECTION, SOLUTION INTRAMUSCULAR; INTRAVENOUS at 12:35

## 2020-12-16 RX ADMIN — ACETAMINOPHEN 975 MG: 325 TABLET, FILM COATED ORAL at 11:30

## 2020-12-16 RX ADMIN — ONDANSETRON 4 MG: 2 INJECTION INTRAMUSCULAR; INTRAVENOUS at 13:16

## 2020-12-16 RX ADMIN — PHENYLEPHRINE HYDROCHLORIDE 200 MCG: 10 INJECTION INTRAVENOUS at 12:55

## 2020-12-16 ASSESSMENT — MIFFLIN-ST. JEOR: SCORE: 2179.35

## 2020-12-16 NOTE — ANESTHESIA POSTPROCEDURE EVALUATION
Patient: Sarath Gray    Procedure(s):  Knee Open Excision of Prepatellar Bursa    Diagnosis:Prepatellar bursitis of right knee [M70.41]  Diagnosis Additional Information: No value filed.    Anesthesia Type:  General    Note:  Anesthesia Post Evaluation    Patient location during evaluation: Bedside  Patient participation: Able to fully participate in evaluation  Level of consciousness: awake and alert  Pain management: adequate  Airway patency: patent  Cardiovascular status: acceptable  Respiratory status: acceptable  Hydration status: acceptable  PONV: none     Anesthetic complications: None          Last vitals:  Vitals:    12/16/20 1430 12/16/20 1445 12/16/20 1500   BP: 115/72 137/88    Pulse: 83 88    Resp: 16 16 16   Temp:      SpO2: 95% 97% 99%         Electronically Signed By: Khushi Nicholson CRNA, APRN CRNA  December 16, 2020  3:02 PM

## 2020-12-16 NOTE — DISCHARGE INSTRUCTIONS
Special Precautions After Surgery - Adult    1. It is not unusual to feel lightheaded or faint, up to 24 hours after surgery or while taking pain medication.  If you have these symptoms; sit for a few minutes before standing and have someone assist you when getting up.  2. You should rest and relax for the next 24 hours and must have someone stay with you for at least 24 hours after your discharge.  3. DO NOT DRIVE any vehicle or operate mechanical equipment for 24 hours following the end of your surgery.  DO NOT DRIVE while taking narcotic pain medications that have been prescribed by your physician.  If you had a limb operated on, you must be able to use it fully to drive.  4. DO NOT drink alcoholic beverages for 24 hours following surgery or while taking prescription pain medication.  5. Drink clear liquids (apple juice, ginger ale, broth, 7-Up, etc.).  Progress to your regular diet as you feel able.  6. Any questions call your physician and do not make important decisions for 24 hours.      [facility]:  [phone]     Same Day Surgery [phone], Monday thru Friday 6am-9pm.

## 2020-12-16 NOTE — BRIEF OP NOTE
[facility]     Brief Operative Note    Pre-operative diagnosis: Prepatellar bursitis of right knee [M70.41]  Post-operative diagnosis Same as pre-operative diagnosis    Procedure: Procedure(s):  Knee Open Excision of Prepatellar Bursa  Surgeon: Surgeon(s) and Role:     * Maik Jeong MD - Primary     * Philip Layne PA-C - Assisting  Anesthesia: Choice   Estimated blood loss: Less than 10 ml  Drains: Kenyon-Ashley  Specimens:   ID Type Source Tests Collected by Time Destination   A : right knee bursa Tissue Knee, Right SURGICAL PATHOLOGY EXAM Maik Jeong MD 12/16/2020  1:24 PM      Findings:   None.  Complications: None.  Implants: * No implants in log *

## 2020-12-16 NOTE — ANESTHESIA CARE TRANSFER NOTE
Patient: Sarath Gray    Procedure(s):  Knee Open Excision of Prepatellar Bursa    Diagnosis: Prepatellar bursitis of right knee [M70.41]  Diagnosis Additional Information: No value filed.    Anesthesia Type:   General     Note:  Airway :Nasal Cannula  Patient transferred to:PACU  Handoff Report: Identifed the Patient, Identified the Reponsible Provider, Reviewed the pertinent medical history, Discussed the surgical course, Reviewed Intra-OP anesthesia mangement and issues during anesthesia, Set expectations for post-procedure period and Allowed opportunity for questions and acknowledgement of understanding      Vitals: (Last set prior to Anesthesia Care Transfer)    CRNA VITALS  12/16/2020 1318 - 12/16/2020 1348      12/16/2020             Resp Rate (observed):  (!) 3                Electronically Signed By: Khushi Nicholson CRNA, APRN CRNA  December 16, 2020  1:48 PM

## 2020-12-17 NOTE — OP NOTE
Procedure Date: 2020      PREOPERATIVE DIAGNOSIS:  Right knee prepatellar bursa.      POSTOPERATIVE DIAGNOSIS:  Bursitis.      POSTOPERATIVE DIAGNOSIS:  Right knee prepatellar bursa.      PROCEDURE PERFORMED:  Excision right prepatellar bursa.      SURGEON:  Maik Jeong MD      ASSISTANT:  Philip Layne PA-C      COMPLICATIONS:  None.      DESCRIPTION OF THE PROCEDURE:  After being informed of the risks, benefits and alternatives of the procedure, the patient desired to proceed.  He was brought to the operating suite, where she was placed under general anesthesia, received 2 g of Ancef.  Timeout verification step was completed.  The leg was elevated and exsanguinated.  A medially based longitudinal incision was made directly over the bursa.  A sharp dissection was then utilized, taking great care to protect skin integrity.  The entire bursa was excised, it was drained.  It was full of characteristic bursal sac fluid.  Any remnant bursa was debrided with a rongeur.  Hemostasis was achieved.  A 10 Uzbek Kenyon-Ashley drain was placed and the incision was closed in layers with 2-0 Vicryl, 3-0 Stratafix, Steri-Strips, and an Aquacel dressing.  He was placed in a knee immobilizer.  He tolerated the procedure well, returned to PAR in stable condition.         MAIK JEONG MD             D: 2020   T: 2020   MT:       Name:     SAMEERA MUNOZ   MRN:      [id number]        Account:        [id number]   :      1965           Procedure Date: 2020      Document: [id number]

## 2020-12-18 LAB — COPATH REPORT: NORMAL

## 2020-12-30 ENCOUNTER — TRANSFERRED RECORDS (OUTPATIENT)
Dept: HEALTH INFORMATION MANAGEMENT | Facility: CLINIC | Age: 55
End: 2020-12-30

## 2021-01-17 DIAGNOSIS — I10 HYPERTENSION GOAL BP (BLOOD PRESSURE) < 140/90: ICD-10-CM

## 2021-01-18 RX ORDER — AMLODIPINE BESYLATE 10 MG/1
TABLET ORAL
Qty: 90 TABLET | Refills: 1 | Status: SHIPPED | OUTPATIENT
Start: 2021-01-18 | End: 2021-08-24

## 2021-01-18 RX ORDER — LISINOPRIL 20 MG/1
TABLET ORAL
Qty: 90 TABLET | Refills: 1 | Status: SHIPPED | OUTPATIENT
Start: 2021-01-18 | End: 2021-08-24

## 2021-03-27 DIAGNOSIS — E78.00 ELEVATED LDL CHOLESTEROL LEVEL: ICD-10-CM

## 2021-03-29 RX ORDER — ATORVASTATIN CALCIUM 20 MG/1
20 TABLET, FILM COATED ORAL DAILY
Qty: 30 TABLET | Refills: 0 | Status: SHIPPED | OUTPATIENT
Start: 2021-03-29 | End: 2021-05-25

## 2021-04-13 NOTE — TELEPHONE ENCOUNTER
Patient feels she has been \"very emotional\" lately. Thinks she is going through menopause. Wondering if there is any recommendations from nurse? Would like a call back   Reason for call:  Patient reporting a symptom    Symptom or request: Pt asking to talk to a nurse. He wonders if he is having appendicitis. States for a few days his abdomen is tight and can't touch it. On the R side he touched a spot that is very sore. He has had chills but no temp. States he has not been able to go to the bathroom. He says he doesn't know if he might be constipated.       Have you been treated for this before? No    Additional comments:      Phone Number patient can be reached at:  Home number on file [phone] (home)    Best Time:  ASAP      Call taken on 4/29/2020 at 8:01 AM by Delicia Live

## 2021-05-15 ENCOUNTER — HEALTH MAINTENANCE LETTER (OUTPATIENT)
Age: 56
End: 2021-05-15

## 2021-05-25 DIAGNOSIS — E78.00 ELEVATED LDL CHOLESTEROL LEVEL: ICD-10-CM

## 2021-05-25 RX ORDER — ATORVASTATIN CALCIUM 20 MG/1
20 TABLET, FILM COATED ORAL DAILY
Qty: 30 TABLET | Refills: 0 | Status: SHIPPED | OUTPATIENT
Start: 2021-05-25 | End: 2021-06-24

## 2021-05-25 NOTE — TELEPHONE ENCOUNTER
"Requested Prescriptions   Pending Prescriptions Disp Refills     atorvastatin (LIPITOR) 20 MG tablet [Pharmacy Med Name: atorvastatin 20 mg tablet] 90 tablet 3     Sig: Take 1 tablet (20 mg) by mouth daily       Statins Protocol Failed - 5/25/2021 10:31 AM        Failed - LDL on file in past 12 months     Recent Labs   Lab Test 12/27/19  0734   LDL 74             Passed - No abnormal creatine kinase in past 12 months     No lab results found.             Passed - Recent (12 mo) or future (30 days) visit within the authorizing provider's specialty     Patient has had an office visit with the authorizing provider or a provider within the authorizing providers department within the previous 12 mos or has a future within next 30 days. See \"Patient Info\" tab in inbasket, or \"Choose Columns\" in Meds & Orders section of the refill encounter.              Passed - Medication is active on med list        Passed - Patient is age 18 or older             "

## 2021-05-25 NOTE — LETTER
[facility]  [address] [place] [address]  Phone: [phone]  Fax: [phone]       May 26, 2021         Sarath Gray  [address] [place] [id number]            Dear Sarath:    We are concerned about your health care.  We recently provided you with medication refills.  Many medications require routine follow-up with your doctor. You are due for appointment and lab.     Your prescription(s) have been refilled for  atorvastatin (LIPITOR) 20 MG tablet so you may have time for the above noted follow-up. Please call to schedule soon so we can assure you have an appointment before your next refills are needed.    Thank you,      Laly Paredes CNP/ marly

## 2021-05-27 ENCOUNTER — RECORDS - HEALTHEAST (OUTPATIENT)
Dept: ADMINISTRATIVE | Facility: CLINIC | Age: 56
End: 2021-05-27

## 2021-06-23 ENCOUNTER — TELEPHONE (OUTPATIENT)
Dept: FAMILY MEDICINE | Facility: CLINIC | Age: 56
End: 2021-06-23

## 2021-06-23 DIAGNOSIS — E78.5 HYPERLIPIDEMIA LDL GOAL <100: Primary | ICD-10-CM

## 2021-06-23 DIAGNOSIS — E78.00 ELEVATED LDL CHOLESTEROL LEVEL: ICD-10-CM

## 2021-06-23 NOTE — TELEPHONE ENCOUNTER
Left message on unidentified voicemail. Patient was sent a letter regarding further refills for atorvastatin. Last LDL was 12/27/19.   Dr Smith, will you order lab or does patient need clinic appt? Last OV for pre-op was 12/14/21 with other labs at that time.   Conchita CHOW RN

## 2021-06-23 NOTE — TELEPHONE ENCOUNTER
Please call.  Since he was seen for preop physical, It is OK to do lipids without visit.    I will place order.  Maik Smith MD

## 2021-06-23 NOTE — TELEPHONE ENCOUNTER
"Reason for Call:  Other appointment    Detailed comments: Pt is calling about a letter he recived stating he need to make an OV for further refills. I tried to schedule with patient but he refuses and states \"Elisabet can just set me up with the labs and a bp check so I dont have to make an appointment\".  Please advise if pt needs appt or can lab and Bp check be done    Phone Number Patient can be reached at: Home number [phone]        Call taken on 6/23/2021 at 3:21 PM by Jennifer Paige        "

## 2021-06-24 RX ORDER — ATORVASTATIN CALCIUM 20 MG/1
20 TABLET, FILM COATED ORAL DAILY
Qty: 90 TABLET | Refills: 0 | Status: SHIPPED | OUTPATIENT
Start: 2021-06-24 | End: 2021-11-08

## 2021-06-24 NOTE — TELEPHONE ENCOUNTER
Pt will be out of his medication before his Lab Appt - 7/2 21  Please Send in new RX until appt  Dariela [facility]

## 2021-07-06 DIAGNOSIS — E78.5 HYPERLIPIDEMIA LDL GOAL <100: ICD-10-CM

## 2021-07-06 LAB
CHOLEST SERPL-MCNC: 165 MG/DL
HDLC SERPL-MCNC: 80 MG/DL
LDLC SERPL CALC-MCNC: 74 MG/DL
NONHDLC SERPL-MCNC: 85 MG/DL
TRIGL SERPL-MCNC: 55 MG/DL

## 2021-07-06 PROCEDURE — 80061 LIPID PANEL: CPT | Performed by: FAMILY MEDICINE

## 2021-07-06 PROCEDURE — 36415 COLL VENOUS BLD VENIPUNCTURE: CPT | Performed by: FAMILY MEDICINE

## 2021-07-06 NOTE — RESULT ENCOUNTER NOTE
"Ilir Lu,   Lipid tests including total cholesterol, triglycerides, HDL (\"good cholesterol\") and LDL (\"bad cholesterol\") are normal.    JUDY GONZALEZ MD"

## 2021-08-20 DIAGNOSIS — I10 HYPERTENSION GOAL BP (BLOOD PRESSURE) < 140/90: ICD-10-CM

## 2021-08-20 DIAGNOSIS — R10.13 EPIGASTRIC PAIN: ICD-10-CM

## 2021-08-24 RX ORDER — AMLODIPINE BESYLATE 10 MG/1
TABLET ORAL
Qty: 90 TABLET | Refills: 1 | Status: SHIPPED | OUTPATIENT
Start: 2021-08-24 | End: 2022-01-21

## 2021-08-24 RX ORDER — OMEPRAZOLE 40 MG/1
CAPSULE, DELAYED RELEASE ORAL
Qty: 180 CAPSULE | Refills: 0 | Status: SHIPPED | OUTPATIENT
Start: 2021-08-24 | End: 2022-01-06

## 2021-08-24 RX ORDER — LISINOPRIL 20 MG/1
TABLET ORAL
Qty: 90 TABLET | Refills: 1 | Status: SHIPPED | OUTPATIENT
Start: 2021-08-24 | End: 2022-01-21

## 2021-09-04 ENCOUNTER — HEALTH MAINTENANCE LETTER (OUTPATIENT)
Age: 56
End: 2021-09-04

## 2021-11-04 DIAGNOSIS — E78.00 ELEVATED LDL CHOLESTEROL LEVEL: ICD-10-CM

## 2021-11-08 RX ORDER — ATORVASTATIN CALCIUM 20 MG/1
20 TABLET, FILM COATED ORAL DAILY
Qty: 90 TABLET | Refills: 2 | Status: SHIPPED | OUTPATIENT
Start: 2021-11-08 | End: 2022-01-21

## 2022-01-05 DIAGNOSIS — R10.13 EPIGASTRIC PAIN: ICD-10-CM

## 2022-01-06 RX ORDER — OMEPRAZOLE 40 MG/1
CAPSULE, DELAYED RELEASE ORAL
Qty: 180 CAPSULE | Refills: 1 | Status: SHIPPED | OUTPATIENT
Start: 2022-01-06 | End: 2022-07-12

## 2022-01-21 ENCOUNTER — OFFICE VISIT (OUTPATIENT)
Dept: FAMILY MEDICINE | Facility: CLINIC | Age: 57
End: 2022-01-21
Payer: COMMERCIAL

## 2022-01-21 VITALS
RESPIRATION RATE: 16 BRPM | DIASTOLIC BLOOD PRESSURE: 90 MMHG | BODY MASS INDEX: 26.35 KG/M2 | TEMPERATURE: 97.5 F | SYSTOLIC BLOOD PRESSURE: 150 MMHG | OXYGEN SATURATION: 100 % | HEART RATE: 82 BPM | WEIGHT: 252 LBS

## 2022-01-21 DIAGNOSIS — L98.9 SKIN LESION: Primary | ICD-10-CM

## 2022-01-21 DIAGNOSIS — E78.00 ELEVATED LDL CHOLESTEROL LEVEL: ICD-10-CM

## 2022-01-21 DIAGNOSIS — I10 HYPERTENSION GOAL BP (BLOOD PRESSURE) < 140/90: ICD-10-CM

## 2022-01-21 LAB
ANION GAP SERPL CALCULATED.3IONS-SCNC: 5 MMOL/L (ref 3–14)
BUN SERPL-MCNC: 38 MG/DL (ref 7–30)
CALCIUM SERPL-MCNC: 9.1 MG/DL (ref 8.5–10.1)
CHLORIDE BLD-SCNC: 101 MMOL/L (ref 94–109)
CO2 SERPL-SCNC: 27 MMOL/L (ref 20–32)
CREAT SERPL-MCNC: 1.02 MG/DL (ref 0.66–1.25)
GFR SERPL CREATININE-BSD FRML MDRD: 86 ML/MIN/1.73M2
GLUCOSE BLD-MCNC: 109 MG/DL (ref 70–99)
POTASSIUM BLD-SCNC: 4.4 MMOL/L (ref 3.4–5.3)
SODIUM SERPL-SCNC: 133 MMOL/L (ref 133–144)

## 2022-01-21 PROCEDURE — 99214 OFFICE O/P EST MOD 30 MIN: CPT | Performed by: FAMILY MEDICINE

## 2022-01-21 PROCEDURE — 36415 COLL VENOUS BLD VENIPUNCTURE: CPT | Performed by: FAMILY MEDICINE

## 2022-01-21 PROCEDURE — 80048 BASIC METABOLIC PNL TOTAL CA: CPT | Performed by: FAMILY MEDICINE

## 2022-01-21 RX ORDER — LISINOPRIL 20 MG/1
20 TABLET ORAL DAILY
Qty: 90 TABLET | Refills: 1 | Status: SHIPPED | OUTPATIENT
Start: 2022-01-21 | End: 2022-02-02

## 2022-01-21 RX ORDER — ATORVASTATIN CALCIUM 20 MG/1
20 TABLET, FILM COATED ORAL DAILY
Qty: 90 TABLET | Refills: 2 | Status: SHIPPED | OUTPATIENT
Start: 2022-01-21 | End: 2022-12-16

## 2022-01-21 RX ORDER — AMLODIPINE BESYLATE 10 MG/1
10 TABLET ORAL DAILY
Qty: 90 TABLET | Refills: 1 | Status: SHIPPED | OUTPATIENT
Start: 2022-01-21 | End: 2022-11-02

## 2022-01-21 ASSESSMENT — PAIN SCALES - GENERAL: PAINLEVEL: NO PAIN (0)

## 2022-01-21 NOTE — LETTER
January 25, 2022      Sarath Gray  [address] [facility] [address]        Dear ,    We are writing to inform you of your test results.    Electrolytes and kidney function test came back normal.   Let us know if there are any questions.        Resulted Orders   Basic metabolic panel  (Ca, Cl, CO2, Creat, Gluc, K, Na, BUN)   Result Value Ref Range    Sodium 133 133 - 144 mmol/L    Potassium 4.4 3.4 - 5.3 mmol/L    Chloride 101 94 - 109 mmol/L    Carbon Dioxide (CO2) 27 20 - 32 mmol/L    Anion Gap 5 3 - 14 mmol/L    Urea Nitrogen 38 (H) 7 - 30 mg/dL    Creatinine 1.02 0.66 - 1.25 mg/dL    Calcium 9.1 8.5 - 10.1 mg/dL    Glucose 109 (H) 70 - 99 mg/dL    GFR Estimate 86 >60 mL/min/1.73m2      Comment:      Effective December 21, 2021 eGFRcr in adults is calculated using the 2021 CKD-EPI creatinine equation which includes age and gender (Abril et al., NEJM, DOI: 10.1056/WWRFax8095041)       If you have any questions or concerns, please call the clinic at the number listed above.       Sincerely,      Quentin Hernandez MD/ jesu

## 2022-01-21 NOTE — PROGRESS NOTES
Assessment & Plan     Skin lesion  Differentials discussed in detail including squamous cell carcinoma, keratoacanthoma.  Patient will schedule appointment for procedure on Monday    Hypertension goal BP (blood pressure) < 140/90  Blood pressure slightly above target goal of less than 140/90.  Recommended to monitor blood pressure at home regularly and follow-up with RN in 2 weeks.  Lisinopril and amlodipine refilled.  BMP ordered to monitor electrolytes and renal function. Healthy lifestyle modifications stressed including regular exercise, balanced diet, weight loss and limiting salt/caffeine/pop intake  - lisinopril (ZESTRIL) 20 MG tablet; Take 1 tablet (20 mg) by mouth daily  - amLODIPine (NORVASC) 10 MG tablet; Take 1 tablet (10 mg) by mouth daily  - Basic metabolic panel  (Ca, Cl, CO2, Creat, Gluc, K, Na, BUN); Future  - Basic metabolic panel  (Ca, Cl, CO2, Creat, Gluc, K, Na, BUN)    Elevated LDL cholesterol level  Lipitor refilled  - atorvastatin (LIPITOR) 20 MG tablet; Take 1 tablet (20 mg) by mouth daily       Tobacco Cessation:   reports that he has been smoking. He has a 18.00 pack-year smoking history. He has never used smokeless tobacco.  Tobacco Cessation Action Plan: Information offered: Patient not interested at this time      Quentin Hernandez MD  [facility]    Sadaf Clemens is a 56 year old who presents for the following health issues     HPI     Skin Lesion  Onset/Duration: few months   Description  Location: left shoulder blade   Color: brown and pink  Border description:   Character: round, raised  Itching: no  Bleeding:  no  Intensity:  moderate  Progression of Symptoms:  worsening  Accompanying signs and symptoms:   Bleeding: no  Scaling: no  Excessive sun exposure/tanning: in the past has   Sunscreen used: YES  History:           Any previous history of skin cancer: no  Any family history of melanoma: yes grandmother   Previous episodes of similar lesion:  no  Precipitating or alleviating factors: none   Therapies tried and outcome: none     Hypertension Follow-up      Do you check your blood pressure regularly outside of the clinic? No     Are you following a low salt diet? No    Are your blood pressures ever more than 140 on the top number (systolic) OR more   than 90 on the bottom number (diastolic), for example 140/90? Doesn't check at home         Review of Systems   Constitutional, HEENT, cardiovascular, pulmonary, GI, , musculoskeletal, neuro, skin, endocrine and psych systems are negative, except as otherwise noted.      Objective    BP (!) 150/90   Pulse 82   Temp 97.5  F (36.4  C) (Tympanic)   Resp 16   Wt 114.3 kg (252 lb)   SpO2 100%   BMI 26.35 kg/m    Body mass index is 26.35 kg/m .  Physical Exam   GENERAL: alert and no distress  EYES: Eyes grossly normal to inspection, PERRL and conjunctivae and sclerae normal  NECK: no adenopathy, no asymmetry, masses, or scars and thyroid normal to palpation  RESP: lungs clear to auscultation - no rales, rhonchi or wheezes  CV: regular rate and rhythm, normal S1 S2, no S3 or S4, no murmur, click or rub, no peripheral edema and peripheral pulses strong  ABDOMEN: soft, nontender, no hepatosplenomegaly, no masses and bowel sounds normal  MS: no gross musculoskeletal defects noted, no edema  SKIN: Hyperkeratotic horn shaped skin lesion involving mid back as shown below, tender on palpation, no vesicles or discharge noted  NEURO: Normal strength and tone, mentation intact and speech normal  PSYCH: mentation appears normal, affect normal/bright

## 2022-01-21 NOTE — PROGRESS NOTES
Shave Biopsy Procedure Note      Pre-operative Diagnosis: Cutaneous horn      Post-operative Diagnosis: same      Locations: Mid back           Indications: Therapeutic and diagnostic      Anesthesia: 1% lidocaine without epi       Procedure Details   History of allergy to iodine: no      Patient informed of the risks (including bleeding and infection) and benefits of the   procedure and verbal informed consent obtained.      The lesion and surrounding area were given a sterile prep using Betadine and draped in the usual sterile fashion. A dermablade was used to shave an area of skin approximately 7x6 mm.  Hemostasis achieved with monopolar cautery. Antibiotic ointment and a sterile dressing applied. The specimen was sent for pathologic examination. The patient tolerated the procedure well.      Condition:  Stable      Complications:  none.      Plan:  1. Instructed to keep the wound dry and covered for 24-48h and clean thereafter.  2. Warning signs of infection were reviewed.    3. Recommended that the patient use OTC acetaminophen, OTC ibuprofen as needed for pain.   4. Return in 2 week(s).        Quentin Hernandez MD  [facility]

## 2022-01-24 ENCOUNTER — OFFICE VISIT (OUTPATIENT)
Dept: FAMILY MEDICINE | Facility: CLINIC | Age: 57
End: 2022-01-24
Payer: COMMERCIAL

## 2022-01-24 VITALS
HEART RATE: 70 BPM | RESPIRATION RATE: 18 BRPM | BODY MASS INDEX: 29.16 KG/M2 | SYSTOLIC BLOOD PRESSURE: 144 MMHG | HEIGHT: 78 IN | WEIGHT: 252 LBS | TEMPERATURE: 97.8 F | DIASTOLIC BLOOD PRESSURE: 94 MMHG

## 2022-01-24 DIAGNOSIS — L85.8 CUTANEOUS HORN: Primary | ICD-10-CM

## 2022-01-24 PROCEDURE — 88305 TISSUE EXAM BY PATHOLOGIST: CPT | Performed by: DERMATOLOGY

## 2022-01-24 PROCEDURE — 11301 SHAVE SKIN LESION 0.6-1.0 CM: CPT | Performed by: FAMILY MEDICINE

## 2022-01-24 ASSESSMENT — MIFFLIN-ST. JEOR: SCORE: 2169.81

## 2022-01-24 ASSESSMENT — PAIN SCALES - GENERAL: PAINLEVEL: NO PAIN (0)

## 2022-01-24 NOTE — NURSING NOTE
"Chief Complaint   Patient presents with     Derm Problem     BP (!) 144/94 (Cuff Size: Adult Large)   Pulse 70   Temp 97.8  F (36.6  C) (Tympanic)   Resp 18   Ht 2.083 m (6' 10\")   Wt 114.3 kg (252 lb)   BMI 26.35 kg/m   Estimated body mass index is 26.35 kg/m  as calculated from the following:    Height as of this encounter: 2.083 m (6' 10\").    Weight as of this encounter: 114.3 kg (252 lb).  Patient presents to the clinic using No DME      Health Maintenance that is potentially due pending provider review:    Health Maintenance Due   Topic Date Due     ANNUAL REVIEW OF HM ORDERS  Never done     ADVANCE CARE PLANNING  Never done     HEPATITIS C SCREENING  Never done     ZOSTER IMMUNIZATION (1 of 2) Never done     LUNG CANCER SCREENING  05/04/2017     PREVENTIVE CARE VISIT  05/11/2019                "

## 2022-01-26 ENCOUNTER — TELEPHONE (OUTPATIENT)
Dept: DERMATOLOGY | Facility: CLINIC | Age: 57
End: 2022-01-26
Payer: COMMERCIAL

## 2022-01-26 DIAGNOSIS — C44.529 SQUAMOUS CELL CARCINOMA OF BACK: Primary | ICD-10-CM

## 2022-01-26 LAB
PATH REPORT.COMMENTS IMP SPEC: ABNORMAL
PATH REPORT.COMMENTS IMP SPEC: YES
PATH REPORT.FINAL DX SPEC: ABNORMAL
PATH REPORT.GROSS SPEC: ABNORMAL
PATH REPORT.MICROSCOPIC SPEC OTHER STN: ABNORMAL
PATH REPORT.RELEVANT HX SPEC: ABNORMAL

## 2022-01-26 NOTE — LETTER
January 26, 2022      Sarath Gray  [address] [address]              Dear Sarath,    You are scheduled for Mohs Surgery on   2/7/22 at 7:45 am     We are located at the [facility] in [place]. Please check in at the Dermatology Clinic located on the 2nd floor at the end of the peralta.    You don't need to arrive more than 5-10 minutes prior to your appointment time.    Be sure to eat a good breakfast and bathe and wash your hair prior to Surgery.   If you are taking any anti-coagulants that are prescribed by your Doctor (such as Coumadin/warfarin, Plavix, Aspirin, Ibuprofen), please continue taking them.    However, If you are taking anti-coagulants over the counter without a Doctor's order for a Medical condition, please discontinue them 10 days prior to Surgery.      Please wear comfortable clothing as you could possibly be in the clinic for 4-6 hours for your surgery.    Robert Green [phone]

## 2022-01-26 NOTE — TELEPHONE ENCOUNTER
[facility] Call Center    Phone Message    May a detailed message be left on voicemail: yes     Reason for Call: Other: Pt needs urgent appt for SCC re-excision     Action Taken: Message routed to:  Other: [facility]    Travel Screening: Not Applicable

## 2022-01-26 NOTE — TELEPHONE ENCOUNTER
Called pt and made mohs appt. Info reviewed and mailed to pt.  Haydee DANIELS RN BSN PHN  Specialty Clinics

## 2022-01-31 ENCOUNTER — OFFICE VISIT (OUTPATIENT)
Dept: OTOLARYNGOLOGY | Facility: CLINIC | Age: 57
End: 2022-01-31
Payer: COMMERCIAL

## 2022-01-31 VITALS
HEART RATE: 83 BPM | SYSTOLIC BLOOD PRESSURE: 151 MMHG | DIASTOLIC BLOOD PRESSURE: 88 MMHG | HEIGHT: 78 IN | OXYGEN SATURATION: 99 % | TEMPERATURE: 98 F | WEIGHT: 252 LBS | BODY MASS INDEX: 29.16 KG/M2

## 2022-01-31 DIAGNOSIS — Z78.9 INTOLERANCE OF CONTINUOUS POSITIVE AIRWAY PRESSURE (CPAP) VENTILATION: ICD-10-CM

## 2022-01-31 DIAGNOSIS — F17.200 TOBACCO DEPENDENCE: ICD-10-CM

## 2022-01-31 DIAGNOSIS — Z71.6 ENCOUNTER FOR TOBACCO USE CESSATION COUNSELING: ICD-10-CM

## 2022-01-31 DIAGNOSIS — G47.33 SEVERE OBSTRUCTIVE SLEEP APNEA: Primary | ICD-10-CM

## 2022-01-31 PROCEDURE — 99203 OFFICE O/P NEW LOW 30 MIN: CPT | Performed by: OTOLARYNGOLOGY

## 2022-01-31 ASSESSMENT — MIFFLIN-ST. JEOR: SCORE: 2169.81

## 2022-01-31 ASSESSMENT — PAIN SCALES - GENERAL: PAINLEVEL: NO PAIN (0)

## 2022-01-31 NOTE — LETTER
1/31/2022         RE: Sarath Gray  [address] [facility] [address]        Dear Colleague,    Thank you for referring your patient, Sarath Gray, to the [facility]. Please see a copy of my visit note below.    Chief Complaint   Patient presents with     Ent Problem     would like to discuss inspire implant- last home sleep study done in 2016 has CPAP but unable to use it      History of Present Illness   Sarath Gray is a 56 year old male who presents today for evaluation.  The patient describes symptoms of a long history of obstructive sleep apnea.  He has been trying for the last several years to use CPAP every night.  He oftentimes wakes up and has taken the CPAP.  He does have problems with the CPAP leaking.  He has a lot of trouble breathing through his nose and so nasal mask do not help him.  He describes poor sleep, restless sleep, daytime tiredness, etc.  He has a lot of aches and pains and so has trouble sleeping continuously on his back.  He does still have his tonsils.  He had multiple nasal injuries in the past.  He is a current tobacco user, roughly 1 pack/day.    The patient underwent a home sleep study on 4/11/2016 which showed an overall AHI of 57.2 events per hour.  The patient's BMI at the time of the study was 24.9 kg/m . The patient's current BMI is 26.35 kg/m .    Past Medical History  Patient Active Problem List   Diagnosis     Esophageal reflux     Tobacco use disorder     Impotence of organic origin     Low back pain     Hyperlipidemia LDL goal <100     History of PSVT (paroxysmal supraventricular tachycardia)     S/P total knee arthroplasty     Left knee DJD     Essential hypertension, benign     CARROLL (obstructive sleep apnea)     Acute appendicitis with generalized peritonitis, unspecified whether abscess present, unspecified whether gangrene present, unspecified whether perforation present     Acute perforated appendicitis     Postoperative  infection     Postoperative intra-abdominal abscess     Current Medications     Current Outpatient Medications:      albuterol (PROAIR HFA) 108 (90 Base) MCG/ACT inhaler, Inhale 2 puffs into the lungs every 4 hours as needed for shortness of breath / dyspnea or wheezing, Disp: 1 Inhaler, Rfl: 11     amLODIPine (NORVASC) 10 MG tablet, Take 1 tablet (10 mg) by mouth daily, Disp: 90 tablet, Rfl: 1     atorvastatin (LIPITOR) 20 MG tablet, Take 1 tablet (20 mg) by mouth daily, Disp: 90 tablet, Rfl: 2     lisinopril (ZESTRIL) 20 MG tablet, Take 1 tablet (20 mg) by mouth daily, Disp: 90 tablet, Rfl: 1     omeprazole (PRILOSEC) 40 MG DR capsule, Take 1 Capsule BY MOUTH TWICE DAILY, Disp: 180 capsule, Rfl: 1     order for DME, Equipment being ordered: CPAP Patient Sarath Gray was set up at [facility]  on April 21, 2016. Patient received a Resmed AirSense 10 Auto. Pressures were set at Auto 5 - 15 cm H2O.   Patient s ramp is 5 cm H2O for Auto and FLEX/EPR is 2.  Patient received a [employer] Mask name: SIMPLUS  Full Face mask Size Large, heated tubing and heated humidifier.  Patient is enrolled in the STM Program and does not need to meet compliance. Patient has a follow up on June 14TH AT 9 AM with YADIRA Resendez.   Jennifer Sam, Disp: , Rfl:      ORDER FOR DME, Equipment being ordered: Other: CPM machine for home use. Set max tolerance and increase 3-5 degrees/day as tolerated. Use up to 6-8 hours/day as tolerated. Treatment Diagnosis: left TKA, Disp: 1 Device, Rfl: 0  No current facility-administered medications for this visit.    Facility-Administered Medications Ordered in Other Visits:      iopamidol (ISOVUE-370) 76% solution 80 mL, 80 mL, Intravenous, Once, Maik Smith MD     sodium chloride 0.9 % for CT scan flush dose 80 mL, 80 mL, Intravenous, Once, Maik Smith MD    Allergies  Allergies   Allergen Reactions     Persantine [Dipyridamole] Other (See Comments)     Nervous and anxiety       Social  "History   Social History     Socioeconomic History     Marital status: Single     Spouse name: Not on file     Number of children: Not on file     Years of education: Not on file     Highest education level: Not on file   Occupational History     Not on file   Tobacco Use     Smoking status: Light Tobacco Smoker     Packs/day: 1.00     Years: 18.00     Pack years: 18.00     Smokeless tobacco: Never Used     Tobacco comment: Started at age 30.    Substance and Sexual Activity     Alcohol use: Yes     Alcohol/week: 0.0 standard drinks     Comment: occassional beer     Drug use: No     Sexual activity: Yes     Partners: Female     Birth control/protection: Surgical   Other Topics Concern     Parent/sibling w/ CABG, MI or angioplasty before 65F 55M? Not Asked   Social History Narrative    ** Merged History Encounter **          Social Determinants of Health     Financial Resource Strain: Not on file   Food Insecurity: Not on file   Transportation Needs: Not on file   Physical Activity: Not on file   Stress: Not on file   Social Connections: Not on file   Intimate Partner Violence: Not on file   Housing Stability: Not on file       Family History  Family History   Problem Relation Age of Onset     C.A.D. Father 36        MI at age 36-37     Cerebrovascular Disease Father      Allergies Father      Anesthesia Reaction Father      Cardiovascular Paternal Grandfather      Prostate Cancer No family hx of      Colon Cancer No family hx of        Review of Systems  As per HPI and PMHx, otherwise 10+ comprehensive system review is negative.    Physical Exam  BP (!) 151/88 (BP Location: Left arm, Patient Position: Chair, Cuff Size: Adult Regular)   Pulse 83   Temp 98  F (36.7  C) (Tympanic)   Ht 2.083 m (6' 10\")   Wt 114.3 kg (252 lb)   SpO2 99%   BMI 26.35 kg/m    GENERAL: Patient is a pleasant, cooperative 56 year old male in no acute distress.  HEAD: Normocephalic, atraumatic.  Hair and scalp are normal.  EYES: Pupils " are equal, round, reactive to light and accommodation.  Extraocular movements are intact.  The sclera nonicteric without injection.  The extraocular structures are normal.  EARS: Normal shape and symmetry.  No tenderness when palpating the mastoid or tragal areas bilaterally.    NOSE: Nares are patent.  Nasal mucosa is dry.  The patient has a rightward nasal septal deviation.  No nasal cavity masses, polyps, or mucopurulence on anterior rhinoscopy.  ORAL CAVITY: Dentition is in mixed repair.  Mucous membranes are dry.  Tongue is mobile, protrudes to the midline.  Palate elevates symmetrically.  Tonsils are 1+, symmetric.  No erythema or exudate.  Uvula is somewhat elongated.  No oral cavity or oropharyngeal masses, lesions, ulcerations, leukoplakia.  The patient has a Nicholson tongue/palate position grade 3.  NECK: Supple, trachea is midline.  The patient has 3 fingerbreadths of hyomental distance.  There no palpable cervical lymphadenopathy or masses bilaterally.      NEUROLOGIC: Cranial nerves II through XII are grossly intact.  Voice is strong.  Patient is House-Brackmann I/VI bilaterally.  CARDIOVASCULAR: Extremities are warm and well-perfused.  No significant peripheral edema.  RESPIRATORY: Patient has nonlabored breathing without cough, wheeze, stridor.  PSYCHIATRIC: Patient is alert and oriented.  Mood and affect appear normal.  SKIN: Warm and dry.  No scalp, face, or neck lesions noted.    Assessment and Plan     ICD-10-CM    1. Severe obstructive sleep apnea  G47.33 Case Request: DRUG INDUCED SLEEP ENDOSCOPY   2. Intolerance of continuous positive airway pressure (CPAP) ventilation  Z78.9 Case Request: DRUG INDUCED SLEEP ENDOSCOPY   3. BMI 26.0-26.9,adult  Z68.26 Case Request: DRUG INDUCED SLEEP ENDOSCOPY   4. Tobacco dependence  F17.200    5. Encounter for tobacco use cessation counseling  Z71.6      It was my pleasure seeing Sarath Gray today in clinic.  The patient presents to clinic today with  severe obstructive sleep apnea intolerant to CPAP.  The patient's BMI is a 6.35 kg/m .  They are interested in the hypoglossal nerve stimulator.  We discussed placement of the hypoglossal nerve stimulator including postoperative course, activation, need for battery change, limitation of the chest/abdomen/pelvis MRI, need for alteration of airport screening.  We discussed the need of drug induced sleep endoscopy to ensure candidacy. Given the age of his prior sleep study, he would likely need an updated sleep study either home or in lab.  We can get the sleep endoscopy first to see if he is a candidate and then pursue updated sleep study if he is a candidate.     We discussed the risks, benefits, alternatives, options of drug-induced sleep endoscopy including, but not, limited to: risk of general anesthesia, potential need for additional procedures.  We discussed the postoperative course and convalescence and need for  the day of the procedure.  The patient voiced understanding and is willing to proceed.    Sarath to follow up with Primary Care provider regarding elevated blood pressure.    Ciro Muniz MD  Department of Otolaryngology-Head and Neck Surgery  [facility]        Again, thank you for allowing me to participate in the care of your patient.        Sincerely,        Ciro Muniz MD

## 2022-01-31 NOTE — NURSING NOTE
"Initial BP (!) 151/88 (BP Location: Left arm, Patient Position: Chair, Cuff Size: Adult Regular)   Pulse 83   Temp 98  F (36.7  C) (Tympanic)   Ht 2.083 m (6' 10\")   Wt 114.3 kg (252 lb)   SpO2 99%   BMI 26.35 kg/m   Estimated body mass index is 26.35 kg/m  as calculated from the following:    Height as of this encounter: 2.083 m (6' 10\").    Weight as of this encounter: 114.3 kg (252 lb). .    Johnna Henry LPN    "

## 2022-02-01 ENCOUNTER — TELEPHONE (OUTPATIENT)
Dept: FAMILY MEDICINE | Facility: CLINIC | Age: 57
End: 2022-02-01
Payer: COMMERCIAL

## 2022-02-01 NOTE — TELEPHONE ENCOUNTER
Call placed to patient.    He is checking his pressure once a week on Sundays. Pressure is as below.    Denies head ache, changes in vision, chest pain/pressure.    Assisted to schedule appointment

## 2022-02-01 NOTE — TELEPHONE ENCOUNTER
Reason for Call:  B/P elevated     Detailed comments: Pt said his B/P is running high yet has been working with Provider's . Pt had it taken yesterday  2/1/22- 158/91 no other symptoms  Pt is wondering what else he can take to get this down.     Phone Number Patient can be reached at: Home number on file [phone] (home)    Best Time: Any Time      Can we leave a detailed message on this number? YES    Call taken on 2/1/2022 at 7:55 AM by Dariela Flowers

## 2022-02-02 ENCOUNTER — VIRTUAL VISIT (OUTPATIENT)
Dept: FAMILY MEDICINE | Facility: CLINIC | Age: 57
End: 2022-02-02
Payer: COMMERCIAL

## 2022-02-02 DIAGNOSIS — G47.00 INSOMNIA, UNSPECIFIED TYPE: ICD-10-CM

## 2022-02-02 DIAGNOSIS — I10 ESSENTIAL HYPERTENSION WITH GOAL BLOOD PRESSURE LESS THAN 140/90: Primary | ICD-10-CM

## 2022-02-02 PROCEDURE — 99214 OFFICE O/P EST MOD 30 MIN: CPT | Mod: TEL | Performed by: FAMILY MEDICINE

## 2022-02-02 RX ORDER — TRAZODONE HYDROCHLORIDE 50 MG/1
50 TABLET, FILM COATED ORAL AT BEDTIME
Qty: 30 TABLET | Refills: 1 | Status: SHIPPED | OUTPATIENT
Start: 2022-02-02 | End: 2022-04-11

## 2022-02-02 RX ORDER — LISINOPRIL 40 MG/1
40 TABLET ORAL DAILY
Qty: 90 TABLET | Refills: 3 | Status: SHIPPED | OUTPATIENT
Start: 2022-02-02 | End: 2023-02-14

## 2022-02-02 NOTE — PATIENT INSTRUCTIONS
ASSESSMENT:   (I10) Essential hypertension with goal blood pressure less than 140/90  (primary encounter diagnosis)  Comment: BP running high.    Plan: lisinopril (ZESTRIL) 40 MG tablet          Continue amlodipine at 10mg daily.   Increase lisinopril to 40mg daily.  You can take 2 of the 20 mg pills once daily until you run out.  I sent a new prescription for 40 mg which is a stronger dose of than you only have to take 1 pill daily.  If this does not control your blood pressure and keep it mostly under 140 for the top number and 90 for the bottom number, I will recommend adding an 12.5 mg chlorthalidone dose.    Stopping smoking may help your blood pressure among other benefits.  Watch salt in the diet  Let me know if your blood pressure is not under good control.    Monitor BP periodically.  This can be done at home, in clinic, at our pharmacy, at a store or by neighbor or relative with a blood pressure cuff.  You should be sitting and relaxed for several minutes when taking blood pressure.   Goal BP (most readings) should be less than 140/90    Normal blood pressure is 120/80.    If your blood pressure is consistently at or above the goal, some changes should be made with lifestyle or medication to keep blood pressure under good control.    High blood pressure over time can lead to eye and kidney damage and increase risk for heart attacks and strokes.   Let us know if readings are often high, so we can help get your blood pressure under good control.      (G47.00) Insomnia, unspecified type  Comment: Getting to sleep okay but waking up at night and cannot get back to sleep.  Plan: traZODone (DESYREL) 50 MG tablet        You can try trazodone 50 mg 1 pill at bedtime.  With all sleeping medications, there can be some drowsiness the next day.  If 1 pill does not work you can try two (100mg).  See suggestions for good sleep hygiene.    Helpful measures for good sleep hygiene.  It may take some time (2-4 weeks) for  these measures to work.      Try and keep a regular bedtime and time of awakening.  Avoid sleeping-in on Sunday morning  Don't spend more than 20 minutes in bed without sleeping.  Avoid trying to force yourself to sleep.  If your can't get to sleep in 20 minutes, get up and do some relaxing activity (like reading or listening to music) in a dimly lit room until you feel sleepy.  Repeat this as needed during the night.   Avoid bright light exposure within 1-2 hours of bedtime.  In particular, no screens like phones, computers, pads which have a lgith that can interfere with sleep.   Only sleeping and sex in bed.  No other activity in bed like reading or watching TV.  Avoid daytime napping.  Dark blinds, eye mask or ear plugs may help if light or noise interfere with sleep.   Deal with your worries before bedtime.  Set aside a worry time for 30 minutes earlier   Regular exercise can help sleep but not within 2 hours of sleeping.  Avoid excess alcohol (one drink daily for women and two drinks daily for men)  Avoid caffeine after lunch.  No overstimulating activities just before bed.  No competitive games before bedtime.  No exciting Television programs before bedtime   Don't smoke (particularly after dinner).  Take a warm bath or shower 30 minutes before bedtime.   Eat a light bedtime snack or warm drink like warm milk.       Some herbal teas can help like chamomeille or Valerian can help for sleep.      Over the counter preparations which can help include diphenhydramine at 25-50mg dose.  Side effects can include the persistence of sleepiness the next morning.   Prescription meds could help.  Benzodiazepines can help for sleep but are potentially addicting, can lose their effect over a couple weeks and cause rebound insomnia, memory loss or falls.     Zolpidem or similar meds can be used.  The FDA has warned about cognitive impairment the morning after taking and increased incidence of car accidents.   Various  antidepressant medications can be used and may be taken on a daily basis without being addicting.

## 2022-02-02 NOTE — PROGRESS NOTES
Sarath is a 56 year old who is being evaluated via a billable telephone visit.      What phone number would you like to be contacted at? [phone]  How would you like to obtain your AVS? Venancio        ASSESSMENT:   (I10) Essential hypertension with goal blood pressure less than 140/90  (primary encounter diagnosis)  Comment: BP running high.    Plan: lisinopril (ZESTRIL) 40 MG tablet          Continue amlodipine at 10mg daily.   Increase lisinopril to 40mg daily.  You can take 2 of the 20 mg pills once daily until you run out.  I sent a new prescription for 40 mg which is a stronger dose of than you only have to take 1 pill daily.  If this does not control your blood pressure and keep it mostly under 140 for the top number and 90 for the bottom number, I will recommend adding an 12.5 mg chlorthalidone dose.    Stopping smoking may help your blood pressure among other benefits.  Watch salt in the diet  Let me know if your blood pressure is not under good control.    Monitor BP periodically.  This can be done at home, in clinic, at our pharmacy, at a store or by neighbor or relative with a blood pressure cuff.  You should be sitting and relaxed for several minutes when taking blood pressure.   Goal BP (most readings) should be less than 140/90    Normal blood pressure is 120/80.    If your blood pressure is consistently at or above the goal, some changes should be made with lifestyle or medication to keep blood pressure under good control.    High blood pressure over time can lead to eye and kidney damage and increase risk for heart attacks and strokes.   Let us know if readings are often high, so we can help get your blood pressure under good control.      (G47.00) Insomnia, unspecified type  Comment: Getting to sleep okay but waking up at night and cannot get back to sleep.  Plan: traZODone (DESYREL) 50 MG tablet        You can try trazodone 50 mg 1 pill at bedtime.  With all sleeping medications, there can be  some drowsiness the next day.  If 1 pill does not work you can try two (100mg).  See suggestions for good sleep hygiene.         Sadaf Clemens is a 56 year old who presents for the following health issues  accompanied by his none.  Chief Complaint   Patient presents with     Hypertension      1/31/22:sleep consultation.   ENT flori for Joseire.     Feeling OK.    Worried about his blood pressure.   Normal aches and pains.     Needs his blood pressure regulated.   BP high when in clinic.  144-151/88-94.  Not checking at home.   Blood pressure medications currently taking: lisinopril 20mg daily and amlodipine 10mg daily.   He has some urinary frequency.  Drinks a lot of coffee in the AM.  30-40 oz daily.   Occupation: construction.    Exercise: work only.   Not watching salt in diet.     Tobacco Use      Smoking status: Current Every Day Smoker        Packs/day: 1.00        Years: 18.00        Pack years: 18      Smokeless tobacco: Never Used      Tobacco comment: Started at age 30.      Smoking 1 ppd.   Alcohol:occasional.   Taking medications on a regular basis.     Hypertension Follow-up      Do you check your blood pressure regularly outside of the clinic? No     Are you following a low salt diet? Yes    Are your blood pressures ever more than 140 on the top number (systolic) OR more   than 90 on the bottom number (diastolic), for example 140/90? Yes      How many servings of fruits and vegetables do you eat daily?  2-3    On average, how many sweetened beverages do you drink each day (Examples: soda, juice, sweet tea, etc.  Do NOT count diet or artificially sweetened beverages)?   0    How many days per week do you exercise enough to make your heart beat faster? 3 or less    How many minutes a day do you exercise enough to make your heart beat faster? 9 or less    How many days per week do you miss taking your medication? 0    Patient Active Problem List   Diagnosis     Esophageal reflux     Tobacco use  disorder     Impotence of organic origin     Low back pain     Hyperlipidemia LDL goal <100     History of PSVT (paroxysmal supraventricular tachycardia)     S/P total knee arthroplasty     Left knee DJD     Essential hypertension, benign     CARROLL (obstructive sleep apnea)     Acute appendicitis with generalized peritonitis, unspecified whether abscess present, unspecified whether gangrene present, unspecified whether perforation present     Acute perforated appendicitis     Postoperative infection     Postoperative intra-abdominal abscess      ROS:   albuteral inhaler prn.   Not sleeping well.   Takes Tylenol PM.  Can get to sleep OK.   Wakes up and can't get back to sleep.   Bedtime 7:00-7:15  Up at 0400.  He has a good routine.  Goes to sleep within 15 minutes.   Can be up 1-3 hours during the night.  Tried Advil PM.  No caffeine after noon.       Vitals:  No vitals were obtained today due to virtual visit.    Physical Exam   alert and no distress  PSYCH: Alert and oriented times 3; coherent speech, normal   rate and volume, able to articulate logical thoughts, able   to abstract reason, no tangential thoughts, no hallucinations   or delusions  His affect is normal  RESP: No cough, no audible wheezing, able to talk in full sentences  Remainder of exam unable to be completed due to telephone visits            Phone call duration: 21 minutes

## 2022-02-07 ENCOUNTER — OFFICE VISIT (OUTPATIENT)
Dept: DERMATOLOGY | Facility: CLINIC | Age: 57
End: 2022-02-07
Payer: COMMERCIAL

## 2022-02-07 VITALS — OXYGEN SATURATION: 98 % | SYSTOLIC BLOOD PRESSURE: 151 MMHG | DIASTOLIC BLOOD PRESSURE: 89 MMHG | HEART RATE: 70 BPM

## 2022-02-07 DIAGNOSIS — L82.1 SEBORRHEIC KERATOSIS: ICD-10-CM

## 2022-02-07 DIAGNOSIS — L81.4 LENTIGO: Primary | ICD-10-CM

## 2022-02-07 DIAGNOSIS — D23.9 DERMAL NEVUS: ICD-10-CM

## 2022-02-07 DIAGNOSIS — D18.01 ANGIOMA OF SKIN: ICD-10-CM

## 2022-02-07 DIAGNOSIS — C44.529 SQUAMOUS CELL CARCINOMA OF BACK: ICD-10-CM

## 2022-02-07 PROCEDURE — 17313 MOHS 1 STAGE T/A/L: CPT | Performed by: DERMATOLOGY

## 2022-02-07 PROCEDURE — 17314 MOHS ADDL STAGE T/A/L: CPT | Performed by: DERMATOLOGY

## 2022-02-07 PROCEDURE — 99203 OFFICE O/P NEW LOW 30 MIN: CPT | Mod: 25 | Performed by: DERMATOLOGY

## 2022-02-07 NOTE — LETTER
2/7/2022         RE: Sarath Gray  [address] [place] [id number]        Dear Colleague,    Thank you for referring your patient, Sarath Gray, to the [facility]. Please see a copy of my visit note below.    Surgical Office Location :   [facility]  [address] [place] [address]      Sarath Gray , a 56 year old year old male patient, I was asked to see by Dr. 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  Patient states this has been present for a while.  Patient reports the following symptoms:  Growing and tender .  Patient reports the following previous treatments none.  Patient reports the following modifying factors none.  Associated symptoms: none.  Patient has no other skin complaints today.  Remainder of the HPI, Meds, PMH, Allergies, FH, and SH was reviewed in chart.      Past Medical History:   Diagnosis Date     Heart disease      Hypertension      CARROLL (obstructive sleep apnea)      PE (pulmonary embolism)     years ago, apparently no cause found, does smoke     Respiratory complications March 2005     Sleep apnea      Squamous cell carcinoma of skin, unspecified        Past Surgical History:   Procedure Laterality Date     ARTHROPLASTY KNEE  6/11/2014    Procedure: ARTHROPLASTY KNEE;  Surgeon: Maik Jeong MD;  Location: [facility]     ARTHROSCOPY KNEE WITH MEDIAL MENISCECTOMY  12/19/2013    Procedure: ARTHROSCOPY KNEE WITH MEDIAL MENISCECTOMY;  Left Knee Arthroscopy With Intraarticular Debridement.;  Surgeon: Maik Jeong MD;  Location: [facility]     ARTHROTOMY KNEE Right 12/16/2020    Procedure: Knee Open Excision of Prepatellar Bursa;  Surgeon: Maik Jeong MD;  Location: [facility]     COLONOSCOPY  2002     COLONOSCOPY N/A 4/18/2018    Procedure: COLONOSCOPY;  colonoscopy;  Surgeon: Nirmal Schneider MD;  Location: [facility]     EXAM UNDER ANESTHESIA, MANIPULATE JOINT (LOCATION)  6/26/2014    Procedure: EXAM UNDER ANESTHESIA,  MANIPULATE JOINT (LOCATION);  Surgeon: Maik Jeong MD;  Location: [facility]     EXAM UNDER ANESTHESIA, MANIPULATE JOINT (LOCATION) Left 8/28/2014    Procedure: EXAM UNDER ANESTHESIA, MANIPULATE JOINT (LOCATION);  Surgeon: Maik Jeong MD;  [facility]: [facility]     EXAM UNDER ANESTHESIA, MANIPULATE JOINT (LOCATION) Left 12/31/2014    Procedure: EXAM UNDER ANESTHESIA, MANIPULATE JOINT (LOCATION);  Surgeon: Maik Jeong MD;  Location: [facility]     H ABLATION SVT  2008    AVNRT     INJECT EPIDURAL LUMBAR  12/16/2011    Procedure:INJECT EPIDURAL LUMBAR; MICKY-Dr. Smith; Surgeon:GENERIC ANESTHESIA PROVIDER; Location:[facility]     INJECT EPIDURAL LUMBAR  1/27/2012    Procedure:INJECT EPIDURAL LUMBAR; MICKY with Flouro--; Surgeon:GENERIC ANESTHESIA PROVIDER; [facility]     LAPAROSCOPIC APPENDECTOMY N/A 4/29/2020    Procedure: APPENDECTOMY, LAPAROSCOPIC;  Surgeon: Alexis Maher MD;  [facility]: [facility]     LAPAROSCOPIC HERNIORRHAPHY INGUINAL BILATERAL Bilateral 12/29/2015    Procedure: LAPAROSCOPIC HERNIORRHAPHY INGUINAL BILATERAL;  Surgeon: Andrew Prajapati MD;  Location: [facility]     Lumbar back fusion  1995, 1998    first was A&P     Lumbar back procedure  1999    Hardware removal     spinal stimulator insertion  2004    also, revised it that year also. never seemed to help well.         Family History   Problem Relation Age of Onset     C.A.D. Father 36        MI at age 36-37     Cerebrovascular Disease Father      Allergies Father      Anesthesia Reaction Father      Cardiovascular Paternal Grandfather      Prostate Cancer No family hx of      Colon Cancer No family hx of        Social History     Socioeconomic History     Marital status: Single     Spouse name: Not on file     Number of children: Not on file     Years of education: Not on file     Highest education level: Not on file   Occupational History     Not on file   Tobacco Use     Smoking status: Current Every Day Smoker     Packs/day: 1.00     Years:  18.00     Pack years: 18.00     Smokeless tobacco: Never Used     Tobacco comment: Started at age 30.    Substance and Sexual Activity     Alcohol use: Yes     Alcohol/week: 0.0 standard drinks     Comment: occassional beer     Drug use: No     Sexual activity: Yes     Partners: Female     Birth control/protection: Surgical   Other Topics Concern     Parent/sibling w/ CABG, MI or angioplasty before 65F 55M? Not Asked   Social History Narrative    ** Merged History Encounter **          Social Determinants of Health     Financial Resource Strain: Not on file   Food Insecurity: Not on file   Transportation Needs: Not on file   Physical Activity: Not on file   Stress: Not on file   Social Connections: Not on file   Intimate Partner Violence: Not on file   Housing Stability: Not on file       Outpatient Encounter Medications as of 2/7/2022   Medication Sig Dispense Refill     albuterol (PROAIR HFA) 108 (90 Base) MCG/ACT inhaler Inhale 2 puffs into the lungs every 4 hours as needed for shortness of breath / dyspnea or wheezing 1 Inhaler 11     amLODIPine (NORVASC) 10 MG tablet Take 1 tablet (10 mg) by mouth daily 90 tablet 1     atorvastatin (LIPITOR) 20 MG tablet Take 1 tablet (20 mg) by mouth daily 90 tablet 2     lisinopril (ZESTRIL) 40 MG tablet Take 1 tablet (40 mg) by mouth daily 90 tablet 3     omeprazole (PRILOSEC) 40 MG DR capsule Take 1 Capsule BY MOUTH TWICE DAILY 180 capsule 1     order for DME Equipment being ordered: CPAP Patient Sarath Gray was set up at [facility]  on April 21, 2016. Patient received a Resmed AirSense 10 Auto. Pressures were set at Auto 5 - 15 cm H2O.   Patient s ramp is 5 cm H2O for Auto and FLEX/EPR is 2.  Patient received a [employer] Mask name: SIMPLUS  Full Face mask Size Large, heated tubing and heated humidifier.  Patient is enrolled in the STM Program and does not need to meet compliance. Patient has a follow up on June 14TH AT 9 AM with YADIRA Resendez.    Jennifer Sam  (Patient not taking: Reported on 2/2/2022)       ORDER FOR DME Equipment being ordered: Other: CPM machine for home use. Set max tolerance and increase 3-5 degrees/day as tolerated. Use up to 6-8 hours/day as tolerated.  Treatment Diagnosis: left TKA (Patient not taking: Reported on 2/2/2022) 1 Device 0     traZODone (DESYREL) 50 MG tablet Take 1 tablet (50 mg) by mouth At Bedtime 30 tablet 1     Facility-Administered Encounter Medications as of 2/7/2022   Medication Dose Route Frequency Provider Last Rate Last Admin     iopamidol (ISOVUE-370) 76% solution 80 mL  80 mL Intravenous Once Maik Smith MD         sodium chloride 0.9 % for CT scan flush dose 80 mL  80 mL Intravenous Once Maik Smith MD                 Review Of Systems  Skin: As above  Eyes: negative  Ears/Nose/Throat: negative  Respiratory: No shortness of breath, dyspnea on exertion, cough, or hemoptysis  Cardiovascular: negative  Gastrointestinal: negative  Genitourinary: negative  Musculoskeletal: negative  Neurologic: negative  Psychiatric: negative  Hematologic/Lymphatic/Immunologic: negative  Endocrine: negative      O:   NAD, WDWN, Alert & Oriented, Mood & Affect wnl, Vitals stable   Here today alone   BP (!) 151/89   Pulse 70   SpO2 98%    General appearance hang ii   Vitals stable   Alert, oriented and in no acute distress      Following lymph nodes palpated: Occipital, Cervical, Supraclavicular , axilla no lad  Mid back 1.5cm red plaque      Stuck on papules and brown macules on trunk and ext    Red papules on trunk   Flesh colored papules on trunk        Eyes: Conjunctivae/lids:Normal     ENT: Lips, buccal mucosa, tongue: normal    MSK:Normal    Cardiovascular: peripheral edema none    Pulm: Breathing Normal    Lymph Nodes: No Head and Neck Lymphadenopathy     Neuro/Psych: Orientation:Normal; Mood/Affect:Normal      A/P:  1. Seborrheic keratosis, lentigo, angioma, dermal nevus  2. Squamous cell carcinoma back   It was a  pleasure speaking to Sarath Gray today.  Previous clinic  notes and pertinent laboratory tests were reviewed prior to Sarath Gray's visit.  Nature and genetics of benign skin lesions dicussed with patient.  Signs and Symptoms of skin cancer discussed with patient.  Patient encouraged to perform monthly skin exams.  UV precautions reviewed with patient.  Risks of non-melanoma skin cancer discussed with patient   Return to clinic 6 months    PROCEDURE NOTE  Mid back squamous cell carcinoma   MOHS:   Size    The rationale for Mohs surgery was discussed with the patient and consent was obtained.  The risks and benefits as well as alternatives to therapy were discussed, in detail.  Specifically, the risks of infection, scarring, bleeding, prolonged wound healing, incomplete removal, allergy to anesthesia, nerve injury and recurrence were addressed.  Indication for Mohs was Size. Prior to the procedure, the treatment site was clearly identified and, if available, confirmed with previous photos and confirmed by the patient   All components of the Universal Protocol/PAUSE rule were completed.  The Mohs surgeon operated in two distinct and integrated capacities as the surgeon and pathologist.      The area was prepped with Betasept.  A rim of normal appearing skin was marked circumferentially around the lesion.  The area was infiltrated with local anesthesia.  The tumor was first debulked to remove all clinically apparent tumor.  An incision following the standard Mohs approach was done and the specimen was oriented,mapped and placed in 1 block(s).  Each specimen was then chromacoded and processed in the Mohs laboratory using standard Mohs technique and submitted for frozen section histology.  Frozen section analysis showed no residual tumor but CLEAR MARGINS.      The tumor was excised using standard Mohs technique in 1 stages(s).  CLEAR MARGINS OBTAINED and Final defect size was 2cm.     We discussed the options  for wound management in full with the patient including risks/benefits/ possible outcomes.        REPAIR SECOND INTENT: We discussed the options for wound management in full with the patient including risks/benefits/possible outcomes. Decision made to allow the wound to heal by second intention. Cautery was used for for hemostasis. EBL minimal; complications none; wound care routine.  The patient was discharged in good condition and will return in one month or prn for wound evaluation.        Again, thank you for allowing me to participate in the care of your patient.        Sincerely,        Robert Green MD

## 2022-02-07 NOTE — PATIENT INSTRUCTIONS
Open Wound Care     for Back    ? No strenuous activity for 48 hours    ? Take Tylenol as needed for discomfort.                                                .         ? Do not drink alcoholic beverages for 48 hours.    ? Keep the pressure bandage in place for 24 hours. If the bandage becomes blood tinged or loose, reinforce it with gauze and tape.        (Refer to the reverse side of this page for management of bleeding).    ? Remove bandage in 24 hours and begin wound care as follows:     1. Clean area with tap water using a Q tip or gauze pad, (shower / bathe normally)  2. Dry wound with Q tip or gauze pad  3. Apply Aquaphor, Vaseline, Polysporin or Bacitracin Ointment with a Q tip    Do NOT use Neosporin Ointment *  4. Cover the wound with a band-aid or nonstick gauze pad and paper tape.  5. Repeat wound care once a day until wound is completely healed.    It is an old wives tale that a wound heals better when it is exposed to air and allowed to dry out. The wound will heal faster with a better cosmetic result if it is kept moist with ointment and covered with a bandage.  Do not let the wound dry out.      Supplies Needed:                Qtips or gauze pads                Polysporin or Bacitracin Ointment                Bandaids or nonstick gauze pads and paper tape    Wound care kits and brown paper tape are available for purchase at   the pharmacy.    BLEEDIN. Use tightly rolled up gauze or cloth to apply direct pressure over the bandage for 20   minutes.  2. Reapply pressure for an additional 20 minutes if necessary  3. Call the office or go to the nearest emergency room if pressure fails to stop the bleeding.  4. Use additional gauze and tape to maintain pressure once the bleeding has stopped.  5. Begin wound care 24 hours after surgery as directed.                  WOUND HEALING    1. One week after surgery a pink / red halo will form around the outside of the wound.   This is new skin.  2. The  center of the wound will appear yellowish white and produce some drainage.  3. The pink halo will slowly migrate in toward the center of the wound until the wound is covered with new shiny pink skin.  4. There will be no more drainage when the wound is completely healed.  5. It will take six months to one year for the redness to fade.  6. The scar may be itchy, tight and sensitive to extreme temperatures for a year after the surgery.  7. Massaging the area several times a day for several minutes after the wound is completely healed will help the scar soften and normalize faster. Begin massage only after healing is complete.      In case of emergency call: Dr Green: [phone]    [facility]: [phone]    [facility]:[phone]

## 2022-02-07 NOTE — PROGRESS NOTES
Sarath Gray , a 56 year old year old male patient, I was asked to see by Dr. 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  Patient states this has been present for a while.  Patient reports the following symptoms:  Growing and tender .  Patient reports the following previous treatments none.  Patient reports the following modifying factors none.  Associated symptoms: none.  Patient has no other skin complaints today.  Remainder of the HPI, Meds, PMH, Allergies, FH, and SH was reviewed in chart.      Past Medical History:   Diagnosis Date     Heart disease      Hypertension      CARROLL (obstructive sleep apnea)      PE (pulmonary embolism)     years ago, apparently no cause found, does smoke     Respiratory complications March 2005     Sleep apnea      Squamous cell carcinoma of skin, unspecified        Past Surgical History:   Procedure Laterality Date     ARTHROPLASTY KNEE  6/11/2014    Procedure: ARTHROPLASTY KNEE;  Surgeon: Maik Jeong MD;  Location: [facility]     ARTHROSCOPY KNEE WITH MEDIAL MENISCECTOMY  12/19/2013    Procedure: ARTHROSCOPY KNEE WITH MEDIAL MENISCECTOMY;  Left Knee Arthroscopy With Intraarticular Debridement.;  Surgeon: Maik Jeong MD;  Location: [facility]     ARTHROTOMY KNEE Right 12/16/2020    Procedure: Knee Open Excision of Prepatellar Bursa;  Surgeon: Maik Jeong MD;  Location: [facility]     COLONOSCOPY  2002     COLONOSCOPY N/A 4/18/2018    Procedure: COLONOSCOPY;  colonoscopy;  Surgeon: Nirmal Schneider MD;  Location: [facility]     EXAM UNDER ANESTHESIA, MANIPULATE JOINT (LOCATION)  6/26/2014    Procedure: EXAM UNDER ANESTHESIA, MANIPULATE JOINT (LOCATION);  Surgeon: Maik Jeong MD;  Location: [facility]     EXAM UNDER ANESTHESIA, MANIPULATE JOINT (LOCATION) Left 8/28/2014    Procedure: EXAM UNDER ANESTHESIA, MANIPULATE JOINT (LOCATION);  Surgeon: Maik Jeong MD;  [facility]: [facility]     EXAM UNDER ANESTHESIA, MANIPULATE JOINT (LOCATION) Left 12/31/2014    Procedure:  EXAM UNDER ANESTHESIA, MANIPULATE JOINT (LOCATION);  Surgeon: Maik Jeong MD;  Location: [facility]     H ABLATION SVT  2008    AVNRT     INJECT EPIDURAL LUMBAR  12/16/2011    Procedure:INJECT EPIDURAL LUMBAR; MICKY-Dr. Smith; Surgeon:GENERIC ANESTHESIA PROVIDER; [facility]     INJECT EPIDURAL LUMBAR  1/27/2012    Procedure:INJECT EPIDURAL LUMBAR; MICKY with Flouro--; Surgeon:GENERIC ANESTHESIA PROVIDER; [facility]     LAPAROSCOPIC APPENDECTOMY N/A 4/29/2020    Procedure: APPENDECTOMY, LAPAROSCOPIC;  Surgeon: Alexis Maher MD;  Location: [facility]     LAPAROSCOPIC HERNIORRHAPHY INGUINAL BILATERAL Bilateral 12/29/2015    Procedure: LAPAROSCOPIC HERNIORRHAPHY INGUINAL BILATERAL;  Surgeon: Andrew Prajapati MD;  Location: [facility]     Lumbar back fusion  1995, 1998    first was A&P     Lumbar back procedure  1999    Hardware removal     spinal stimulator insertion  2004    also, revised it that year also. never seemed to help well.         Family History   Problem Relation Age of Onset     C.A.D. Father 36        MI at age 36-37     Cerebrovascular Disease Father      Allergies Father      Anesthesia Reaction Father      Cardiovascular Paternal Grandfather      Prostate Cancer No family hx of      Colon Cancer No family hx of        Social History     Socioeconomic History     Marital status: Single     Spouse name: Not on file     Number of children: Not on file     Years of education: Not on file     Highest education level: Not on file   Occupational History     Not on file   Tobacco Use     Smoking status: Current Every Day Smoker     Packs/day: 1.00     Years: 18.00     Pack years: 18.00     Smokeless tobacco: Never Used     Tobacco comment: Started at age 30.    Substance and Sexual Activity     Alcohol use: Yes     Alcohol/week: 0.0 standard drinks     Comment: occassional beer     Drug use: No     Sexual activity: Yes     Partners: Female     Birth control/protection: Surgical   Other Topics Concern      Parent/sibling w/ CABG, MI or angioplasty before 65F 55M? Not Asked   Social History Narrative    ** Merged History Encounter **          Social Determinants of Health     Financial Resource Strain: Not on file   Food Insecurity: Not on file   Transportation Needs: Not on file   Physical Activity: Not on file   Stress: Not on file   Social Connections: Not on file   Intimate Partner Violence: Not on file   Housing Stability: Not on file       Outpatient Encounter Medications as of 2/7/2022   Medication Sig Dispense Refill     albuterol (PROAIR HFA) 108 (90 Base) MCG/ACT inhaler Inhale 2 puffs into the lungs every 4 hours as needed for shortness of breath / dyspnea or wheezing 1 Inhaler 11     amLODIPine (NORVASC) 10 MG tablet Take 1 tablet (10 mg) by mouth daily 90 tablet 1     atorvastatin (LIPITOR) 20 MG tablet Take 1 tablet (20 mg) by mouth daily 90 tablet 2     lisinopril (ZESTRIL) 40 MG tablet Take 1 tablet (40 mg) by mouth daily 90 tablet 3     omeprazole (PRILOSEC) 40 MG DR capsule Take 1 Capsule BY MOUTH TWICE DAILY 180 capsule 1     order for DME Equipment being ordered: CPAP Patient Sarath Gray was [facility] up at [facility]  on April 21, 2016. Patient received a Resmed AirSense 10 Auto. Pressures were set at Auto 5 - 15 cm H2O.   Patient s ramp is 5 cm H2O for Auto and FLEX/EPR is 2.  Patient received a [employer] Mask name: SIMPLUS  Full Face mask Size Large, heated tubing and heated humidifier.  Patient is enrolled in the STM Program and does not need to meet compliance. Patient has a follow up on June 14TH AT 9 AM with YADIRA Resendez.    Jennifer Sam (Patient not taking: Reported on 2/2/2022)       ORDER FOR DME Equipment being ordered: Other: CPM machine for home use. Set max tolerance and increase 3-5 degrees/day as tolerated. Use up to 6-8 hours/day as tolerated.  Treatment Diagnosis: left TKA (Patient not taking: Reported on 2/2/2022) 1 Device 0     traZODone (DESYREL) 50 MG tablet Take 1 tablet  (50 mg) by mouth At Bedtime 30 tablet 1     Facility-Administered Encounter Medications as of 2/7/2022   Medication Dose Route Frequency Provider Last Rate Last Admin     iopamidol (ISOVUE-370) 76% solution 80 mL  80 mL Intravenous Once Maik Smith MD         sodium chloride 0.9 % for CT scan flush dose 80 mL  80 mL Intravenous Once Maik Smith MD                 Review Of Systems  Skin: As above  Eyes: negative  Ears/Nose/Throat: negative  Respiratory: No shortness of breath, dyspnea on exertion, cough, or hemoptysis  Cardiovascular: negative  Gastrointestinal: negative  Genitourinary: negative  Musculoskeletal: negative  Neurologic: negative  Psychiatric: negative  Hematologic/Lymphatic/Immunologic: negative  Endocrine: negative      O:   NAD, WDWN, Alert & Oriented, Mood & Affect wnl, Vitals stable   Here today alone   BP (!) 151/89   Pulse 70   SpO2 98%    General appearance hang ii   Vitals stable   Alert, oriented and in no acute distress      Following lymph nodes palpated: Occipital, Cervical, Supraclavicular , axilla no lad  Mid back 1.5cm red plaque      Stuck on papules and brown macules on trunk and ext    Red papules on trunk   Flesh colored papules on trunk        Eyes: Conjunctivae/lids:Normal     ENT: Lips, buccal mucosa, tongue: normal    MSK:Normal    Cardiovascular: peripheral edema none    Pulm: Breathing Normal    Lymph Nodes: No Head and Neck Lymphadenopathy     Neuro/Psych: Orientation:Normal; Mood/Affect:Normal      A/P:  1. Seborrheic keratosis, lentigo, angioma, dermal nevus  2. Squamous cell carcinoma back   It was a pleasure speaking to Sarath Gray today.  Previous clinic  notes and pertinent laboratory tests were reviewed prior to Sarath Gray's visit.  Nature and genetics of benign skin lesions dicussed with patient.  Signs and Symptoms of skin cancer discussed with patient.  Patient encouraged to perform monthly skin exams.  UV precautions reviewed with  patient.  Risks of non-melanoma skin cancer discussed with patient   Return to clinic 6 months    PROCEDURE NOTE  Mid back squamous cell carcinoma   MOHS:   Size    The rationale for Mohs surgery was discussed with the patient and consent was obtained.  The risks and benefits as well as alternatives to therapy were discussed, in detail.  Specifically, the risks of infection, scarring, bleeding, prolonged wound healing, incomplete removal, allergy to anesthesia, nerve injury and recurrence were addressed.  Indication for Mohs was Size. Prior to the procedure, the treatment site was clearly identified and, if available, confirmed with previous photos and confirmed by the patient   All components of the Universal Protocol/PAUSE rule were completed.  The Mohs surgeon operated in two distinct and integrated capacities as the surgeon and pathologist.      The area was prepped with Betasept.  A rim of normal appearing skin was marked circumferentially around the lesion.  The area was infiltrated with local anesthesia.  The tumor was first debulked to remove all clinically apparent tumor.  An incision following the standard Mohs approach was done and the specimen was oriented,mapped and placed in 1 block(s).  Each specimen was then chromacoded and processed in the Mohs laboratory using standard Mohs technique and submitted for frozen section histology.  Frozen section analysis showed residual tumor but CLEAR MARGINS.    First stage:There is a proliferation of irregular nests of abnormal squamous cells arising from the epidermis and invading the dermis. These are well differentiated. The dermis shows a variable superficial perivascular inflammatory infiltrate.     The tumor was excised using standard Mohs technique in 2 stages(s).  CLEAR MARGINS OBTAINED and Final defect size was 2x2.2cm.     We discussed the options for wound management in full with the patient including risks/benefits/ possible outcomes.        REPAIR  SECOND INTENT: We discussed the options for wound management in full with the patient including risks/benefits/possible outcomes. Decision made to allow the wound to heal by second intention. Cautery was used for for hemostasis. EBL minimal; complications none; wound care routine.  The patient was discharged in good condition and will return in one month or prn for wound evaluation.

## 2022-02-07 NOTE — PROGRESS NOTES
Surgical Office Location :   [facility]  [address] [place] [address]

## 2022-02-17 DIAGNOSIS — G47.33 OSA (OBSTRUCTIVE SLEEP APNEA): Primary | ICD-10-CM

## 2022-03-11 ENCOUNTER — TELEPHONE (OUTPATIENT)
Dept: SLEEP MEDICINE | Facility: CLINIC | Age: 57
End: 2022-03-11
Payer: COMMERCIAL

## 2022-03-11 NOTE — TELEPHONE ENCOUNTER
Reason for call:  Other   Patient called regarding (reason for call): appointment  Additional comments:   Sleep study with actigraphy needs scheduling.  Please call to schedule.    Phone number to reach patient:  Cell number on file:    Telephone Information:   Mobile [phone]       Best Time:  any    Can we leave a detailed message on this number?  unknown    Travel screening: Not Applicable

## 2022-03-11 NOTE — TELEPHONE ENCOUNTER
PATIENT DOES NOT NEED TO BE SCHEDULED FOR AN ACTIGRAPHY WATCH.     Left message for patient to contact the sleep clinic to schedule sleep testing and follow up appointment.

## 2022-03-25 ENCOUNTER — TELEPHONE (OUTPATIENT)
Dept: SLEEP MEDICINE | Facility: CLINIC | Age: 57
End: 2022-03-25
Payer: COMMERCIAL

## 2022-03-25 NOTE — TELEPHONE ENCOUNTER
Called patient to get sleep study scheduled.     No answer,  left informing him the reason of my call and my direct call back number and office hours.    Katie Toth CMA on 3/25/2022 at 9:36 AM    Order is for Diagnostic PSG nothing else.

## 2022-05-20 ENCOUNTER — TELEPHONE (OUTPATIENT)
Dept: DERMATOLOGY | Facility: CLINIC | Age: 57
End: 2022-05-20

## 2022-05-20 NOTE — TELEPHONE ENCOUNTER
[facility] Call Center    Phone Message    May a detailed message be left on voicemail: yes     Reason for Call: Symptoms or Concerns     If patient has red-flag symptoms, warm transfer to triage line    Current symptom or concern: The pt states the growth on his back that was removed has come back. Dr. Green indicated he should call and come in. The pt is sending pictures thru my chart for you to see.     Symptoms have been present for:  ? day(s)    Has patient previously been seen for this? Yes    By : Dr. Green    Date: ?    Are there any new or worsening symptoms? Yes: see above. Please call to discuss with the pt. Thanks.      Action Taken: Message routed to:  Other: [facility] derm    Travel Screening: Not Applicable

## 2022-05-20 NOTE — TELEPHONE ENCOUNTER
Sent message to provider on mychart that pt sent. Also sent mychart to pt to let him know that his mychart and picture was sent to provider.  Haydee DANIELS RN BSN PHN  [facility]

## 2022-05-24 ENCOUNTER — OFFICE VISIT (OUTPATIENT)
Dept: DERMATOLOGY | Facility: CLINIC | Age: 57
End: 2022-05-24
Payer: COMMERCIAL

## 2022-05-24 VITALS — DIASTOLIC BLOOD PRESSURE: 90 MMHG | SYSTOLIC BLOOD PRESSURE: 143 MMHG | OXYGEN SATURATION: 96 % | HEART RATE: 89 BPM

## 2022-05-24 DIAGNOSIS — Z85.828 HISTORY OF SKIN CANCER: Primary | ICD-10-CM

## 2022-05-24 PROCEDURE — 99212 OFFICE O/P EST SF 10 MIN: CPT | Performed by: DERMATOLOGY

## 2022-05-24 NOTE — NURSING NOTE
"Initial BP (!) 143/90   Pulse 89   SpO2 96%  Estimated body mass index is 26.35 kg/m  as calculated from the following:    Height as of 1/31/22: 2.083 m (6' 10\").    Weight as of 1/31/22: 114.3 kg (252 lb). .      "

## 2022-05-24 NOTE — LETTER
5/24/2022         RE: Sarath Gray  [address] [place] [address]        Dear Colleague,    Thank you for referring your patient, Sarath Gray, to the [facility]. Please see a copy of my visit note below.    Sarath Gray is an extremely pleasant 56 year old year old male patient here today for hx of non-melanoma skin cancer, wound check on back/ Patient has no other skin complaints today.  Remainder of the HPI, Meds, PMH, Allergies, FH, and SH was reviewed in chart.      Past Medical History:   Diagnosis Date     Heart disease      Hypertension      CARROLL (obstructive sleep apnea)      PE (pulmonary embolism)     years ago, apparently no cause found, does smoke     Respiratory complications March 2005     Sleep apnea      Squamous cell carcinoma of skin, unspecified        Past Surgical History:   Procedure Laterality Date     ARTHROPLASTY KNEE  6/11/2014    Procedure: ARTHROPLASTY KNEE;  Surgeon: Maik Jeong MD;  Location: [facility]     ARTHROSCOPY KNEE WITH MEDIAL MENISCECTOMY  12/19/2013    Procedure: ARTHROSCOPY KNEE WITH MEDIAL MENISCECTOMY;  Left Knee Arthroscopy With Intraarticular Debridement.;  Surgeon: Maik Jeong MD;  Location: [facility]     ARTHROTOMY KNEE Right 12/16/2020    Procedure: Knee Open Excision of Prepatellar Bursa;  Surgeon: Maik Jeong MD;  Location: [facility]     COLONOSCOPY  2002     COLONOSCOPY N/A 4/18/2018    Procedure: COLONOSCOPY;  colonoscopy;  Surgeon: Nirmal Schneider MD;  Location: [facility]     EXAM UNDER ANESTHESIA, MANIPULATE JOINT (LOCATION)  6/26/2014    Procedure: EXAM UNDER ANESTHESIA, MANIPULATE JOINT (LOCATION);  Surgeon: Maik Jeong MD;  Location: [facility]     EXAM UNDER ANESTHESIA, MANIPULATE JOINT (LOCATION) Left 8/28/2014    Procedure: EXAM UNDER ANESTHESIA, MANIPULATE JOINT (LOCATION);  Surgeon: Maik Jeong MD;  Location: [facility]     EXAM UNDER ANESTHESIA, MANIPULATE JOINT (LOCATION) Left 12/31/2014     Procedure: EXAM UNDER ANESTHESIA, MANIPULATE JOINT (LOCATION);  Surgeon: Maik Jeong MD;  Location: [facility]     H ABLATION SVT  2008    AVNRT     INJECT EPIDURAL LUMBAR  12/16/2011    Procedure:INJECT EPIDURAL LUMBAR; MICKY-Dr. Smith; Surgeon:GENERIC ANESTHESIA PROVIDER; Location:[facility]     INJECT EPIDURAL LUMBAR  1/27/2012    Procedure:INJECT EPIDURAL LUMBAR; MICKY with Flouro--; Surgeon:GENERIC ANESTHESIA PROVIDER; Location:[facility]     LAPAROSCOPIC APPENDECTOMY N/A 4/29/2020    Procedure: APPENDECTOMY, LAPAROSCOPIC;  Surgeon: Alexis Maher MD;  Location: [facility]     LAPAROSCOPIC HERNIORRHAPHY INGUINAL BILATERAL Bilateral 12/29/2015    Procedure: LAPAROSCOPIC HERNIORRHAPHY INGUINAL BILATERAL;  Surgeon: Andrew Prajapati MD;  Location: [facility]     Lumbar back fusion  1995, 1998    first was A&P     Lumbar back procedure  1999    Hardware removal     spinal stimulator insertion  2004    also, revised it that year also. never seemed to help well.         Family History   Problem Relation Age of Onset     C.A.D. Father 36        MI at age 36-37     Cerebrovascular Disease Father      Allergies Father      Anesthesia Reaction Father      Cardiovascular Paternal Grandfather      Prostate Cancer No family hx of      Colon Cancer No family hx of        Social History     Socioeconomic History     Marital status: Single     Spouse name: Not on file     Number of children: Not on file     Years of education: Not on file     Highest education level: Not on file   Occupational History     Not on file   Tobacco Use     Smoking status: Current Every Day Smoker     Packs/day: 1.00     Years: 18.00     Pack years: 18.00     Smokeless tobacco: Never Used     Tobacco comment: Started at age 30.    Substance and Sexual Activity     Alcohol use: Yes     Alcohol/week: 0.0 standard drinks     Comment: occassional beer     Drug use: No     Sexual activity: Yes     Partners: Female     Birth control/protection: Surgical   Other Topics  Concern     Parent/sibling w/ CABG, MI or angioplasty before 65F 55M? Not Asked   Social History Narrative    ** Merged History Encounter **          Social Determinants of Health     Financial Resource Strain: Not on file   Food Insecurity: Not on file   Transportation Needs: Not on file   Physical Activity: Not on file   Stress: Not on file   Social Connections: Not on file   Intimate Partner Violence: Not on file   Housing Stability: Not on file       Outpatient Encounter Medications as of 5/24/2022   Medication Sig Dispense Refill     albuterol (PROAIR HFA) 108 (90 Base) MCG/ACT inhaler Inhale 2 puffs into the lungs every 4 hours as needed for shortness of breath / dyspnea or wheezing 1 Inhaler 11     amLODIPine (NORVASC) 10 MG tablet Take 1 tablet (10 mg) by mouth daily 90 tablet 1     atorvastatin (LIPITOR) 20 MG tablet Take 1 tablet (20 mg) by mouth daily 90 tablet 2     lisinopril (ZESTRIL) 40 MG tablet Take 1 tablet (40 mg) by mouth daily 90 tablet 3     omeprazole (PRILOSEC) 40 MG DR capsule Take 1 Capsule BY MOUTH TWICE DAILY 180 capsule 1     traZODone (DESYREL) 50 MG tablet Take 1 tablet by mouth daily AT BEDTIME 30 tablet 1     order for DME Equipment being ordered: CPAP Patient Sarath Gray was set up at [facility]  on April 21, 2016. Patient received a Resmed AirSense 10 Auto. Pressures were set at Auto 5 - 15 cm H2O.   Patient s ramp is 5 cm H2O for Auto and FLEX/EPR is 2.  Patient received a [employer] Mask name: SIMPLUS  Full Face mask Size Large, heated tubing and heated humidifier.  Patient is enrolled in the STM Program and does not need to meet compliance. Patient has a follow up on June 14TH AT 9 AM with YADIRA Resendez.    Jennifer Sam (Patient not taking: No sig reported)       ORDER FOR DME Equipment being ordered: Other: CPM machine for home use. Set max tolerance and increase 3-5 degrees/day as tolerated. Use up to 6-8 hours/day as tolerated.  Treatment Diagnosis: left TKA (Patient  not taking: No sig reported) 1 Device 0     Facility-Administered Encounter Medications as of 5/24/2022   Medication Dose Route Frequency Provider Last Rate Last Admin     iopamidol (ISOVUE-370) 76% solution 80 mL  80 mL Intravenous Once Maik Smith MD         sodium chloride 0.9 % for CT scan flush dose 80 mL  80 mL Intravenous Once Maik Smith MD                 O:   NAD, WDWN, Alert & Oriented, Mood & Affect wnl, Vitals stable   Here today alone   BP (!) 143/90   Pulse 89   SpO2 96%    General appearance normal   Vitals stable   Alert, oriented and in no acute distress     Back well healed no evidence of recurrence       Eyes: Conjunctivae/lids:Normal     ENT: Lips, buccal mucosa, tongue: normal    MSK:Normal    Cardiovascular: peripheral edema none    Pulm: Breathing Normal    Neuro/Psych: Orientation:Alert and Orientedx3 ; Mood/Affect:normal       A/P:  1. Hx of non-melanoma skin cancer   It was a pleasure speaking to Sarath Gray today.  Previous clinic notes and pertinent laboratory tests were reviewed prior to Sarath Gray's visit.  Nature and genetics of benign skin lesions dicussed with patient.  Signs and Symptoms of skin cancer discussed with patient.  Patient encouraged to perform monthly skin exams.  UV precautions reviewed with patient.  Return to clinic 6 months        Again, thank you for allowing me to participate in the care of your patient.        Sincerely,        Robert Green MD

## 2022-05-24 NOTE — PROGRESS NOTES
Sarath Gray is an extremely pleasant 56 year old year old male patient here today for hx of non-melanoma skin cancer, wound check on back/ Patient has no other skin complaints today.  Remainder of the HPI, Meds, PMH, Allergies, FH, and SH was reviewed in chart.      Past Medical History:   Diagnosis Date     Heart disease      Hypertension      CARROLL (obstructive sleep apnea)      PE (pulmonary embolism)     years ago, apparently no cause found, does smoke     Respiratory complications March 2005     Sleep apnea      Squamous cell carcinoma of skin, unspecified        Past Surgical History:   Procedure Laterality Date     ARTHROPLASTY KNEE  6/11/2014    Procedure: ARTHROPLASTY KNEE;  Surgeon: Maik Jeong MD;  Location: [facility]     ARTHROSCOPY KNEE WITH MEDIAL MENISCECTOMY  12/19/2013    Procedure: ARTHROSCOPY KNEE WITH MEDIAL MENISCECTOMY;  Left Knee Arthroscopy With Intraarticular Debridement.;  Surgeon: Maik Jeong MD;  Location: [facility]     ARTHROTOMY KNEE Right 12/16/2020    Procedure: Knee Open Excision of Prepatellar Bursa;  Surgeon: Maik Jeong MD;  Location: [facility]     COLONOSCOPY  2002     COLONOSCOPY N/A 4/18/2018    Procedure: COLONOSCOPY;  colonoscopy;  Surgeon: Nirmal Schneider MD;  Location: [facility]     EXAM UNDER ANESTHESIA, MANIPULATE JOINT (LOCATION)  6/26/2014    Procedure: EXAM UNDER ANESTHESIA, MANIPULATE JOINT (LOCATION);  Surgeon: Maik Jeong MD;  Location: [facility]     EXAM UNDER ANESTHESIA, MANIPULATE JOINT (LOCATION) Left 8/28/2014    Procedure: EXAM UNDER ANESTHESIA, MANIPULATE JOINT (LOCATION);  Surgeon: Maik Jeong MD;  Location: [facility]     EXAM UNDER ANESTHESIA, MANIPULATE JOINT (LOCATION) Left 12/31/2014    Procedure: EXAM UNDER ANESTHESIA, MANIPULATE JOINT (LOCATION);  Surgeon: Maik Jeong MD;  Location: [facility]     H ABLATION SVT  2008    AVNRT     INJECT EPIDURAL LUMBAR  12/16/2011    Procedure:INJECT EPIDURAL LUMBAR; MICKY-Dr. Smith;  Surgeon:GENERIC ANESTHESIA PROVIDER; Location:[facility]     INJECT EPIDURAL LUMBAR  1/27/2012    Procedure:INJECT EPIDURAL LUMBAR; MICKY with Flouro--; Surgeon:GENERIC ANESTHESIA PROVIDER; Location:[facility]     LAPAROSCOPIC APPENDECTOMY N/A 4/29/2020    Procedure: APPENDECTOMY, LAPAROSCOPIC;  Surgeon: Alexis Maher MD;  Location: [facility]     LAPAROSCOPIC HERNIORRHAPHY INGUINAL BILATERAL Bilateral 12/29/2015    Procedure: LAPAROSCOPIC HERNIORRHAPHY INGUINAL BILATERAL;  Surgeon: Andrew Prajapati MD;  Location: [facility]     Lumbar back fusion  1995, 1998    first was A&P     Lumbar back procedure  1999    Hardware removal     spinal stimulator insertion  2004    also, revised it that year also. never seemed to help well.         Family History   Problem Relation Age of Onset     C.A.D. Father 36        MI at age 36-37     Cerebrovascular Disease Father      Allergies Father      Anesthesia Reaction Father      Cardiovascular Paternal Grandfather      Prostate Cancer No family hx of      Colon Cancer No family hx of        Social History     Socioeconomic History     Marital status: Single     Spouse name: Not on file     Number of children: Not on file     Years of education: Not on file     Highest education level: Not on file   Occupational History     Not on file   Tobacco Use     Smoking status: Current Every Day Smoker     Packs/day: 1.00     Years: 18.00     Pack years: 18.00     Smokeless tobacco: Never Used     Tobacco comment: Started at age 30.    Substance and Sexual Activity     Alcohol use: Yes     Alcohol/week: 0.0 standard drinks     Comment: occassional beer     Drug use: No     Sexual activity: Yes     Partners: Female     Birth control/protection: Surgical   Other Topics Concern     Parent/sibling w/ CABG, MI or angioplasty before 65F 55M? Not Asked   Social History Narrative    ** Merged History Encounter **          Social Determinants of Health     Financial Resource Strain: Not on file   Food  Insecurity: Not on file   Transportation Needs: Not on file   Physical Activity: Not on file   Stress: Not on file   Social Connections: Not on file   Intimate Partner Violence: Not on file   Housing Stability: Not on file       Outpatient Encounter Medications as of 5/24/2022   Medication Sig Dispense Refill     albuterol (PROAIR HFA) 108 (90 Base) MCG/ACT inhaler Inhale 2 puffs into the lungs every 4 hours as needed for shortness of breath / dyspnea or wheezing 1 Inhaler 11     amLODIPine (NORVASC) 10 MG tablet Take 1 tablet (10 mg) by mouth daily 90 tablet 1     atorvastatin (LIPITOR) 20 MG tablet Take 1 tablet (20 mg) by mouth daily 90 tablet 2     lisinopril (ZESTRIL) 40 MG tablet Take 1 tablet (40 mg) by mouth daily 90 tablet 3     omeprazole (PRILOSEC) 40 MG DR capsule Take 1 Capsule BY MOUTH TWICE DAILY 180 capsule 1     traZODone (DESYREL) 50 MG tablet Take 1 tablet by mouth daily AT BEDTIME 30 tablet 1     order for DME Equipment being ordered: CPAP Patient Sarath Gray was set up at [facility]  on April 21, 2016. Patient received a SIM Digital AirSense 10 Auto. Pressures were set at Auto 5 - 15 cm H2O.   Patient s ramp is 5 cm H2O for Auto and FLEX/EPR is 2.  Patient received a [employer] health Mask name: SIMPLUS  Full Face mask Size Large, heated tubing and heated humidifier.  Patient is enrolled in the STM Program and does not need to meet compliance. Patient has a follow up on June 14TH AT 9 AM with YADIRA Resendez.    Jennifer Sam (Patient not taking: No sig reported)       ORDER FOR DME Equipment being ordered: Other: CPM machine for home use. Set max tolerance and increase 3-5 degrees/day as tolerated. Use up to 6-8 hours/day as tolerated.  Treatment Diagnosis: left TKA (Patient not taking: No sig reported) 1 Device 0     Facility-Administered Encounter Medications as of 5/24/2022   Medication Dose Route Frequency Provider Last Rate Last Admin     iopamidol (ISOVUE-370) 76% solution 80 mL  80 mL  Intravenous Once Maik Smith MD         sodium chloride 0.9 % for CT scan flush dose 80 mL  80 mL Intravenous Once Maik Smith MD                 O:   NAD, WDWN, Alert & Oriented, Mood & Affect wnl, Vitals stable   Here today alone   BP (!) 143/90   Pulse 89   SpO2 96%    General appearance normal   Vitals stable   Alert, oriented and in no acute distress     Back well healed no evidence of recurrence       Eyes: Conjunctivae/lids:Normal     ENT: Lips, buccal mucosa, tongue: normal    MSK:Normal    Cardiovascular: peripheral edema none    Pulm: Breathing Normal    Neuro/Psych: Orientation:Alert and Orientedx3 ; Mood/Affect:normal       A/P:  1. Hx of non-melanoma skin cancer   It was a pleasure speaking to Sarath Gray today.  Previous clinic notes and pertinent laboratory tests were reviewed prior to Sarath Gray's visit.  Nature and genetics of benign skin lesions dicussed with patient.  Signs and Symptoms of skin cancer discussed with patient.  Patient encouraged to perform monthly skin exams.  UV precautions reviewed with patient.  Return to clinic 6 months

## 2022-06-11 ENCOUNTER — HEALTH MAINTENANCE LETTER (OUTPATIENT)
Age: 57
End: 2022-06-11

## 2022-07-06 ENCOUNTER — TELEPHONE (OUTPATIENT)
Dept: SLEEP MEDICINE | Facility: CLINIC | Age: 57
End: 2022-07-06

## 2022-07-06 NOTE — TELEPHONE ENCOUNTER
Called and left message for patient to call me back to schedule his sleep study. Diagnostic in lab needed for evaluation for upper airway stimulator implant.

## 2022-07-10 DIAGNOSIS — R10.13 EPIGASTRIC PAIN: ICD-10-CM

## 2022-07-12 RX ORDER — OMEPRAZOLE 40 MG/1
CAPSULE, DELAYED RELEASE ORAL
Qty: 180 CAPSULE | Refills: 1 | Status: SHIPPED | OUTPATIENT
Start: 2022-07-12 | End: 2023-05-12

## 2022-10-16 ENCOUNTER — HEALTH MAINTENANCE LETTER (OUTPATIENT)
Age: 57
End: 2022-10-16

## 2022-11-01 DIAGNOSIS — I10 HYPERTENSION GOAL BP (BLOOD PRESSURE) < 140/90: ICD-10-CM

## 2022-11-02 RX ORDER — AMLODIPINE BESYLATE 10 MG/1
10 TABLET ORAL DAILY
Qty: 90 TABLET | Refills: 1 | Status: SHIPPED | OUTPATIENT
Start: 2022-11-02 | End: 2023-05-02

## 2022-11-02 NOTE — TELEPHONE ENCOUNTER
Routing to provider for consideration bp out of range.    5.24.22 2.7.22 2.7.22   BP: 143/90 Abnormal  151/89 Abnormal  165/85 Abnormal

## 2022-12-16 DIAGNOSIS — E78.00 ELEVATED LDL CHOLESTEROL LEVEL: ICD-10-CM

## 2022-12-16 RX ORDER — ATORVASTATIN CALCIUM 20 MG/1
20 TABLET, FILM COATED ORAL DAILY
Qty: 90 TABLET | Refills: 0 | Status: SHIPPED | OUTPATIENT
Start: 2022-12-16 | End: 2023-03-27

## 2022-12-16 NOTE — TELEPHONE ENCOUNTER
"Requested Prescriptions   Pending Prescriptions Disp Refills     atorvastatin (LIPITOR) 20 MG tablet [Pharmacy Med Name: atorvastatin 20 mg tablet] 90 tablet 2     Sig: Take 1 tablet (20 mg) by mouth daily       Statins Protocol Failed - 12/16/2022  8:04 AM        Failed - LDL on file in past 12 months     Recent Labs   Lab Test 07/06/21  0718   LDL 74             Passed - No abnormal creatine kinase in past 12 months     No lab results found.             Passed - Recent (12 mo) or future (30 days) visit within the authorizing provider's specialty     Patient has had an office visit with the authorizing provider or a provider within the authorizing providers department within the previous 12 mos or has a future within next 30 days. See \"Patient Info\" tab in inbasket, or \"Choose Columns\" in Meds & Orders section of the refill encounter.              Passed - Medication is active on med list        Passed - Patient is age 18 or older           "

## 2023-03-27 DIAGNOSIS — E78.00 ELEVATED LDL CHOLESTEROL LEVEL: ICD-10-CM

## 2023-03-27 RX ORDER — ATORVASTATIN CALCIUM 20 MG/1
20 TABLET, FILM COATED ORAL DAILY
Qty: 90 TABLET | Refills: 0 | Status: SHIPPED | OUTPATIENT
Start: 2023-03-27 | End: 2023-05-17

## 2023-04-13 DIAGNOSIS — R10.13 EPIGASTRIC PAIN: ICD-10-CM

## 2023-04-13 RX ORDER — OMEPRAZOLE 40 MG/1
CAPSULE, DELAYED RELEASE ORAL
Qty: 180 CAPSULE | Refills: 1 | OUTPATIENT
Start: 2023-04-13

## 2023-04-13 NOTE — TELEPHONE ENCOUNTER
Patient was notified on 2/14/23 per Dr. Hernandez patient needs office visit for further refills. Patient did not set up appointment.   Julie Behrendt RN

## 2023-05-02 DIAGNOSIS — I10 HYPERTENSION GOAL BP (BLOOD PRESSURE) < 140/90: ICD-10-CM

## 2023-05-02 RX ORDER — AMLODIPINE BESYLATE 10 MG/1
TABLET ORAL
Qty: 30 TABLET | Refills: 0 | Status: SHIPPED | OUTPATIENT
Start: 2023-05-02 | End: 2023-05-17

## 2023-05-02 NOTE — TELEPHONE ENCOUNTER
Left message to call care team.     30 day supply of medication refilled. Patient overdue for visit. Last in person visit was 1/24/22 with Dr. Hernandez. Needs appt for further refills

## 2023-05-17 ENCOUNTER — OFFICE VISIT (OUTPATIENT)
Dept: FAMILY MEDICINE | Facility: CLINIC | Age: 58
End: 2023-05-17
Payer: COMMERCIAL

## 2023-05-17 VITALS
RESPIRATION RATE: 20 BRPM | TEMPERATURE: 97.5 F | DIASTOLIC BLOOD PRESSURE: 93 MMHG | OXYGEN SATURATION: 98 % | HEART RATE: 77 BPM | HEIGHT: 78 IN | BODY MASS INDEX: 29.87 KG/M2 | SYSTOLIC BLOOD PRESSURE: 158 MMHG | WEIGHT: 258.2 LBS

## 2023-05-17 DIAGNOSIS — M21.611 BUNION, RIGHT: ICD-10-CM

## 2023-05-17 DIAGNOSIS — E78.2 MIXED HYPERLIPIDEMIA: Primary | ICD-10-CM

## 2023-05-17 DIAGNOSIS — I10 ESSENTIAL HYPERTENSION WITH GOAL BLOOD PRESSURE LESS THAN 140/90: ICD-10-CM

## 2023-05-17 DIAGNOSIS — R10.13 EPIGASTRIC PAIN: ICD-10-CM

## 2023-05-17 DIAGNOSIS — K21.9 GASTROESOPHAGEAL REFLUX DISEASE, UNSPECIFIED WHETHER ESOPHAGITIS PRESENT: Chronic | ICD-10-CM

## 2023-05-17 DIAGNOSIS — E78.00 ELEVATED LDL CHOLESTEROL LEVEL: ICD-10-CM

## 2023-05-17 DIAGNOSIS — Z12.11 COLON CANCER SCREENING: ICD-10-CM

## 2023-05-17 DIAGNOSIS — I10 HYPERTENSION GOAL BP (BLOOD PRESSURE) < 140/90: Primary | ICD-10-CM

## 2023-05-17 LAB
ANION GAP SERPL CALCULATED.3IONS-SCNC: 8 MMOL/L (ref 7–15)
BUN SERPL-MCNC: 16.4 MG/DL (ref 6–20)
CALCIUM SERPL-MCNC: 9.5 MG/DL (ref 8.6–10)
CHLORIDE SERPL-SCNC: 103 MMOL/L (ref 98–107)
CHOLEST SERPL-MCNC: 200 MG/DL
CREAT SERPL-MCNC: 0.95 MG/DL (ref 0.67–1.17)
DEPRECATED HCO3 PLAS-SCNC: 33 MMOL/L (ref 22–29)
ERYTHROCYTE [DISTWIDTH] IN BLOOD BY AUTOMATED COUNT: 14.2 % (ref 10–15)
GFR SERPL CREATININE-BSD FRML MDRD: >90 ML/MIN/1.73M2
GLUCOSE SERPL-MCNC: 99 MG/DL (ref 70–99)
HCT VFR BLD AUTO: 43.5 % (ref 40–53)
HDLC SERPL-MCNC: 68 MG/DL
HGB BLD-MCNC: 14.4 G/DL (ref 13.3–17.7)
LDLC SERPL CALC-MCNC: 123 MG/DL
MCH RBC QN AUTO: 29.3 PG (ref 26.5–33)
MCHC RBC AUTO-ENTMCNC: 33.1 G/DL (ref 31.5–36.5)
MCV RBC AUTO: 89 FL (ref 78–100)
NONHDLC SERPL-MCNC: 132 MG/DL
PLATELET # BLD AUTO: 246 10E3/UL (ref 150–450)
POTASSIUM SERPL-SCNC: 4 MMOL/L (ref 3.4–5.3)
RBC # BLD AUTO: 4.91 10E6/UL (ref 4.4–5.9)
SODIUM SERPL-SCNC: 144 MMOL/L (ref 136–145)
TRIGL SERPL-MCNC: 44 MG/DL
WBC # BLD AUTO: 8.1 10E3/UL (ref 4–11)

## 2023-05-17 PROCEDURE — 85027 COMPLETE CBC AUTOMATED: CPT | Performed by: FAMILY MEDICINE

## 2023-05-17 PROCEDURE — 80061 LIPID PANEL: CPT | Performed by: FAMILY MEDICINE

## 2023-05-17 PROCEDURE — 80048 BASIC METABOLIC PNL TOTAL CA: CPT | Performed by: FAMILY MEDICINE

## 2023-05-17 PROCEDURE — 36415 COLL VENOUS BLD VENIPUNCTURE: CPT | Performed by: FAMILY MEDICINE

## 2023-05-17 PROCEDURE — 99214 OFFICE O/P EST MOD 30 MIN: CPT | Performed by: FAMILY MEDICINE

## 2023-05-17 RX ORDER — ATORVASTATIN CALCIUM 20 MG/1
20 TABLET, FILM COATED ORAL DAILY
Qty: 90 TABLET | Refills: 1 | Status: SHIPPED | OUTPATIENT
Start: 2023-05-17 | End: 2023-11-29

## 2023-05-17 RX ORDER — AMLODIPINE BESYLATE 10 MG/1
10 TABLET ORAL DAILY
Qty: 90 TABLET | Refills: 1 | Status: SHIPPED | OUTPATIENT
Start: 2023-05-17 | End: 2023-11-08

## 2023-05-17 RX ORDER — OMEPRAZOLE 40 MG/1
40 CAPSULE, DELAYED RELEASE ORAL 2 TIMES DAILY
Qty: 180 CAPSULE | Refills: 3 | Status: SHIPPED | OUTPATIENT
Start: 2023-05-17 | End: 2023-08-21

## 2023-05-17 RX ORDER — HYDROCHLOROTHIAZIDE 25 MG/1
25 TABLET ORAL DAILY
Qty: 90 TABLET | Refills: 1 | Status: SHIPPED | OUTPATIENT
Start: 2023-05-17 | End: 2023-07-18 | Stop reason: SINTOL

## 2023-05-17 RX ORDER — LISINOPRIL 40 MG/1
40 TABLET ORAL DAILY
Qty: 90 TABLET | Refills: 1 | Status: SHIPPED | OUTPATIENT
Start: 2023-05-17 | End: 2023-10-31

## 2023-05-17 ASSESSMENT — PAIN SCALES - GENERAL: PAINLEVEL: NO PAIN (0)

## 2023-05-17 NOTE — PROGRESS NOTES
Assessment & Plan     Hypertension goal BP (blood pressure) < 140/90  -Blood pressure today 158/93, his BP has been persistently elevated over the past 12 months despite using prescribed amlodipine and lisinopril. We discussed lifestyle modifications for blood pressure; patient is not ready to quit smoking or change his diet. He has CARROLL but does not always use his CPAP. Plan will be to add hydrochlorothiazide to his medications, continue amlodipine and lisinopril as well. Will assess CBC, BMP and fasting lipid panel today as well. Patient will follow-up in two weeks for blood pressure check and repeat CBC / BMP.      Elevated LDL cholesterol level  -Patient has been taking atorvastatin as prescribed. Lipids last checked >one year ago. Discussed the benefits of a heart healthy diet and weight loss. Encouraged patient to obtain 150 minutes of moderate intensity exercise every week. Discussed the necessity for smoking cessation.      Essential hypertension with goal blood pressure less than 140/90  -Blood pressure today 158/93, his BP has been persistently elevated over the past 12 months despite using prescribed amlodipine and lisinopril. We discussed lifestyle modifications for blood pressure; patient is not ready to quit smoking or change his diet. He has CARROLL but does not always use his CPAP. Plan will be to add hydrochlorothiazide to his medications, continue amlodipine and lisinopril as well. Will assess CBC, BMP and fasting lipid panel today as well. Patient will follow-up in two weeks for blood pressure check and repeat CBC / BMP.    Epigastric pain  -Patient has been taking omeprazole daily for >five years, but has continued reflux. Referring patient to GI for possible upper endoscopy.       Bunion, right  Chronic, right great toe. Has tried insoles and bunion pads with no relief. This is causing him pain and intermittent bleeding to the second toe. Referring to orthopedic / podiatry for further management /  interventions.       Gastroesophageal reflux disease, unspecified whether esophagitis present  -Patient has been taking omeprazole daily for >five years, but has continued reflux. Referring patient to GI for possible upper endoscopy.       Colon cancer screening  -Family history of polyps. Patient denies any recent blood in the stool or melena. Last colonoscopy was April 2018; advised five year follow-up. Referral for colonoscopy placed today, per GI recommendation.      YARA Ewing      I have reviewed today's vital signs, notes, medications, labs and imaging. I have also seen and examined the patient and agree with the findings and plan as outlined above.      Quentin Hernandez MD  [facility]    Sadaf Clemens is a 57 year old, presenting for the following health issues:  Hyperlipidemia and Hypertension        5/17/2023     6:59 AM   Additional Questions   Roomed by Yandy ANDREWS CMA     History of Present Illness       Hyperlipidemia:  He presents for follow up of hyperlipidemia.  He is taking medication to lower cholesterol. He is not having myalgia or other side effects to statin medications.    Hypertension: He presents for follow up of hypertension.  He does not check blood pressure  regularly outside of the clinic. Outside blood pressures have been over 140/90. He does not follow a low salt diet.     He eats 4 or more servings of fruits and vegetables daily.He consumes 2 sweetened beverage(s) daily.He exercises with enough effort to increase his heart rate 9 or less minutes per day.  He exercises with enough effort to increase his heart rate 3 or less days per week. He is missing 1 dose(s) of medications per week.  He is not taking prescribed medications regularly due to remembering to take.      Review of Systems   Constitutional, HEENT, cardiovascular, pulmonary, GI, , musculoskeletal, neuro, skin, endocrine and psych systems are negative, except as otherwise noted.     "  Objective    BP (!) 158/93 (BP Location: Right arm, Patient Position: Sitting, Cuff Size: Adult Large)   Pulse 77   Temp 97.5  F (36.4  C) (Tympanic)   Resp 20   Ht 2.083 m (6' 10\")   Wt 117.1 kg (258 lb 3.2 oz)   SpO2 98%   BMI 27.00 kg/m    Body mass index is 27 kg/m .  Physical Exam   GENERAL: alert and no distress  EYES: Eyes grossly normal to inspection, PERRL and conjunctivae and sclerae normal  NECK: no adenopathy, no asymmetry, masses, or scars and thyroid normal to palpation  RESP: lungs clear to auscultation - no rales, rhonchi or wheezes  CV: regular rate and rhythm, normal S1 S2, no S3 or S4, no murmur, click or rub, no peripheral edema and peripheral pulses strong  ABDOMEN: soft, nontender, no hepatosplenomegaly, no masses  MS: Right great toe hallux valgus, limited active ROM, no erythema or tenderness; no lower extremity edema, high arch bilaterally   NEURO: Normal strength and tone, mentation intact and speech normal  PSYCH: mentation appears normal, affect normal/bright    "

## 2023-06-08 ENCOUNTER — APPOINTMENT (OUTPATIENT)
Dept: CT IMAGING | Facility: CLINIC | Age: 58
End: 2023-06-08
Attending: FAMILY MEDICINE
Payer: COMMERCIAL

## 2023-06-08 ENCOUNTER — HOSPITAL ENCOUNTER (EMERGENCY)
Facility: CLINIC | Age: 58
Discharge: HOME | End: 2023-06-08
Attending: FAMILY MEDICINE | Admitting: FAMILY MEDICINE
Payer: COMMERCIAL

## 2023-06-08 VITALS
DIASTOLIC BLOOD PRESSURE: 77 MMHG | HEART RATE: 77 BPM | TEMPERATURE: 97.7 F | HEIGHT: 78 IN | OXYGEN SATURATION: 99 % | BODY MASS INDEX: 30.66 KG/M2 | RESPIRATION RATE: 25 BRPM | WEIGHT: 265 LBS | SYSTOLIC BLOOD PRESSURE: 130 MMHG

## 2023-06-08 DIAGNOSIS — N17.9 AKI (ACUTE KIDNEY INJURY) (H): ICD-10-CM

## 2023-06-08 DIAGNOSIS — J18.9 COMMUNITY ACQUIRED PNEUMONIA OF LEFT LOWER LOBE OF LUNG: ICD-10-CM

## 2023-06-08 LAB
ALBUMIN SERPL BCG-MCNC: 4.7 G/DL (ref 3.5–5.2)
ALP SERPL-CCNC: 52 U/L (ref 40–129)
ALT SERPL W P-5'-P-CCNC: 37 U/L (ref 10–50)
ANION GAP SERPL CALCULATED.3IONS-SCNC: 16 MMOL/L (ref 7–15)
APTT PPP: 29 SECONDS (ref 22–38)
AST SERPL W P-5'-P-CCNC: 34 U/L (ref 10–50)
BASOPHILS # BLD AUTO: 0.1 10E3/UL (ref 0–0.2)
BASOPHILS NFR BLD AUTO: 1 %
BILIRUB SERPL-MCNC: 0.5 MG/DL
BUN SERPL-MCNC: 32.6 MG/DL (ref 6–20)
CALCIUM SERPL-MCNC: 10.4 MG/DL (ref 8.6–10)
CHLORIDE SERPL-SCNC: 87 MMOL/L (ref 98–107)
CREAT SERPL-MCNC: 1.99 MG/DL (ref 0.67–1.17)
D DIMER PPP FEU-MCNC: 1.41 UG/ML FEU (ref 0–0.5)
DEPRECATED HCO3 PLAS-SCNC: 24 MMOL/L (ref 22–29)
EOSINOPHIL # BLD AUTO: 0.2 10E3/UL (ref 0–0.7)
EOSINOPHIL NFR BLD AUTO: 1 %
ERYTHROCYTE [DISTWIDTH] IN BLOOD BY AUTOMATED COUNT: 12.9 % (ref 10–15)
GFR SERPL CREATININE-BSD FRML MDRD: 38 ML/MIN/1.73M2
GLUCOSE BLDC GLUCOMTR-MCNC: 107 MG/DL (ref 70–99)
GLUCOSE SERPL-MCNC: 94 MG/DL (ref 70–99)
HCT VFR BLD AUTO: 42.5 % (ref 40–53)
HGB BLD-MCNC: 15 G/DL (ref 13.3–17.7)
HOLD SPECIMEN: NORMAL
HOLD SPECIMEN: NORMAL
IMM GRANULOCYTES # BLD: 0.1 10E3/UL
IMM GRANULOCYTES NFR BLD: 1 %
INR PPP: 1.06 (ref 0.85–1.15)
LYMPHOCYTES # BLD AUTO: 2.8 10E3/UL (ref 0.8–5.3)
LYMPHOCYTES NFR BLD AUTO: 16 %
MCH RBC QN AUTO: 29.6 PG (ref 26.5–33)
MCHC RBC AUTO-ENTMCNC: 35.3 G/DL (ref 31.5–36.5)
MCV RBC AUTO: 84 FL (ref 78–100)
MONOCYTES # BLD AUTO: 1.7 10E3/UL (ref 0–1.3)
MONOCYTES NFR BLD AUTO: 10 %
NEUTROPHILS # BLD AUTO: 12.6 10E3/UL (ref 1.6–8.3)
NEUTROPHILS NFR BLD AUTO: 71 %
NRBC # BLD AUTO: 0 10E3/UL
NRBC BLD AUTO-RTO: 0 /100
PLATELET # BLD AUTO: 287 10E3/UL (ref 150–450)
POTASSIUM SERPL-SCNC: 4.7 MMOL/L (ref 3.4–5.3)
PROT SERPL-MCNC: 8.2 G/DL (ref 6.4–8.3)
RBC # BLD AUTO: 5.07 10E6/UL (ref 4.4–5.9)
SODIUM SERPL-SCNC: 127 MMOL/L (ref 136–145)
TROPONIN T SERPL HS-MCNC: 16 NG/L
WBC # BLD AUTO: 17.4 10E3/UL (ref 4–11)

## 2023-06-08 PROCEDURE — 71275 CT ANGIOGRAPHY CHEST: CPT

## 2023-06-08 PROCEDURE — 96367 TX/PROPH/DG ADDL SEQ IV INF: CPT

## 2023-06-08 PROCEDURE — 93005 ELECTROCARDIOGRAM TRACING: CPT

## 2023-06-08 PROCEDURE — 85025 COMPLETE CBC W/AUTO DIFF WBC: CPT | Performed by: FAMILY MEDICINE

## 2023-06-08 PROCEDURE — 258N000003 HC RX IP 258 OP 636: Performed by: FAMILY MEDICINE

## 2023-06-08 PROCEDURE — 96375 TX/PRO/DX INJ NEW DRUG ADDON: CPT

## 2023-06-08 PROCEDURE — 93010 ELECTROCARDIOGRAM REPORT: CPT | Performed by: FAMILY MEDICINE

## 2023-06-08 PROCEDURE — 96365 THER/PROPH/DIAG IV INF INIT: CPT | Mod: 59

## 2023-06-08 PROCEDURE — 99285 EMERGENCY DEPT VISIT HI MDM: CPT | Mod: 25

## 2023-06-08 PROCEDURE — 99207 PR NO BILLABLE SERVICE THIS VISIT: CPT | Performed by: NURSE PRACTITIONER

## 2023-06-08 PROCEDURE — 99285 EMERGENCY DEPT VISIT HI MDM: CPT | Mod: 25 | Performed by: FAMILY MEDICINE

## 2023-06-08 PROCEDURE — 85379 FIBRIN DEGRADATION QUANT: CPT | Performed by: FAMILY MEDICINE

## 2023-06-08 PROCEDURE — 250N000011 HC RX IP 250 OP 636: Performed by: FAMILY MEDICINE

## 2023-06-08 PROCEDURE — 85610 PROTHROMBIN TIME: CPT | Performed by: FAMILY MEDICINE

## 2023-06-08 PROCEDURE — 82962 GLUCOSE BLOOD TEST: CPT

## 2023-06-08 PROCEDURE — 36415 COLL VENOUS BLD VENIPUNCTURE: CPT | Performed by: FAMILY MEDICINE

## 2023-06-08 PROCEDURE — 85730 THROMBOPLASTIN TIME PARTIAL: CPT | Performed by: FAMILY MEDICINE

## 2023-06-08 PROCEDURE — 80053 COMPREHEN METABOLIC PANEL: CPT | Performed by: FAMILY MEDICINE

## 2023-06-08 PROCEDURE — 250N000009 HC RX 250: Performed by: FAMILY MEDICINE

## 2023-06-08 PROCEDURE — 84484 ASSAY OF TROPONIN QUANT: CPT | Performed by: FAMILY MEDICINE

## 2023-06-08 RX ORDER — IOPAMIDOL 755 MG/ML
168 INJECTION, SOLUTION INTRAVASCULAR ONCE
Status: COMPLETED | OUTPATIENT
Start: 2023-06-08 | End: 2023-06-08

## 2023-06-08 RX ORDER — CEFTRIAXONE 1 G/1
1 INJECTION, POWDER, FOR SOLUTION INTRAMUSCULAR; INTRAVENOUS ONCE
Status: COMPLETED | OUTPATIENT
Start: 2023-06-08 | End: 2023-06-08

## 2023-06-08 RX ORDER — LORAZEPAM 2 MG/ML
1 INJECTION INTRAMUSCULAR ONCE
Status: COMPLETED | OUTPATIENT
Start: 2023-06-08 | End: 2023-06-08

## 2023-06-08 RX ORDER — GADOBUTROL 604.72 MG/ML
10 INJECTION INTRAVENOUS ONCE
Status: DISCONTINUED | OUTPATIENT
Start: 2023-06-08 | End: 2023-06-08 | Stop reason: HOSPADM

## 2023-06-08 RX ORDER — GADOBUTROL 604.72 MG/ML
12 INJECTION INTRAVENOUS ONCE
Status: DISCONTINUED | OUTPATIENT
Start: 2023-06-08 | End: 2023-06-08 | Stop reason: HOSPADM

## 2023-06-08 RX ORDER — LORAZEPAM 2 MG/ML
1 INJECTION INTRAMUSCULAR ONCE
Status: DISCONTINUED | OUTPATIENT
Start: 2023-06-08 | End: 2023-06-08

## 2023-06-08 RX ORDER — LEVOFLOXACIN 500 MG/1
500 TABLET, FILM COATED ORAL DAILY
Qty: 10 TABLET | Refills: 0 | Status: SHIPPED | OUTPATIENT
Start: 2023-06-08 | End: 2023-06-18

## 2023-06-08 RX ADMIN — LORAZEPAM 1 MG: 2 INJECTION INTRAMUSCULAR; INTRAVENOUS at 13:59

## 2023-06-08 RX ADMIN — SODIUM CHLORIDE 100 ML: 9 INJECTION, SOLUTION INTRAVENOUS at 13:37

## 2023-06-08 RX ADMIN — CEFTRIAXONE SODIUM 1 G: 1 INJECTION, POWDER, FOR SOLUTION INTRAMUSCULAR; INTRAVENOUS at 14:31

## 2023-06-08 RX ADMIN — AZITHROMYCIN MONOHYDRATE 500 MG: 500 INJECTION, POWDER, LYOPHILIZED, FOR SOLUTION INTRAVENOUS at 15:04

## 2023-06-08 RX ADMIN — IOPAMIDOL 84 ML: 755 INJECTION, SOLUTION INTRAVENOUS at 13:36

## 2023-06-08 ASSESSMENT — ACTIVITIES OF DAILY LIVING (ADL): ADLS_ACUITY_SCORE: 35

## 2023-06-08 NOTE — DISCHARGE INSTRUCTIONS
RETURN TO THE EMERGENCY ROOM FOR THE FOLLOWING:    Severely worsened breathing, severely worsened weakness, confusion or concerning changes in behavior, fainting, or at anytime for any concern.    FOLLOW UP:    With your primary clinic next week for reevaluation.  A referral order was placed at the time of your discharge.  Expect a phone call within the next 1-2 business days to help with scheduling.  Discussion about repeat chest x-ray and repeat blood work to look at kidney function specifically as recommended.    TREATMENT RECOMMENDATIONS:    Levaquin 500 mg once a day for 7-10 days.  Medication can be stopped at 7 days if you feel great.    Work note provided.  No work over the next 3 days.    Increase oral fluids.  Advance diet as able.    NURSE ADVICE LINE:  [phone] or [phone]

## 2023-06-08 NOTE — CONSULTS
"  [facility]    Stroke Telephone Note    I was called by Anastacio Lawler on 06/08/23 regarding patient Sarath Gray. The patient is a 57 year old male with past medical history significant for  SVT, CARROLL, HTN, and HLD. He presented to the ED for evaluation of subacute left hand paraesthesias. These have reportedly been intermittent for the past two weeks and have worsened. Per ED, he is also short of breath.    Initially activated as a code stroke. Given timeline of symptoms, this was cancelled and MRI and MRA were recommended.     Imaging Findings   PEnding    Impression  Subacute LUE paraesthesias    Recommendations   - MRI brain w/wo  - MRA brain w/o  - MRA neck w/wo    Please page stroke neurology for further recommendations when imaging is completed.    My recommendations are based on the information provided over the phone by Sarath Gray's in-person providers. They are not intended to replace the clinical judgment of his in-person providers. I was not requested to personally see or examine the patient at this time.    Marta Weems, CNP  Vascular Neurology    To page me or covering stroke neurology team member, click here: AMCOM  Choose \"On Call\" tab at top, then select \"NEUROLOGY/ALL SITES\" from middle drop-down box, press Enter, then look for \"stroke\" or \"telestroke\" for your site.        "

## 2023-06-08 NOTE — ED NOTES
All belongings placed in bag at bedside. MRI sheet given to fill out, notified to call when complete.

## 2023-06-08 NOTE — ED PROVIDER NOTES
"  HPI   Patient is a 57-year-old male presenting with shortness of breath, confusion, and left arm numbness and tingling.  He has a known history of SVT.  He has sleep apnea.  He has had acute appendicitis with appendectomy.  He has hypertension and takes Norvasc, hydrochlorothiazide, and lisinopril.  He takes atorvastatin for hyperlipidemia.  He denies recent medication changes.  He does take albuterol occasionally for asthma/COPD.  He smokes tobacco.  He has taken albuterol intermittently over the past few days without improvement.    The patient describes intermittent episodes of left arm numbness and tingling.  This is in a glove distribution.  He does have left-sided neck pain.  He tells me that his arm is not working like it supposed to.  He denies specific weakness.  No leg symptoms.  No headache.  No vision changes.  No trouble with speech.  No facial droop.  No recent fall or injury.  He feels short of breath compared to his baseline.  He has a difficult time getting up and moving around because of this.  He denies chest pain.  He denies wheezing.  No leg pain or swelling.  Transient confusion described.  He says, \"I could not do my work today like I normally do.\"        Allergies:  Allergies   Allergen Reactions     Persantine [Dipyridamole] Other (See Comments)     Nervous and anxiety     Problem List:    Patient Active Problem List    Diagnosis Date Noted     Postoperative infection 05/03/2020     Priority: Medium     Postoperative intra-abdominal abscess 05/03/2020     Priority: Medium     Acute appendicitis with generalized peritonitis, unspecified whether abscess present, unspecified whether gangrene present, unspecified whether perforation present 04/29/2020     Priority: Medium     Added automatically from request for surgery [id number]       CARROLL (obstructive sleep apnea) 03/29/2016     Priority: Medium     4/11/2016. AHI 57, positional effect noted, lowest oxygen saturation was 79, time below 88% was " 32 minutes  1/25/2001. AHI was 8 events per hour. Respiratory arousal index was 45, lowest oxygen saturation was 85%. He weighed 235 lbs       Essential hypertension with goal blood pressure less than 140/90 01/08/2016     Priority: Medium     S/P total knee arthroplasty 06/11/2014     Priority: Medium     Left knee DJD 06/11/2014     Priority: Medium     History of PSVT (paroxysmal supraventricular tachycardia) 05/01/2012     Priority: Medium     WITH ABLATION 2008       Hyperlipidemia LDL goal <100 10/31/2010     Priority: Medium     Low back pain 01/23/2009     Priority: Medium     12/5/2011:Three prior low back surgeries.          Impotence of organic origin 04/02/2008     Priority: Medium     Esophageal reflux 02/24/2005     Priority: Medium     Tobacco use disorder 02/24/2005     Priority: Medium      Past Medical History:    Past Medical History:   Diagnosis Date     Heart disease      Hypertension      CARROLL (obstructive sleep apnea)      PE (pulmonary embolism)      Respiratory complications March 2005     Sleep apnea      Squamous cell carcinoma of skin, unspecified      Past Surgical History:    Past Surgical History:   Procedure Laterality Date     ARTHROPLASTY KNEE  6/11/2014    Procedure: ARTHROPLASTY KNEE;  Surgeon: Maik Jeong MD;  Location: [facility]     ARTHROSCOPY KNEE WITH MEDIAL MENISCECTOMY  12/19/2013    Procedure: ARTHROSCOPY KNEE WITH MEDIAL MENISCECTOMY;  Left Knee Arthroscopy With Intraarticular Debridement.;  Surgeon: Maik Jeong MD;  Location: [facility]     ARTHROTOMY KNEE Right 12/16/2020    Procedure: Knee Open Excision of Prepatellar Bursa;  Surgeon: Maik Jeong MD;  Location: [facility]     COLONOSCOPY  2002     COLONOSCOPY N/A 4/18/2018    Procedure: COLONOSCOPY;  colonoscopy;  Surgeon: Nirmal Schneider MD;  Location: [facility]     EXAM UNDER ANESTHESIA, MANIPULATE JOINT (LOCATION)  6/26/2014    Procedure: EXAM UNDER ANESTHESIA, MANIPULATE JOINT (LOCATION);  Surgeon:  Maik Jeong MD;  Location: [facility]     EXAM UNDER ANESTHESIA, MANIPULATE JOINT (LOCATION) Left 8/28/2014    Procedure: EXAM UNDER ANESTHESIA, MANIPULATE JOINT (LOCATION);  Surgeon: Maik Jeong MD;  Location: [facility]     EXAM UNDER ANESTHESIA, MANIPULATE JOINT (LOCATION) Left 12/31/2014    Procedure: EXAM UNDER ANESTHESIA, MANIPULATE JOINT (LOCATION);  Surgeon: Maik Jeong MD;  Location: [facility]     H ABLATION SVT  2008    AVNRT     INJECT EPIDURAL LUMBAR  12/16/2011    Procedure:INJECT EPIDURAL LUMBAR; MICKY-Dr. Smith; Surgeon:GENERIC ANESTHESIA PROVIDER; Location:[facility]     INJECT EPIDURAL LUMBAR  1/27/2012    Procedure:INJECT EPIDURAL LUMBAR; MICKY with Flouro--; Surgeon:GENERIC ANESTHESIA PROVIDER; Location:[facility]     LAPAROSCOPIC APPENDECTOMY N/A 4/29/2020    Procedure: APPENDECTOMY, LAPAROSCOPIC;  Surgeon: Alexis Maher MD;  Location: [facility]     LAPAROSCOPIC HERNIORRHAPHY INGUINAL BILATERAL Bilateral 12/29/2015    Procedure: LAPAROSCOPIC HERNIORRHAPHY INGUINAL BILATERAL;  Surgeon: Andrew Prajapati MD;  Location: [facility]     Lumbar back fusion  1995, 1998    first was A&P     Lumbar back procedure  1999    Hardware removal     spinal stimulator insertion  2004    also, revised it that year also. never seemed to help well.      Family History:    Family History   Problem Relation Age of Onset     C.A.D. Father 36        MI at age 36-37     Cerebrovascular Disease Father      Allergies Father      Anesthesia Reaction Father      Cardiovascular Paternal Grandfather      Prostate Cancer No family hx of      Colon Cancer No family hx of      Social History:  Marital Status:  Single [1]  Social History     Tobacco Use     Smoking status: Every Day     Packs/day: 1.00     Years: 18.00     Pack years: 18.00     Types: Cigarettes     Smokeless tobacco: Never     Tobacco comments:     Started at age 30.    Vaping Use     Vaping status: Never Used   Substance Use Topics     Alcohol use: Yes     Alcohol/week:  "0.0 standard drinks of alcohol     Comment: occassional beer     Drug use: No      Medications:    levofloxacin (LEVAQUIN) 500 MG tablet  albuterol (PROAIR HFA) 108 (90 Base) MCG/ACT inhaler  amLODIPine (NORVASC) 10 MG tablet  atorvastatin (LIPITOR) 20 MG tablet  hydrochlorothiazide (HYDRODIURIL) 25 MG tablet  lisinopril (ZESTRIL) 40 MG tablet  omeprazole (PRILOSEC) 40 MG DR capsule  order for DME  ORDER FOR DME  traZODone (DESYREL) 50 MG tablet      Review of Systems   All other systems reviewed and are negative.      PE   BP: (!) 155/75  Pulse: 77  Temp: 97.7  F (36.5  C)  Resp: (!) 37  Height: 210.8 cm (6' 11\")  Weight: 120.2 kg (265 lb)  SpO2: 99 %  Physical Exam  Vitals and nursing note reviewed.   Constitutional:       Comments: The patient appears quite uncomfortable, perhaps anxious.  He looks like he is hyperventilating.  He has cooperative and answering questions well.  He is able to stand from the wheelchair and get to the bed on his own though it is difficult for him.  He is using his left upper extremity normally.   HENT:      Head: Atraumatic.      Right Ear: External ear normal.      Left Ear: External ear normal.      Nose: Nose normal.      Mouth/Throat:      Mouth: Mucous membranes are moist.      Pharynx: Oropharynx is clear.   Eyes:      General: No scleral icterus.     Extraocular Movements: Extraocular movements intact.      Conjunctiva/sclera: Conjunctivae normal.      Pupils: Pupils are equal, round, and reactive to light.   Cardiovascular:      Rate and Rhythm: Normal rate.   Pulmonary:      Effort: Pulmonary effort is normal. No respiratory distress.      Breath sounds: Normal breath sounds.   Chest:      Chest wall: No tenderness.   Abdominal:      Palpations: Abdomen is soft.      Tenderness: There is no abdominal tenderness.   Musculoskeletal:         General: Normal range of motion.      Cervical back: Normal range of motion.      Right lower leg: No tenderness. No edema.      Left " "lower leg: No tenderness. No edema.   Skin:     General: Skin is warm and dry.   Neurological:      Mental Status: He is alert and oriented to person, place, and time.      Comments: No dysarthria or dysphasia.  CN II-VIII intact grossly.  Moving U/L extremities B.  Strength 5/5 U/L extremities B.  Patient reports dullness to touch in the left upper extremity.  Rapid alternating movements intact.  Negative Rhomberg.  Normal finger-to-nose movments.   Psychiatric:         Behavior: Behavior normal.         ED COURSE and MDM   1344.  Patient has symptoms and signs as described above.  Vague neurological symptoms involving his left upper extremity.  Numbness and tingling in a glove distribution.  Subjective incoordination, no findings of deficit on examination.  EKG unremarkable and documented below.  Patient is anxious.  Lab values pending.  Ativan will be given in preparation for MRI.    1352.  Clarified his history.  It is the same as above, generally.  Intermittent to left arm numbness and tingling that is in a glove distribution.  It has lasted all day today which is unusual.  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  He feels like he cannot get back to baseline and do what he wants to do, even with rest.  He does report left-sided neck pain but this is not specific to exertion.  He does get shooting pain from the neck down into the arm and into the fingers.  No leg symptoms.  MRI brain, MRA head and neck, MRI cervical spine pending.  EKG encouraging.  Troponin pending.  CT PE run pending.    1428.  Patient appears to have pneumonia on the CT scan.  Leukocytosis present.  Patient coughing in the room.  When questioned further he tells me that he has had pneumonia on the left side multiple times in the past.  He admits that he has had some coughing and fatigue recently but he says, \"I have not " "had pneumonia like this before.  I have not had to go to the ER because of pneumonia.\"  I think his history and physical are now consistent with this finding.  He does not want MRI done and I think that is a reasonable course of action.  MRI canceled.  MRI canceled.  Outpatient imaging of the cervical spine may be necessary if his left neck and arm symptoms continue.  Antibiotics will be given here in the ED, ceftriaxone and azithromycin.  Close follow-up will be required.  O2 saturations are within normal range on room air.  No respiratory distress.    1629.  Patient has evidence of acute kidney injury, perhaps related to dehydration.  This would be consistent with his recent history.  He does feel better after fluid given here in the ED.  Antibiotics given as well, ceftriaxone and azithromycin.  Levaquin prescription ordered as an outpatient.  Start this medication tomorrow.  Close follow-up recommended next week for reevaluation.  Of note, we did talk about hospitalization but he is refusing.  He is adamant that he is going home today.  I stressed the importance of close follow-up to monitor his pneumonia and document resolution as well as repeat blood work to evaluate for improved kidney function.  He should return here for worsening as discussed.  The patient's mom who is a former nurse is in the room throughout.  She is understanding of the situation as well and will monitor him closely.  Patient agrees with the plan.    EKG  (1355)   Interpretation performed by me.  Rate: 81     Rhythm: sinus     Axis: nl  Intervals: CT (12-2) 142, QRS (<12) 100, QTc (>5) 398  P wave: nl     QRS complex: nl   ST segment / T-wave: nl  Conclusion: nl    Electronic medical chart reviewed, including medical problems, medications, medical allergies, social history.  Recent hospitalizations and surgical procedures reviewed.  Recent clinic visits and consultations reviewed.  Recent labs and test results reviewed.  Nursing notes " reviewed.    The patient, their parent if applicable, and/or their medical decision maker(s) and I have reviewed all of the available historical information, applicable PMH, physical exam findings, and objective diagnostic data gathered during this ED visit.  We then discussed all work-up options and then together agreed upon the course taken during this visit.  The ultimate disposition and plan was a cooperative decision made between myself and the patient, their parent if applicable, and/or their legal decision maker(s).  The risks and benefits of all decisions made during this visit were discussed to the best of my abilities given the circumstances, and all parties are understanding of the pertinent ramifications of these decisions.      LABS  Labs Ordered and Resulted from Time of ED Arrival to Time of ED Departure   COMPREHENSIVE METABOLIC PANEL - Abnormal       Result Value    Sodium 127 (*)     Potassium 4.7      Chloride 87 (*)     Carbon Dioxide (CO2) 24      Anion Gap 16 (*)     Urea Nitrogen 32.6 (*)     Creatinine 1.99 (*)     Calcium 10.4 (*)     Glucose 94      Alkaline Phosphatase 52      AST 34      ALT 37      Protein Total 8.2      Albumin 4.7      Bilirubin Total 0.5      GFR Estimate 38 (*)    CBC WITH PLATELETS AND DIFFERENTIAL - Abnormal    WBC Count 17.4 (*)     RBC Count 5.07      Hemoglobin 15.0      Hematocrit 42.5      MCV 84      MCH 29.6      MCHC 35.3      RDW 12.9      Platelet Count 287      % Neutrophils 71      % Lymphocytes 16      % Monocytes 10      % Eosinophils 1      % Basophils 1      % Immature Granulocytes 1      NRBCs per 100 WBC 0      Absolute Neutrophils 12.6 (*)     Absolute Lymphocytes 2.8      Absolute Monocytes 1.7 (*)     Absolute Eosinophils 0.2      Absolute Basophils 0.1      Absolute Immature Granulocytes 0.1      Absolute NRBCs 0.0     D DIMER QUANTITATIVE - Abnormal    D-Dimer Quantitative 1.41 (*)    GLUCOSE BY METER - Abnormal    GLUCOSE BY METER POCT 107  (*)    TROPONIN T, HIGH SENSITIVITY - Normal    Troponin T, High Sensitivity 16     INR - Normal    INR 1.06     PARTIAL THROMBOPLASTIN TIME - Normal    aPTT 29         IMAGING  Images reviewed by me.  Radiology report also reviewed.  CT Chest Pulmonary Embolism w Contrast   Final Result   IMPRESSION:   1.  New and enlarging nodular opacities with associated consolidation   architectural distortion in the right middle and left upper lobes,   favor chronic infectious/inflammatory process, less likely neoplasm.   Consider pulmonology consultation.   2.  No pulmonary embolus.      SHARMAINE MORALES MD            SYSTEM ID:  [id number]          Procedures    Medications   gadobutrol (GADAVIST) injection 12 mL (has no administration in time range)   0.9% sodium chloride BOLUS (has no administration in time range)   gadobutrol (GADAVIST) injection 10 mL (has no administration in time range)   0.9% sodium chloride BOLUS (has no administration in time range)   iopamidol (ISOVUE-370) solution 168 mL (84 mLs Intravenous $Given 6/8/23 1336)   sodium chloride 0.9 % bag 500mL for CT scan flush use (100 mLs Intravenous $Given 6/8/23 1337)   LORazepam (ATIVAN) injection 1 mg (1 mg Intravenous $Given 6/8/23 1359)   cefTRIAXone (ROCEPHIN) 1 g vial to attach to  mL bag for ADULTS or NS 50 mL bag for PEDS (0 g Intravenous Stopped 6/8/23 1459)   azithromycin (ZITHROMAX) 500 mg in sodium chloride 0.9 % 250 mL intermittent infusion (0 mg Intravenous Stopped 6/8/23 1600)         IMPRESSION       ICD-10-CM    1. Community acquired pneumonia of left lower lobe of lung  J18.9 Primary Care Referral      2. COMPA (acute kidney injury) (H)  N17.9 Primary Care Referral               Medication List      Started    levofloxacin 500 MG tablet  Commonly known as: Levaquin  500 mg, Oral, DAILY                        Anastacio Lawler MD  06/08/23 1271

## 2023-06-12 ENCOUNTER — TELEPHONE (OUTPATIENT)
Dept: FAMILY MEDICINE | Facility: CLINIC | Age: 58
End: 2023-06-12
Payer: COMMERCIAL

## 2023-06-12 NOTE — TELEPHONE ENCOUNTER
Pt called for appt, he needs a ER f/u appt. Pt was diagnosed with pneumonia on 6/8/2023. He feels very weak, lightheaded and dizzy with activity. /75 this afternoon, was 144/76 this am and 174/73 on Saturday. He said hie heart is not beating fast. Appt made for tomorrow in [facility]. Pt will go to ER with worsening or new symptoms. Sadia Liriano RN

## 2023-06-13 ENCOUNTER — OFFICE VISIT (OUTPATIENT)
Dept: FAMILY MEDICINE | Facility: CLINIC | Age: 58
End: 2023-06-13
Payer: COMMERCIAL

## 2023-06-13 VITALS
HEIGHT: 78 IN | DIASTOLIC BLOOD PRESSURE: 70 MMHG | BODY MASS INDEX: 28.58 KG/M2 | OXYGEN SATURATION: 100 % | SYSTOLIC BLOOD PRESSURE: 132 MMHG | RESPIRATION RATE: 18 BRPM | HEART RATE: 78 BPM | TEMPERATURE: 97.8 F | WEIGHT: 247 LBS

## 2023-06-13 DIAGNOSIS — N17.9 AKI (ACUTE KIDNEY INJURY) (H): ICD-10-CM

## 2023-06-13 DIAGNOSIS — I10 HYPERTENSION GOAL BP (BLOOD PRESSURE) < 140/90: ICD-10-CM

## 2023-06-13 DIAGNOSIS — D72.829 LEUKOCYTOSIS, UNSPECIFIED TYPE: ICD-10-CM

## 2023-06-13 DIAGNOSIS — E87.1 HYPONATREMIA: Primary | ICD-10-CM

## 2023-06-13 PROBLEM — K65.1 POSTOPERATIVE INTRA-ABDOMINAL ABSCESS (H): Status: RESOLVED | Noted: 2020-05-03 | Resolved: 2023-06-13

## 2023-06-13 PROBLEM — K35.209 ACUTE APPENDICITIS WITH GENERALIZED PERITONITIS, UNSPECIFIED WHETHER ABSCESS PRESENT, UNSPECIFIED WHETHER GANGRENE PRESENT, UNSPECIFIED WHETHER PERFORATION PRESENT: Status: RESOLVED | Noted: 2020-04-29 | Resolved: 2023-06-13

## 2023-06-13 PROBLEM — T81.43XA POSTOPERATIVE INTRA-ABDOMINAL ABSCESS (H): Status: RESOLVED | Noted: 2020-05-03 | Resolved: 2023-06-13

## 2023-06-13 PROBLEM — T81.40XA POSTOPERATIVE INFECTION: Status: RESOLVED | Noted: 2020-05-03 | Resolved: 2023-06-13

## 2023-06-13 LAB
ANION GAP SERPL CALCULATED.3IONS-SCNC: 10 MMOL/L (ref 7–15)
BUN SERPL-MCNC: 21.7 MG/DL (ref 6–20)
CALCIUM SERPL-MCNC: 10.1 MG/DL (ref 8.6–10)
CHLORIDE SERPL-SCNC: 94 MMOL/L (ref 98–107)
CREAT SERPL-MCNC: 1.01 MG/DL (ref 0.67–1.17)
DEPRECATED HCO3 PLAS-SCNC: 30 MMOL/L (ref 22–29)
ERYTHROCYTE [DISTWIDTH] IN BLOOD BY AUTOMATED COUNT: 12.8 % (ref 10–15)
GFR SERPL CREATININE-BSD FRML MDRD: 87 ML/MIN/1.73M2
GLUCOSE SERPL-MCNC: 96 MG/DL (ref 70–99)
HCT VFR BLD AUTO: 41.4 % (ref 40–53)
HGB BLD-MCNC: 14.4 G/DL (ref 13.3–17.7)
MCH RBC QN AUTO: 29.7 PG (ref 26.5–33)
MCHC RBC AUTO-ENTMCNC: 34.8 G/DL (ref 31.5–36.5)
MCV RBC AUTO: 85 FL (ref 78–100)
PLATELET # BLD AUTO: 230 10E3/UL (ref 150–450)
POTASSIUM SERPL-SCNC: 4.2 MMOL/L (ref 3.4–5.3)
RBC # BLD AUTO: 4.85 10E6/UL (ref 4.4–5.9)
SODIUM SERPL-SCNC: 134 MMOL/L (ref 136–145)
WBC # BLD AUTO: 7.1 10E3/UL (ref 4–11)

## 2023-06-13 PROCEDURE — 99214 OFFICE O/P EST MOD 30 MIN: CPT | Performed by: STUDENT IN AN ORGANIZED HEALTH CARE EDUCATION/TRAINING PROGRAM

## 2023-06-13 PROCEDURE — 36415 COLL VENOUS BLD VENIPUNCTURE: CPT | Performed by: STUDENT IN AN ORGANIZED HEALTH CARE EDUCATION/TRAINING PROGRAM

## 2023-06-13 PROCEDURE — 85027 COMPLETE CBC AUTOMATED: CPT | Performed by: STUDENT IN AN ORGANIZED HEALTH CARE EDUCATION/TRAINING PROGRAM

## 2023-06-13 PROCEDURE — 80048 BASIC METABOLIC PNL TOTAL CA: CPT | Performed by: STUDENT IN AN ORGANIZED HEALTH CARE EDUCATION/TRAINING PROGRAM

## 2023-06-13 ASSESSMENT — PAIN SCALES - GENERAL: PAINLEVEL: NO PAIN (0)

## 2023-06-13 NOTE — PROGRESS NOTES
"  Assessment & Plan     Very pleasant 57 year old with PMHx of paroxysmal SVT s/p ablation and HTN who presents for follow up from ED visit. Seen on 6/8/23 with some lightheadedness and confusion type symptoms. On workup found to have CAP and treated with levoquin. Is improving from that perspective. On labs, also noted to have significant COMPA despite no changes in fluid intake. Was started on hydrochlorothiazide about 1 month ago during clinic visit. We discussed today that likely COMPA, hyponatremia related to thiazide diuretic. Will recheck labs today, STOP hydrochlorothiazide, recheck labs in 2 weeks, and follow up in clinic in 1 month.     Will likely need 3rd medication for htn control. Currently on amlodipine and lisinopril.     Hyponatremia  - Basic metabolic panel  - Basic metabolic panel  - Basic metabolic panel    COMPA (acute kidney injury) (H)  - Basic metabolic panel  - PRIMARY CARE FOLLOW-UP SCHEDULING  - Basic metabolic panel  - Basic metabolic panel    Leukocytosis, unspecified type  Had leukocytosis likely 2/2 infection. Recheck today.   - CBC with platelets  - CBC with platelets    Hypertension goal BP (blood pressure) < 140/90  Consider addition of spironolactone vs lasix given history of left lower extremity edema. Will wait for renal function to return to normal prior to starting.   - PRIMARY CARE FOLLOW-UP SCHEDULING  - PRIMARY CARE FOLLOW-UP SCHEDULING      I spent a total of 31 minutes on the day of the visit.   Time spent by me doing chart review, history and exam, documentation and further activities per the note     MED REC REQUIRED  Post Medication Reconciliation Status:  Discharge medications reconciled and changed, see notes/orders    BMI:   Estimated body mass index is 25.21 kg/m  as calculated from the following:    Height as of this encounter: 2.108 m (6' 11\").    Weight as of this encounter: 112 kg (247 lb).       Traci Radford MD  [facility] " "MEGHAN Clemens is a 57 year old, presenting for the following health issues:  ER F/U      HPI     ED/UC Followup:    Facility:  [place]   Date of visit: 06/08/2023  Reason for visit: Pneumonia  Current Status: Dizzy and lightheaded    Pulmonary improved.     Really lightheaded. Also feels \"in [place]\"    Keeping track of BP at home. Last vew days it is all over the place.   Yesterday could hardly function    No more sweats      Takes meds in the morning when waking up.     Mostly 150-160/80soon after last visit,   Then improved somewhat to 130-150/70    Last vfew days:   124/73  144/76  118/75 in the afternoon when \"floating around:  This morning 150/84    Trying to watch sodium, but that is hard to do      Review of Systems   Constitutional, HEENT, cardiovascular, pulmonary, GI, , musculoskeletal, neuro, skin, endocrine and psych systems are negative, except as otherwise noted.      Objective    /70 (BP Location: Right arm, Patient Position: Sitting, Cuff Size: Adult Regular)   Pulse 78   Temp 97.8  F (36.6  C) (Tympanic)   Resp 18   Ht 2.108 m (6' 11\")   Wt 112 kg (247 lb)   SpO2 100%   BMI 25.21 kg/m    Body mass index is 25.21 kg/m .  Physical Exam  Constitutional:       Appearance: Normal appearance.   HENT:      Head: Normocephalic.   Eyes:      General: No scleral icterus.     Extraocular Movements: Extraocular movements intact.      Conjunctiva/sclera: Conjunctivae normal.   Cardiovascular:      Rate and Rhythm: Normal rate and regular rhythm.      Heart sounds: Normal heart sounds.   Pulmonary:      Effort: Pulmonary effort is normal.      Breath sounds: Normal breath sounds.   Musculoskeletal:         General: Swelling (left knee effusion) present.   Neurological:      General: No focal deficit present.      Mental Status: He is alert and oriented to person, place, and time.           Results from this visit  Results for orders placed or performed in visit on 06/13/23   CBC " with platelets     Status: Normal   Result Value Ref Range    WBC Count 7.1 4.0 - 11.0 10e3/uL    RBC Count 4.85 4.40 - 5.90 10e6/uL    Hemoglobin 14.4 13.3 - 17.7 g/dL    Hematocrit 41.4 40.0 - 53.0 %    MCV 85 78 - 100 fL    MCH 29.7 26.5 - 33.0 pg    MCHC 34.8 31.5 - 36.5 g/dL    RDW 12.8 10.0 - 15.0 %    Platelet Count 230 150 - 450 10e3/uL

## 2023-06-17 ENCOUNTER — HEALTH MAINTENANCE LETTER (OUTPATIENT)
Age: 58
End: 2023-06-17

## 2023-06-26 ENCOUNTER — TELEPHONE (OUTPATIENT)
Dept: FAMILY MEDICINE | Facility: CLINIC | Age: 58
End: 2023-06-26
Payer: COMMERCIAL

## 2023-06-26 ENCOUNTER — TELEPHONE (OUTPATIENT)
Dept: FAMILY MEDICINE | Facility: CLINIC | Age: 58
End: 2023-06-26

## 2023-06-26 NOTE — TELEPHONE ENCOUNTER
General Call    Contacts       Type Contact Phone/Fax    06/26/2023 02:34 PM CDT Phone (Incoming) Sarath Gray (Self) [phone] (H)        Reason for Call:  FYI, for Traci Radford.     What are your questions or concerns:  Doesn't want Traci to be mad.  He had to cancel his morning lab for 6/27.  His mom was injured and he is currently coordinating her care and up coming surgery.  He will reschedule ASAP     Date of last appointment with provider: appointment coming up on 7/18    Could we send this information to you in [email or web address] or would you prefer to receive a phone call?:   Patient would prefer a phone call  No call back needed  Okay to leave a detailed message?: Yes at Cell number on file:    Telephone Information:   Mobile [phone]

## 2023-07-11 ENCOUNTER — LAB (OUTPATIENT)
Dept: LAB | Facility: CLINIC | Age: 58
End: 2023-07-11
Payer: COMMERCIAL

## 2023-07-11 DIAGNOSIS — E87.1 HYPONATREMIA: ICD-10-CM

## 2023-07-11 DIAGNOSIS — N17.9 AKI (ACUTE KIDNEY INJURY) (H): ICD-10-CM

## 2023-07-11 DIAGNOSIS — Z11.59 NEED FOR HEPATITIS C SCREENING TEST: ICD-10-CM

## 2023-07-11 LAB
ANION GAP SERPL CALCULATED.3IONS-SCNC: 12 MMOL/L (ref 7–15)
BUN SERPL-MCNC: 14 MG/DL (ref 6–20)
CALCIUM SERPL-MCNC: 9.2 MG/DL (ref 8.6–10)
CHLORIDE SERPL-SCNC: 105 MMOL/L (ref 98–107)
CREAT SERPL-MCNC: 1 MG/DL (ref 0.67–1.17)
DEPRECATED HCO3 PLAS-SCNC: 27 MMOL/L (ref 22–29)
GFR SERPL CREATININE-BSD FRML MDRD: 88 ML/MIN/1.73M2
GLUCOSE SERPL-MCNC: 86 MG/DL (ref 70–99)
POTASSIUM SERPL-SCNC: 3.5 MMOL/L (ref 3.4–5.3)
SODIUM SERPL-SCNC: 144 MMOL/L (ref 136–145)

## 2023-07-11 PROCEDURE — 80048 BASIC METABOLIC PNL TOTAL CA: CPT

## 2023-07-11 PROCEDURE — 36415 COLL VENOUS BLD VENIPUNCTURE: CPT

## 2023-07-11 PROCEDURE — 86803 HEPATITIS C AB TEST: CPT

## 2023-07-12 LAB — HCV AB SERPL QL IA: NONREACTIVE

## 2023-07-17 NOTE — RESULT ENCOUNTER NOTE
Sarath    I am reviewing your results for Dr Radford in her absence today.    Hepatitis C screen was also normal.    Let me know if you have questions.    Kimber Bello PA-C

## 2023-07-18 ENCOUNTER — OFFICE VISIT (OUTPATIENT)
Dept: FAMILY MEDICINE | Facility: CLINIC | Age: 58
End: 2023-07-18
Attending: STUDENT IN AN ORGANIZED HEALTH CARE EDUCATION/TRAINING PROGRAM
Payer: COMMERCIAL

## 2023-07-18 VITALS
HEART RATE: 68 BPM | WEIGHT: 256 LBS | RESPIRATION RATE: 18 BRPM | BODY MASS INDEX: 29.62 KG/M2 | DIASTOLIC BLOOD PRESSURE: 100 MMHG | HEIGHT: 78 IN | OXYGEN SATURATION: 99 % | SYSTOLIC BLOOD PRESSURE: 164 MMHG

## 2023-07-18 DIAGNOSIS — E78.2 MIXED HYPERLIPIDEMIA: ICD-10-CM

## 2023-07-18 DIAGNOSIS — I10 HYPERTENSION GOAL BP (BLOOD PRESSURE) < 140/90: ICD-10-CM

## 2023-07-18 LAB
ALBUMIN UR-MCNC: NEGATIVE MG/DL
ANION GAP SERPL CALCULATED.3IONS-SCNC: 12 MMOL/L (ref 7–15)
APPEARANCE UR: CLEAR
BILIRUB UR QL STRIP: NEGATIVE
BUN SERPL-MCNC: 13.6 MG/DL (ref 6–20)
CALCIUM SERPL-MCNC: 9.7 MG/DL (ref 8.6–10)
CHLORIDE SERPL-SCNC: 102 MMOL/L (ref 98–107)
CHOLEST SERPL-MCNC: 171 MG/DL
COLOR UR AUTO: YELLOW
CREAT SERPL-MCNC: 1.01 MG/DL (ref 0.67–1.17)
DEPRECATED HCO3 PLAS-SCNC: 30 MMOL/L (ref 22–29)
GFR SERPL CREATININE-BSD FRML MDRD: 87 ML/MIN/1.73M2
GLUCOSE SERPL-MCNC: 91 MG/DL (ref 70–99)
GLUCOSE UR STRIP-MCNC: NEGATIVE MG/DL
HDLC SERPL-MCNC: 74 MG/DL
HGB UR QL STRIP: NEGATIVE
KETONES UR STRIP-MCNC: NEGATIVE MG/DL
LDLC SERPL CALC-MCNC: 89 MG/DL
LEUKOCYTE ESTERASE UR QL STRIP: NEGATIVE
NITRATE UR QL: NEGATIVE
NONHDLC SERPL-MCNC: 97 MG/DL
PH UR STRIP: 7 [PH] (ref 5–7)
POTASSIUM SERPL-SCNC: 4.1 MMOL/L (ref 3.4–5.3)
SODIUM SERPL-SCNC: 144 MMOL/L (ref 136–145)
SP GR UR STRIP: 1.01 (ref 1–1.03)
TRIGL SERPL-MCNC: 41 MG/DL
TSH SERPL DL<=0.005 MIU/L-ACNC: 1.06 UIU/ML (ref 0.3–4.2)
UROBILINOGEN UR STRIP-ACNC: 0.2 E.U./DL

## 2023-07-18 PROCEDURE — 81003 URINALYSIS AUTO W/O SCOPE: CPT | Performed by: STUDENT IN AN ORGANIZED HEALTH CARE EDUCATION/TRAINING PROGRAM

## 2023-07-18 PROCEDURE — 80061 LIPID PANEL: CPT | Performed by: STUDENT IN AN ORGANIZED HEALTH CARE EDUCATION/TRAINING PROGRAM

## 2023-07-18 PROCEDURE — 99214 OFFICE O/P EST MOD 30 MIN: CPT | Performed by: STUDENT IN AN ORGANIZED HEALTH CARE EDUCATION/TRAINING PROGRAM

## 2023-07-18 PROCEDURE — 80048 BASIC METABOLIC PNL TOTAL CA: CPT | Performed by: STUDENT IN AN ORGANIZED HEALTH CARE EDUCATION/TRAINING PROGRAM

## 2023-07-18 PROCEDURE — 84443 ASSAY THYROID STIM HORMONE: CPT | Performed by: STUDENT IN AN ORGANIZED HEALTH CARE EDUCATION/TRAINING PROGRAM

## 2023-07-18 PROCEDURE — 36415 COLL VENOUS BLD VENIPUNCTURE: CPT | Performed by: STUDENT IN AN ORGANIZED HEALTH CARE EDUCATION/TRAINING PROGRAM

## 2023-07-18 RX ORDER — SPIRONOLACTONE 25 MG/1
TABLET ORAL
Qty: 53 TABLET | Refills: 0 | Status: SHIPPED | OUTPATIENT
Start: 2023-07-18 | End: 2023-07-27

## 2023-07-18 ASSESSMENT — PAIN SCALES - GENERAL: PAINLEVEL: MODERATE PAIN (5)

## 2023-07-18 NOTE — PROGRESS NOTES
"  Assessment & Plan     Hypertension goal BP (blood pressure) < 140/90  Patient is a very pleasant 57-year-old gentleman who presents today for follow-up on high blood pressure.  He does have a family history of cardiovascular disease.  Blood pressure has been poorly controlled.  Unfortunately, we tried hydrochlorothiazide but renal function was affected.  Today, blood pressure remains elevated, and has had elevated blood pressures at home as well.  We discussed adding spironolactone, slowly taper up to avoid significant renal injury, check labs prior to significant increases, continue to monitor blood pressures at home.  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  We discussed frusemide versus spironolactone, and we will try spironolactone first.  Additional work-up was also ordered today given the difficulty with blood pressure management, worsening over time.  TSH and UA normal.  BMP was erroneously run as he had had that lab done last week.  This was intended to be a future order to be completed prior to her next visit.    - PRIMARY CARE FOLLOW-UP SCHEDULING  - US Renal Complete w Arterial Duplex  - TSH with free T4 reflex  - UA Macroscopic with reflex to Microscopic and Culture - Lab Collect  - Echocardiogram Complete  - spironolactone (ALDACTONE) 25 MG tablet  Dispense: 53 tablet; Refill: 0  - PRIMARY CARE FOLLOW-UP SCHEDULING  - PRIMARY CARE FOLLOW-UP SCHEDULING  - Basic metabolic panel  - UA Macroscopic with reflex to Microscopic and Culture - Lab Collect  - TSH with free T4 reflex       BMI:   Estimated body mass index is 26.13 kg/m  as calculated from the following:    Height as of this encounter: 2.108 m (6' 11\").    Weight as of this encounter: 116.1 kg (256 lb).     Traci Radford MD  [facility] " "MEGHAN Clemens is a 57 year old, presenting for the following health issues:  Hypertension and Lipids        7/18/2023     6:51 AM   Additional Questions   Roomed by Radha LOGAN   Accompanied by Self     History of Present Illness       Hyperlipidemia:  He presents for follow up of hyperlipidemia.  He is taking medication to lower cholesterol. He is not having myalgia or other side effects to statin medications.    Hypertension: He presents for follow up of hypertension.  He does check blood pressure  regularly outside of the clinic. Outside blood pressures have been over 140/90. He follows a low salt diet.     He eats 2-3 servings of fruits and vegetables daily.He consumes 1 sweetened beverage(s) daily.He exercises with enough effort to increase his heart rate 10 to 19 minutes per day.  He exercises with enough effort to increase his heart rate 3 or less days per week.   He is taking medications regularly.     180/110 at home    Sometimes 150 systolic, always >100       Sometimes tingling in the head, nothing triggers it, just sitting around.   Had some hot flashes, redness in face, couple minutes later fine.     No chest pain  No other lightheadedness/dizziness since the ED visit.   No leg pain or worsening leg swelling.       Review of Systems   Constitutional, HEENT, cardiovascular, pulmonary, GI, , musculoskeletal, neuro, skin, endocrine and psych systems are negative, except as otherwise noted.      Objective    BP (!) 164/100 (BP Location: Right arm, Patient Position: Sitting, Cuff Size: Adult Regular)   Pulse 68   Resp 18   Ht 2.108 m (6' 11\")   Wt 116.1 kg (256 lb)   SpO2 99%   BMI 26.13 kg/m    Body mass index is 26.13 kg/m .  Physical Exam  Constitutional:       Appearance: Normal appearance.   HENT:      Head: Normocephalic.   Eyes:      General: No scleral icterus.     Extraocular Movements: Extraocular movements intact.      Conjunctiva/sclera: Conjunctivae normal. "   Cardiovascular:      Rate and Rhythm: Normal rate and regular rhythm.   Pulmonary:      Effort: Pulmonary effort is normal.   Neurological:      General: No focal deficit present.      Mental Status: He is alert and oriented to person, place, and time.           Results from this visit  Results for orders placed or performed in visit on 07/18/23   Basic metabolic panel     Status: Abnormal   Result Value Ref Range    Sodium 144 136 - 145 mmol/L    Potassium 4.1 3.4 - 5.3 mmol/L    Chloride 102 98 - 107 mmol/L    Carbon Dioxide (CO2) 30 (H) 22 - 29 mmol/L    Anion Gap 12 7 - 15 mmol/L    Urea Nitrogen 13.6 6.0 - 20.0 mg/dL    Creatinine 1.01 0.67 - 1.17 mg/dL    Calcium 9.7 8.6 - 10.0 mg/dL    Glucose 91 70 - 99 mg/dL    GFR Estimate 87 >60 mL/min/1.73m2   UA Macroscopic with reflex to Microscopic and Culture - Lab Collect     Status: Normal    Specimen: Urine, Midstream   Result Value Ref Range    Color Urine Yellow Colorless, Straw, Light Yellow, Yellow    Appearance Urine Clear Clear    Glucose Urine Negative Negative mg/dL    Bilirubin Urine Negative Negative    Ketones Urine Negative Negative mg/dL    Specific Gravity Urine 1.015 1.003 - 1.035    Blood Urine Negative Negative    pH Urine 7.0 5.0 - 7.0    Protein Albumin Urine Negative Negative mg/dL    Urobilinogen Urine 0.2 0.2, 1.0 E.U./dL    Nitrite Urine Negative Negative    Leukocyte Esterase Urine Negative Negative    Narrative    Microscopic not indicated   TSH with free T4 reflex     Status: Normal   Result Value Ref Range    TSH 1.06 0.30 - 4.20 uIU/mL   Lipid panel     Status: Normal   Result Value Ref Range    Cholesterol 171 <200 mg/dL    Triglycerides 41 <150 mg/dL    Direct Measure HDL 74 >=40 mg/dL    LDL Cholesterol Calculated 89 <=100 mg/dL    Non HDL Cholesterol 97 <130 mg/dL    Narrative    Cholesterol  Desirable:  <200 mg/dL    Triglycerides  Normal:  Less than 150 mg/dL  Borderline High:  150-199 mg/dL  High:  200-499 mg/dL  Very High:   Greater than or equal to 500 mg/dL    Direct Measure HDL  Female:  Greater than or equal to 50 mg/dL   Male:  Greater than or equal to 40 mg/dL    LDL Cholesterol  Desirable:  <100mg/dL  Above Desirable:  100-129 mg/dL   Borderline High:  130-159 mg/dL   High:  160-189 mg/dL   Very High:  >= 190 mg/dL    Non HDL Cholesterol  Desirable:  130 mg/dL  Above Desirable:  130-159 mg/dL  Borderline High:  160-189 mg/dL  High:  190-219 mg/dL  Very High:  Greater than or equal to 220 mg/dL       Results from last visit:  Lab on 07/11/2023   Component Date Value Ref Range Status     Sodium 07/11/2023 144  136 - 145 mmol/L Final     Potassium 07/11/2023 3.5  3.4 - 5.3 mmol/L Final     Chloride 07/11/2023 105  98 - 107 mmol/L Final     Carbon Dioxide (CO2) 07/11/2023 27  22 - 29 mmol/L Final     Anion Gap 07/11/2023 12  7 - 15 mmol/L Final     Urea Nitrogen 07/11/2023 14.0  6.0 - 20.0 mg/dL Final     Creatinine 07/11/2023 1.00  0.67 - 1.17 mg/dL Final     Calcium 07/11/2023 9.2  8.6 - 10.0 mg/dL Final     Glucose 07/11/2023 86  70 - 99 mg/dL Final     GFR Estimate 07/11/2023 88  >60 mL/min/1.73m2 Final     Hepatitis C Antibody 07/11/2023 Nonreactive  Nonreactive Final

## 2023-07-21 ENCOUNTER — TELEPHONE (OUTPATIENT)
Dept: FAMILY MEDICINE | Facility: CLINIC | Age: 58
End: 2023-07-21
Payer: COMMERCIAL

## 2023-07-21 NOTE — TELEPHONE ENCOUNTER
Patient notified and will start taking spironolactone 2 tabs daily. He has been checking his blood pressure every morning around 4:15 AM when he wakes up. He will start checking his blood pressure 1-2 hours after taking his medication and after resting at least 5 minutes.   Advised to update care team next week with BP readings.   Conchita CHOW RN

## 2023-07-21 NOTE — TELEPHONE ENCOUNTER
General Call      Reason for Call:  Medication Question    What are your questions or concerns:  Pt was started on spironolactone, started taking it this past Tuesday for hypertension. Pt stated his BP is still running high, wondering if he should increase his dosage and start taking 2 tablets a day. Wondering what Dr. Radford wants him to do.        Could we send this information to you in [place] or would you prefer to receive a phone call?:   Patient would prefer a phone call   Okay to leave a detailed message?: Yes at Home number on file [phone] (home)

## 2023-07-21 NOTE — TELEPHONE ENCOUNTER
Pls see telephone encounter below.    Spoke with patient who has been checking his BP at home for the last 2 days readings have been 198/104. He denies any chest pain, dizziness, blurred vision, lightheadedness or HA. States his face has been more flushed lately.    Message routed to provider to advise.    Julie Behrendt RN

## 2023-07-21 NOTE — TELEPHONE ENCOUNTER
Yes, please increase to 2 tabs daily. Continue to increase by 1 tab each week if home numbers remain elevated >140/90. Make sure he is checking after resting for 5 minutes first.     Traci Radford MD

## 2023-07-25 ENCOUNTER — TELEPHONE (OUTPATIENT)
Dept: FAMILY MEDICINE | Facility: CLINIC | Age: 58
End: 2023-07-25
Payer: COMMERCIAL

## 2023-07-25 DIAGNOSIS — I10 HYPERTENSION GOAL BP (BLOOD PRESSURE) < 140/90: ICD-10-CM

## 2023-07-25 NOTE — TELEPHONE ENCOUNTER
Blood Pressure / Medication Question     Contacts         Type Contact Phone/Fax    07/25/2023 09:22 AM CDT Phone (Incoming) Isaac Sarath J (Self) [phone] (H)            What medication are you calling about (include dose and sig)?: Spironolactone   Pt is wondering if he should increase the Dose again. Pt was increased from 25 mg to 50 mg.  B/P 7/23 178/114         7/24  172/102         7/25  172/101  Pt said he feels the same as he always has. Pt has a Echo scheduled 5/26/23.    Before the last increase 196/112 last week.      [place]       [facility] [place] [address] [address]  [id number] [address] [address]  Phone: [phone] Fax: [phone]      Controlled Substance Agreement on file:   CSA -- Patient Level:    CSA: None found at the patient level.           Could we send this information to you in [facility] or would you prefer to receive a phone call?:   Patient would prefer a phone call   Okay to leave a detailed message?: Yes at Home number on file [phone] (home)    Dariela [employer]

## 2023-07-26 ENCOUNTER — HOSPITAL ENCOUNTER (OUTPATIENT)
Dept: CARDIOLOGY | Facility: CLINIC | Age: 58
Discharge: HOME | End: 2023-07-26
Attending: STUDENT IN AN ORGANIZED HEALTH CARE EDUCATION/TRAINING PROGRAM | Admitting: STUDENT IN AN ORGANIZED HEALTH CARE EDUCATION/TRAINING PROGRAM
Payer: COMMERCIAL

## 2023-07-26 DIAGNOSIS — I10 HYPERTENSION GOAL BP (BLOOD PRESSURE) < 140/90: ICD-10-CM

## 2023-07-26 LAB — LVEF ECHO: NORMAL

## 2023-07-26 PROCEDURE — 93306 TTE W/DOPPLER COMPLETE: CPT | Mod: 26 | Performed by: INTERNAL MEDICINE

## 2023-07-26 PROCEDURE — 93306 TTE W/DOPPLER COMPLETE: CPT

## 2023-07-27 RX ORDER — SPIRONOLACTONE 25 MG/1
75 TABLET ORAL DAILY
Qty: 21 TABLET | Refills: 0 | COMMUNITY
Start: 2023-07-27 | End: 2023-08-07

## 2023-07-27 NOTE — TELEPHONE ENCOUNTER
Patient notified, he will continue to check his BP daily and send readings to [facility].    Julie Behrendt RN

## 2023-08-07 ENCOUNTER — HOSPITAL ENCOUNTER (OUTPATIENT)
Dept: ULTRASOUND IMAGING | Facility: CLINIC | Age: 58
Discharge: HOME | End: 2023-08-07
Attending: STUDENT IN AN ORGANIZED HEALTH CARE EDUCATION/TRAINING PROGRAM | Admitting: STUDENT IN AN ORGANIZED HEALTH CARE EDUCATION/TRAINING PROGRAM
Payer: COMMERCIAL

## 2023-08-07 DIAGNOSIS — I10 HYPERTENSION GOAL BP (BLOOD PRESSURE) < 140/90: ICD-10-CM

## 2023-08-07 PROCEDURE — 93975 VASCULAR STUDY: CPT

## 2023-08-07 PROCEDURE — 76770 US EXAM ABDO BACK WALL COMP: CPT

## 2023-08-07 RX ORDER — SPIRONOLACTONE 100 MG/1
100 TABLET, FILM COATED ORAL DAILY
Qty: 90 TABLET | Refills: 3 | Status: SHIPPED | OUTPATIENT
Start: 2023-08-07 | End: 2023-08-21

## 2023-08-07 NOTE — TELEPHONE ENCOUNTER
Pt also wondering if if this medication needs to be increased again. Pt has 1 tablet left of current medication.    Yesi William Patient

## 2023-08-07 NOTE — TELEPHONE ENCOUNTER
Yes, let's increase to 100 mg.     Continue to monitor for dehydration at home (lightheadedness, dizziness).     Traci Radford MD

## 2023-08-07 NOTE — TELEPHONE ENCOUNTER
left message for patient to return call.  Need to know why he thinks he needs this med increased.  Alfreda Nam RN

## 2023-08-07 NOTE — TELEPHONE ENCOUNTER
S-(situation): I talked with Sarath.  He is asking if should increase the spironolactone again.  Current dose is 75 mg daily.  He says BP this last week has been in the 169-170/100-101 range.  Feels good he says.  He admits to having watery stool since starting the spironolactone about 3 weeks ago.  He watery stool about 6 times a day.  Denies abdominal pain, cramps, fever nausea or vomiting.  Pepto Bismol helps.  Denies chest pain or lightheadedness.    B-(background): Poorly controlled BP. On spironolactone because renal function affected by HCTZ.      A-(assessment): high BP readings from home    R-(recommendations): I told Sarath that I would send this note to Dr Radford for review.  Alfreda Nam RN

## 2023-08-08 ENCOUNTER — LAB (OUTPATIENT)
Dept: LAB | Facility: CLINIC | Age: 58
End: 2023-08-08
Payer: COMMERCIAL

## 2023-08-08 DIAGNOSIS — I10 HYPERTENSION GOAL BP (BLOOD PRESSURE) < 140/90: ICD-10-CM

## 2023-08-08 LAB
ANION GAP SERPL CALCULATED.3IONS-SCNC: 11 MMOL/L (ref 7–15)
BUN SERPL-MCNC: 16.7 MG/DL (ref 6–20)
CALCIUM SERPL-MCNC: 8.8 MG/DL (ref 8.6–10)
CHLORIDE SERPL-SCNC: 102 MMOL/L (ref 98–107)
CREAT SERPL-MCNC: 1.1 MG/DL (ref 0.67–1.17)
DEPRECATED HCO3 PLAS-SCNC: 26 MMOL/L (ref 22–29)
GFR SERPL CREATININE-BSD FRML MDRD: 78 ML/MIN/1.73M2
GLUCOSE SERPL-MCNC: 83 MG/DL (ref 70–99)
POTASSIUM SERPL-SCNC: 3.6 MMOL/L (ref 3.4–5.3)
SODIUM SERPL-SCNC: 139 MMOL/L (ref 136–145)

## 2023-08-08 PROCEDURE — 80048 BASIC METABOLIC PNL TOTAL CA: CPT

## 2023-08-08 PROCEDURE — 36415 COLL VENOUS BLD VENIPUNCTURE: CPT

## 2023-08-21 ENCOUNTER — OFFICE VISIT (OUTPATIENT)
Dept: FAMILY MEDICINE | Facility: CLINIC | Age: 58
End: 2023-08-21
Attending: STUDENT IN AN ORGANIZED HEALTH CARE EDUCATION/TRAINING PROGRAM
Payer: COMMERCIAL

## 2023-08-21 VITALS
HEIGHT: 78 IN | WEIGHT: 251 LBS | DIASTOLIC BLOOD PRESSURE: 92 MMHG | RESPIRATION RATE: 18 BRPM | SYSTOLIC BLOOD PRESSURE: 148 MMHG | OXYGEN SATURATION: 99 % | HEART RATE: 78 BPM | BODY MASS INDEX: 29.04 KG/M2

## 2023-08-21 DIAGNOSIS — I70.1 RENAL ARTERY STENOSIS (H): ICD-10-CM

## 2023-08-21 DIAGNOSIS — R10.13 EPIGASTRIC PAIN: ICD-10-CM

## 2023-08-21 DIAGNOSIS — I10 HYPERTENSION GOAL BP (BLOOD PRESSURE) < 140/90: Primary | ICD-10-CM

## 2023-08-21 DIAGNOSIS — I34.0 MITRAL VALVE INSUFFICIENCY, UNSPECIFIED ETIOLOGY: ICD-10-CM

## 2023-08-21 PROCEDURE — 99214 OFFICE O/P EST MOD 30 MIN: CPT | Performed by: STUDENT IN AN ORGANIZED HEALTH CARE EDUCATION/TRAINING PROGRAM

## 2023-08-21 PROCEDURE — 99207 E-CONSULT TO VASCULAR SURGERY (ADULT OUTPT PROVIDER TO SPECIALIST WRITTEN QUESTION & RESPONSE): CPT | Performed by: STUDENT IN AN ORGANIZED HEALTH CARE EDUCATION/TRAINING PROGRAM

## 2023-08-21 RX ORDER — OMEPRAZOLE 40 MG/1
40 CAPSULE, DELAYED RELEASE ORAL 2 TIMES DAILY
Qty: 180 CAPSULE | Refills: 3 | Status: SHIPPED | OUTPATIENT
Start: 2023-08-21 | End: 2024-08-07

## 2023-08-21 RX ORDER — SPIRONOLACTONE 100 MG/1
150 TABLET, FILM COATED ORAL DAILY
Qty: 135 TABLET | Refills: 3 | Status: SHIPPED | OUTPATIENT
Start: 2023-08-21 | End: 2023-09-12

## 2023-08-21 ASSESSMENT — PAIN SCALES - GENERAL: PAINLEVEL: MODERATE PAIN (5)

## 2023-08-21 NOTE — PROGRESS NOTES
"  Assessment & Plan     Hypertension goal BP (blood pressure) < 140/90  Patient is a very pleasant 57 year old who presents today for follow up on BP. Patient has done a great job with communication between visits, increasing spironolactone dosage. BMP in the interim without decrease GFR, electrolyte disturbances. Some diarrhea with the spironolactone, but tolerating with antidiarrheals. Asymptomatic. Continues to smoke. Discussed renal ultrasound today, consider stenosis as a potential contributing factor for difficulty managing BP. For now, increase to 150 mg flavia, contact clinic in 1-2 weeks to update us on dosage. Increase to 200 mg if still not at goal. Follow up in 1 month if not controlled at home. Considered renin:bonnie testing today, but will hold off until recheck labs. Results may be difficult to interpret given spironolactone and ACE usage.   - PRIMARY CARE FOLLOW-UP SCHEDULING  - Adult E-Consult to Vascular Surgery (Outpt Provider to Specialist Written Question & Response)  - spironolactone (ALDACTONE) 100 MG tablet  Dispense: 135 tablet; Refill: 3    Renal artery stenosis (H)  E consult for vascular today given renal artery stenosis suspected on renal US. Will await their recommendations before further workup/referrals.   - Adult E-Consult to Vascular Surgery (Outpt Provider to Specialist Written Question & Response)    Epigastric pain  refill  - omeprazole (PRILOSEC) 40 MG DR capsule  Dispense: 180 capsule; Refill: 3    Mitral valve insufficiency, unspecified etiology  Reviewed Echo results today. Asymptomatic and mild to moderate insufficiency. Monitor for symptom development.    I spent a total of 26 minutes on the day of the visit.   Time spent by me doing chart review, history and exam, documentation and further activities per the note     BMI:   Estimated body mass index is 25.62 kg/m  as calculated from the following:    Height as of this encounter: 2.108 m (6' 11\").    Weight as of this " "encounter: 113.9 kg (251 lb).     Traci Radford MD  [facility]    Sadaf Clemens is a 57 year old, presenting for the following health issues:  Hypertension and Results (Labwork, US, ECHO)        8/21/2023     6:48 AM   Additional Questions   Roomed by Radha LOGAN   Accompanied by Self       History of Present Illness       Hypertension: He presents for follow up of hypertension.  He does check blood pressure  regularly outside of the clinic. Outside blood pressures have been over 140/90. He follows a low salt diet.     He eats 2-3 servings of fruits and vegetables daily.He consumes 1 sweetened beverage(s) daily.He exercises with enough effort to increase his heart rate 9 or less minutes per day.  He exercises with enough effort to increase his heart rate 3 or less days per week.   He is taking medications regularly.     Getting near 150 systolic at home as well.     Some diarrhea since starting spironolactone. Taking antidiarrheal. Otherwise doing well.    Kidney US, wondering what this means.     Review of Systems   Constitutional, HEENT, cardiovascular, pulmonary, GI, , musculoskeletal, neuro, skin, endocrine and psych systems are negative, except as otherwise noted.      Objective    BP (!) 148/92 (BP Location: Right arm, Patient Position: Sitting, Cuff Size: Adult Large)   Pulse 78   Resp 18   Ht 2.108 m (6' 11\")   Wt 113.9 kg (251 lb)   SpO2 99%   BMI 25.62 kg/m    Body mass index is 25.62 kg/m .  Physical Exam  Constitutional:       Appearance: Normal appearance.   HENT:      Head: Normocephalic.   Eyes:      General: No scleral icterus.     Extraocular Movements: Extraocular movements intact.      Conjunctiva/sclera: Conjunctivae normal.   Cardiovascular:      Rate and Rhythm: Normal rate.   Pulmonary:      Effort: Pulmonary effort is normal.   Neurological:      General: No focal deficit present.      Mental Status: He is alert and oriented to person, place, and " time.         Results from last visit:  Lab on 08/08/2023   Component Date Value Ref Range Status    Sodium 08/08/2023 139  136 - 145 mmol/L Final    Potassium 08/08/2023 3.6  3.4 - 5.3 mmol/L Final    Chloride 08/08/2023 102  98 - 107 mmol/L Final    Carbon Dioxide (CO2) 08/08/2023 26  22 - 29 mmol/L Final    Anion Gap 08/08/2023 11  7 - 15 mmol/L Final    Urea Nitrogen 08/08/2023 16.7  6.0 - 20.0 mg/dL Final    Creatinine 08/08/2023 1.10  0.67 - 1.17 mg/dL Final    Calcium 08/08/2023 8.8  8.6 - 10.0 mg/dL Final    Glucose 08/08/2023 83  70 - 99 mg/dL Final    GFR Estimate 08/08/2023 78  >60 mL/min/1.73m2 Final     7/26/23 Echo:   Interpretation Summary  Global and regional left ventricular function is normal with an EF of 55-60%.  Right ventricular function, chamber size, wall motion, and thickness are  normal.  Mild to moderate mitral insufficiency is present.  The inferior vena cava is normal.  No pericardial effusion is present.  There is no prior study for direct comparison.    8/7/23 Renal US Complete  FINDINGS:  RIGHT KIDNEY: 14.4 x 6.2 x 4.9 cm. Normal without hydronephrosis or  masses.      LEFT KIDNEY: 14.3 x 5.6 x 5.3 cm. Normal without hydronephrosis or  masses.      RENAL DOPPLER:     RIGHT:  RENAL ARTERY VELOCITY (cm/s):  Hilum: 142  Mid: 264  Origin: 246     ARCUATE ARTERIES (RI):  Superior: 0.64  Mid: 0.66  Inferior: 0.68     LEFT:  RENAL ARTERY VELOCITY (cm/s):  Hilum: 82  Mid: 138  Origin: 198     ARCUATE ARTERIES (RI):  Superior: 0.71  Mid: 0.58  Inferior: 0.69     Renal Vein: Patent     Aortic Velocity: 116 cm/s                                                                      IMPRESSION:  1.  Elevated peak systolic velocities of the renal arteries, right  greater than left, suggesting a degree of stenosis.  2.  Normal sonographic appearance of the kidneys.     NIMA WEINBERG MD

## 2023-09-05 ENCOUNTER — MYC MEDICAL ADVICE (OUTPATIENT)
Dept: FAMILY MEDICINE | Facility: CLINIC | Age: 58
End: 2023-09-05
Payer: COMMERCIAL

## 2023-09-05 DIAGNOSIS — I10 HYPERTENSION GOAL BP (BLOOD PRESSURE) < 140/90: ICD-10-CM

## 2023-09-12 RX ORDER — SPIRONOLACTONE 100 MG/1
200 TABLET, FILM COATED ORAL DAILY
Qty: 180 TABLET | Refills: 3 | Status: SHIPPED | OUTPATIENT
Start: 2023-09-12 | End: 2024-02-05

## 2023-09-13 ENCOUNTER — TELEPHONE (OUTPATIENT)
Dept: VASCULAR SURGERY | Facility: CLINIC | Age: 58
End: 2023-09-13
Payer: COMMERCIAL

## 2023-09-13 NOTE — TELEPHONE ENCOUNTER
----- Message from Lona Velasquez RN sent at 9/13/2023  1:04 PM CDT -----  Ilir Reina,    Can you please set this pt up in a new vascular slot with Dr FLANNERY? Reason for visit is renal stenosis. No imaging needed.    Thank you!    Lona

## 2023-10-02 ENCOUNTER — OFFICE VISIT (OUTPATIENT)
Dept: VASCULAR SURGERY | Facility: CLINIC | Age: 58
End: 2023-10-02
Payer: COMMERCIAL

## 2023-10-02 VITALS — HEART RATE: 77 BPM | OXYGEN SATURATION: 97 % | SYSTOLIC BLOOD PRESSURE: 165 MMHG | DIASTOLIC BLOOD PRESSURE: 96 MMHG

## 2023-10-02 DIAGNOSIS — E78.5 HYPERLIPIDEMIA LDL GOAL <100: Primary | ICD-10-CM

## 2023-10-02 DIAGNOSIS — I70.1 RENAL ARTERY STENOSIS (H): ICD-10-CM

## 2023-10-02 DIAGNOSIS — I10 ESSENTIAL HYPERTENSION WITH GOAL BLOOD PRESSURE LESS THAN 140/90: ICD-10-CM

## 2023-10-02 PROCEDURE — 99203 OFFICE O/P NEW LOW 30 MIN: CPT | Performed by: SURGERY

## 2023-10-02 RX ORDER — MULTIVITAMIN,THERAPEUTIC
1 TABLET ORAL DAILY
COMMUNITY

## 2023-10-02 RX ORDER — ASPIRIN 81 MG/1
81 TABLET ORAL DAILY
COMMUNITY
Start: 2023-10-02

## 2023-10-02 ASSESSMENT — PAIN SCALES - GENERAL: PAINLEVEL: NO PAIN (0)

## 2023-10-02 NOTE — PATIENT INSTRUCTIONS
Thank you so much for choosing us for your care. It was a pleasure to see you at the vascular clinic today.     Follow-up recommendations: We will see you back in 1-2 weeks after your scan. Please do not hesitate to reach out to us in the meantime with any concerns. Our schedulers will get in touch with you to set up your follow-up visit.    Additional testing/imaging ordered today: CTA abdomen/pelvis. Be sure to increase your fluid intake 24 hours before and after the CT scan. This will help prevent damage to your kidneys.      Our scheduling team will get in touch with you to set up any follow-up testing/imaging and/or appointments. Please be aware that any testing/imaging recommended today will need to completed prior to your next visit with the provider. If testing/imaging is not completed prior to your next visit, your visit may be rescheduled.        If you have any questions, please contact our clinic directly at [phone] and ask for the nurse. We also encourage the use of [employer] to communicate with your HealthCare Provider.      If you have an urgent need after business hours (8:00 am to 4:30 pm) please call [phone], option 4, and ask for the vascular attending on call. For non-urgent after hours needs, please call the vascular clinic at [phone]. For scheduling needs, please call our clinic directly at [phone].

## 2023-10-02 NOTE — LETTER
10/2/2023       RE: Sarath Gray  [address] [facility] [id number]     Dear Colleague,    Thank you for referring your patient, Sarath Gray, to the [facility] [place] at [facility]. Please see a copy of my visit note below.    Vascular & Endovascular Surgery Clinic Consultation   Vascular Surgeon: Scotty Mcdonough MD, RPVI       Location:  [facility]    Sarath Gray  Medical Record #:  [id number]  YOB: 1965  Age:  58 year old     Date of Service: 10/2/2023    Referring Provider: No ref. provider found.  Primary Care Provider: Traci Radford    Chief Complaint/Reason for Visit: Renal artery stenosis    HPI:  Mr. Gray is a pleasant 58 year old male seen today for difficult to control hypertension and renal ultrasound showing elevated peak systolic velocities bilaterally.  He has had a several year history of hypertension.  This has been more difficult to control over the summer.  He did present to the ER dehydrated and was found to have systolic blood pressures in the 200s.  He improved with blood pressure control.  Is my understanding he did not have flash pulmonary edema or heart failure at the time.  He has had no other events similar.    CV risk factors include hypertension, CARROLL and is a current smoker (1 pack/day for approximately 25 years).  He tells me he has been trying to cut down as his wife is quitting smoking but not quite ready for nicotine dependence consultation    Relevant surgical history:  -Laparoscopic appendectomy and bilateral inguinal hernia repairs    Review of Systems:    A 12 point ROS was reviewed and is negative except for symptoms noted in HPI.     Medical/Surgical History:    Past Medical History:   Diagnosis Date    Acute appendicitis with generalized peritonitis, unspecified whether abscess present, unspecified whether gangrene present,  unspecified whether perforation present 4/29/2020    Added automatically from request for surgery [id number]    Heart disease     Hypertension     CARROLL (obstructive sleep apnea)     PE (pulmonary embolism)     years ago, apparently no cause found, does smoke    Respiratory complications March 2005    Sleep apnea     Squamous cell carcinoma of skin, unspecified          Past Surgical History:   Procedure Laterality Date    ARTHROPLASTY KNEE  6/11/2014    Procedure: ARTHROPLASTY KNEE;  Surgeon: Maik Jeong MD;  Location: [facility]    ARTHROSCOPY KNEE WITH MEDIAL MENISCECTOMY  12/19/2013    Procedure: ARTHROSCOPY KNEE WITH MEDIAL MENISCECTOMY;  Left Knee Arthroscopy With Intraarticular Debridement.;  Surgeon: Maik Jeong MD;  Location: [facility]    ARTHROTOMY KNEE Right 12/16/2020    Procedure: Knee Open Excision of Prepatellar Bursa;  Surgeon: Maik Jeong MD;  Location: [facility]    COLONOSCOPY  2002    COLONOSCOPY N/A 4/18/2018    Procedure: COLONOSCOPY;  colonoscopy;  Surgeon: Nirmal Schneider MD;  Location: [facility]    EXAM UNDER ANESTHESIA, MANIPULATE JOINT (LOCATION)  6/26/2014    Procedure: EXAM UNDER ANESTHESIA, MANIPULATE JOINT (LOCATION);  Surgeon: Maik Jeong MD;  Location: [facility]    EXAM UNDER ANESTHESIA, MANIPULATE JOINT (LOCATION) Left 8/28/2014    Procedure: EXAM UNDER ANESTHESIA, MANIPULATE JOINT (LOCATION);  Surgeon: Maik Jeong MD;  [facility]: [facility]    EXAM UNDER ANESTHESIA, MANIPULATE JOINT (LOCATION) Left 12/31/2014    Procedure: EXAM UNDER ANESTHESIA, MANIPULATE JOINT (LOCATION);  Surgeon: Maik Jeong MD;  Location: [facility]    H ABLATION SVT  2008    AVNRT    INJECT EPIDURAL LUMBAR  12/16/2011    Procedure:INJECT EPIDURAL LUMBAR; MICKY-Dr. Smith; Surgeon:GENERIC ANESTHESIA PROVIDER; [facility]    INJECT EPIDURAL LUMBAR  1/27/2012    Procedure:INJECT EPIDURAL LUMBAR; MICKY with Flouro--; Surgeon:GENERIC ANESTHESIA PROVIDER; [facility]    LAPAROSCOPIC APPENDECTOMY  N/A 4/29/2020    Procedure: APPENDECTOMY, LAPAROSCOPIC;  Surgeon: Alexis Maher MD;  [facility]    LAPAROSCOPIC HERNIORRHAPHY INGUINAL BILATERAL Bilateral 12/29/2015    Procedure: LAPAROSCOPIC HERNIORRHAPHY INGUINAL BILATERAL;  Surgeon: Andrew Prajapati MD;  Location: [facility]    Lumbar back fusion  1995, 1998    first was A&P    Lumbar back procedure  1999    Hardware removal    spinal stimulator insertion  2004    also, revised it that year also. never seemed to help well.         Allergies:     Allergies   Allergen Reactions    Persantine [Dipyridamole] Other (See Comments)     Nervous and anxiety        Medications:    Current Outpatient Medications   Medication    albuterol (PROAIR HFA) 108 (90 Base) MCG/ACT inhaler    amLODIPine (NORVASC) 10 MG tablet    atorvastatin (LIPITOR) 20 MG tablet    lisinopril (ZESTRIL) 40 MG tablet    omeprazole (PRILOSEC) 40 MG DR capsule    order for DME    ORDER FOR DME    spironolactone (ALDACTONE) 100 MG tablet    traZODone (DESYREL) 50 MG tablet     No current facility-administered medications for this visit.     Facility-Administered Medications Ordered in Other Visits   Medication    iopamidol (ISOVUE-370) 76% solution 80 mL    sodium chloride 0.9 % for CT scan flush dose 80 mL        Social Habits:    Tobacco Use      Smoking status: Every Day        Packs/day: 1.00        Years: 18.00        Pack years: 18        Types: Cigarettes      Smokeless tobacco: Never      Tobacco comments: Started at age 30.        Alcohol Use: Not on file       History   Drug Use No        Family History:    Family History   Problem Relation Age of Onset    C.A.D. Father 36        MI at age 36-37    Cerebrovascular Disease Father     Allergies Father     Anesthesia Reaction Father     Cardiovascular Paternal Grandfather     Prostate Cancer No family hx of     Colon Cancer No family hx of        Physical Examination:  There were no vitals taken for this visit.  Wt Readings from Last 1  Encounters:   08/21/23 251 lb     There is no height or weight on file to calculate BMI.    Exam:  General: No apparent distress. Answers questions appropriately  Neurologic: Focally intact, Alert and oriented x 3.   Eyes: Grossly normal.   Cardiac: Regular rate and rhythm  Pulmonary:  Non labored breathing with normal respiratory effort  Abdominal: Soft, non distended, non tender to palpation.  No pulsatile or expansile mass.   Musculoskeletal/Extremity: 5/5 gross lower extremity strength.  No open wounds or abrasions.     Vascular Exam:     Radial: Left: 2+   Right: 2+    Laboratory Findings:  Hemoglobin   Date Value Ref Range Status   06/13/2023 14.4 13.3 - 17.7 g/dL Final   06/08/2023 15.0 13.3 - 17.7 g/dL Final   12/16/2020 14.9 13.3 - 17.7 g/dL Final   12/14/2020 14.7 13.3 - 17.7 g/dL Final     WBC   Date Value Ref Range Status   12/14/2020 11.2 (H) 4.0 - 11.0 10e9/L Final   05/05/2020 8.8 4.0 - 11.0 10e9/L Final     WBC Count   Date Value Ref Range Status   06/13/2023 7.1 4.0 - 11.0 10e3/uL Final   06/08/2023 17.4 (H) 4.0 - 11.0 10e3/uL Final     INR   Date Value Ref Range Status   06/08/2023 1.06 0.85 - 1.15 Final   05/01/2012 0.98 0.86 - 1.14 Final       Imaging:    I personally reviewed the renal artery duplex from 8/7/2023 which shows elevated peak systolic velocities to 246 in the right origin, and to 64 in the right mid renal artery.  The renal aortic ratio however is normal.  Resistive indices are approximately 1.65.  The left has similar elevation peak systolic velocity to 198 with again normal renal aortic ratio and resistive indices approximately 0.65 to 0.7.  This could be suggestive of bilateral renal artery stenosis by peak systolic velocity however is contradictory with low renal aortic ratio.    Impression/Shared Medical Decision Making:    #1-Hypertension with recent medication escalation  #2-borderline renal artery stenosis bilaterally with contradictory ultrasound findings  #3-nicotine  dependence  #4-obstructive sleep apnea not on CPAP  Mr. Gray is a pleasant 58 year old male evaluated for hypertension in the setting of elevated peak systolic velocities on ultrasound which could be suggestive of but not definitive for renal artery stenosis.  We discussed the etiology and natural history of peripheral arterial disease including modifiable risk factors.  He is a current smoker.  Discussed smoking cessation and its importance both to blood pressure control, vascular health, and durability of any interventions.  Additionally, CPAP and CARROLL has been shown to decrease blood pressure.  We discussed this.  He is not quite ready for nicotine dependence referral, however is trying to quit smoking.  He is on a statin but not on aspirin.  The addition of aspirin would improve his optimal medical management for peripheral vascular disease.  We discussed the somewhat mixed picture raised by his ultrasound.  He does not have any worsening EGFR or ischemic nephropathy, recurrent episodes of flash pulmonary edema or heart failure.  He is on 3 medications which can be an indication for intervention however I do think we need a better anatomic delineation with CT angiogram.  I do think would also be helpful to have a nephrology perspective on the expected benefits of stenting with this particular presentation.  I will discuss this with his primary care physician and patient placed this referral.  Mr. Gray also mention his difficulty in traveling today particularly with some other family illnesses over the summer.  We discussed that he would prefer to have most of his work-up done in [place], which I think is reasonable.  I can either see him virtually again to discuss the results once he is seen by nephrology and I have the results of the CT angiogram or I can try to set him up with one of my partners who has clinic in that area.    Discussed warning signs including, but not limited to, hypertensive emergency,  and worsening renal function.  If he experiences any of these, he has been advised to seek treatment and contact my team.    Mr. Gray is in agreement with this plan as outlined.    Recommendations/Plan:  CT angiogram of the abdomen pelvis to better assess for renal artery anatomy, with oral hydration  We will add a baby aspirin to his current regimen  I will reach out to his primary care physician  Nephrology referral  Smoking cessation, he is not currently ready for nicotine dependence referral        40 minutes spent on the day of encounter doing chart review from our system and care everywhere, history and exam, documentation, coordinating care, and further activities as noted with over half spent counseling.         Again, thank you for allowing me to participate in the care of your patient.      Sincerely,    Scotty Mcdonough

## 2023-10-02 NOTE — NURSING NOTE
Chief Complaint   Patient presents with    New Patient     New vascular - renal stenosis        Vitals were taken, medications reconciled.    Claire Merlos CMA  10:02 AM

## 2023-10-02 NOTE — PROGRESS NOTES
Vascular & Endovascular Surgery Clinic Consultation   Vascular Surgeon: Scotty Mcdonough MD, RPVI       Location:  [facility]    Sarath Gray  Medical Record #:  [id number]  YOB: 1965  Age:  58 year old     Date of Service: 10/2/2023    Referring Provider: No ref. provider found.  Primary Care Provider: Traci Radford    Chief Complaint/Reason for Visit: Renal artery stenosis    HPI:  Mr. Gray is a pleasant 58 year old male seen today for difficult to control hypertension and renal ultrasound showing elevated peak systolic velocities bilaterally.  He has had a several year history of hypertension.  This has been more difficult to control over the summer.  He did present to the ER dehydrated and was found to have systolic blood pressures in the 200s.  He improved with blood pressure control.  Is my understanding he did not have flash pulmonary edema or heart failure at the time.  He has had no other events similar.    CV risk factors include hypertension, CARROLL and is a current smoker (1 pack/day for approximately 25 years).  He tells me he has been trying to cut down as his wife is quitting smoking but not quite ready for nicotine dependence consultation    Relevant surgical history:  -Laparoscopic appendectomy and bilateral inguinal hernia repairs    Review of Systems:    A 12 point ROS was reviewed and is negative except for symptoms noted in HPI.     Medical/Surgical History:    Past Medical History:   Diagnosis Date    Acute appendicitis with generalized peritonitis, unspecified whether abscess present, unspecified whether gangrene present, unspecified whether perforation present 4/29/2020    Added automatically from request for surgery [id number]    Heart disease     Hypertension     CARROLL (obstructive sleep apnea)     PE (pulmonary embolism)     years ago, apparently no cause found, does smoke    Respiratory complications March 2005    Sleep apnea      Squamous cell carcinoma of skin, unspecified          Past Surgical History:   Procedure Laterality Date    ARTHROPLASTY KNEE  6/11/2014    Procedure: ARTHROPLASTY KNEE;  Surgeon: Maik Jeong MD;  Location: [facility]    ARTHROSCOPY KNEE WITH MEDIAL MENISCECTOMY  12/19/2013    Procedure: ARTHROSCOPY KNEE WITH MEDIAL MENISCECTOMY;  Left Knee Arthroscopy With Intraarticular Debridement.;  Surgeon: Maik Jeong MD;  Location: [facility]    ARTHROTOMY KNEE Right 12/16/2020    Procedure: Knee Open Excision of Prepatellar Bursa;  Surgeon: Maik Jeong MD;  Location: [facility]    COLONOSCOPY  2002    COLONOSCOPY N/A 4/18/2018    Procedure: COLONOSCOPY;  colonoscopy;  Surgeon: Nirmal Schneider MD;  Location: [facility]    EXAM UNDER ANESTHESIA, MANIPULATE JOINT (LOCATION)  6/26/2014    Procedure: EXAM UNDER ANESTHESIA, MANIPULATE JOINT (LOCATION);  Surgeon: Maik Jeong MD;  Location: [facility]    EXAM UNDER ANESTHESIA, MANIPULATE JOINT (LOCATION) Left 8/28/2014    Procedure: EXAM UNDER ANESTHESIA, MANIPULATE JOINT (LOCATION);  Surgeon: Maik Jeong MD;  [facility]: [facility]    EXAM UNDER ANESTHESIA, MANIPULATE JOINT (LOCATION) Left 12/31/2014    Procedure: EXAM UNDER ANESTHESIA, MANIPULATE JOINT (LOCATION);  Surgeon: Maik Jeong MD;  Location: [facility]    H ABLATION SVT  2008    AVNRT    INJECT EPIDURAL LUMBAR  12/16/2011    Procedure:INJECT EPIDURAL LUMBAR; MICKY-Dr. Smith; Surgeon:GENERIC ANESTHESIA PROVIDER; [facility]    INJECT EPIDURAL LUMBAR  1/27/2012    Procedure:INJECT EPIDURAL LUMBAR; MICKY with Flouro--; Surgeon:GENERIC ANESTHESIA PROVIDER; [facility]    LAPAROSCOPIC APPENDECTOMY N/A 4/29/2020    Procedure: APPENDECTOMY, LAPAROSCOPIC;  Surgeon: Alexis Maher MD;  Location: [facility]    LAPAROSCOPIC HERNIORRHAPHY INGUINAL BILATERAL Bilateral 12/29/2015    Procedure: LAPAROSCOPIC HERNIORRHAPHY INGUINAL BILATERAL;  Surgeon: Andrew Prajapati MD;  Location: [facility]    Lumbar back fusion  1995, 1998     first was A&P    Lumbar back procedure  1999    Hardware removal    spinal stimulator insertion  2004    also, revised it that year also. never seemed to help well.         Allergies:     Allergies   Allergen Reactions    Persantine [Dipyridamole] Other (See Comments)     Nervous and anxiety        Medications:    Current Outpatient Medications   Medication    albuterol (PROAIR HFA) 108 (90 Base) MCG/ACT inhaler    amLODIPine (NORVASC) 10 MG tablet    atorvastatin (LIPITOR) 20 MG tablet    lisinopril (ZESTRIL) 40 MG tablet    omeprazole (PRILOSEC) 40 MG DR capsule    order for DME    ORDER FOR DME    spironolactone (ALDACTONE) 100 MG tablet    traZODone (DESYREL) 50 MG tablet     No current facility-administered medications for this visit.     Facility-Administered Medications Ordered in Other Visits   Medication    iopamidol (ISOVUE-370) 76% solution 80 mL    sodium chloride 0.9 % for CT scan flush dose 80 mL        Social Habits:    Tobacco Use      Smoking status: Every Day        Packs/day: 1.00        Years: 18.00        Pack years: 18        Types: Cigarettes      Smokeless tobacco: Never      Tobacco comments: Started at age 30.        Alcohol Use: Not on file       History   Drug Use No        Family History:    Family History   Problem Relation Age of Onset    C.A.D. Father 36        MI at age 36-37    Cerebrovascular Disease Father     Allergies Father     Anesthesia Reaction Father     Cardiovascular Paternal Grandfather     Prostate Cancer No family hx of     Colon Cancer No family hx of        Physical Examination:  There were no vitals taken for this visit.  Wt Readings from Last 1 Encounters:   08/21/23 251 lb     There is no height or weight on file to calculate BMI.    Exam:  General: No apparent distress. Answers questions appropriately  Neurologic: Focally intact, Alert and oriented x 3.   Eyes: Grossly normal.   Cardiac: Regular rate and rhythm  Pulmonary:  Non labored breathing with normal  respiratory effort  Abdominal: Soft, non distended, non tender to palpation.  No pulsatile or expansile mass.   Musculoskeletal/Extremity: 5/5 gross lower extremity strength.  No open wounds or abrasions.     Vascular Exam:     Radial: Left: 2+   Right: 2+    Laboratory Findings:  Hemoglobin   Date Value Ref Range Status   06/13/2023 14.4 13.3 - 17.7 g/dL Final   06/08/2023 15.0 13.3 - 17.7 g/dL Final   12/16/2020 14.9 13.3 - 17.7 g/dL Final   12/14/2020 14.7 13.3 - 17.7 g/dL Final     WBC   Date Value Ref Range Status   12/14/2020 11.2 (H) 4.0 - 11.0 10e9/L Final   05/05/2020 8.8 4.0 - 11.0 10e9/L Final     WBC Count   Date Value Ref Range Status   06/13/2023 7.1 4.0 - 11.0 10e3/uL Final   06/08/2023 17.4 (H) 4.0 - 11.0 10e3/uL Final     INR   Date Value Ref Range Status   06/08/2023 1.06 0.85 - 1.15 Final   05/01/2012 0.98 0.86 - 1.14 Final       Imaging:    I personally reviewed the renal artery duplex from 8/7/2023 which shows elevated peak systolic velocities to 246 in the right origin, and to 64 in the right mid renal artery.  The renal aortic ratio however is normal.  Resistive indices are approximately 1.65.  The left has similar elevation peak systolic velocity to 198 with again normal renal aortic ratio and resistive indices approximately 0.65 to 0.7.  This could be suggestive of bilateral renal artery stenosis by peak systolic velocity however is contradictory with low renal aortic ratio.    Impression/Shared Medical Decision Making:    #1-Hypertension with recent medication escalation  #2-borderline renal artery stenosis bilaterally with contradictory ultrasound findings  #3-nicotine dependence  #4-obstructive sleep apnea not on CPAP  Mr. Gray is a pleasant 58 year old male evaluated for hypertension in the setting of elevated peak systolic velocities on ultrasound which could be suggestive of but not definitive for renal artery stenosis.  We discussed the etiology and natural history of peripheral  arterial disease including modifiable risk factors.  He is a current smoker.  Discussed smoking cessation and its importance both to blood pressure control, vascular health, and durability of any interventions.  Additionally, CPAP and CARROLL has been shown to decrease blood pressure.  We discussed this.  He is not quite ready for nicotine dependence referral, however is trying to quit smoking.  He is on a statin but not on aspirin.  The addition of aspirin would improve his optimal medical management for peripheral vascular disease.  We discussed the somewhat mixed picture raised by his ultrasound.  He does not have any worsening EGFR or ischemic nephropathy, recurrent episodes of flash pulmonary edema or heart failure.  He is on 3 medications which can be an indication for intervention however I do think we need a better anatomic delineation with CT angiogram.  I do think would also be helpful to have a nephrology perspective on the expected benefits of stenting with this particular presentation.  I will discuss this with his primary care physician and patient placed this referral.  Mr. Gray also mention his difficulty in traveling today particularly with some other family illnesses over the summer.  We discussed that he would prefer to have most of his work-up done in [place], which I think is reasonable.  I can either see him virtually again to discuss the results once he is seen by nephrology and I have the results of the CT angiogram or I can try to set him up with one of my partners who has clinic in that area.    Discussed warning signs including, but not limited to, hypertensive emergency, and worsening renal function.  If he experiences any of these, he has been advised to seek treatment and contact my team.    Mr. Gray is in agreement with this plan as outlined.    Recommendations/Plan:  CT angiogram of the abdomen pelvis to better assess for renal artery anatomy, with oral hydration  We will add a baby  aspirin to his current regimen  I will reach out to his primary care physician  Nephrology referral  Smoking cessation, he is not currently ready for nicotine dependence referral    Scotty Mcdonough MD, [facility]  Vascular & Endovascular Surgery      40 minutes spent on the day of encounter doing chart review from our system and care everywhere, history and exam, documentation, coordinating care, and further activities as noted with over half spent counseling.

## 2023-10-05 ENCOUNTER — TELEPHONE (OUTPATIENT)
Dept: VASCULAR SURGERY | Facility: CLINIC | Age: 58
End: 2023-10-05

## 2023-10-05 ENCOUNTER — HOSPITAL ENCOUNTER (OUTPATIENT)
Dept: CT IMAGING | Facility: HOSPITAL | Age: 58
Discharge: HOME | End: 2023-10-05
Attending: SURGERY | Admitting: SURGERY
Payer: COMMERCIAL

## 2023-10-05 DIAGNOSIS — E78.5 HYPERLIPIDEMIA LDL GOAL <100: ICD-10-CM

## 2023-10-05 DIAGNOSIS — I10 ESSENTIAL HYPERTENSION WITH GOAL BLOOD PRESSURE LESS THAN 140/90: ICD-10-CM

## 2023-10-05 DIAGNOSIS — I70.1 RENAL ARTERY STENOSIS (H): ICD-10-CM

## 2023-10-05 LAB
CREAT BLD-MCNC: 1 MG/DL (ref 0.7–1.3)
EGFRCR SERPLBLD CKD-EPI 2021: >60 ML/MIN/1.73M2

## 2023-10-05 PROCEDURE — 250N000011 HC RX IP 250 OP 636: Mod: JZ | Performed by: SURGERY

## 2023-10-05 PROCEDURE — 82565 ASSAY OF CREATININE: CPT

## 2023-10-05 PROCEDURE — 74174 CTA ABD&PLVS W/CONTRAST: CPT

## 2023-10-05 RX ORDER — IOPAMIDOL 755 MG/ML
72 INJECTION, SOLUTION INTRAVASCULAR ONCE
Status: COMPLETED | OUTPATIENT
Start: 2023-10-05 | End: 2023-10-05

## 2023-10-05 RX ADMIN — IOPAMIDOL 72 ML: 755 INJECTION, SOLUTION INTRAVENOUS at 12:44

## 2023-10-05 NOTE — TELEPHONE ENCOUNTER
[facility] Call Center    Phone Message    May a detailed message be left on voicemail: yes     Reason for Call: Other: pt was told to schedule for kidney specialist Appt. Pt said the soonest opening is in Feb. Pt was told by kidney clinic to contact  To see if they are able to talk with clinic to schedule before the end of the year. Please review and call back if needed. Thanks pt is scheduled for Appt with Mary Kinney on Monday. Thanks      Action Taken: Message routed to:  [facility] VS    Travel Screening: Not Applicable

## 2023-10-06 ENCOUNTER — MYC MEDICAL ADVICE (OUTPATIENT)
Dept: NEPHROLOGY | Facility: CLINIC | Age: 58
End: 2023-10-06
Payer: COMMERCIAL

## 2023-10-06 NOTE — CONFIDENTIAL NOTE
DIAGNOSIS:     Essential hypertension with goal blood pressure less than 140/90   Renal artery stenosis (H24)      DATE RECEIVED: 11.08.2023   NOTES STATUS DETAILS   OFFICE NOTE from referring provider Internal 10.02.2023 Scotty Mcdonough    OFFICE NOTE from other specialist  Internal 08.21.2023 Traci Radford MD     05.17.2023 Quentin Hernandez MD     02.02.2022 Maik Smith MD    *Only VASCULITIS or LUPUS gather office notes for the following     *PULMONARY       *ENT     *DERMATOLOGY     *RHEUMATOLOGY     DISCHARGE SUMMARY from hospital     DISCHARGE REPORT from the ER     MEDICATION LIST Internal    IMAGING  (NEED IMAGES AND REPORTS)     KIDNEY CT SCAN     KIDNEY ULTRASOUND Internal 08.07.2023 US Renal Complete   MR ABDOMEN     NUCLEAR MEDICINE RENAL     LABS     CBC Internal 06.13.2023   CMP Internal 06.13.2023   BMP Internal 08.08.2023    UA Internal 07.18.2023   URINE PROTEIN Internal 07.18.2023   RENAL PANEL     BIOPSY     KIDNEY BIOPSY

## 2023-10-06 NOTE — TELEPHONE ENCOUNTER
ISMAEL and sent My Chart message to schedule new Nephrology appointment with Dr. Fisher. Held spot on 11.08.2023 at 9:30. Labs to be scheduled 3-7 days prior at clinic of choice. // MANUALLY schedule to pull up the held appt. // first attempt 10.06.2023 [facility]

## 2023-10-06 NOTE — CONFIDENTIAL NOTE
[facility] Call Center    Phone Message    May a detailed message be left on voicemail: yes     Reason for Call: Pt accepts appt 11/8 at 9:30. Labs scheduled Thank you    Action Taken: Message routed to:  [facility] ([facility] Neph    Travel Screening: Not Applicable

## 2023-10-09 ENCOUNTER — VIRTUAL VISIT (OUTPATIENT)
Dept: VASCULAR SURGERY | Facility: CLINIC | Age: 58
End: 2023-10-09
Payer: COMMERCIAL

## 2023-10-09 VITALS — HEIGHT: 78 IN | WEIGHT: 260 LBS | BODY MASS INDEX: 30.08 KG/M2

## 2023-10-09 DIAGNOSIS — I10 ESSENTIAL HYPERTENSION WITH GOAL BLOOD PRESSURE LESS THAN 140/90: Primary | ICD-10-CM

## 2023-10-09 PROCEDURE — 99441 PR PHYSICIAN TELEPHONE EVALUATION 5-10 MIN: CPT | Performed by: SURGERY

## 2023-10-09 NOTE — PATIENT INSTRUCTIONS
"Thank you so much for choosing us for your care. It was a pleasure to see you at the vascular clinic today.     Follow-up recommendations: We will see you back in the vascular clinic on an \"as-needed\" basis. Please contact our clinic at [phone] if any issues arise. You may also send us a Sanako message with any concerns.    Additional testing/imaging ordered today: None      Our scheduling team will get in touch with you to set up any follow-up testing/imaging and/or appointments. Please be aware that any testing/imaging recommended today will need to completed prior to your next visit with the provider. If testing/imaging is not completed prior to your next visit, your visit may be rescheduled.        If you have any questions, please contact our clinic directly at [phone] and ask for the nurse. We also encourage the use of Sanako to communicate with your HealthCare Provider.      If you have an urgent need after business hours (8:00 am to 4:30 pm) please call [phone], option 4, and ask for the vascular attending on call. For non-urgent after hours needs, please call the vascular clinic at [phone]. For scheduling needs, please call our clinic directly at [phone].    "

## 2023-10-09 NOTE — PROGRESS NOTES
Vascular & Endovascular Surgery Clinic Return Visit   Vascular Surgeon: Scotty Mcdonough MD, RPVI       Location:  Virtual Visit Details:    Type of service:  Telephone     Length of call: 5 minutes    Originating Location (Pt. Location): Home     Distant Location (Provider location):  [facility]     Platform used for visit:  Rodolfo     Received verbal consent for virtual visit.        Sarath Gray  Medical Record #:  [id number]  YOB: 1965  Age:  58 year old     Date of Service: 10/9/2023    Primary Care Provider: Traci Radford    Chief Complaint/Reason for Visit: Follow up of renal artery stenosis    Interval History:  Mr. Gray is a pleasant 58 year old male is seen virtually today for reevaluation after CT angiogram was performed to evaluate renal artery anatomy and possible stenosis.  His duplex was nonconclusive for renal artery stenosis.  He tells me he has still been having some issues with blood pressure control.  He has not had any ER visits.  he denies chest pain, palpitations, dizziness, shortness of breath, fever, chills, abdominal pain, nausea, vomiting, unintended weight gain or weight loss, headaches, or focal neurologic deficits.      Review of Systems:    A 12 point ROS was reviewed and is negative except for symptoms noted in HPI.     Medical/Surgical History:    Past Medical History:   Diagnosis Date    Acute appendicitis with generalized peritonitis, unspecified whether abscess present, unspecified whether gangrene present, unspecified whether perforation present 4/29/2020    Added automatically from request for surgery [id number]    Heart disease     Hypertension     CARROLL (obstructive sleep apnea)     PE (pulmonary embolism)     years ago, apparently no cause found, does smoke    Respiratory complications March 2005    Sleep apnea     Squamous cell carcinoma of skin, unspecified          Past Surgical History:   Procedure Laterality Date    ARTHROPLASTY  KNEE  6/11/2014    Procedure: ARTHROPLASTY KNEE;  Surgeon: Maik Jeong MD;  Location: [facility]    ARTHROSCOPY KNEE WITH MEDIAL MENISCECTOMY  12/19/2013    Procedure: ARTHROSCOPY KNEE WITH MEDIAL MENISCECTOMY;  Left Knee Arthroscopy With Intraarticular Debridement.;  Surgeon: Maik Jeong MD;  Location: [facility]    ARTHROTOMY KNEE Right 12/16/2020    Procedure: Knee Open Excision of Prepatellar Bursa;  Surgeon: Maik Jeong MD;  Location: [facility]    COLONOSCOPY  2002    COLONOSCOPY N/A 4/18/2018    Procedure: COLONOSCOPY;  colonoscopy;  Surgeon: Nirmal Schneider MD;  Location: [facility]    EXAM UNDER ANESTHESIA, MANIPULATE JOINT (LOCATION)  6/26/2014    Procedure: EXAM UNDER ANESTHESIA, MANIPULATE JOINT (LOCATION);  Surgeon: Maik Jeong MD;  [facility]: [facility]    EXAM UNDER ANESTHESIA, MANIPULATE JOINT (LOCATION) Left 8/28/2014    Procedure: EXAM UNDER ANESTHESIA, MANIPULATE JOINT (LOCATION);  Surgeon: Maik Jeong MD;  [facility]: [facility]    EXAM UNDER ANESTHESIA, MANIPULATE JOINT (LOCATION) Left 12/31/2014    Procedure: EXAM UNDER ANESTHESIA, MANIPULATE JOINT (LOCATION);  Surgeon: Maik Jeong MD;  Location: [facility]    H ABLATION SVT  2008    AVNRT    INJECT EPIDURAL LUMBAR  12/16/2011    Procedure:INJECT EPIDURAL LUMBAR; MICKY-Dr. Smith; Surgeon:GENERIC ANESTHESIA PROVIDER; Location:[facility]    INJECT EPIDURAL LUMBAR  1/27/2012    Procedure:INJECT EPIDURAL LUMBAR; MICKY with Flouro--; Surgeon:GENERIC ANESTHESIA PROVIDER; [facility]    LAPAROSCOPIC APPENDECTOMY N/A 4/29/2020    Procedure: APPENDECTOMY, LAPAROSCOPIC;  Surgeon: Alexis Maher MD;  [facility]    LAPAROSCOPIC HERNIORRHAPHY INGUINAL BILATERAL Bilateral 12/29/2015    Procedure: LAPAROSCOPIC HERNIORRHAPHY INGUINAL BILATERAL;  Surgeon: Andrew Prajapati MD;  Location: [facility]    Lumbar back fusion  1995, 1998    first was A&P    Lumbar back procedure  1999    Hardware removal    spinal stimulator insertion  2004    also,  revised it that year also. never seemed to help well.         Allergies:     Allergies   Allergen Reactions    Persantine [Dipyridamole] Other (See Comments)     Nervous and anxiety        Medications:    Current Outpatient Medications   Medication    albuterol (PROAIR HFA) 108 (90 Base) MCG/ACT inhaler    amLODIPine (NORVASC) 10 MG tablet    aspirin 81 MG EC tablet    atorvastatin (LIPITOR) 20 MG tablet    lisinopril (ZESTRIL) 40 MG tablet    multivitamin, therapeutic (THERA-VIT) TABS tablet    omeprazole (PRILOSEC) 40 MG DR capsule    order for DME    ORDER FOR DME    spironolactone (ALDACTONE) 100 MG tablet    traZODone (DESYREL) 50 MG tablet     No current facility-administered medications for this visit.     Facility-Administered Medications Ordered in Other Visits   Medication    iopamidol (ISOVUE-370) 76% solution 80 mL    sodium chloride 0.9 % for CT scan flush dose 80 mL        Social Habits:    Tobacco Use      Smoking status: Every Day        Packs/day: 1.00        Years: 18.00        Pack years: 18        Types: Cigarettes      Smokeless tobacco: Never      Tobacco comments: Started at age 30.        Alcohol Use: Not on file       History   Drug Use No        Family History:    Family History   Problem Relation Age of Onset    C.A.D. Father 36        MI at age 36-37    Cerebrovascular Disease Father     Allergies Father     Anesthesia Reaction Father     Cardiovascular Paternal Grandfather     Prostate Cancer No family hx of     Colon Cancer No family hx of        Physical Examination:  No exam due to virtual visit    Laboratory Findings:  Hemoglobin   Date Value Ref Range Status   06/13/2023 14.4 13.3 - 17.7 g/dL Final   06/08/2023 15.0 13.3 - 17.7 g/dL Final   12/16/2020 14.9 13.3 - 17.7 g/dL Final   12/14/2020 14.7 13.3 - 17.7 g/dL Final     WBC   Date Value Ref Range Status   12/14/2020 11.2 (H) 4.0 - 11.0 10e9/L Final   05/05/2020 8.8 4.0 - 11.0 10e9/L Final     WBC Count   Date Value Ref Range  Status   06/13/2023 7.1 4.0 - 11.0 10e3/uL Final   06/08/2023 17.4 (H) 4.0 - 11.0 10e3/uL Final     INR   Date Value Ref Range Status   06/08/2023 1.06 0.85 - 1.15 Final   05/01/2012 0.98 0.86 - 1.14 Final       Imaging:    I personally reviewed the CT angiogram the abdomen pelvis from 10/5/2023 which shows minimal aortoiliac atherosclerotic disease.  The celiac, SMA, and CECIL are widely patent.  He has single renal arteries bilaterally without evidence of stenosis.    Impression/Shared Medical Decision Making:    #1-Uncontrolled hypertension without evidence of renal artery stenosis on CT angiogram  Mr. Gray is a pleasant 58 year old male evaluated for renal artery stenosis.  His ultrasound demonstrated a mixed picture so we have now obtained a CT angiogram.  This shows no evidence of renal artery stenosis.  I discussed the findings on the CT with him and unfortunately do not have a target for intervention.  He will still need to undergo work-up for his uncontrolled hypertension.  He is scheduled to see nephrology next month and is working closely with his primary care physician.  I did also mention that there was a pulmonary nodule identified on CT that would need repeat imaging.  I will defer to his primary care physician for this.   He does not need dedicated follow-up with me although I will be happy to see him back if any concerns arise.    Mr. Gray is in agreement with this plan as outlined.    Recommendations/Plan:  No evidence of renal artery stenosis.  No the need for dedicated vascular surgery follow-up unless additional concerns arise  Will need follow-up of pulmonary nodule    Scotty Mcdonough MD, [facility]  Vascular & Endovascular Surgery

## 2023-10-09 NOTE — PROGRESS NOTES
"[facility] Vascular      Type of Visit: Virtual: Telephone    Patient is here for a return visit to discuss  Renal stenosis follow up .     Vitals - Patient Reported  Pain Loc:  (states nothing to do what he is being seen for today.)      Ht 2.083 m (6' 10\")   Wt 117.9 kg (260 lb)   BMI 27.19 kg/m      Questions patient would like addressed today are: Patient verbalized no questions/concerns, this has been communicated to the provider.     Refills are needed: No    How [facility] you [facility]    Virtual Visit Details    Type of service:  Telephone Visit   Phone call duration: 5 minutes   Jessica Rao      "

## 2023-10-09 NOTE — NURSING NOTE
Patient confirms medications and allergies are accurate via patients echeck in completion, and or denies any changes since last reviewed/verified.       Jessica Rao, Virtual Facilitator  Is the patient currently in the state of [place]? YES    Visit mode:TELEPHONE    If the visit is dropped, the patient can be reconnected by: TELEPHONE VISIT: Phone number: [phone]    Will anyone else be joining the visit? NO  (If patient encounters technical issues they should call [phone])    How would you like to obtain your AVS? MyChart    Are changes needed to the allergy or medication list? No    Reason for visit: No chief complaint on file.    Jessica Rao VVF

## 2023-10-09 NOTE — LETTER
"10/9/2023       RE: Sarath Gray  [address] [place] [address]     Dear Colleague,    Thank you for referring your patient, Sarath Gray, to the [facility] VASCULAR CLINIC [place] [facility]. Please see a copy of my visit note below.    [facility] Vascular      Type of Visit: Virtual: Telephone    Patient is here for a return visit to discuss  Renal stenosis follow up .     Vitals - Patient Reported  Pain Loc:  (states nothing to do what he is being seen for today.)      Ht 2.083 m (6' 10\")   Wt 117.9 kg (260 lb)   BMI 27.19 kg/m      Questions patient would like addressed today are: Patient verbalized no questions/concerns, this has been communicated to the provider.     Refills are needed: No    How would you like to obtain your AVS? MyChart    Virtual Visit Details    Type of service:  Telephone Visit   Phone call duration: 5 minutes   Jessica Rao        Vascular & Endovascular Surgery Clinic Return Visit   Vascular Surgeon: Scotty Mcdonough MD, RPVI       Location:  Virtual Visit Details:    Type of service:  Telephone     Length of call: 5 minutes    Originating Location (Pt. Location): Home     Distant Location (Provider location):  [facility]     Platform used for visit:  Doximity     Received verbal consent for virtual visit.        Sarath Gray  Medical Record #:  [id number]  YOB: 1965  Age:  58 year old     Date of Service: 10/9/2023    Primary Care Provider: Traci Radford    Chief Complaint/Reason for Visit: Follow up of renal artery stenosis    Interval History:  Mr. Gray is a pleasant 58 year old male is seen virtually today for reevaluation after CT angiogram was performed to evaluate renal artery anatomy and possible stenosis.  His duplex was nonconclusive for renal artery stenosis.  He tells me he has still been having some issues with blood pressure control.  He has not " had any ER visits.  he denies chest pain, palpitations, dizziness, shortness of breath, fever, chills, abdominal pain, nausea, vomiting, unintended weight gain or weight loss, headaches, or focal neurologic deficits.      Review of Systems:    A 12 point ROS was reviewed and is negative except for symptoms noted in HPI.     Medical/Surgical History:    Past Medical History:   Diagnosis Date    Acute appendicitis with generalized peritonitis, unspecified whether abscess present, unspecified whether gangrene present, unspecified whether perforation present 4/29/2020    Added automatically from request for surgery [id number]    Heart disease     Hypertension     CARROLL (obstructive sleep apnea)     PE (pulmonary embolism)     years ago, apparently no cause found, does smoke    Respiratory complications March 2005    Sleep apnea     Squamous cell carcinoma of skin, unspecified          Past Surgical History:   Procedure Laterality Date    ARTHROPLASTY KNEE  6/11/2014    Procedure: ARTHROPLASTY KNEE;  Surgeon: Maik Jeong MD;  Location: [facility]    ARTHROSCOPY KNEE WITH MEDIAL MENISCECTOMY  12/19/2013    Procedure: ARTHROSCOPY KNEE WITH MEDIAL MENISCECTOMY;  Left Knee Arthroscopy With Intraarticular Debridement.;  Surgeon: Maik Jeong MD;  Location: [facility]    ARTHROTOMY KNEE Right 12/16/2020    Procedure: Knee Open Excision of Prepatellar Bursa;  Surgeon: Maik Jeong MD;  Location: [facility]    COLONOSCOPY  2002    COLONOSCOPY N/A 4/18/2018    Procedure: COLONOSCOPY;  colonoscopy;  Surgeon: Nirmal Schneider MD;  [facility]: [facility]    EXAM UNDER ANESTHESIA, MANIPULATE JOINT (LOCATION)  6/26/2014    Procedure: EXAM UNDER ANESTHESIA, MANIPULATE JOINT (LOCATION);  Surgeon: Maik Jeong MD;  [facility]    EXAM UNDER ANESTHESIA, MANIPULATE JOINT (LOCATION) Left 8/28/2014    Procedure: EXAM UNDER ANESTHESIA, MANIPULATE JOINT (LOCATION);  Surgeon: Maik Jeong MD;  [facility]    EXAM UNDER  ANESTHESIA, MANIPULATE JOINT (LOCATION) Left 12/31/2014    Procedure: EXAM UNDER ANESTHESIA, MANIPULATE JOINT (LOCATION);  Surgeon: Maik Jeong MD;  Location: [facility]    H ABLATION SVT  2008    AVNRT    INJECT EPIDURAL LUMBAR  12/16/2011    Procedure:INJECT EPIDURAL LUMBAR; MICKY-Dr. Smith; Surgeon:GENERIC ANESTHESIA PROVIDER; [facility]    INJECT EPIDURAL LUMBAR  1/27/2012    Procedure:INJECT EPIDURAL LUMBAR; MICKY with Flouro--; Surgeon:GENERIC ANESTHESIA PROVIDER; [facility]    LAPAROSCOPIC APPENDECTOMY N/A 4/29/2020    Procedure: APPENDECTOMY, LAPAROSCOPIC;  Surgeon: Alexis Maher MD;  [facility]    LAPAROSCOPIC HERNIORRHAPHY INGUINAL BILATERAL Bilateral 12/29/2015    Procedure: LAPAROSCOPIC HERNIORRHAPHY INGUINAL BILATERAL;  Surgeon: Andrew Prajapati MD;  Location: [facility]    Lumbar back fusion  1995, 1998    first was A&P    Lumbar back procedure  1999    Hardware removal    spinal stimulator insertion  2004    also, revised it that year also. never seemed to help well.         Allergies:     Allergies   Allergen Reactions    Persantine [Dipyridamole] Other (See Comments)     Nervous and anxiety        Medications:    Current Outpatient Medications   Medication    albuterol (PROAIR HFA) 108 (90 Base) MCG/ACT inhaler    amLODIPine (NORVASC) 10 MG tablet    aspirin 81 MG EC tablet    atorvastatin (LIPITOR) 20 MG tablet    lisinopril (ZESTRIL) 40 MG tablet    multivitamin, therapeutic (THERA-VIT) TABS tablet    omeprazole (PRILOSEC) 40 MG DR capsule    order for DME    ORDER FOR DME    spironolactone (ALDACTONE) 100 MG tablet    traZODone (DESYREL) 50 MG tablet     No current facility-administered medications for this visit.     Facility-Administered Medications Ordered in Other Visits   Medication    iopamidol (ISOVUE-370) 76% solution 80 mL    sodium chloride 0.9 % for CT scan flush dose 80 mL        Social Habits:    Tobacco Use      Smoking status: Every Day        Packs/day: 1.00         Years: 18.00        Pack years: 18        Types: Cigarettes      Smokeless tobacco: Never      Tobacco comments: Started at age 30.        Alcohol Use: Not on file       History   Drug Use No        Family History:    Family History   Problem Relation Age of Onset    C.A.D. Father 36        MI at age 36-37    Cerebrovascular Disease Father     Allergies Father     Anesthesia Reaction Father     Cardiovascular Paternal Grandfather     Prostate Cancer No family hx of     Colon Cancer No family hx of        Physical Examination:  No exam due to virtual visit    Laboratory Findings:  Hemoglobin   Date Value Ref Range Status   06/13/2023 14.4 13.3 - 17.7 g/dL Final   06/08/2023 15.0 13.3 - 17.7 g/dL Final   12/16/2020 14.9 13.3 - 17.7 g/dL Final   12/14/2020 14.7 13.3 - 17.7 g/dL Final     WBC   Date Value Ref Range Status   12/14/2020 11.2 (H) 4.0 - 11.0 10e9/L Final   05/05/2020 8.8 4.0 - 11.0 10e9/L Final     WBC Count   Date Value Ref Range Status   06/13/2023 7.1 4.0 - 11.0 10e3/uL Final   06/08/2023 17.4 (H) 4.0 - 11.0 10e3/uL Final     INR   Date Value Ref Range Status   06/08/2023 1.06 0.85 - 1.15 Final   05/01/2012 0.98 0.86 - 1.14 Final       Imaging:    I personally reviewed the CT angiogram the abdomen pelvis from 10/5/2023 which shows minimal aortoiliac atherosclerotic disease.  The celiac, SMA, and CECIL are widely patent.  He has single renal arteries bilaterally without evidence of stenosis.    Impression/Shared Medical Decision Making:    #1-Uncontrolled hypertension without evidence of renal artery stenosis on CT angiogram  Mr. Gray is a pleasant 58 year old male evaluated for renal artery stenosis.  His ultrasound demonstrated a mixed picture so we have now obtained a CT angiogram.  This shows no evidence of renal artery stenosis.  I discussed the findings on the CT with him and unfortunately do not have a target for intervention.  He will still need to undergo work-up for his uncontrolled  hypertension.  He is scheduled to see nephrology next month and is working closely with his primary care physician.  I did also mention that there was a pulmonary nodule identified on CT that would need repeat imaging.  I will defer to his primary care physician for this.   He does not need dedicated follow-up with me although I will be happy to see him back if any concerns arise.    Mr. Gray is in agreement with this plan as outlined.    Recommendations/Plan:  No evidence of renal artery stenosis.  No the need for dedicated vascular surgery follow-up unless additional concerns arise  Will need follow-up of pulmonary nodule        Again, thank you for allowing me to participate in the care of your patient.      Sincerely,    Scotty Mcdonough

## 2023-10-25 DIAGNOSIS — I10 ESSENTIAL HYPERTENSION WITH GOAL BLOOD PRESSURE LESS THAN 140/90: Primary | ICD-10-CM

## 2023-10-29 ENCOUNTER — MYC MEDICAL ADVICE (OUTPATIENT)
Dept: FAMILY MEDICINE | Facility: CLINIC | Age: 58
End: 2023-10-29
Payer: COMMERCIAL

## 2023-10-29 DIAGNOSIS — I10 HYPERTENSION GOAL BP (BLOOD PRESSURE) < 140/90: Primary | ICD-10-CM

## 2023-10-31 DIAGNOSIS — I10 ESSENTIAL HYPERTENSION WITH GOAL BLOOD PRESSURE LESS THAN 140/90: ICD-10-CM

## 2023-10-31 RX ORDER — HYDRALAZINE HYDROCHLORIDE 10 MG/1
10 TABLET, FILM COATED ORAL 3 TIMES DAILY
Qty: 270 TABLET | Refills: 3 | Status: SHIPPED | OUTPATIENT
Start: 2023-10-31 | End: 2024-02-05

## 2023-10-31 RX ORDER — LISINOPRIL 40 MG/1
40 TABLET ORAL DAILY
Qty: 90 TABLET | Refills: 3 | Status: SHIPPED | OUTPATIENT
Start: 2023-10-31

## 2023-10-31 NOTE — TELEPHONE ENCOUNTER
"Requested Prescriptions   Pending Prescriptions Disp Refills    lisinopril (ZESTRIL) 40 MG tablet [Pharmacy Med Name: lisinopril 40 mg tablet] 90 tablet 1     Sig: Take 1 tablet (40 mg) by mouth daily       ACE Inhibitors (Including Combos) Protocol Failed - 10/31/2023  8:01 AM        Failed - Blood pressure under 140/90 in past 12 months     BP Readings from Last 3 Encounters:   10/02/23 (!) 165/96   08/21/23 (!) 148/92   07/18/23 (!) 164/100                 Passed - Recent (12 mo) or future (30 days) visit within the authorizing provider's specialty     Patient has had an office visit with the authorizing provider or a provider within the authorizing providers department within the previous 12 mos or has a future within next 30 days. See \"Patient Info\" tab in inbasket, or \"Choose Columns\" in Meds & Orders section of the refill encounter.              Passed - Medication is active on med list        Passed - Patient is age 18 or older        Passed - Normal serum creatinine on file in past 12 months     Recent Labs   Lab Test 10/05/23  1243   CR 1.0       Ok to refill medication if creatinine is low          Passed - Normal serum potassium on file in past 12 months     Recent Labs   Lab Test 08/08/23  1346   POTASSIUM 3.6                  "

## 2023-11-02 ENCOUNTER — LAB (OUTPATIENT)
Dept: LAB | Facility: CLINIC | Age: 58
End: 2023-11-02
Payer: COMMERCIAL

## 2023-11-02 DIAGNOSIS — I10 ESSENTIAL HYPERTENSION WITH GOAL BLOOD PRESSURE LESS THAN 140/90: ICD-10-CM

## 2023-11-02 LAB
ALBUMIN MFR UR ELPH: 11.6 MG/DL
ALBUMIN SERPL BCG-MCNC: 4.4 G/DL (ref 3.5–5.2)
ALBUMIN UR-MCNC: NEGATIVE MG/DL
ALP SERPL-CCNC: 39 U/L (ref 40–129)
ALT SERPL W P-5'-P-CCNC: 23 U/L (ref 0–70)
ANION GAP SERPL CALCULATED.3IONS-SCNC: 12 MMOL/L (ref 7–15)
APPEARANCE UR: CLEAR
AST SERPL W P-5'-P-CCNC: 26 U/L (ref 0–45)
BILIRUB SERPL-MCNC: 0.4 MG/DL
BILIRUB UR QL STRIP: NEGATIVE
BUN SERPL-MCNC: 15.1 MG/DL (ref 6–20)
CALCIUM SERPL-MCNC: 9.8 MG/DL (ref 8.6–10)
CHLORIDE SERPL-SCNC: 102 MMOL/L (ref 98–107)
COLOR UR AUTO: YELLOW
CREAT SERPL-MCNC: 1.03 MG/DL (ref 0.67–1.17)
CREAT UR-MCNC: 99 MG/DL
CREAT UR-MCNC: 99 MG/DL
DEPRECATED HCO3 PLAS-SCNC: 26 MMOL/L (ref 22–29)
EGFRCR SERPLBLD CKD-EPI 2021: 84 ML/MIN/1.73M2
ERYTHROCYTE [DISTWIDTH] IN BLOOD BY AUTOMATED COUNT: 13 % (ref 10–15)
GLUCOSE SERPL-MCNC: 103 MG/DL (ref 70–99)
GLUCOSE UR STRIP-MCNC: NEGATIVE MG/DL
HCT VFR BLD AUTO: 43.7 % (ref 40–53)
HGB BLD-MCNC: 14.6 G/DL (ref 13.3–17.7)
HGB UR QL STRIP: NEGATIVE
KETONES UR STRIP-MCNC: NEGATIVE MG/DL
LEUKOCYTE ESTERASE UR QL STRIP: NEGATIVE
MCH RBC QN AUTO: 30.4 PG (ref 26.5–33)
MCHC RBC AUTO-ENTMCNC: 33.4 G/DL (ref 31.5–36.5)
MCV RBC AUTO: 91 FL (ref 78–100)
MICROALBUMIN UR-MCNC: <12 MG/L
MICROALBUMIN/CREAT UR: NORMAL MG/G{CREAT}
NITRATE UR QL: NEGATIVE
PH UR STRIP: 6 [PH] (ref 5–7)
PLATELET # BLD AUTO: 235 10E3/UL (ref 150–450)
POTASSIUM SERPL-SCNC: 3.9 MMOL/L (ref 3.4–5.3)
PROT SERPL-MCNC: 7.1 G/DL (ref 6.4–8.3)
PROT/CREAT 24H UR: 0.12 MG/MG CR (ref 0–0.2)
PTH-INTACT SERPL-MCNC: 26 PG/ML (ref 15–65)
RBC # BLD AUTO: 4.8 10E6/UL (ref 4.4–5.9)
RBC #/AREA URNS AUTO: NORMAL /HPF
SODIUM SERPL-SCNC: 140 MMOL/L (ref 135–145)
SP GR UR STRIP: 1.02 (ref 1–1.03)
UROBILINOGEN UR STRIP-ACNC: 0.2 E.U./DL
VIT D+METAB SERPL-MCNC: 52 NG/ML (ref 20–50)
WBC # BLD AUTO: 8.4 10E3/UL (ref 4–11)
WBC #/AREA URNS AUTO: NORMAL /HPF

## 2023-11-02 PROCEDURE — 36415 COLL VENOUS BLD VENIPUNCTURE: CPT

## 2023-11-02 PROCEDURE — 82306 VITAMIN D 25 HYDROXY: CPT

## 2023-11-02 PROCEDURE — 83970 ASSAY OF PARATHORMONE: CPT

## 2023-11-02 PROCEDURE — 82043 UR ALBUMIN QUANTITATIVE: CPT

## 2023-11-02 PROCEDURE — 82570 ASSAY OF URINE CREATININE: CPT

## 2023-11-02 PROCEDURE — 81001 URINALYSIS AUTO W/SCOPE: CPT

## 2023-11-02 PROCEDURE — 80053 COMPREHEN METABOLIC PANEL: CPT

## 2023-11-02 PROCEDURE — 84156 ASSAY OF PROTEIN URINE: CPT

## 2023-11-02 PROCEDURE — 85027 COMPLETE CBC AUTOMATED: CPT

## 2023-11-08 ENCOUNTER — OFFICE VISIT (OUTPATIENT)
Dept: NEPHROLOGY | Facility: CLINIC | Age: 58
End: 2023-11-08
Attending: INTERNAL MEDICINE
Payer: COMMERCIAL

## 2023-11-08 ENCOUNTER — PRE VISIT (OUTPATIENT)
Dept: NEPHROLOGY | Facility: CLINIC | Age: 58
End: 2023-11-08
Payer: COMMERCIAL

## 2023-11-08 VITALS
DIASTOLIC BLOOD PRESSURE: 96 MMHG | BODY MASS INDEX: 28.81 KG/M2 | HEART RATE: 85 BPM | SYSTOLIC BLOOD PRESSURE: 184 MMHG | WEIGHT: 249 LBS | HEIGHT: 78 IN

## 2023-11-08 DIAGNOSIS — I70.1 RENAL ARTERY STENOSIS (H): ICD-10-CM

## 2023-11-08 DIAGNOSIS — R73.09 ELEVATED GLUCOSE LEVEL: Primary | ICD-10-CM

## 2023-11-08 DIAGNOSIS — I10 ESSENTIAL HYPERTENSION WITH GOAL BLOOD PRESSURE LESS THAN 140/90: ICD-10-CM

## 2023-11-08 PROCEDURE — 99205 OFFICE O/P NEW HI 60 MIN: CPT | Performed by: INTERNAL MEDICINE

## 2023-11-08 PROCEDURE — 99213 OFFICE O/P EST LOW 20 MIN: CPT | Performed by: INTERNAL MEDICINE

## 2023-11-08 RX ORDER — CARVEDILOL 12.5 MG/1
12.5 TABLET ORAL 2 TIMES DAILY WITH MEALS
Qty: 180 TABLET | Refills: 1 | Status: SHIPPED | OUTPATIENT
Start: 2023-11-08 | End: 2023-12-11

## 2023-11-08 ASSESSMENT — PAIN SCALES - GENERAL: PAINLEVEL: NO PAIN (0)

## 2023-11-08 NOTE — NURSING NOTE
"Chief Complaint   Patient presents with    Consult     Establish care with hypertension     BP (!) 184/96   Pulse 85   Ht 2.083 m (6' 10\")   Wt 112.9 kg (249 lb)   BMI 26.04 kg/m    Lakshmi Stevenson, Lead CMA  11/8/2023 9:15 AM    "

## 2023-11-08 NOTE — LETTER
11/8/2023       RE: Sarath Gray  [address] [facility] [address]     Dear Colleague,    Thank you for referring your patient, Sarath Gray, to the [facility] [place] at [facility]. Please see a copy of my visit note below.    Nephrology Clinic    Sarath Gray MRN:[id number] YOB: 1965  Date of Service: 11/08/2023  Primary care provider: Traci Radford  Requesting physician: Traci Radford      REASON FOR CONSULT: Hypertension    HISTORY OF PRESENT ILLNESS:   Sarath Gray is a 58 year old male who presents for evaluation of uncontrolled hypertension.  The past medical history is significant for hypertension diagnosed only two years ago, mild to moderate mitral insufficiency and heavy smoking. His hypertension has been difficult to manage and remains uncontrolled on spironolactone 200 mg daily, amlodipine 10 mg daily, lisinopril 40 mg daily and hydralazine 10 mg three times a day which has only been added a few days ago. He has tried hydrochlorothiazide in the past but it has led to COMPA and was stopped. Beta blockers were not given due to concern for low heart rate. Renal artery was ruled out as a secondary cause of hypertension with no abnormality seen at the kidneys level. His renal function is currently intact with a creatinine level at 0.95 mg/dL and has been stable over the past two years. He has no evidence of any albuminuria on a uACR done on 11/02/2023. His TSH level is normal and is 1.06 mg/dL on 7/18/2023. He hasn't been checked for primary aldosteronism. The patient is also a heavy smoker and smokes around 1/2 ppd. He also drinks a pot of coffee daily. No significant alcohol intake.    Echocardiogram done on 7/26/2023  Interpretation Summary  Global and regional left ventricular function is normal with an EF of 55-60%.  Right ventricular function, chamber size, wall motion, and thickness  are  normal.  Mild to moderate mitral insufficiency is present.  The inferior vena cava is normal.  No pericardial effusion is present.  There is no prior study for direct comparison.  The patient denies any dysuria, any pollakiuria, any nocturia, any LE edema, any dyspnea on exertion .  The patient denies ever having kidney stones, urinary tract infections, gross hematuria. There is no family history of CKD.  The following portions of the patient's history were reviewed and updated as appropriate: allergies, current medications, past family history, past medical history, past social history, past surgical history and problem list.    PAST MEDICAL HISTORY:  Past Medical History:   Diagnosis Date    Acute appendicitis with generalized peritonitis, unspecified whether abscess present, unspecified whether gangrene present, unspecified whether perforation present 4/29/2020    Added automatically from request for surgery [id number]    Heart disease     Hypertension     CARROLL (obstructive sleep apnea)     PE (pulmonary embolism)     years ago, apparently no cause found, does smoke    Respiratory complications March 2005    Sleep apnea     Squamous cell carcinoma of skin, unspecified      PAST SURGICAL HISTORY:  Past Surgical History:   Procedure Laterality Date    ARTHROPLASTY KNEE  6/11/2014    Procedure: ARTHROPLASTY KNEE;  Surgeon: Maik Jeong MD;  Location: [facility]    ARTHROSCOPY KNEE WITH MEDIAL MENISCECTOMY  12/19/2013    Procedure: ARTHROSCOPY KNEE WITH MEDIAL MENISCECTOMY;  Left Knee Arthroscopy With Intraarticular Debridement.;  Surgeon: Maik Jeong MD;  Location: [facility]    ARTHROTOMY KNEE Right 12/16/2020    Procedure: Knee Open Excision of Prepatellar Bursa;  Surgeon: Maik Jeong MD;  Location: [facility]    COLONOSCOPY  2002    COLONOSCOPY N/A 4/18/2018    Procedure: COLONOSCOPY;  colonoscopy;  Surgeon: Nirmal Schneider MD;  Location: [facility]    EXAM UNDER ANESTHESIA, MANIPULATE JOINT (LOCATION)   6/26/2014    Procedure: EXAM UNDER ANESTHESIA, MANIPULATE JOINT (LOCATION);  Surgeon: Maik Jeong MD;  Location: [facility]    EXAM UNDER ANESTHESIA, MANIPULATE JOINT (LOCATION) Left 8/28/2014    Procedure: EXAM UNDER ANESTHESIA, MANIPULATE JOINT (LOCATION);  Surgeon: Maik Jeong MD;  Location: [facility]    EXAM UNDER ANESTHESIA, MANIPULATE JOINT (LOCATION) Left 12/31/2014    Procedure: EXAM UNDER ANESTHESIA, MANIPULATE JOINT (LOCATION);  Surgeon: Maik Jeong MD;  Location: [facility] OR    H ABLATION SVT  2008    AVNRT    INJECT EPIDURAL LUMBAR  12/16/2011    Procedure:INJECT EPIDURAL LUMBAR; MICKY-Dr. Smith; Surgeon:GENERIC ANESTHESIA PROVIDER; Location:[facility]    INJECT EPIDURAL LUMBAR  1/27/2012    Procedure:INJECT EPIDURAL LUMBAR; MICKY with Flouro--; Surgeon:GENERIC ANESTHESIA PROVIDER; Location:[facility]    LAPAROSCOPIC APPENDECTOMY N/A 4/29/2020    Procedure: APPENDECTOMY, LAPAROSCOPIC;  Surgeon: Alexis Maher MD;  Location: [facility]    LAPAROSCOPIC HERNIORRHAPHY INGUINAL BILATERAL Bilateral 12/29/2015    Procedure: LAPAROSCOPIC HERNIORRHAPHY INGUINAL BILATERAL;  Surgeon: Andrew Prajapati MD;  Location: [facility]    Lumbar back fusion  1995, 1998    first was A&P    Lumbar back procedure  1999    Hardware removal    spinal stimulator insertion  2004    also, revised it that year also. never seemed to help well.      MEDICATIONS:  Prescription Medications as of 11/8/2023         Rx Number Disp Refills Start End Last Dispensed Date Next Fill Date Owning Pharmacy    albuterol (PROAIR HFA) 108 (90 Base) MCG/ACT inhaler  1 Inhaler 11 6/22/2020    [facility] [place] - [address] [facility]    Sig: Inhale 2 puffs into the lungs every 4 hours as needed for shortness of breath / dyspnea or wheezing    Class: E-Prescribe    Notes to Pharmacy: Pharmacy may dispense brand covered by insurance (Proair, or proventil or ventolin or generic albuterol inhaler)    Route: Inhalation    amLODIPine (NORVASC) 10 MG tablet  90  tablet 1 5/17/2023    [facility], [place] - [address] [facility] [address]    Sig: Take 1 tablet (10 mg) by mouth daily    Class: E-Prescribe    Route: Oral    aspirin 81 MG EC tablet    10/2/2023        Sig: Take 1 tablet (81 mg) by mouth daily    Class: OTC    Route: Oral    atorvastatin (LIPITOR) 20 MG tablet  90 tablet 1 5/17/2023    [facility] [place] [address] [facility] [address]    Sig: Take 1 tablet (20 mg) by mouth daily    Class: E-Prescribe    Route: Oral    hydrALAZINE (APRESOLINE) 10 MG tablet  270 tablet 3 10/31/2023    [facility], [place] - [address] [facility] [address]    Sig: Take 1 tablet (10 mg) by mouth 3 times daily    Class: E-Prescribe    Route: Oral    lisinopril (ZESTRIL) 40 MG tablet  90 tablet 3 10/31/2023    [facility], [place] - [address] [facility] [address]    Sig: Take 1 tablet (40 mg) by mouth daily    Class: E-Prescribe    Notes to Pharmacy: This prescription was filled on 10/10/2023. Any refills authorized will be placed on file.    Route: Oral    Renewals       Renewal provider: Quentin Hernandez MD            multivitamin, therapeutic (THERA-VIT) TABS tablet            Sig: Take 1 tablet by mouth daily    Class: Historical    Route: Oral    omeprazole (PRILOSEC) 40 MG DR capsule  180 capsule 3 8/21/2023    [place], [place] - [address] [facility]    Sig: Take 1 capsule (40 mg) by mouth 2 times daily    Class: E-Prescribe    Route: Oral    order for DME            Sig: Equipment being ordered: CPAP Patient Sarath Gray was set up at [facility]  on April 21, 2016. Patient received a Resmed AirSense 10 Auto. Pressures were set at Auto 5 - 15 cm H2O.   Patient s ramp is 5 cm H2O for Auto and FLEX/EPR is 2.  Patient received a [employer] Mask name: SIMPLUS  Full Face mask Size Large, heated tubing and heated humidifier.  Patient is enrolled in the STM Program and does not need to meet compliance. Patient has a follow up on June 14TH AT 9 AM with YADIRA Resendez.    Jennifer Sam    Class:  Historical    ORDER FOR DME  1 Device 0 6/14/2014    [facility] - [place], [place] - [address] [facility]    Sig: Equipment being ordered: Other: CPM machine for home use. Set max tolerance and increase 3-5 degrees/day as tolerated. Use up to 6-8 hours/day as tolerated.  Treatment Diagnosis: left TKA    Class: Local Print    spironolactone (ALDACTONE) 100 MG tablet  180 tablet 3 9/12/2023    [place], [place] - [address] [facility]    Sig: Take 2 tablets (200 mg) by mouth daily    Class: E-Prescribe    Route: Oral    traZODone (DESYREL) 50 MG tablet  30 tablet 1 4/11/2022    [place], [place] - [address] [facility]    Sig: Take 1 tablet by mouth daily AT BEDTIME    Class: E-Prescribe    Notes to Pharmacy: This prescription was filled on 3/19/2022. Any refills authorized will be placed on file.          Clinic-Administered Medications as of 11/8/2023         Dose Frequency Start End    iopamidol (ISOVUE-370) 76% solution 80 mL 80 mL ONCE 3/7/2016     Route: Intravenous    sodium chloride 0.9 % for CT scan flush dose 80 mL 80 mL ONCE 3/7/2016     Route: Intravenous           ALLERGIES:    Allergies   Allergen Reactions    Persantine [Dipyridamole] Other (See Comments)     Nervous and anxiety     REVIEW OF SYSTEMS:    A comprehensive review of systems was performed and found to be negative except as described here or above.  SOCIAL HISTORY:   Social History     Socioeconomic History    Marital status: Single     Spouse name: Not on file    Number of children: Not on file    Years of education: Not on file    Highest education level: Not on file   Occupational History    Not on file   Tobacco Use    Smoking status: Every Day     Packs/day: 1.00     Years: 18.00     Additional pack years: 0.00     Total pack years: 18.00     Types: Cigarettes    Smokeless tobacco: Never    Tobacco comments:     Started at age 30.    Vaping Use    Vaping Use: Never used   Substance and Sexual Activity    Alcohol use:  "Yes     Alcohol/week: 0.0 standard drinks of alcohol     Comment: occassional beer    Drug use: No    Sexual activity: Yes     Partners: Female     Birth control/protection: Surgical   Other Topics Concern    Parent/sibling w/ CABG, MI or angioplasty before 65F 55M? Not Asked   Social History Narrative    ** Merged History Encounter **          Social Determinants of Health     Financial Resource Strain: Not on file   Food Insecurity: Not on file   Transportation Needs: Not on file   Physical Activity: Not on file   Stress: Not on file   Social Connections: Not on file   Interpersonal Safety: Not on file   Housing Stability: Not on file     FAMILY MEDICAL HISTORY:   Family History   Problem Relation Age of Onset    C.A.D. Father 36        MI at age 36-37    Cerebrovascular Disease Father     Allergies Father     Anesthesia Reaction Father     Cardiovascular Paternal Grandfather     Prostate Cancer No family hx of     Colon Cancer No family hx of      PHYSICAL EXAM:   BP (!) 184/96   Pulse 85   Ht 2.083 m (6' 10\")   Wt 112.9 kg (249 lb)   BMI 26.04 kg/m    GENERAL APPEARANCE: alert and no distress  EYES: nonicteric  HENT: mouth without ulcers or lesions  NECK: supple, no adenopathy  RESP: lungs clear to auscultation   CV: regular rhythm, normal rate, no rub  ABDOMEN: soft, nontender, normal bowel sounds, no HSM   Extremities: no clubbing, cyanosis, or edema  MS: no evidence of inflammation in joints, no muscle tenderness  SKIN: no rash  NEURO: mentation intact and speech normal  PSYCH: affect normal/bright   LABS:   Recent Results (from the past 672 hour(s))   Comprehensive metabolic panel    Collection Time: 11/02/23  1:32 PM   Result Value Ref Range    Sodium 140 135 - 145 mmol/L    Potassium 3.9 3.4 - 5.3 mmol/L    Carbon Dioxide (CO2) 26 22 - 29 mmol/L    Anion Gap 12 7 - 15 mmol/L    Urea Nitrogen 15.1 6.0 - 20.0 mg/dL    Creatinine 1.03 0.67 - 1.17 mg/dL    GFR Estimate 84 >60 mL/min/1.73m2    Calcium 9.8 " 8.6 - 10.0 mg/dL    Chloride 102 98 - 107 mmol/L    Glucose 103 (H) 70 - 99 mg/dL    Alkaline Phosphatase 39 (L) 40 - 129 U/L    AST 26 0 - 45 U/L    ALT 23 0 - 70 U/L    Protein Total 7.1 6.4 - 8.3 g/dL    Albumin 4.4 3.5 - 5.2 g/dL    Bilirubin Total 0.4 <=1.2 mg/dL   CBC with platelets    Collection Time: 11/02/23  1:32 PM   Result Value Ref Range    WBC Count 8.4 4.0 - 11.0 10e3/uL    RBC Count 4.80 4.40 - 5.90 10e6/uL    Hemoglobin 14.6 13.3 - 17.7 g/dL    Hematocrit 43.7 40.0 - 53.0 %    MCV 91 78 - 100 fL    MCH 30.4 26.5 - 33.0 pg    MCHC 33.4 31.5 - 36.5 g/dL    RDW 13.0 10.0 - 15.0 %    Platelet Count 235 150 - 450 10e3/uL   Protein  random urine    Collection Time: 11/02/23  1:32 PM   Result Value Ref Range    Total Protein Urine mg/dL 11.6   mg/dL    Total Protein Urine mg/mg Creat 0.12 0.00 - 0.20 mg/mg Cr    Creatinine Urine mg/dL 99.0 mg/dL   Albumin Random Urine Quantitative with Creat Ratio    Collection Time: 11/02/23  1:32 PM   Result Value Ref Range    Creatinine Urine mg/dL 99.0 mg/dL    Albumin Urine mg/L <12.0 mg/L    Albumin Urine mg/g Cr     UA with Microscopic    Collection Time: 11/02/23  1:32 PM   Result Value Ref Range    Color Urine Yellow Colorless, Straw, Light Yellow, Yellow    Appearance Urine Clear Clear    Glucose Urine Negative Negative mg/dL    Bilirubin Urine Negative Negative    Ketones Urine Negative Negative mg/dL    Specific Gravity Urine 1.020 1.003 - 1.035    Blood Urine Negative Negative    pH Urine 6.0 5.0 - 7.0    Protein Albumin Urine Negative Negative mg/dL    Urobilinogen Urine 0.2 0.2, 1.0 E.U./dL    Nitrite Urine Negative Negative    Leukocyte Esterase Urine Negative Negative   Parathyroid Hormone Intact    Collection Time: 11/02/23  1:32 PM   Result Value Ref Range    Parathyroid Hormone Intact 26 15 - 65 pg/mL   Vitamin D Deficiency    Collection Time: 11/02/23  1:32 PM   Result Value Ref Range    Vitamin D, Total (25-Hydroxy) 52 (H) 20 - 50 ng/mL   Urine  Microscopic Exam    Collection Time: 11/02/23  1:32 PM   Result Value Ref Range    RBC Urine None Seen 0-2 /HPF /HPF    WBC Urine None Seen 0-5 /HPF /HPF     CMP  Recent Labs   Lab Test 11/02/23  1332 10/05/23  1243 08/08/23  1346 07/18/23  0743 07/11/23  1443 06/13/23  1158 06/08/23  1328 01/21/22  0813 12/16/20  1139 12/14/20  1456 05/05/20  0516 05/04/20  0439 05/03/20  1745 04/30/20  0451 04/29/20  0930     --  139 144 144   < > 127*   < >  --  137 139 139 137  --  136   POTASSIUM 3.9  --  3.6 4.1 3.5   < > 4.7   < > 3.8 3.9 4.2 4.4 3.9  --  4.0   CHLORIDE 102  --  102 102 105   < > 87*   < >  --  104 105 106 103  --  104   CO2 26  --  26 30* 27   < > 24   < >  --  30 28 28 28  --  27   ANIONGAP 12  --  11 12 12   < > 16*   < >  --  3 6 5 6  --  5   *  --  83 91 86   < > 94   < >  --  90 86 83 88   < > 93   BUN 15.1  --  16.7 13.6 14.0   < > 32.6*   < >  --  16 14 14 11  --  10   CR 1.03 1.0 1.10 1.01 1.00   < > 1.99*   < > 0.89 0.98 1.16 1.07 0.90  --  0.91   GFRESTIMATED 84 >60 78 87 88   < > 38*   < > >90 86 71 78 >90  --  >90   GFRESTBLACK  --   --   --   --   --   --   --   --  >90 >90 82 >90 >90  --  >90   DEEPIKA 9.8  --  8.8 9.7 9.2   < > 10.4*   < >  --  9.5 9.0 8.6 9.3  --  9.0   PROTTOTAL 7.1  --   --   --   --   --  8.2  --   --   --   --   --  7.9  --  7.5   ALBUMIN 4.4  --   --   --   --   --  4.7  --   --   --   --   --  3.4  --  3.5   BILITOTAL 0.4  --   --   --   --   --  0.5  --   --   --   --   --  0.3  --  0.5   ALKPHOS 39*  --   --   --   --   --  52  --   --   --   --   --  51  --  50   AST 26  --   --   --   --   --  34  --   --   --   --   --  26  --  16   ALT 23  --   --   --   --   --  37  --   --   --   --   --  32  --  28    < > = values in this interval not displayed.     CBC  Recent Labs   Lab Test 11/02/23  1332 06/13/23  1158 06/08/23  1328 05/17/23  0728   HGB 14.6 14.4 15.0 14.4   WBC 8.4 7.1 17.4* 8.1   RBC 4.80 4.85 5.07 4.91   HCT 43.7 41.4 42.5 43.5   MCV 91 85 84  89   MCH 30.4 29.7 29.6 29.3   MCHC 33.4 34.8 35.3 33.1   RDW 13.0 12.8 12.9 14.2    230 287 246     INR  Recent Labs   Lab Test 06/08/23  1328   INR 1.06   PTT 29     ABGNo lab results found.   URINE STUDIES  Recent Labs   Lab Test 11/02/23  1332 07/18/23  0743 05/03/20  1901 04/29/20  0930 10/03/17  1036   COLOR Yellow Yellow Yellow Sandra Yellow   APPEARANCE Clear Clear Clear Clear Clear   URINEGLC Negative Negative Negative Negative Negative   URINEBILI Negative Negative Negative Negative Negative   URINEKETONE Negative Negative Negative Negative Negative   SG 1.020 1.015 1.017 1.025 1.015   UBLD Negative Negative Negative Negative Trace*   URINEPH 6.0 7.0 5.0 5.0 6.5   PROTEIN Negative Negative Negative Negative Negative   UROBILINOGEN 0.2 0.2  --   --  0.2   NITRITE Negative Negative Negative Negative Negative   LEUKEST Negative Negative Trace* Negative Negative   RBCU None Seen  --  1 3* O - 2   WBCU None Seen  --  1 2 O - 2     No lab results found.    ASSESSMENT AND PLAN:   #Hypertension  Uncontrolled and exacerbated by heavy smoking and coffee intake. A secondary cause should also be excluded. As I am unable to stop his medications to rule out primary hyperaldosteronism will nonetheless check a renin aldosterone ratio. The patient is currently on amlodipine 10 mg daily, spironolactone 200 mg daily, hydralazine 10 mg tid and lisinopril 40 mg daily. I will switch amlodipine to nifedipine 60 mg twice daily as it is more potent and add carvedilol 12.5 mg twice daily. The patient will keep a BP diary and communicate the results at his next visit.  The patient was also instructed to keep the sodium intake around 2400 mg /day, follow a plant-based diet and to avoid NSAIDs     #Glycemia  Will screen him for diabetes as a potential contributor to HTN although his glycemia on labs has not been really elevated.    #CVD/dyslipidemia  On atorvastatin 20 mg daily, LDL is 89 in July 2023     #Blood  count  Hemoglobin 14.6 no acute issue    #Acid-base status  CO2 level 28 no acute issue    #Electrolytes  Na 140  K 3.4 will repeat        The total time of this encounter amounted to 60 minutes on the day of the encounter. This time included time spent with the patient, reviewing records, ordering tests, and performing post visit documentation.     The patient will return to follow up in one month    Yashira Fisher MD  Division of Renal Disease and Hypertension  November 8, 2023  9:17 AM

## 2023-11-08 NOTE — PATIENT INSTRUCTIONS
It was a pleasure taking care of you today.  I've included a brief summary of our discussion and care plan from today's visit below.  Please review this information with your primary care provider.  _______________________________________________________________________    My recommendations are summarized as follows:  -Keep the amount of sodium in your diet at 2.4 g/day (also see instructions attached in that regard)  -Keep a Blood Pressure diary by taking your blood pressure twice a day as instructed (also see instructions attached in that regard). Please make sure that you are using a validated blood pressure device by checking that it is the case at: https://www.validatebp.org/  -Avoid all NSAID's. Examples include Ibuprofen (Advil, Motrin), naprosyn (Aleve), celebrex among others. Acetaminophen (Tylenol) is ok with maximum dose in 24 hours of 3200 mg.  -Healthy lifestyle measures will keep your kidney's functioning at their current best. This includes regular exercise and smoking cessation. You can start by cutting down by half your consumption of cigarettes  -Decrease by half your coffee consumption  -Do not exceed one alcoholic drink per day  -If for any reason, you are at risk of volume depletion/dehydration (high grade fevers, severe vomiting or diarrhea) stop lisinopril and spironolactone till you get better  -Stop amlodipine and replace it with nifedipine 60 mg twice daily  -Start carvedilol 12.5 mg twice daily with meals  -Do labs before you make these changes to your medications      To schedule imaging please call [phone]     To schedule your lab appointment at the [facility], please call       Return to Nephrology Clinic in one month to review your progress.    _______________________________________________________________________    Who do I call with any questions after my visit?    Please be in touch if there are any further questions that arise following today's  visit.  There are multiple ways to contact your nephrology care team.      During business hours, you may reach your Nephrology LPN Care Coordinator, Heaven, at .      To schedule or reschedule an appointment, please call [phone].    You can always send a secure message through [email or web address].  [email or web address] messages are answered by your nurse or doctor typically within 24 hours.  Please allow extra time on weekends and holidays.      For urgent/emergent questions after business hours, you may reach the on-call Nephrology Fellow by contacting the [facility]  at [phone].     How will I get the results of any tests ordered?    You will receive all of your results.  If you have signed up for [employer], any tests ordered at your visit will be available to you after your physician reviews them.  Typically this takes 1-2 weeks.  If there are urgent results that require a change in your care plan, your physician or nurse will call you to discuss the next steps.      What is [email or web address]?  [email or web address] is a secure way for you to access all of your healthcare records from the [facility].  It is a web based computer program, so you can sign on to it from any location.  It also allows you to send secure messages to your care team.  I recommend signing up for [email or web address] access if you have not already done so and are comfortable with using a computer.      How do I schedule a follow-up visit?  If you did not schedule a follow-up visit today, please call [phone] to schedule a follow-up office visit.        Sincerely,      Dr. Yashira Fisher  [facility]  Division of Nephrology and Hypertension

## 2023-11-08 NOTE — PROGRESS NOTES
Nephrology Clinic    Sarath Gray MRN:[id number] YOB: 1965  Date of Service: 11/08/2023  Primary care provider: Traci Radford  Requesting physician: Traci Radford      REASON FOR CONSULT: Hypertension    HISTORY OF PRESENT ILLNESS:   Sarath Gray is a 58 year old male who presents for evaluation of uncontrolled hypertension.  The past medical history is significant for hypertension diagnosed only two years ago, mild to moderate mitral insufficiency and heavy smoking. His hypertension has been difficult to manage and remains uncontrolled on spironolactone 200 mg daily, amlodipine 10 mg daily, lisinopril 40 mg daily and hydralazine 10 mg three times a day which has only been added a few days ago. He has tried hydrochlorothiazide in the past but it has led to COMPA and was stopped. Beta blockers were not given due to concern for low heart rate. Renal artery was ruled out as a secondary cause of hypertension with no abnormality seen at the kidneys level. His renal function is currently intact with a creatinine level at 0.95 mg/dL and has been stable over the past two years. He has no evidence of any albuminuria on a uACR done on 11/02/2023. His TSH level is normal and is 1.06 mg/dL on 7/18/2023. He hasn't been checked for primary aldosteronism. The patient is also a heavy smoker and smokes around 1/2 ppd. He also drinks a pot of coffee daily. No significant alcohol intake.    Echocardiogram done on 7/26/2023  Interpretation Summary  Global and regional left ventricular function is normal with an EF of 55-60%.  Right ventricular function, chamber size, wall motion, and thickness are  normal.  Mild to moderate mitral insufficiency is present.  The inferior vena cava is normal.  No pericardial effusion is present.  There is no prior study for direct comparison.  The patient denies any dysuria, any pollakiuria, any nocturia, any LE edema, any dyspnea on exertion .  The patient denies ever having  kidney stones, urinary tract infections, gross hematuria. There is no family history of CKD.  The following portions of the patient's history were reviewed and updated as appropriate: allergies, current medications, past family history, past medical history, past social history, past surgical history and problem list.    PAST MEDICAL HISTORY:  Past Medical History:   Diagnosis Date    Acute appendicitis with generalized peritonitis, unspecified whether abscess present, unspecified whether gangrene present, unspecified whether perforation present 4/29/2020    Added automatically from request for surgery [id number]    Heart disease     Hypertension     CARROLL (obstructive sleep apnea)     PE (pulmonary embolism)     years ago, apparently no cause found, does smoke    Respiratory complications March 2005    Sleep apnea     Squamous cell carcinoma of skin, unspecified      PAST SURGICAL HISTORY:  Past Surgical History:   Procedure Laterality Date    ARTHROPLASTY KNEE  6/11/2014    Procedure: ARTHROPLASTY KNEE;  Surgeon: Maik Jeong MD;  Location: [facility]    ARTHROSCOPY KNEE WITH MEDIAL MENISCECTOMY  12/19/2013    Procedure: ARTHROSCOPY KNEE WITH MEDIAL MENISCECTOMY;  Left Knee Arthroscopy With Intraarticular Debridement.;  Surgeon: Maik Jeong MD;  Location: [facility]    ARTHROTOMY KNEE Right 12/16/2020    Procedure: Knee Open Excision of Prepatellar Bursa;  Surgeon: Maik Jeong MD;  Location: [facility]    COLONOSCOPY  2002    COLONOSCOPY N/A 4/18/2018    Procedure: COLONOSCOPY;  colonoscopy;  Surgeon: Nirmal Schneider MD;  Location: [facility]    EXAM UNDER ANESTHESIA, MANIPULATE JOINT (LOCATION)  6/26/2014    Procedure: EXAM UNDER ANESTHESIA, MANIPULATE JOINT (LOCATION);  Surgeon: Maik Jeong MD;  Location: [facility]    EXAM UNDER ANESTHESIA, MANIPULATE JOINT (LOCATION) Left 8/28/2014    Procedure: EXAM UNDER ANESTHESIA, MANIPULATE JOINT (LOCATION);  Surgeon: Maik Jeong MD;  Location: [facility]    EXAM  UNDER ANESTHESIA, MANIPULATE JOINT (LOCATION) Left 12/31/2014    Procedure: EXAM UNDER ANESTHESIA, MANIPULATE JOINT (LOCATION);  Surgeon: Maik Jeong MD;  Location: [facility]    H ABLATION SVT  2008    AVNRT    INJECT EPIDURAL LUMBAR  12/16/2011    Procedure:INJECT EPIDURAL LUMBAR; MICKY-Dr. Smith; Surgeon:GENERIC ANESTHESIA PROVIDER; Location:[facility]    INJECT EPIDURAL LUMBAR  1/27/2012    Procedure:INJECT EPIDURAL LUMBAR; MICKY with Flouro--; Surgeon:GENERIC ANESTHESIA PROVIDER; Location:[facility]    LAPAROSCOPIC APPENDECTOMY N/A 4/29/2020    Procedure: APPENDECTOMY, LAPAROSCOPIC;  Surgeon: Alexis Maher MD;  Location: [facility]    LAPAROSCOPIC HERNIORRHAPHY INGUINAL BILATERAL Bilateral 12/29/2015    Procedure: LAPAROSCOPIC HERNIORRHAPHY INGUINAL BILATERAL;  Surgeon: Andrew Prajapati MD;  Location: [facility]    Lumbar back fusion  1995, 1998    first was A&P    Lumbar back procedure  1999    Hardware removal    spinal stimulator insertion  2004    also, revised it that year also. never seemed to help well.      MEDICATIONS:  Prescription Medications as of 11/8/2023         Rx Number Disp Refills Start End Last Dispensed Date Next Fill Date Owning Pharmacy    albuterol (PROAIR HFA) 108 (90 Base) MCG/ACT inhaler  1 Inhaler 11 6/22/2020    [place], [place] - [address] [facility]    Sig: Inhale 2 puffs into the lungs every 4 hours as needed for shortness of breath / dyspnea or wheezing    Class: E-Prescribe    Notes to Pharmacy: Pharmacy may dispense brand covered by insurance (Proair, or proventil or ventolin or generic albuterol inhaler)    Route: Inhalation    amLODIPine (NORVASC) 10 MG tablet  90 tablet 1 5/17/2023    [facility], [place] - [address] [facility]    Sig: Take 1 tablet (10 mg) by mouth daily    Class: E-Prescribe    Route: Oral    aspirin 81 MG EC tablet    10/2/2023        Sig: Take 1 tablet (81 mg) by mouth daily    Class: OTC    Route: Oral    atorvastatin (LIPITOR) 20 MG tablet  90 tablet 1  5/17/2023    [place], [place] - [address] [facility]    Sig: Take 1 tablet (20 mg) by mouth daily    Class: E-Prescribe    Route: Oral    hydrALAZINE (APRESOLINE) 10 MG tablet  270 tablet 3 10/31/2023    [place], [place] - [address] [facility]    Sig: Take 1 tablet (10 mg) by mouth 3 times daily    Class: E-Prescribe    Route: Oral    lisinopril (ZESTRIL) 40 MG tablet  90 tablet 3 10/31/2023    [place], [place] - [address] [facility]    Sig: Take 1 tablet (40 mg) by mouth daily    Class: E-Prescribe    Notes to Pharmacy: This prescription was filled on 10/10/2023. Any refills authorized will be placed on file.    Route: Oral    Renewals       Renewal provider: Quentin Hernandez MD            multivitamin, therapeutic (THERA-VIT) TABS tablet            Sig: Take 1 tablet by mouth daily    Class: Historical    Route: Oral    omeprazole (PRILOSEC) 40 MG DR capsule  180 capsule 3 8/21/2023    [place], [place] - [address] [facility]    Sig: Take 1 capsule (40 mg) by mouth 2 times daily    Class: E-Prescribe    Route: Oral    order for DME            Sig: Equipment being ordered: CPAP Patient Sarath Gray was set up at [facility]  on April 21, 2016. Patient received a Resmed AirSense 10 Auto. Pressures were set at Auto 5 - 15 cm H2O.   Patient s ramp is 5 cm H2O for Auto and FLEX/EPR is 2.  Patient received a [employer] Mask name: SIMPLUS  Full Face mask Size Large, heated tubing and heated humidifier.  Patient is enrolled in the STM Program and does not need to meet compliance. Patient has a follow up on June 14TH AT 9 AM with YADIRA Resendez.    Jennifer Sam    Class: Historical    ORDER FOR DME  1 Device 0 6/14/2014    [facility], [place] - [address] [facility] [place]    Sig: Equipment being ordered: Other: CPM machine for home use. Set max tolerance and increase 3-5 degrees/day as tolerated. Use up to 6-8 hours/day as tolerated.  Treatment Diagnosis: left TKA    Class:  Local Print    spironolactone (ALDACTONE) 100 MG tablet  180 tablet 3 9/12/2023    [facility] [place] - [address]    Sig: Take 2 tablets (200 mg) by mouth daily    Class: E-Prescribe    Route: Oral    traZODone (DESYREL) 50 MG tablet  30 tablet 1 4/11/2022    [facility] [place] - [address] [address]    Sig: Take 1 tablet by mouth daily AT BEDTIME    Class: E-Prescribe    Notes to Pharmacy: This prescription was filled on 3/19/2022. Any refills authorized will be placed on file.          Clinic-Administered Medications as of 11/8/2023         Dose Frequency Start End    iopamidol (ISOVUE-370) 76% solution 80 mL 80 mL ONCE 3/7/2016     Route: Intravenous    sodium chloride 0.9 % for CT scan flush dose 80 mL 80 mL ONCE 3/7/2016     Route: Intravenous           ALLERGIES:    Allergies   Allergen Reactions    Persantine [Dipyridamole] Other (See Comments)     Nervous and anxiety     REVIEW OF SYSTEMS:    A comprehensive review of systems was performed and found to be negative except as described here or above.  SOCIAL HISTORY:   Social History     Socioeconomic History    Marital status: Single     Spouse name: Not on file    Number of children: Not on file    Years of education: Not on file    Highest education level: Not on file   Occupational History    Not on file   Tobacco Use    Smoking status: Every Day     Packs/day: 1.00     Years: 18.00     Additional pack years: 0.00     Total pack years: 18.00     Types: Cigarettes    Smokeless tobacco: Never    Tobacco comments:     Started at age 30.    Vaping Use    Vaping Use: Never used   Substance and Sexual Activity    Alcohol use: Yes     Alcohol/week: 0.0 standard drinks of alcohol     Comment: occassional beer    Drug use: No    Sexual activity: Yes     Partners: Female     Birth control/protection: Surgical   Other Topics Concern    Parent/sibling w/ CABG, MI or angioplasty before 65F 55M? Not Asked   Social History Narrative    ** Merged  "History Encounter **          Social Determinants of Health     Financial Resource Strain: Not on file   Food Insecurity: Not on file   Transportation Needs: Not on file   Physical Activity: Not on file   Stress: Not on file   Social Connections: Not on file   Interpersonal Safety: Not on file   Housing Stability: Not on file     FAMILY MEDICAL HISTORY:   Family History   Problem Relation Age of Onset    C.A.D. Father 36        MI at age 36-37    Cerebrovascular Disease Father     Allergies Father     Anesthesia Reaction Father     Cardiovascular Paternal Grandfather     Prostate Cancer No family hx of     Colon Cancer No family hx of      PHYSICAL EXAM:   BP (!) 184/96   Pulse 85   Ht 2.083 m (6' 10\")   Wt 112.9 kg (249 lb)   BMI 26.04 kg/m    GENERAL APPEARANCE: alert and no distress  EYES: nonicteric  HENT: mouth without ulcers or lesions  NECK: supple, no adenopathy  RESP: lungs clear to auscultation   CV: regular rhythm, normal rate, no rub  ABDOMEN: soft, nontender, normal bowel sounds, no HSM   Extremities: no clubbing, cyanosis, or edema  MS: no evidence of inflammation in joints, no muscle tenderness  SKIN: no rash  NEURO: mentation intact and speech normal  PSYCH: affect normal/bright   LABS:   Recent Results (from the past 672 hour(s))   Comprehensive metabolic panel    Collection Time: 11/02/23  1:32 PM   Result Value Ref Range    Sodium 140 135 - 145 mmol/L    Potassium 3.9 3.4 - 5.3 mmol/L    Carbon Dioxide (CO2) 26 22 - 29 mmol/L    Anion Gap 12 7 - 15 mmol/L    Urea Nitrogen 15.1 6.0 - 20.0 mg/dL    Creatinine 1.03 0.67 - 1.17 mg/dL    GFR Estimate 84 >60 mL/min/1.73m2    Calcium 9.8 8.6 - 10.0 mg/dL    Chloride 102 98 - 107 mmol/L    Glucose 103 (H) 70 - 99 mg/dL    Alkaline Phosphatase 39 (L) 40 - 129 U/L    AST 26 0 - 45 U/L    ALT 23 0 - 70 U/L    Protein Total 7.1 6.4 - 8.3 g/dL    Albumin 4.4 3.5 - 5.2 g/dL    Bilirubin Total 0.4 <=1.2 mg/dL   CBC with platelets    Collection Time: 11/02/23 "  1:32 PM   Result Value Ref Range    WBC Count 8.4 4.0 - 11.0 10e3/uL    RBC Count 4.80 4.40 - 5.90 10e6/uL    Hemoglobin 14.6 13.3 - 17.7 g/dL    Hematocrit 43.7 40.0 - 53.0 %    MCV 91 78 - 100 fL    MCH 30.4 26.5 - 33.0 pg    MCHC 33.4 31.5 - 36.5 g/dL    RDW 13.0 10.0 - 15.0 %    Platelet Count 235 150 - 450 10e3/uL   Protein  random urine    Collection Time: 11/02/23  1:32 PM   Result Value Ref Range    Total Protein Urine mg/dL 11.6   mg/dL    Total Protein Urine mg/mg Creat 0.12 0.00 - 0.20 mg/mg Cr    Creatinine Urine mg/dL 99.0 mg/dL   Albumin Random Urine Quantitative with Creat Ratio    Collection Time: 11/02/23  1:32 PM   Result Value Ref Range    Creatinine Urine mg/dL 99.0 mg/dL    Albumin Urine mg/L <12.0 mg/L    Albumin Urine mg/g Cr     UA with Microscopic    Collection Time: 11/02/23  1:32 PM   Result Value Ref Range    Color Urine Yellow Colorless, Straw, Light Yellow, Yellow    Appearance Urine Clear Clear    Glucose Urine Negative Negative mg/dL    Bilirubin Urine Negative Negative    Ketones Urine Negative Negative mg/dL    Specific Gravity Urine 1.020 1.003 - 1.035    Blood Urine Negative Negative    pH Urine 6.0 5.0 - 7.0    Protein Albumin Urine Negative Negative mg/dL    Urobilinogen Urine 0.2 0.2, 1.0 E.U./dL    Nitrite Urine Negative Negative    Leukocyte Esterase Urine Negative Negative   Parathyroid Hormone Intact    Collection Time: 11/02/23  1:32 PM   Result Value Ref Range    Parathyroid Hormone Intact 26 15 - 65 pg/mL   Vitamin D Deficiency    Collection Time: 11/02/23  1:32 PM   Result Value Ref Range    Vitamin D, Total (25-Hydroxy) 52 (H) 20 - 50 ng/mL   Urine Microscopic Exam    Collection Time: 11/02/23  1:32 PM   Result Value Ref Range    RBC Urine None Seen 0-2 /HPF /HPF    WBC Urine None Seen 0-5 /HPF /HPF     CMP  Recent Labs   Lab Test 11/02/23  1332 10/05/23  1243 08/08/23  1346 07/18/23  0743 07/11/23  1443 06/13/23  1158 06/08/23  1328 01/21/22  0813 12/16/20  1139  12/14/20  1456 05/05/20  0516 05/04/20  0439 05/03/20  1745 04/30/20  0451 04/29/20  0930     --  139 144 144   < > 127*   < >  --  137 139 139 137  --  136   POTASSIUM 3.9  --  3.6 4.1 3.5   < > 4.7   < > 3.8 3.9 4.2 4.4 3.9  --  4.0   CHLORIDE 102  --  102 102 105   < > 87*   < >  --  104 105 106 103  --  104   CO2 26  --  26 30* 27   < > 24   < >  --  30 28 28 28  --  27   ANIONGAP 12  --  11 12 12   < > 16*   < >  --  3 6 5 6  --  5   *  --  83 91 86   < > 94   < >  --  90 86 83 88   < > 93   BUN 15.1  --  16.7 13.6 14.0   < > 32.6*   < >  --  16 14 14 11  --  10   CR 1.03 1.0 1.10 1.01 1.00   < > 1.99*   < > 0.89 0.98 1.16 1.07 0.90  --  0.91   GFRESTIMATED 84 >60 78 87 88   < > 38*   < > >90 86 71 78 >90  --  >90   GFRESTBLACK  --   --   --   --   --   --   --   --  >90 >90 82 >90 >90  --  >90   DEEPIKA 9.8  --  8.8 9.7 9.2   < > 10.4*   < >  --  9.5 9.0 8.6 9.3  --  9.0   PROTTOTAL 7.1  --   --   --   --   --  8.2  --   --   --   --   --  7.9  --  7.5   ALBUMIN 4.4  --   --   --   --   --  4.7  --   --   --   --   --  3.4  --  3.5   BILITOTAL 0.4  --   --   --   --   --  0.5  --   --   --   --   --  0.3  --  0.5   ALKPHOS 39*  --   --   --   --   --  52  --   --   --   --   --  51  --  50   AST 26  --   --   --   --   --  34  --   --   --   --   --  26  --  16   ALT 23  --   --   --   --   --  37  --   --   --   --   --  32  --  28    < > = values in this interval not displayed.     CBC  Recent Labs   Lab Test 11/02/23  1332 06/13/23  1158 06/08/23  1328 05/17/23  0728   HGB 14.6 14.4 15.0 14.4   WBC 8.4 7.1 17.4* 8.1   RBC 4.80 4.85 5.07 4.91   HCT 43.7 41.4 42.5 43.5   MCV 91 85 84 89   MCH 30.4 29.7 29.6 29.3   MCHC 33.4 34.8 35.3 33.1   RDW 13.0 12.8 12.9 14.2    230 287 246     INR  Recent Labs   Lab Test 06/08/23  1328   INR 1.06   PTT 29     ABGNo lab results found.   URINE STUDIES  Recent Labs   Lab Test 11/02/23  1332 07/18/23  0743 05/03/20  1901 04/29/20  0930 10/03/17  1036   COLOR  Yellow Yellow Yellow Sandra Yellow   APPEARANCE Clear Clear Clear Clear Clear   URINEGLC Negative Negative Negative Negative Negative   URINEBILI Negative Negative Negative Negative Negative   URINEKETONE Negative Negative Negative Negative Negative   SG 1.020 1.015 1.017 1.025 1.015   UBLD Negative Negative Negative Negative Trace*   URINEPH 6.0 7.0 5.0 5.0 6.5   PROTEIN Negative Negative Negative Negative Negative   UROBILINOGEN 0.2 0.2  --   --  0.2   NITRITE Negative Negative Negative Negative Negative   LEUKEST Negative Negative Trace* Negative Negative   RBCU None Seen  --  1 3* O - 2   WBCU None Seen  --  1 2 O - 2     No lab results found.    ASSESSMENT AND PLAN:   #Hypertension  Uncontrolled and exacerbated by heavy smoking and coffee intake. A secondary cause should also be excluded. As I am unable to stop his medications to rule out primary hyperaldosteronism will nonetheless check a renin aldosterone ratio. The patient is currently on amlodipine 10 mg daily, spironolactone 200 mg daily, hydralazine 10 mg tid and lisinopril 40 mg daily. I will switch amlodipine to nifedipine 60 mg twice daily as it is more potent and add carvedilol 12.5 mg twice daily. The patient will keep a BP diary and communicate the results at his next visit.  The patient was also instructed to keep the sodium intake around 2400 mg /day, follow a plant-based diet and to avoid NSAIDs     #Glycemia  Will screen him for diabetes as a potential contributor to HTN although his glycemia on labs has not been really elevated.    #CVD/dyslipidemia  On atorvastatin 20 mg daily, LDL is 89 in July 2023     #Blood count  Hemoglobin 14.6 no acute issue    #Acid-base status  CO2 level 28 no acute issue    #Electrolytes  Na 140  K 3.4 will repeat        The total time of this encounter amounted to 60 minutes on the day of the encounter. This time included time spent with the patient, reviewing records, ordering tests, and performing post visit  documentation.     The patient will return to follow up in one month    Yashira Fisher MD  Division of Renal Disease and Hypertension  November 8, 2023  9:17 AM

## 2023-11-09 ENCOUNTER — LAB (OUTPATIENT)
Dept: LAB | Facility: CLINIC | Age: 58
End: 2023-11-09
Payer: COMMERCIAL

## 2023-11-09 DIAGNOSIS — I10 ESSENTIAL HYPERTENSION WITH GOAL BLOOD PRESSURE LESS THAN 140/90: ICD-10-CM

## 2023-11-09 LAB
ALBUMIN SERPL BCG-MCNC: 4.6 G/DL (ref 3.5–5.2)
ALP SERPL-CCNC: 43 U/L (ref 40–129)
ALT SERPL W P-5'-P-CCNC: 24 U/L (ref 0–70)
ANION GAP SERPL CALCULATED.3IONS-SCNC: 13 MMOL/L (ref 7–15)
AST SERPL W P-5'-P-CCNC: 27 U/L (ref 0–45)
BILIRUB SERPL-MCNC: 0.4 MG/DL
BUN SERPL-MCNC: 11.7 MG/DL (ref 6–20)
CALCIUM SERPL-MCNC: 10 MG/DL (ref 8.6–10)
CHLORIDE SERPL-SCNC: 99 MMOL/L (ref 98–107)
CREAT SERPL-MCNC: 0.95 MG/DL (ref 0.67–1.17)
DEPRECATED HCO3 PLAS-SCNC: 28 MMOL/L (ref 22–29)
EGFRCR SERPLBLD CKD-EPI 2021: >90 ML/MIN/1.73M2
GLUCOSE SERPL-MCNC: 89 MG/DL (ref 70–99)
POTASSIUM SERPL-SCNC: 3.4 MMOL/L (ref 3.4–5.3)
PROT SERPL-MCNC: 7.5 G/DL (ref 6.4–8.3)
SODIUM SERPL-SCNC: 140 MMOL/L (ref 135–145)

## 2023-11-09 PROCEDURE — 99000 SPECIMEN HANDLING OFFICE-LAB: CPT

## 2023-11-09 PROCEDURE — 84244 ASSAY OF RENIN: CPT | Mod: 90

## 2023-11-09 PROCEDURE — 36415 COLL VENOUS BLD VENIPUNCTURE: CPT

## 2023-11-09 PROCEDURE — 80053 COMPREHEN METABOLIC PANEL: CPT

## 2023-11-09 PROCEDURE — 82088 ASSAY OF ALDOSTERONE: CPT

## 2023-11-10 LAB — ALDOST SERPL-MCNC: <3 NG/DL (ref 0–31)

## 2023-11-12 LAB — RENIN PLAS-CCNC: 3.2 NG/ML/HR

## 2023-11-13 LAB — ALDOST/RENIN PLAS-RTO: NORMAL {RATIO}

## 2023-11-29 DIAGNOSIS — I10 HYPERTENSION GOAL BP (BLOOD PRESSURE) < 140/90: ICD-10-CM

## 2023-11-29 DIAGNOSIS — E78.00 ELEVATED LDL CHOLESTEROL LEVEL: ICD-10-CM

## 2023-11-29 RX ORDER — ATORVASTATIN CALCIUM 20 MG/1
20 TABLET, FILM COATED ORAL DAILY
Qty: 30 TABLET | Refills: 7 | Status: SHIPPED | OUTPATIENT
Start: 2023-11-29 | End: 2024-08-07

## 2023-11-29 RX ORDER — AMLODIPINE BESYLATE 10 MG/1
10 TABLET ORAL DAILY
Qty: 30 TABLET | Refills: 5 | OUTPATIENT
Start: 2023-11-29

## 2023-12-04 ENCOUNTER — LAB (OUTPATIENT)
Dept: LAB | Facility: CLINIC | Age: 58
End: 2023-12-04
Payer: COMMERCIAL

## 2023-12-04 DIAGNOSIS — I10 ESSENTIAL HYPERTENSION WITH GOAL BLOOD PRESSURE LESS THAN 140/90: ICD-10-CM

## 2023-12-04 DIAGNOSIS — R73.09 ELEVATED GLUCOSE LEVEL: ICD-10-CM

## 2023-12-04 LAB
ALBUMIN SERPL BCG-MCNC: 4.2 G/DL (ref 3.5–5.2)
ALP SERPL-CCNC: 42 U/L (ref 40–150)
ALT SERPL W P-5'-P-CCNC: 22 U/L (ref 0–70)
ANION GAP SERPL CALCULATED.3IONS-SCNC: 10 MMOL/L (ref 7–15)
AST SERPL W P-5'-P-CCNC: 28 U/L (ref 0–45)
BILIRUB SERPL-MCNC: 0.3 MG/DL
BUN SERPL-MCNC: 18.4 MG/DL (ref 6–20)
CALCIUM SERPL-MCNC: 9.1 MG/DL (ref 8.6–10)
CHLORIDE SERPL-SCNC: 103 MMOL/L (ref 98–107)
CREAT SERPL-MCNC: 1.33 MG/DL (ref 0.67–1.17)
DEPRECATED HCO3 PLAS-SCNC: 29 MMOL/L (ref 22–29)
EGFRCR SERPLBLD CKD-EPI 2021: 62 ML/MIN/1.73M2
GLUCOSE SERPL-MCNC: 87 MG/DL (ref 70–99)
HBA1C MFR BLD: 5.2 % (ref 0–5.6)
POTASSIUM SERPL-SCNC: 3.7 MMOL/L (ref 3.4–5.3)
PROT SERPL-MCNC: 7.2 G/DL (ref 6.4–8.3)
SODIUM SERPL-SCNC: 142 MMOL/L (ref 135–145)

## 2023-12-04 PROCEDURE — 99000 SPECIMEN HANDLING OFFICE-LAB: CPT

## 2023-12-04 PROCEDURE — 36415 COLL VENOUS BLD VENIPUNCTURE: CPT

## 2023-12-04 PROCEDURE — 80053 COMPREHEN METABOLIC PANEL: CPT

## 2023-12-04 PROCEDURE — 83036 HEMOGLOBIN GLYCOSYLATED A1C: CPT

## 2023-12-04 PROCEDURE — 84244 ASSAY OF RENIN: CPT | Mod: 90

## 2023-12-04 PROCEDURE — 82088 ASSAY OF ALDOSTERONE: CPT

## 2023-12-05 LAB — ALDOST SERPL-MCNC: <3 NG/DL (ref 0–31)

## 2023-12-07 LAB — RENIN PLAS-CCNC: 0.5 NG/ML/HR

## 2023-12-08 LAB — ALDOST/RENIN PLAS-RTO: NORMAL {RATIO}

## 2023-12-11 ENCOUNTER — VIRTUAL VISIT (OUTPATIENT)
Dept: NEPHROLOGY | Facility: CLINIC | Age: 58
End: 2023-12-11
Attending: INTERNAL MEDICINE
Payer: COMMERCIAL

## 2023-12-11 VITALS — BODY MASS INDEX: 29.85 KG/M2 | HEIGHT: 78 IN | WEIGHT: 258 LBS

## 2023-12-11 DIAGNOSIS — I10 ESSENTIAL HYPERTENSION WITH GOAL BLOOD PRESSURE LESS THAN 140/90: ICD-10-CM

## 2023-12-11 PROCEDURE — 99442 PR PHYSICIAN TELEPHONE EVALUATION 11-20 MIN: CPT | Performed by: INTERNAL MEDICINE

## 2023-12-11 RX ORDER — CARVEDILOL 25 MG/1
25 TABLET ORAL 2 TIMES DAILY WITH MEALS
Qty: 180 TABLET | Refills: 1 | Status: SHIPPED | OUTPATIENT
Start: 2023-12-11 | End: 2024-06-19

## 2023-12-11 ASSESSMENT — PAIN SCALES - GENERAL: PAINLEVEL: NO PAIN (0)

## 2023-12-11 NOTE — NURSING NOTE
Is the patient currently in the state of [place]? YES    Visit mode:TELEPHONE    If the visit is dropped, the patient can be reconnected by: TELEPHONE VISIT: Phone number: [phone]    Will anyone else be joining the visit? NO  (If patient encounters technical issues they should call [phone])    How would you like to obtain your AVS? MyChart    Are changes needed to the allergy or medication list? No    Reason for visit: No chief complaint on file.    Cristobal MOORE

## 2023-12-11 NOTE — PATIENT INSTRUCTIONS
-Please increase carvedilol to 25 mg twice daily  -Please do labs in 2 weeks from now  -Let's touch base in a month from now through a phone visit

## 2023-12-11 NOTE — PROGRESS NOTES
Nephrology Clinic    Sarath Gray MRN:[id number] YOB: 1965  Date of Service: 12/11/2023  Primary care provider: Traci Radford  Requesting physician: Traci Radford      REASON FOR CONSULT: Hypertension    HISTORY OF PRESENT ILLNESS:   Sarath Gray is a 58 year old male who first presented for evaluation of uncontrolled hypertension on November 8th 2023.  The past medical history is significant for hypertension diagnosed only two years ago, mild to moderate mitral insufficiency and heavy smoking. His hypertension had been difficult to manage and remained uncontrolled on spironolactone 200 mg daily, amlodipine 10 mg daily, lisinopril 40 mg daily and hydralazine 10 mg three times a day which has only been added a few days prior. He had tried hydrochlorothiazide in the past but it has led to COMPA and was stopped. Beta blockers were not given due to concern for low heart rate. Renal artery was ruled out as a secondary cause of hypertension with no abnormality seen at the kidneys level. His renal function was currently intact with a creatinine level at 0.95 mg/dL and had been stable over the past two years. He had no evidence of any albuminuria on a uACR done on 11/02/2023. His TSH level was normal and was 1.06 mg/dL on 7/18/2023. He hadn't been checked for primary aldosteronism. The patient is also a heavy smoker and smokes around 1/2 ppd. He also drinks a pot of coffee daily. No significant alcohol intake.    On his first visit, I switched amlodipine to nifedipine 60 mg twice daily and started him on carvedilol 12.5 mg twice daily. I also instructed him about lifestyle modifications and checked a renin aldosterone ratio which showed a heavily suppressed aldosterone level. He reports that his blood pressure remains uncontrolled despite taking all his medications and cutting down on smoking. He is going through significant life-stressors as his wife just spent 35 days in the hospital and  almost .    Echocardiogram done on 2023  Interpretation Summary  Global and regional left ventricular function is normal with an EF of 55-60%.  Right ventricular function, chamber size, wall motion, and thickness are  normal.  Mild to moderate mitral insufficiency is present.  The inferior vena cava is normal.  No pericardial effusion is present.  There is no prior study for direct comparison.  The patient denies any dysuria, any pollakiuria, any nocturia, any LE edema, any dyspnea on exertion .  The patient denies ever having kidney stones, urinary tract infections, gross hematuria. There is no family history of CKD.  The following portions of the patient's history were reviewed and updated as appropriate: allergies, current medications, past family history, past medical history, past social history, past surgical history and problem list.    PAST MEDICAL HISTORY:  Past Medical History:   Diagnosis Date    Acute appendicitis with generalized peritonitis, unspecified whether abscess present, unspecified whether gangrene present, unspecified whether perforation present 2020    Added automatically from request for surgery [id number]    Heart disease     Hypertension     CARROLL (obstructive sleep apnea)     PE (pulmonary embolism)     years ago, apparently no cause found, does smoke    Respiratory complications 2005    Sleep apnea     Squamous cell carcinoma of skin, unspecified      PAST SURGICAL HISTORY:  Past Surgical History:   Procedure Laterality Date    ARTHROPLASTY KNEE  2014    Procedure: ARTHROPLASTY KNEE;  Surgeon: Maik Jeong MD;  Location: [facility]    ARTHROSCOPY KNEE WITH MEDIAL MENISCECTOMY  2013    Procedure: ARTHROSCOPY KNEE WITH MEDIAL MENISCECTOMY;  Left Knee Arthroscopy With Intraarticular Debridement.;  Surgeon: Maik Jeong MD;  Location: [facility]    ARTHROTOMY KNEE Right 2020    Procedure: Knee Open Excision of Prepatellar Bursa;  Surgeon: Maik Jeong  MD;  Location: [facility]    COLONOSCOPY  2002    COLONOSCOPY N/A 4/18/2018    Procedure: COLONOSCOPY;  colonoscopy;  Surgeon: Nirmal Schneider MD;  Location: [facility]    EXAM UNDER ANESTHESIA, MANIPULATE JOINT (LOCATION)  6/26/2014    Procedure: EXAM UNDER ANESTHESIA, MANIPULATE JOINT (LOCATION);  Surgeon: Maik Jeong MD;  Location: [facility]    EXAM UNDER ANESTHESIA, MANIPULATE JOINT (LOCATION) Left 8/28/2014    Procedure: EXAM UNDER ANESTHESIA, MANIPULATE JOINT (LOCATION);  Surgeon: Maik Jeong MD;  Location: [facility]    EXAM UNDER ANESTHESIA, MANIPULATE JOINT (LOCATION) Left 12/31/2014    Procedure: EXAM UNDER ANESTHESIA, MANIPULATE JOINT (LOCATION);  Surgeon: Maik Jeong MD;  Location: [facility]    H ABLATION SVT  2008    AVNRT    INJECT EPIDURAL LUMBAR  12/16/2011    Procedure:INJECT EPIDURAL LUMBAR; MICKY-Dr. Smith; Surgeon:GENERIC ANESTHESIA PROVIDER; Location:[facility]    INJECT EPIDURAL LUMBAR  1/27/2012    Procedure:INJECT EPIDURAL LUMBAR; MICKY with Flouro--; Surgeon:GENERIC ANESTHESIA PROVIDER; Location:[facility]    LAPAROSCOPIC APPENDECTOMY N/A 4/29/2020    Procedure: APPENDECTOMY, LAPAROSCOPIC;  Surgeon: Alexis Maher MD;  Location: [facility]    LAPAROSCOPIC HERNIORRHAPHY INGUINAL BILATERAL Bilateral 12/29/2015    Procedure: LAPAROSCOPIC HERNIORRHAPHY INGUINAL BILATERAL;  Surgeon: Andrew Prajapati MD;  Location: [facility]    Lumbar back fusion  1995, 1998    first was A&P    Lumbar back procedure  1999    Hardware removal    spinal stimulator insertion  2004    also, revised it that year also. never seemed to help well.      MEDICATIONS:  Prescription Medications as of 12/11/2023         Rx Number Disp Refills Start End Last Dispensed Date Next Fill Date Owning Pharmacy    albuterol (PROAIR HFA) 108 (90 Base) MCG/ACT inhaler  1 Inhaler 11 6/22/2020    [facility] - [place] - [address] [facility]    Sig: Inhale 2 puffs into the lungs every 4 hours as needed for shortness of breath / dyspnea or wheezing     Class: E-Prescribe    Notes to Pharmacy: Pharmacy may dispense brand covered by insurance (Proair, or proventil or ventolin or generic albuterol inhaler)    Route: Inhalation    aspirin 81 MG EC tablet    10/2/2023        Sig: Take 1 tablet (81 mg) by mouth daily    Class: OTC    Route: Oral    atorvastatin (LIPITOR) 20 MG tablet  30 tablet 7 11/29/2023    [facility], [place] - [address] [address]    Sig: Take 1 tablet (20 mg) by mouth daily    Class: E-Prescribe    Notes to Pharmacy: This prescription was filled on 11/8/2023. Any refills authorized will be placed on file.    Route: Oral    carvedilol (COREG) 12.5 MG tablet  180 tablet 1 11/8/2023 5/6/2024   [facility], [place] - [address] [address]    Sig: Take 1 tablet (12.5 mg) by mouth 2 times daily (with meals) for 180 days    Class: E-Prescribe    Route: Oral    hydrALAZINE (APRESOLINE) 10 MG tablet  270 tablet 3 10/31/2023    [facility], [place] - [address] [address]    Sig: Take 1 tablet (10 mg) by mouth 3 times daily    Class: E-Prescribe    Route: Oral    lisinopril (ZESTRIL) 40 MG tablet  90 tablet 3 10/31/2023    [facility], [place] - [address] [address]    Sig: Take 1 tablet (40 mg) by mouth daily    Class: E-Prescribe    Notes to Pharmacy: This prescription was filled on 10/10/2023. Any refills authorized will be placed on file.    Route: Oral    Renewals       Renewal provider: Quentin Hernandez MD            multivitamin, therapeutic (THERA-VIT) TABS tablet            Sig: Take 1 tablet by mouth daily    Class: Historical    Route: Oral    NIFEdipine ER (ADALAT CC) 60 MG 24 hr tablet  180 tablet 1 11/8/2023 5/6/2024   [facility], [place] - [address] [address]    Sig: Take 1 tablet (60 mg) by mouth two times daily for 180 days    Class: E-Prescribe    Route: Oral    omeprazole (PRILOSEC) 40 MG DR capsule  180 capsule 3 8/21/2023    [facility], [place] - [address] [address]    Sig: Take 1 capsule (40 mg) by mouth 2  times daily    Class: E-Prescribe    Route: Oral    order for DME            Sig: Equipment being ordered: CPAP Patient Sarath Gray was set up at [facility]  on April 21, 2016. Patient received a Resmed AirSense 10 Auto. Pressures were set at Auto 5 - 15 cm H2O.   Patient s ramp is 5 cm H2O for Auto and FLEX/EPR is 2.  Patient received a [employer] Mask name: SIMPLUS  Full Face mask Size Large, heated tubing and heated humidifier.  Patient is enrolled in the STM Program and does not need to meet compliance. Patient has a follow up on June 14TH AT 9 AM with YADIRA Resendez.    Jennifer Sam    Class: Historical    ORDER FOR DME  1 Device 0 6/14/2014    [facility], [place] - [address]    Sig: Equipment being ordered: Other: CPM machine for home use. Set max tolerance and increase 3-5 degrees/day as tolerated. Use up to 6-8 hours/day as tolerated.  Treatment Diagnosis: left TKA    Class: Local Print    spironolactone (ALDACTONE) 100 MG tablet  180 tablet 3 9/12/2023    [place], [place] - [address] [facility]    Sig: Take 2 tablets (200 mg) by mouth daily    Class: E-Prescribe    Route: Oral    traZODone (DESYREL) 50 MG tablet  30 tablet 1 4/11/2022    [place], [place] - [address] [facility]    Sig: Take 1 tablet by mouth daily AT BEDTIME    Class: E-Prescribe    Notes to Pharmacy: This prescription was filled on 3/19/2022. Any refills authorized will be placed on file.          Clinic-Administered Medications as of 12/11/2023         Dose Frequency Start End    iopamidol (ISOVUE-370) 76% solution 80 mL 80 mL ONCE 3/7/2016     Route: Intravenous    sodium chloride 0.9 % for CT scan flush dose 80 mL 80 mL ONCE 3/7/2016     Route: Intravenous           ALLERGIES:    Allergies   Allergen Reactions    Persantine [Dipyridamole] Other (See Comments)     Nervous and anxiety     REVIEW OF SYSTEMS:    A comprehensive review of systems was performed and found to be negative except  "as described here or above.  SOCIAL HISTORY:   Social History     Socioeconomic History    Marital status: Single     Spouse name: Not on file    Number of children: Not on file    Years of education: Not on file    Highest education level: Not on file   Occupational History    Not on file   Tobacco Use    Smoking status: Every Day     Packs/day: 1.00     Years: 18.00     Additional pack years: 0.00     Total pack years: 18.00     Types: Cigarettes    Smokeless tobacco: Never    Tobacco comments:     Started at age 30.    Vaping Use    Vaping Use: Never used   Substance and Sexual Activity    Alcohol use: Yes     Alcohol/week: 0.0 standard drinks of alcohol     Comment: occassional beer    Drug use: No    Sexual activity: Yes     Partners: Female     Birth control/protection: Surgical   Other Topics Concern    Parent/sibling w/ CABG, MI or angioplasty before 65F 55M? Not Asked   Social History Narrative    ** Merged History Encounter **          Social Determinants of Health     Financial Resource Strain: Not on file   Food Insecurity: Not on file   Transportation Needs: Not on file   Physical Activity: Not on file   Stress: Not on file   Social Connections: Not on file   Interpersonal Safety: Not on file   Housing Stability: Not on file     FAMILY MEDICAL HISTORY:   Family History   Problem Relation Age of Onset    C.A.D. Father 36        MI at age 36-37    Cerebrovascular Disease Father     Allergies Father     Anesthesia Reaction Father     Cardiovascular Paternal Grandfather     Prostate Cancer No family hx of     Colon Cancer No family hx of      PHYSICAL EXAM:   Ht 2.083 m (6' 10\")   Wt 117 kg (258 lb)   BMI 26.98 kg/m    GENERAL APPEARANCE: alert and no distress  EYES: nonicteric  HENT: mouth without ulcers or lesions  NECK: supple, no adenopathy  RESP: lungs clear to auscultation   CV: regular rhythm, normal rate, no rub  ABDOMEN: soft, nontender, normal bowel sounds, no HSM   Extremities: no clubbing, " cyanosis, or edema  MS: no evidence of inflammation in joints, no muscle tenderness  SKIN: no rash  NEURO: mentation intact and speech normal  PSYCH: affect normal/bright   LABS:   Recent Results (from the past 672 hour(s))   Comprehensive metabolic panel    Collection Time: 12/04/23  1:30 PM   Result Value Ref Range    Sodium 142 135 - 145 mmol/L    Potassium 3.7 3.4 - 5.3 mmol/L    Carbon Dioxide (CO2) 29 22 - 29 mmol/L    Anion Gap 10 7 - 15 mmol/L    Urea Nitrogen 18.4 6.0 - 20.0 mg/dL    Creatinine 1.33 (H) 0.67 - 1.17 mg/dL    GFR Estimate 62 >60 mL/min/1.73m2    Calcium 9.1 8.6 - 10.0 mg/dL    Chloride 103 98 - 107 mmol/L    Glucose 87 70 - 99 mg/dL    Alkaline Phosphatase 42 40 - 150 U/L    AST 28 0 - 45 U/L    ALT 22 0 - 70 U/L    Protein Total 7.2 6.4 - 8.3 g/dL    Albumin 4.2 3.5 - 5.2 g/dL    Bilirubin Total 0.3 <=1.2 mg/dL   Hemoglobin A1c    Collection Time: 12/04/23  1:30 PM   Result Value Ref Range    Hemoglobin A1C 5.2 0.0 - 5.6 %   Aldosterone    Collection Time: 12/04/23  1:30 PM   Result Value Ref Range    Aldosterone <3.0 0.0 - 31.0 ng/dL   Renin activity    Collection Time: 12/04/23  1:30 PM   Result Value Ref Range    Renin Activity 0.5 ng/mL/hr   Aldosterone Renin Ratio    Collection Time: 12/04/23  1:30 PM   Result Value Ref Range    Aldosterone Renin Ratio       CMP  Recent Labs   Lab Test 12/04/23  1330 11/09/23  1102 11/02/23  1332 10/05/23  1243 08/08/23  1346 06/13/23  1158 06/08/23  1328 01/21/22  0813 12/16/20  1139 12/14/20  1456 05/05/20  0516 05/04/20  0439    140 140  --  139   < > 127*   < >  --  137 139 139   POTASSIUM 3.7 3.4 3.9  --  3.6   < > 4.7   < > 3.8 3.9 4.2 4.4   CHLORIDE 103 99 102  --  102   < > 87*   < >  --  104 105 106   CO2 29 28 26  --  26   < > 24   < >  --  30 28 28   ANIONGAP 10 13 12  --  11   < > 16*   < >  --  3 6 5   GLC 87 89 103*  --  83   < > 94   < >  --  90 86 83   BUN 18.4 11.7 15.1  --  16.7   < > 32.6*   < >  --  16 14 14   CR 1.33* 0.95  1.03 1.0 1.10   < > 1.99*   < > 0.89 0.98 1.16 1.07   GFRESTIMATED 62 >90 84 >60 78   < > 38*   < > >90 86 71 78   GFRESTBLACK  --   --   --   --   --   --   --   --  >90 >90 82 >90   DEEPIKA 9.1 10.0 9.8  --  8.8   < > 10.4*   < >  --  9.5 9.0 8.6   PROTTOTAL 7.2 7.5 7.1  --   --   --  8.2  --   --   --   --   --    ALBUMIN 4.2 4.6 4.4  --   --   --  4.7  --   --   --   --   --    BILITOTAL 0.3 0.4 0.4  --   --   --  0.5  --   --   --   --   --    ALKPHOS 42 43 39*  --   --   --  52  --   --   --   --   --    AST 28 27 26  --   --   --  34  --   --   --   --   --    ALT 22 24 23  --   --   --  37  --   --   --   --   --     < > = values in this interval not displayed.     CBC  Recent Labs   Lab Test 11/02/23 1332 06/13/23  1158 06/08/23  1328 05/17/23  0728   HGB 14.6 14.4 15.0 14.4   WBC 8.4 7.1 17.4* 8.1   RBC 4.80 4.85 5.07 4.91   HCT 43.7 41.4 42.5 43.5   MCV 91 85 84 89   MCH 30.4 29.7 29.6 29.3   MCHC 33.4 34.8 35.3 33.1   RDW 13.0 12.8 12.9 14.2    230 287 246     INR  Recent Labs   Lab Test 06/08/23  1328   INR 1.06   PTT 29     ABGNo lab results found.   URINE STUDIES  Recent Labs   Lab Test 11/02/23  1332 07/18/23  0743 05/03/20  1901 04/29/20  0930 10/03/17  1036   COLOR Yellow Yellow Yellow Sandra Yellow   APPEARANCE Clear Clear Clear Clear Clear   URINEGLC Negative Negative Negative Negative Negative   URINEBILI Negative Negative Negative Negative Negative   URINEKETONE Negative Negative Negative Negative Negative   SG 1.020 1.015 1.017 1.025 1.015   UBLD Negative Negative Negative Negative Trace*   URINEPH 6.0 7.0 5.0 5.0 6.5   PROTEIN Negative Negative Negative Negative Negative   UROBILINOGEN 0.2 0.2  --   --  0.2   NITRITE Negative Negative Negative Negative Negative   LEUKEST Negative Negative Trace* Negative Negative   RBCU None Seen  --  1 3* O - 2   WBCU None Seen  --  1 2 O - 2     No lab results found.    ASSESSMENT AND PLAN:   #Hypertension  Uncontrolled and exacerbated by heavy smoking  and coffee intake. A secondary cause should also be excluded. As I am unable to stop his medications to rule out primary I nonetheless checked a renin aldosterone ratio and the aldosterone was found to be heavily suppressed. The patient is currently on nifedipine 60 mg twice daily, spironolactone 200 mg daily, hydralazine 10 mg tid, carvedilol 12.5 mg twice daily and lisinopril 40 mg daily. I will increase carvedilol  to 25 mg twice daily. The patient will keep a BP diary and communicate the results at his next visit.  The patient was also instructed to keep the sodium intake around 2400 mg /day, follow a plant-based diet and to avoid NSAIDs    #Elevated creatinine a creatinine level on 12/4 has increased to 1.3 from a baseline of 0.95 with no clear explanation. I will recheck a level.     #Glycemia  Hba1c is 5.2, not an issue    #CVD/dyslipidemia  On atorvastatin 20 mg daily, LDL is 89 in July 2023     #Blood count  Hemoglobin 14.6 no acute issue    #Acid-base status  CO2 level 28 no acute issue    #Electrolytes  Na 140  K 3.4 -> 3.7 no acute issue       The total time of this encounter amounted to 30 minutes on the day of the encounter. This time included time spent with the patient, reviewing records, ordering tests, and performing post visit documentation.     The patient will return to follow up in one month    Yashira Fisher MD  Division of Renal Disease and Hypertension  December 11, 2023      Virtual Visit Details    Type of service:  Telephone Visit   Phone call duration: 15 minutes

## 2023-12-11 NOTE — LETTER
12/11/2023       RE: Sarath Gray  [address] [facility] [id number]     Dear Colleague,    Thank you for referring your patient, Sarath Gray, to the [facility] [place] at [facility]. Please see a copy of my visit note below.    Nephrology Clinic    Sarath Gray MRN:[id number] YOB: 1965  Date of Service: 12/11/2023  Primary care provider: Traci Radford  Requesting physician: Traci Radford      REASON FOR CONSULT: Hypertension    HISTORY OF PRESENT ILLNESS:   Sarath Gray is a 58 year old male who first presented for evaluation of uncontrolled hypertension on November 8th 2023.  The past medical history is significant for hypertension diagnosed only two years ago, mild to moderate mitral insufficiency and heavy smoking. His hypertension had been difficult to manage and remained uncontrolled on spironolactone 200 mg daily, amlodipine 10 mg daily, lisinopril 40 mg daily and hydralazine 10 mg three times a day which has only been added a few days prior. He had tried hydrochlorothiazide in the past but it has led to COMPA and was stopped. Beta blockers were not given due to concern for low heart rate. Renal artery was ruled out as a secondary cause of hypertension with no abnormality seen at the kidneys level. His renal function was currently intact with a creatinine level at 0.95 mg/dL and had been stable over the past two years. He had no evidence of any albuminuria on a uACR done on 11/02/2023. His TSH level was normal and was 1.06 mg/dL on 7/18/2023. He hadn't been checked for primary aldosteronism. The patient is also a heavy smoker and smokes around 1/2 ppd. He also drinks a pot of coffee daily. No significant alcohol intake.    On his first visit, I switched amlodipine to nifedipine 60 mg twice daily and started him on carvedilol 12.5 mg twice daily. I also instructed him about lifestyle  modifications and checked a renin aldosterone ratio which showed a heavily suppressed aldosterone level. He reports that his blood pressure remains uncontrolled despite taking all his medications and cutting down on smoking. He is going through significant life-stressors as his wife just spent 35 days in the hospital and almost .    Echocardiogram done on 2023  Interpretation Summary  Global and regional left ventricular function is normal with an EF of 55-60%.  Right ventricular function, chamber size, wall motion, and thickness are  normal.  Mild to moderate mitral insufficiency is present.  The inferior vena cava is normal.  No pericardial effusion is present.  There is no prior study for direct comparison.  The patient denies any dysuria, any pollakiuria, any nocturia, any LE edema, any dyspnea on exertion .  The patient denies ever having kidney stones, urinary tract infections, gross hematuria. There is no family history of CKD.  The following portions of the patient's history were reviewed and updated as appropriate: allergies, current medications, past family history, past medical history, past social history, past surgical history and problem list.    PAST MEDICAL HISTORY:  Past Medical History:   Diagnosis Date    Acute appendicitis with generalized peritonitis, unspecified whether abscess present, unspecified whether gangrene present, unspecified whether perforation present 2020    Added automatically from request for surgery [id number]    Heart disease     Hypertension     CARROLL (obstructive sleep apnea)     PE (pulmonary embolism)     years ago, apparently no cause found, does smoke    Respiratory complications 2005    Sleep apnea     Squamous cell carcinoma of skin, unspecified      PAST SURGICAL HISTORY:  Past Surgical History:   Procedure Laterality Date    ARTHROPLASTY KNEE  2014    Procedure: ARTHROPLASTY KNEE;  Surgeon: Maik Jeong MD;  Location: [facility]    ARTHROSCOPY  KNEE WITH MEDIAL MENISCECTOMY  12/19/2013    Procedure: ARTHROSCOPY KNEE WITH MEDIAL MENISCECTOMY;  Left Knee Arthroscopy With Intraarticular Debridement.;  Surgeon: Maik Jeong MD;  Location: [facility]    ARTHROTOMY KNEE Right 12/16/2020    Procedure: Knee Open Excision of Prepatellar Bursa;  Surgeon: Maik Jeong MD;  Location: [facility]    COLONOSCOPY  2002    COLONOSCOPY N/A 4/18/2018    Procedure: COLONOSCOPY;  colonoscopy;  Surgeon: Nirmal Schneider MD;  Location: [facility]    EXAM UNDER ANESTHESIA, MANIPULATE JOINT (LOCATION)  6/26/2014    Procedure: EXAM UNDER ANESTHESIA, MANIPULATE JOINT (LOCATION);  Surgeon: Maik Jeong MD;  Location: [facility]    EXAM UNDER ANESTHESIA, MANIPULATE JOINT (LOCATION) Left 8/28/2014    Procedure: EXAM UNDER ANESTHESIA, MANIPULATE JOINT (LOCATION);  Surgeon: Maik Jeong MD;  Location: [facility]    EXAM UNDER ANESTHESIA, MANIPULATE JOINT (LOCATION) Left 12/31/2014    Procedure: EXAM UNDER ANESTHESIA, MANIPULATE JOINT (LOCATION);  Surgeon: Maik Jeong MD;  Location: [facility]    H ABLATION SVT  2008    AVNRT    INJECT EPIDURAL LUMBAR  12/16/2011    Procedure:INJECT EPIDURAL LUMBAR; MICKY-Dr. Smith; Surgeon:GENERIC ANESTHESIA PROVIDER; Location:[facility]    INJECT EPIDURAL LUMBAR  1/27/2012    Procedure:INJECT EPIDURAL LUMBAR; MICKY with Flouro--; Surgeon:GENERIC ANESTHESIA PROVIDER; Location:[facility]    LAPAROSCOPIC APPENDECTOMY N/A 4/29/2020    Procedure: APPENDECTOMY, LAPAROSCOPIC;  Surgeon: Alexis Maher MD;  Location: [facility]    LAPAROSCOPIC HERNIORRHAPHY INGUINAL BILATERAL Bilateral 12/29/2015    Procedure: LAPAROSCOPIC HERNIORRHAPHY INGUINAL BILATERAL;  Surgeon: Andrew Prajapati MD;  Location: [facility]    Lumbar back fusion  1995, 1998    first was A&P    Lumbar back procedure  1999    Hardware removal    spinal stimulator insertion  2004    also, revised it that year also. never seemed to help well.      MEDICATIONS:  Prescription Medications as of 12/11/2023          Rx Number Disp Refills Start End Last Dispensed Date Next Fill Date Owning Pharmacy    albuterol (PROAIR HFA) 108 (90 Base) MCG/ACT inhaler  1 Inhaler 11 6/22/2020    [facility] [address] [facility]    Sig: Inhale 2 puffs into the lungs every 4 hours as needed for shortness of breath / dyspnea or wheezing    Class: E-Prescribe    Notes to Pharmacy: Pharmacy may dispense brand covered by insurance (Proair, or proventil or ventolin or generic albuterol inhaler)    Route: Inhalation    aspirin 81 MG EC tablet    10/2/2023        Sig: Take 1 tablet (81 mg) by mouth daily    Class: OTC    Route: Oral    atorvastatin (LIPITOR) 20 MG tablet  30 tablet 7 11/29/2023    [facility] [address] [facility]    Sig: Take 1 tablet (20 mg) by mouth daily    Class: E-Prescribe    Notes to Pharmacy: This prescription was filled on 11/8/2023. Any refills authorized will be placed on file.    Route: Oral    carvedilol (COREG) 12.5 MG tablet  180 tablet 1 11/8/2023 5/6/2024   [facility] [address] [facility]    Sig: Take 1 tablet (12.5 mg) by mouth 2 times daily (with meals) for 180 days    Class: E-Prescribe    Route: Oral    hydrALAZINE (APRESOLINE) 10 MG tablet  270 tablet 3 10/31/2023    [place], [place] - [address] [facility]    Sig: Take 1 tablet (10 mg) by mouth 3 times daily    Class: E-Prescribe    Route: Oral    lisinopril (ZESTRIL) 40 MG tablet  90 tablet 3 10/31/2023    [facility] [address] [facility]    Sig: Take 1 tablet (40 mg) by mouth daily    Class: E-Prescribe    Notes to Pharmacy: This prescription was filled on 10/10/2023. Any refills authorized will be placed on file.    Route: Oral    Renewals       Renewal provider: Quentin Hernandez MD            multivitamin, therapeutic (THERA-VIT) TABS tablet            Sig: Take 1 tablet by mouth daily    Class: Historical    Route: Oral    NIFEdipine ER (ADALAT CC) 60 MG 24 hr tablet  180 tablet 1 11/8/2023  5/6/2024   [place], [place] - [address] [facility]    Sig: Take 1 tablet (60 mg) by mouth two times daily for 180 days    Class: E-Prescribe    Route: Oral    omeprazole (PRILOSEC) 40 MG DR capsule  180 capsule 3 8/21/2023    [place], [place] - [address] [facility]    Sig: Take 1 capsule (40 mg) by mouth 2 times daily    Class: E-Prescribe    Route: Oral    order for DME            Sig: Equipment being ordered: CPAP Patient Sarath Gray was set up at [facility]  on April 21, 2016. Patient received a Resmed AirSense 10 Auto. Pressures were set at Auto 5 - 15 cm H2O.   Patient s ramp is 5 cm H2O for Auto and FLEX/EPR is 2.  Patient received a [employer] Mask name: SIMPLUS  Full Face mask Size Large, heated tubing and heated humidifier.  Patient is enrolled in the STM Program and does not need to meet compliance. Patient has a follow up on June 14TH AT 9 AM with GARCÍA Resendez    Class: Historical    ORDER FOR DME  1 Device 0 6/14/2014    [facility], [place] - [address] [facility]    Sig: Equipment being ordered: Other: CPM machine for home use. Set max tolerance and increase 3-5 degrees/day as tolerated. Use up to 6-8 hours/day as tolerated.  Treatment Diagnosis: left TKA    Class: Local Print    spironolactone (ALDACTONE) 100 MG tablet  180 tablet 3 9/12/2023    [place], [place] - [address] [facility]    Sig: Take 2 tablets (200 mg) by mouth daily    Class: E-Prescribe    Route: Oral    traZODone (DESYREL) 50 MG tablet  30 tablet 1 4/11/2022    [place], [place] - [address] [facility]    Sig: Take 1 tablet by mouth daily AT BEDTIME    Class: E-Prescribe    Notes to Pharmacy: This prescription was filled on 3/19/2022. Any refills authorized will be placed on file.          Clinic-Administered Medications as of 12/11/2023         Dose Frequency Start End    iopamidol (ISOVUE-370) 76% solution 80 mL 80 mL ONCE 3/7/2016     Route: Intravenous    sodium  "chloride 0.9 % for CT scan flush dose 80 mL 80 mL ONCE 3/7/2016     Route: Intravenous           ALLERGIES:    Allergies   Allergen Reactions    Persantine [Dipyridamole] Other (See Comments)     Nervous and anxiety     REVIEW OF SYSTEMS:    A comprehensive review of systems was performed and found to be negative except as described here or above.  SOCIAL HISTORY:   Social History     Socioeconomic History    Marital status: Single     Spouse name: Not on file    Number of children: Not on file    Years of education: Not on file    Highest education level: Not on file   Occupational History    Not on file   Tobacco Use    Smoking status: Every Day     Packs/day: 1.00     Years: 18.00     Additional pack years: 0.00     Total pack years: 18.00     Types: Cigarettes    Smokeless tobacco: Never    Tobacco comments:     Started at age 30.    Vaping Use    Vaping Use: Never used   Substance and Sexual Activity    Alcohol use: Yes     Alcohol/week: 0.0 standard drinks of alcohol     Comment: occassional beer    Drug use: No    Sexual activity: Yes     Partners: Female     Birth control/protection: Surgical   Other Topics Concern    Parent/sibling w/ CABG, MI or angioplasty before 65F 55M? Not Asked   Social History Narrative    ** Merged History Encounter **          Social Determinants of Health     Financial Resource Strain: Not on file   Food Insecurity: Not on file   Transportation Needs: Not on file   Physical Activity: Not on file   Stress: Not on file   Social Connections: Not on file   Interpersonal Safety: Not on file   Housing Stability: Not on file     FAMILY MEDICAL HISTORY:   Family History   Problem Relation Age of Onset    C.A.D. Father 36        MI at age 36-37    Cerebrovascular Disease Father     Allergies Father     Anesthesia Reaction Father     Cardiovascular Paternal Grandfather     Prostate Cancer No family hx of     Colon Cancer No family hx of      PHYSICAL EXAM:   Ht 2.083 m (6' 10\")   Wt 117 kg " (258 lb)   BMI 26.98 kg/m    GENERAL APPEARANCE: alert and no distress  EYES: nonicteric  HENT: mouth without ulcers or lesions  NECK: supple, no adenopathy  RESP: lungs clear to auscultation   CV: regular rhythm, normal rate, no rub  ABDOMEN: soft, nontender, normal bowel sounds, no HSM   Extremities: no clubbing, cyanosis, or edema  MS: no evidence of inflammation in joints, no muscle tenderness  SKIN: no rash  NEURO: mentation intact and speech normal  PSYCH: affect normal/bright   LABS:   Recent Results (from the past 672 hour(s))   Comprehensive metabolic panel    Collection Time: 12/04/23  1:30 PM   Result Value Ref Range    Sodium 142 135 - 145 mmol/L    Potassium 3.7 3.4 - 5.3 mmol/L    Carbon Dioxide (CO2) 29 22 - 29 mmol/L    Anion Gap 10 7 - 15 mmol/L    Urea Nitrogen 18.4 6.0 - 20.0 mg/dL    Creatinine 1.33 (H) 0.67 - 1.17 mg/dL    GFR Estimate 62 >60 mL/min/1.73m2    Calcium 9.1 8.6 - 10.0 mg/dL    Chloride 103 98 - 107 mmol/L    Glucose 87 70 - 99 mg/dL    Alkaline Phosphatase 42 40 - 150 U/L    AST 28 0 - 45 U/L    ALT 22 0 - 70 U/L    Protein Total 7.2 6.4 - 8.3 g/dL    Albumin 4.2 3.5 - 5.2 g/dL    Bilirubin Total 0.3 <=1.2 mg/dL   Hemoglobin A1c    Collection Time: 12/04/23  1:30 PM   Result Value Ref Range    Hemoglobin A1C 5.2 0.0 - 5.6 %   Aldosterone    Collection Time: 12/04/23  1:30 PM   Result Value Ref Range    Aldosterone <3.0 0.0 - 31.0 ng/dL   Renin activity    Collection Time: 12/04/23  1:30 PM   Result Value Ref Range    Renin Activity 0.5 ng/mL/hr   Aldosterone Renin Ratio    Collection Time: 12/04/23  1:30 PM   Result Value Ref Range    Aldosterone Renin Ratio       CMP  Recent Labs   Lab Test 12/04/23  1330 11/09/23  1102 11/02/23  1332 10/05/23  1243 08/08/23  1346 06/13/23  1158 06/08/23  1328 01/21/22  0813 12/16/20  1139 12/14/20  1456 05/05/20  0516 05/04/20  0439    140 140  --  139   < > 127*   < >  --  137 139 139   POTASSIUM 3.7 3.4 3.9  --  3.6   < > 4.7   < > 3.8  3.9 4.2 4.4   CHLORIDE 103 99 102  --  102   < > 87*   < >  --  104 105 106   CO2 29 28 26  --  26   < > 24   < >  --  30 28 28   ANIONGAP 10 13 12  --  11   < > 16*   < >  --  3 6 5   GLC 87 89 103*  --  83   < > 94   < >  --  90 86 83   BUN 18.4 11.7 15.1  --  16.7   < > 32.6*   < >  --  16 14 14   CR 1.33* 0.95 1.03 1.0 1.10   < > 1.99*   < > 0.89 0.98 1.16 1.07   GFRESTIMATED 62 >90 84 >60 78   < > 38*   < > >90 86 71 78   GFRESTBLACK  --   --   --   --   --   --   --   --  >90 >90 82 >90   DEEPIKA 9.1 10.0 9.8  --  8.8   < > 10.4*   < >  --  9.5 9.0 8.6   PROTTOTAL 7.2 7.5 7.1  --   --   --  8.2  --   --   --   --   --    ALBUMIN 4.2 4.6 4.4  --   --   --  4.7  --   --   --   --   --    BILITOTAL 0.3 0.4 0.4  --   --   --  0.5  --   --   --   --   --    ALKPHOS 42 43 39*  --   --   --  52  --   --   --   --   --    AST 28 27 26  --   --   --  34  --   --   --   --   --    ALT 22 24 23  --   --   --  37  --   --   --   --   --     < > = values in this interval not displayed.     CBC  Recent Labs   Lab Test 11/02/23  1332 06/13/23  1158 06/08/23  1328 05/17/23  0728   HGB 14.6 14.4 15.0 14.4   WBC 8.4 7.1 17.4* 8.1   RBC 4.80 4.85 5.07 4.91   HCT 43.7 41.4 42.5 43.5   MCV 91 85 84 89   MCH 30.4 29.7 29.6 29.3   MCHC 33.4 34.8 35.3 33.1   RDW 13.0 12.8 12.9 14.2    230 287 246     INR  Recent Labs   Lab Test 06/08/23  1328   INR 1.06   PTT 29     ABGNo lab results found.   URINE STUDIES  Recent Labs   Lab Test 11/02/23  1332 07/18/23  0743 05/03/20  1901 04/29/20  0930 10/03/17  1036   COLOR Yellow Yellow Yellow Sandra Yellow   APPEARANCE Clear Clear Clear Clear Clear   URINEGLC Negative Negative Negative Negative Negative   URINEBILI Negative Negative Negative Negative Negative   URINEKETONE Negative Negative Negative Negative Negative   SG 1.020 1.015 1.017 1.025 1.015   UBLD Negative Negative Negative Negative Trace*   URINEPH 6.0 7.0 5.0 5.0 6.5   PROTEIN Negative Negative Negative Negative Negative    UROBILINOGEN 0.2 0.2  --   --  0.2   NITRITE Negative Negative Negative Negative Negative   LEUKEST Negative Negative Trace* Negative Negative   RBCU None Seen  --  1 3* O - 2   WBCU None Seen  --  1 2 O - 2     No lab results found.    ASSESSMENT AND PLAN:   #Hypertension  Uncontrolled and exacerbated by heavy smoking and coffee intake. A secondary cause should also be excluded. As I am unable to stop his medications to rule out primary I nonetheless checked a renin aldosterone ratio and the aldosterone was found to be heavily suppressed. The patient is currently on nifedipine 60 mg twice daily, spironolactone 200 mg daily, hydralazine 10 mg tid, carvedilol 12.5 mg twice daily and lisinopril 40 mg daily. I will increase carvedilol  to 25 mg twice daily. The patient will keep a BP diary and communicate the results at his next visit.  The patient was also instructed to keep the sodium intake around 2400 mg /day, follow a plant-based diet and to avoid NSAIDs    #Elevated creatinine a creatinine level on 12/4 has increased to 1.3 from a baseline of 0.95 with no clear explanation. I will recheck a level.     #Glycemia  Hba1c is 5.2, not an issue    #CVD/dyslipidemia  On atorvastatin 20 mg daily, LDL is 89 in July 2023     #Blood count  Hemoglobin 14.6 no acute issue    #Acid-base status  CO2 level 28 no acute issue    #Electrolytes  Na 140  K 3.4 -> 3.7 no acute issue       The total time of this encounter amounted to 30 minutes on the day of the encounter. This time included time spent with the patient, reviewing records, ordering tests, and performing post visit documentation.     The patient will return to follow up in one month    Yashira Fisher MD  Division of Renal Disease and Hypertension  December 11, 2023      Virtual Visit Details    Type of service:  Telephone Visit   Phone call duration: 15 minutes

## 2023-12-17 ENCOUNTER — TELEPHONE (OUTPATIENT)
Dept: NEPHROLOGY | Facility: CLINIC | Age: 58
End: 2023-12-17
Payer: COMMERCIAL

## 2023-12-17 NOTE — TELEPHONE ENCOUNTER
ISMAEL and sent [facility] to schedule follow up around 1.11.24 with Dr. Fisher via telephone as well as lab before the end of the year// 12.17.23 KET

## 2023-12-22 ENCOUNTER — MYC MEDICAL ADVICE (OUTPATIENT)
Dept: NEPHROLOGY | Facility: CLINIC | Age: 58
End: 2023-12-22
Payer: COMMERCIAL

## 2023-12-22 NOTE — TELEPHONE ENCOUNTER
Sent patient a My Chart message to inform him of an appointment made with Dr. Fisher in an add on spot on 01.10.2024 for a phone visit // 12.22.2023 MCE

## 2024-01-04 ENCOUNTER — TELEPHONE (OUTPATIENT)
Dept: FAMILY MEDICINE | Facility: CLINIC | Age: 59
End: 2024-01-04
Payer: COMMERCIAL

## 2024-01-04 NOTE — TELEPHONE ENCOUNTER
Patient Quality Outreach    Patient is due for the following:   Hypertension -  Hypertension follow-up visit    Next Steps:   Patient has upcoming appointment, these items will be addressed at that time.    Type of outreach:    Chart review performed, no outreach needed.      Questions for provider review:    None           Radha Case MA

## 2024-01-05 DIAGNOSIS — I70.1 RENAL ARTERY STENOSIS (H): Primary | ICD-10-CM

## 2024-01-05 DIAGNOSIS — I10 ESSENTIAL HYPERTENSION WITH GOAL BLOOD PRESSURE LESS THAN 140/90: ICD-10-CM

## 2024-01-31 DIAGNOSIS — I10 ESSENTIAL HYPERTENSION WITH GOAL BLOOD PRESSURE LESS THAN 140/90: Primary | ICD-10-CM

## 2024-02-02 ENCOUNTER — LAB (OUTPATIENT)
Dept: LAB | Facility: CLINIC | Age: 59
End: 2024-02-02
Payer: COMMERCIAL

## 2024-02-02 DIAGNOSIS — I10 ESSENTIAL HYPERTENSION WITH GOAL BLOOD PRESSURE LESS THAN 140/90: ICD-10-CM

## 2024-02-02 DIAGNOSIS — I70.1 RENAL ARTERY STENOSIS (H): ICD-10-CM

## 2024-02-02 LAB
ALBUMIN MFR UR ELPH: <6 MG/DL
ALBUMIN SERPL BCG-MCNC: 4.3 G/DL (ref 3.5–5.2)
ALP SERPL-CCNC: 38 U/L (ref 40–150)
ALT SERPL W P-5'-P-CCNC: 24 U/L (ref 0–70)
ANION GAP SERPL CALCULATED.3IONS-SCNC: 10 MMOL/L (ref 7–15)
AST SERPL W P-5'-P-CCNC: 27 U/L (ref 0–45)
BILIRUB SERPL-MCNC: 0.4 MG/DL
BUN SERPL-MCNC: 15.5 MG/DL (ref 6–20)
CALCIUM SERPL-MCNC: 9.6 MG/DL (ref 8.6–10)
CHLORIDE SERPL-SCNC: 99 MMOL/L (ref 98–107)
CREAT SERPL-MCNC: 1.01 MG/DL (ref 0.67–1.17)
CREAT UR-MCNC: 14.2 MG/DL
DEPRECATED HCO3 PLAS-SCNC: 30 MMOL/L (ref 22–29)
EGFRCR SERPLBLD CKD-EPI 2021: 86 ML/MIN/1.73M2
GLUCOSE SERPL-MCNC: 99 MG/DL (ref 70–99)
POTASSIUM SERPL-SCNC: 4.1 MMOL/L (ref 3.4–5.3)
PROT SERPL-MCNC: 7.2 G/DL (ref 6.4–8.3)
PROT/CREAT 24H UR: NORMAL MG/G{CREAT}
SODIUM SERPL-SCNC: 139 MMOL/L (ref 135–145)

## 2024-02-02 PROCEDURE — 84156 ASSAY OF PROTEIN URINE: CPT

## 2024-02-02 PROCEDURE — 80053 COMPREHEN METABOLIC PANEL: CPT

## 2024-02-02 PROCEDURE — 36415 COLL VENOUS BLD VENIPUNCTURE: CPT

## 2024-02-05 ENCOUNTER — VIRTUAL VISIT (OUTPATIENT)
Dept: NEPHROLOGY | Facility: CLINIC | Age: 59
End: 2024-02-05
Attending: INTERNAL MEDICINE
Payer: COMMERCIAL

## 2024-02-05 DIAGNOSIS — I10 HYPERTENSION GOAL BP (BLOOD PRESSURE) < 140/90: ICD-10-CM

## 2024-02-05 PROCEDURE — 99442 PR PHYSICIAN TELEPHONE EVALUATION 11-20 MIN: CPT | Performed by: INTERNAL MEDICINE

## 2024-02-05 RX ORDER — SPIRONOLACTONE 100 MG/1
200 TABLET, FILM COATED ORAL DAILY
Qty: 180 TABLET | Refills: 3 | Status: SHIPPED | OUTPATIENT
Start: 2024-02-05 | End: 2025-01-30

## 2024-02-05 RX ORDER — HYDRALAZINE HYDROCHLORIDE 25 MG/1
25 TABLET, FILM COATED ORAL 3 TIMES DAILY
Qty: 270 TABLET | Refills: 3 | Status: SHIPPED | OUTPATIENT
Start: 2024-02-05 | End: 2025-01-30

## 2024-02-05 NOTE — LETTER
2/5/2024       RE: Sarath Gray  [address] [facility] [address]     Dear Colleague,    Thank you for referring your patient, Sarath Gray, to the [facility] [place] [facility]. Please see a copy of my visit note below.    Nephrology Clinic    Sarath Gray MRN:[id number] YOB: 1965  Date of Service: 02/05/2024  Primary care provider: Traci Radford  Requesting physician: Traci Radford      REASON FOR CONSULT: Hypertension    HISTORY OF PRESENT ILLNESS:   Sarath Gray is a 58 year old male who first presented for evaluation of uncontrolled hypertension on November 8th 2023.  The past medical history is significant for hypertension diagnosed only two years ago, mild to moderate mitral insufficiency and heavy smoking. His hypertension had been difficult to manage and remained uncontrolled on spironolactone 200 mg daily, amlodipine 10 mg daily, lisinopril 40 mg daily and hydralazine 10 mg three times a day which has only been added a few days prior. He had tried hydrochlorothiazide in the past but it has led to COMPA and was stopped. Beta blockers were not given due to concern for low heart rate. Renal artery was ruled out as a secondary cause of hypertension with no abnormality seen at the kidneys level. His renal function was currently intact with a creatinine level at 0.95 mg/dL and had been stable over the past two years. He had no evidence of any albuminuria on a uACR done on 11/02/2023. His TSH level was normal and was 1.06 mg/dL on 7/18/2023. He hadn't been checked for primary aldosteronism. The patient is also a heavy smoker and smokes around 1/2 ppd. He also drinks a pot of coffee daily. No significant alcohol intake.    On his first visit, I switched amlodipine to nifedipine 60 mg twice daily and started him on carvedilol 12.5 mg twice daily. I also instructed him about lifestyle  modifications and checked a renin aldosterone ratio which showed a heavily suppressed aldosterone level. He reported that his blood pressure remains uncontrolled despite taking all his medications and cutting down on smoking. He is going through significant life-stressors as his wife just spent 35 days in the hospital and almost . On his last visit, I increased carvedilol to 25 mg twice daily. The BP control has improved but remains suboptimal with an average of 150/85    Echocardiogram done on 2023  Interpretation Summary  Global and regional left ventricular function is normal with an EF of 55-60%.  Right ventricular function, chamber size, wall motion, and thickness are  normal.  Mild to moderate mitral insufficiency is present.  The inferior vena cava is normal.  No pericardial effusion is present.  There is no prior study for direct comparison.  The patient denies any dysuria, any pollakiuria, any nocturia, any LE edema, any dyspnea on exertion .  The patient denies ever having kidney stones, urinary tract infections, gross hematuria. There is no family history of CKD.  The following portions of the patient's history were reviewed and updated as appropriate: allergies, current medications, past family history, past medical history, past social history, past surgical history and problem list.    PAST MEDICAL HISTORY:  Past Medical History:   Diagnosis Date     Acute appendicitis with generalized peritonitis, unspecified whether abscess present, unspecified whether gangrene present, unspecified whether perforation present 2020    Added automatically from request for surgery [id number]     Heart disease      Hypertension      CARROLL (obstructive sleep apnea)      PE (pulmonary embolism)     years ago, apparently no cause found, does smoke     Respiratory complications 2005     Sleep apnea      Squamous cell carcinoma of skin, unspecified      PAST SURGICAL HISTORY:  Past Surgical History:   Procedure  Laterality Date     ARTHROPLASTY KNEE  6/11/2014    Procedure: ARTHROPLASTY KNEE;  Surgeon: Maik Jeong MD;  Location: [facility]     ARTHROSCOPY KNEE WITH MEDIAL MENISCECTOMY  12/19/2013    Procedure: ARTHROSCOPY KNEE WITH MEDIAL MENISCECTOMY;  Left Knee Arthroscopy With Intraarticular Debridement.;  Surgeon: Maik Jeong MD;  Location: [facility]     ARTHROTOMY KNEE Right 12/16/2020    Procedure: Knee Open Excision of Prepatellar Bursa;  Surgeon: Maik Jeong MD;  Location: [facility]     COLONOSCOPY  2002     COLONOSCOPY N/A 4/18/2018    Procedure: COLONOSCOPY;  colonoscopy;  Surgeon: Nirmal Schneider MD;  Location: [facility]     EXAM UNDER ANESTHESIA, MANIPULATE JOINT (LOCATION)  6/26/2014    Procedure: EXAM UNDER ANESTHESIA, MANIPULATE JOINT (LOCATION);  Surgeon: Maik Jeong MD;  Location: [facility]     EXAM UNDER ANESTHESIA, MANIPULATE JOINT (LOCATION) Left 8/28/2014    Procedure: EXAM UNDER ANESTHESIA, MANIPULATE JOINT (LOCATION);  Surgeon: Maik Jeong MD;  Location: [facility]     EXAM UNDER ANESTHESIA, MANIPULATE JOINT (LOCATION) Left 12/31/2014    Procedure: EXAM UNDER ANESTHESIA, MANIPULATE JOINT (LOCATION);  Surgeon: Maik Jeong MD;  Location: [facility]     H ABLATION SVT  2008    AVNRT     INJECT EPIDURAL LUMBAR  12/16/2011    Procedure:INJECT EPIDURAL LUMBAR; MICKY-Dr. Smith; Surgeon:GENERIC ANESTHESIA PROVIDER; Location:[facility]     INJECT EPIDURAL LUMBAR  1/27/2012    Procedure:INJECT EPIDURAL LUMBAR; MICKY with Flouro--; Surgeon:GENERIC ANESTHESIA PROVIDER; Location:[facility]     LAPAROSCOPIC APPENDECTOMY N/A 4/29/2020    Procedure: APPENDECTOMY, LAPAROSCOPIC;  Surgeon: Alexis Maher MD;  Location: [facility]     LAPAROSCOPIC HERNIORRHAPHY INGUINAL BILATERAL Bilateral 12/29/2015    Procedure: LAPAROSCOPIC HERNIORRHAPHY INGUINAL BILATERAL;  Surgeon: Andrew Prajapati MD;  Location: [facility]     Lumbar back fusion  1995, 1998    first was A&P     Lumbar back procedure  1999    Hardware removal      spinal stimulator insertion  2004    also, revised it that year also. never seemed to help well.      MEDICATIONS:  Prescription Medications as of 2/5/2024         Rx Number Disp Refills Start End Last Dispensed Date Next Fill Date Owning Pharmacy    albuterol (PROAIR HFA) 108 (90 Base) MCG/ACT inhaler  1 Inhaler 11 6/22/2020 --   [facility] - [place] - [address] [facility]    Sig: Inhale 2 puffs into the lungs every 4 hours as needed for shortness of breath / dyspnea or wheezing    Class: E-Prescribe    Notes to Pharmacy: Pharmacy may dispense brand covered by insurance (Proair, or proventil or ventolin or generic albuterol inhaler)    Route: Inhalation    aspirin 81 MG EC tablet  -- -- 10/2/2023 --       Sig: Take 1 tablet (81 mg) by mouth daily    Class: OTC    Route: Oral    atorvastatin (LIPITOR) 20 MG tablet  30 tablet 7 11/29/2023 --   [facility] [place] - [address] [facility]    Sig: Take 1 tablet (20 mg) by mouth daily    Class: E-Prescribe    Notes to Pharmacy: This prescription was filled on 11/8/2023. Any refills authorized will be placed on file.    Route: Oral    carvedilol (COREG) 25 MG tablet  180 tablet 1 12/11/2023 6/8/2024   [facility] [place] - [address] [facility]    Sig: Take 1 tablet (25 mg) by mouth 2 times daily (with meals) for 180 days    Class: E-Prescribe    Route: Oral    hydrALAZINE (APRESOLINE) 10 MG tablet  270 tablet 3 10/31/2023 --   [facility] [place] - [address] [facility] [address]    Sig: Take 1 tablet (10 mg) by mouth 3 times daily    Class: E-Prescribe    Route: Oral    lisinopril (ZESTRIL) 40 MG tablet  90 tablet 3 10/31/2023 --   [facility] [place]  [address] [facility] [address]    Sig: Take 1 tablet (40 mg) by mouth daily    Class: E-Prescribe    Notes to Pharmacy: This prescription was filled on 10/10/2023. Any refills authorized will be placed on file.    Route: Oral    Renewals       Renewal provider: Quentin Hernandez MD            multivitamin, therapeutic  (THERA-VIT) TABS tablet  -- --  --       Sig: Take 1 tablet by mouth daily    Class: Historical    Route: Oral    NIFEdipine ER (ADALAT CC) 60 MG 24 hr tablet  180 tablet 1 11/8/2023 5/6/2024   [facility], [place] - [address] [facility]    Sig: Take 1 tablet (60 mg) by mouth two times daily for 180 days    Class: E-Prescribe    Route: Oral    omeprazole (PRILOSEC) 40 MG DR capsule  180 capsule 3 8/21/2023 --   [facility], [place] - [address] [facility]    Sig: Take 1 capsule (40 mg) by mouth 2 times daily    Class: E-Prescribe    Route: Oral    order for DME  -- --  --       Sig: Equipment being ordered: CPAP Patient Sarath Gray was set up at [facility]  on April 21, 2016. Patient received a Resmed AirSense 10 Auto. Pressures were set at Auto 5 - 15 cm H2O.   Patient s ramp is 5 cm H2O for Auto and FLEX/EPR is 2.  Patient received a [employer] Mask name: SIMPLUS  Full Face mask Size Large, heated tubing and heated humidifier.  Patient is enrolled in the STM Program and does not need to meet compliance. Patient has a follow up on June 14TH AT 9 AM with YADIRA Resendez.    Jennifer Sam    Class: Historical    ORDER FOR DME  1 Device 0 6/14/2014 --   [facility] - [place], [place] - [address] [facility]    Sig: Equipment being ordered: Other: CPM machine for home use. Set max tolerance and increase 3-5 degrees/day as tolerated. Use up to 6-8 hours/day as tolerated.  Treatment Diagnosis: left TKA    Class: Local Print    spironolactone (ALDACTONE) 100 MG tablet  180 tablet 3 9/12/2023 --   [facility], [place] - [address] [facility]    Sig: Take 2 tablets (200 mg) by mouth daily    Class: E-Prescribe    Route: Oral    traZODone (DESYREL) 50 MG tablet  30 tablet 1 4/11/2022 --   [facility] - [place] - [address] [facility]    Sig: Take 1 tablet by mouth daily AT BEDTIME    Class: E-Prescribe    Notes to Pharmacy: This prescription was filled on 3/19/2022. Any refills authorized will be placed  on file.          Clinic-Administered Medications as of 2/5/2024         Dose Frequency Start End    iopamidol (ISOVUE-370) 76% solution 80 mL 80 mL ONCE 3/7/2016 --    Route: Intravenous    sodium chloride 0.9 % for CT scan flush dose 80 mL 80 mL ONCE 3/7/2016 --    Route: Intravenous           ALLERGIES:    Allergies   Allergen Reactions     Persantine [Dipyridamole] Other (See Comments)     Nervous and anxiety     REVIEW OF SYSTEMS:    A comprehensive review of systems was performed and found to be negative except as described here or above.  SOCIAL HISTORY:   Social History     Socioeconomic History     Marital status: Single     Spouse name: Not on file     Number of children: Not on file     Years of education: Not on file     Highest education level: Not on file   Occupational History     Not on file   Tobacco Use     Smoking status: Every Day     Packs/day: 1.00     Years: 18.00     Additional pack years: 0.00     Total pack years: 18.00     Types: Cigarettes     Smokeless tobacco: Never     Tobacco comments:     Started at age 30.    Vaping Use     Vaping Use: Never used   Substance and Sexual Activity     Alcohol use: Yes     Alcohol/week: 0.0 standard drinks of alcohol     Comment: occassional beer     Drug use: No     Sexual activity: Yes     Partners: Female     Birth control/protection: Surgical   Other Topics Concern     Parent/sibling w/ CABG, MI or angioplasty before 65F 55M? Not Asked   Social History Narrative    ** Merged History Encounter **          Social Determinants of Health     Financial Resource Strain: Not on file   Food Insecurity: Not on file   Transportation Needs: Not on file   Physical Activity: Not on file   Stress: Not on file   Social Connections: Not on file   Interpersonal Safety: Not on file   Housing Stability: Not on file     FAMILY MEDICAL HISTORY:   Family History   Problem Relation Age of Onset     C.A.D. Father 36        MI at age 36-37     Cerebrovascular Disease Father       Allergies Father      Anesthesia Reaction Father      Cardiovascular Paternal Grandfather      Prostate Cancer No family hx of      Colon Cancer No family hx of      PHYSICAL EXAM:   There were no vitals taken for this visit.  GENERAL APPEARANCE: alert and no distress  EYES: nonicteric  HENT: mouth without ulcers or lesions  NECK: supple, no adenopathy  RESP: lungs clear to auscultation   CV: regular rhythm, normal rate, no rub  ABDOMEN: soft, nontender, normal bowel sounds, no HSM   Extremities: no clubbing, cyanosis, or edema  MS: no evidence of inflammation in joints, no muscle tenderness  SKIN: no rash  NEURO: mentation intact and speech normal  PSYCH: affect normal/bright   LABS:   Recent Results (from the past 672 hour(s))   Comprehensive metabolic panel    Collection Time: 02/02/24  7:16 AM   Result Value Ref Range    Sodium 139 135 - 145 mmol/L    Potassium 4.1 3.4 - 5.3 mmol/L    Carbon Dioxide (CO2) 30 (H) 22 - 29 mmol/L    Anion Gap 10 7 - 15 mmol/L    Urea Nitrogen 15.5 6.0 - 20.0 mg/dL    Creatinine 1.01 0.67 - 1.17 mg/dL    GFR Estimate 86 >60 mL/min/1.73m2    Calcium 9.6 8.6 - 10.0 mg/dL    Chloride 99 98 - 107 mmol/L    Glucose 99 70 - 99 mg/dL    Alkaline Phosphatase 38 (L) 40 - 150 U/L    AST 27 0 - 45 U/L    ALT 24 0 - 70 U/L    Protein Total 7.2 6.4 - 8.3 g/dL    Albumin 4.3 3.5 - 5.2 g/dL    Bilirubin Total 0.4 <=1.2 mg/dL   Protein  random urine    Collection Time: 02/02/24  7:16 AM   Result Value Ref Range    Total Protein Urine mg/dL <6.0   mg/dL    Total Protein Urine mg/mg Creat      Creatinine Urine mg/dL 14.2 mg/dL     CMP  Recent Labs   Lab Test 02/02/24  0716 12/04/23  1330 11/09/23  1102 11/02/23  1332 01/21/22  0813 12/16/20  1139 12/14/20  1456 05/05/20  0516 05/04/20  0439    142 140 140   < >  --  137 139 139   POTASSIUM 4.1 3.7 3.4 3.9   < > 3.8 3.9 4.2 4.4   CHLORIDE 99 103 99 102   < >  --  104 105 106   CO2 30* 29 28 26   < >  --  30 28 28   ANIONGAP 10 10 13 12   <  >  --  3 6 5   GLC 99 87 89 103*   < >  --  90 86 83   BUN 15.5 18.4 11.7 15.1   < >  --  16 14 14   CR 1.01 1.33* 0.95 1.03   < > 0.89 0.98 1.16 1.07   GFRESTIMATED 86 62 >90 84   < > >90 86 71 78   GFRESTBLACK  --   --   --   --   --  >90 >90 82 >90   DEEPIKA 9.6 9.1 10.0 9.8   < >  --  9.5 9.0 8.6   PROTTOTAL 7.2 7.2 7.5 7.1   < >  --   --   --   --    ALBUMIN 4.3 4.2 4.6 4.4   < >  --   --   --   --    BILITOTAL 0.4 0.3 0.4 0.4   < >  --   --   --   --    ALKPHOS 38* 42 43 39*   < >  --   --   --   --    AST 27 28 27 26   < >  --   --   --   --    ALT 24 22 24 23   < >  --   --   --   --     < > = values in this interval not displayed.     CBC  Recent Labs   Lab Test 11/02/23  1332 06/13/23  1158 06/08/23  1328 05/17/23  0728   HGB 14.6 14.4 15.0 14.4   WBC 8.4 7.1 17.4* 8.1   RBC 4.80 4.85 5.07 4.91   HCT 43.7 41.4 42.5 43.5   MCV 91 85 84 89   MCH 30.4 29.7 29.6 29.3   MCHC 33.4 34.8 35.3 33.1   RDW 13.0 12.8 12.9 14.2    230 287 246     INR  Recent Labs   Lab Test 06/08/23  1328   INR 1.06   PTT 29     ABGNo lab results found.   URINE STUDIES  Recent Labs   Lab Test 11/02/23  1332 07/18/23  0743 05/03/20  1901 04/29/20  0930 10/03/17  1036   COLOR Yellow Yellow Yellow Sandra Yellow   APPEARANCE Clear Clear Clear Clear Clear   URINEGLC Negative Negative Negative Negative Negative   URINEBILI Negative Negative Negative Negative Negative   URINEKETONE Negative Negative Negative Negative Negative   SG 1.020 1.015 1.017 1.025 1.015   UBLD Negative Negative Negative Negative Trace*   URINEPH 6.0 7.0 5.0 5.0 6.5   PROTEIN Negative Negative Negative Negative Negative   UROBILINOGEN 0.2 0.2  --   --  0.2   NITRITE Negative Negative Negative Negative Negative   LEUKEST Negative Negative Trace* Negative Negative   RBCU None Seen  --  1 3* O - 2   WBCU None Seen  --  1 2 O - 2     No lab results found.    ASSESSMENT AND PLAN:   #Hypertension  Uncontrolled and exacerbated by heavy smoking and coffee intake. A secondary  cause should also be excluded. As I am unable to stop his medications to rule out primary I nonetheless checked a renin aldosterone ratio and the aldosterone was found to be heavily suppressed. The patient is currently on nifedipine 60 mg twice daily, spironolactone 200 mg daily, hydralazine 10 mg tid, carvedilol 25 mg twice daily and lisinopril 40 mg daily. I will increase hydralazine  to 25 mg three times a day. The patient will keep a BP diary and communicate the results at his next visit.  The patient was also instructed to keep the sodium intake around 2400 mg /day, follow a plant-based diet and to avoid NSAIDs    #Elevated creatinine a creatinine level on 12/4 has increased to 1.3 from a baseline of 0.95 with no clear explanation. A creatinine level rechecked is back to baseline at 1.01 mg/dL     #Glycemia  Hba1c is 5.2, not an issue    #CVD/dyslipidemia  On atorvastatin 20 mg daily, LDL is 89 in July 2023     #Blood count  Hemoglobin 14.6 no acute issue    #Acid-base status  CO2 level 28 -> 30 acceptable    #Electrolytes  Na 140 -> 139  K 3.4 -> 3.7 -> 4.1 no acute issue       The total time of this encounter amounted to 30 minutes on the day of the encounter. This time included time spent with the patient, reviewing records, ordering tests, and performing post visit documentation.     The patient will return to follow up in one month    Yashira Fisher MD  Division of Renal Disease and Hypertension        Virtual Visit Details    Type of service:  Telephone Visit   Phone call duration: 15 minutes       Again, thank you for allowing me to participate in the care of your patient.      Sincerely,    Yashira Fisher MD

## 2024-02-05 NOTE — PATIENT INSTRUCTIONS
-Please increase hydralazine to 25 mg three times a day  -Please do a follow up through phone in a month from now

## 2024-02-05 NOTE — NURSING NOTE
Is the patient currently in the state of [place]? YES    Visit mode:TELEPHONE    If the visit is dropped, the patient can be reconnected by: TELEPHONE VISIT: Phone number: [phone]    Will anyone else be joining the visit? NO  (If patient encounters technical issues they should call [phone]    How would you like to obtain your AVS? MyChart    Are changes needed to the allergy or medication list? No    Reason for visit: WILLIAMECK    Ligia MOORE

## 2024-02-05 NOTE — PROGRESS NOTES
Nephrology Clinic    Sarath Gray MRN:[id number] YOB: 1965  Date of Service: 02/05/2024  Primary care provider: Traci Radford  Requesting physician: Traci Radford      REASON FOR CONSULT: Hypertension    HISTORY OF PRESENT ILLNESS:   Sarath Gray is a 58 year old male who first presented for evaluation of uncontrolled hypertension on November 8th 2023.  The past medical history is significant for hypertension diagnosed only two years ago, mild to moderate mitral insufficiency and heavy smoking. His hypertension had been difficult to manage and remained uncontrolled on spironolactone 200 mg daily, amlodipine 10 mg daily, lisinopril 40 mg daily and hydralazine 10 mg three times a day which has only been added a few days prior. He had tried hydrochlorothiazide in the past but it has led to COMPA and was stopped. Beta blockers were not given due to concern for low heart rate. Renal artery was ruled out as a secondary cause of hypertension with no abnormality seen at the kidneys level. His renal function was currently intact with a creatinine level at 0.95 mg/dL and had been stable over the past two years. He had no evidence of any albuminuria on a uACR done on 11/02/2023. His TSH level was normal and was 1.06 mg/dL on 7/18/2023. He hadn't been checked for primary aldosteronism. The patient is also a heavy smoker and smokes around 1/2 ppd. He also drinks a pot of coffee daily. No significant alcohol intake.    On his first visit, I switched amlodipine to nifedipine 60 mg twice daily and started him on carvedilol 12.5 mg twice daily. I also instructed him about lifestyle modifications and checked a renin aldosterone ratio which showed a heavily suppressed aldosterone level. He reported that his blood pressure remains uncontrolled despite taking all his medications and cutting down on smoking. He is going through significant life-stressors as his wife just spent 35 days in the hospital and  almost . On his last visit, I increased carvedilol to 25 mg twice daily. The BP control has improved but remains suboptimal with an average of 150/85    Echocardiogram done on 2023  Interpretation Summary  Global and regional left ventricular function is normal with an EF of 55-60%.  Right ventricular function, chamber size, wall motion, and thickness are  normal.  Mild to moderate mitral insufficiency is present.  The inferior vena cava is normal.  No pericardial effusion is present.  There is no prior study for direct comparison.  The patient denies any dysuria, any pollakiuria, any nocturia, any LE edema, any dyspnea on exertion .  The patient denies ever having kidney stones, urinary tract infections, gross hematuria. There is no family history of CKD.  The following portions of the patient's history were reviewed and updated as appropriate: allergies, current medications, past family history, past medical history, past social history, past surgical history and problem list.    PAST MEDICAL HISTORY:  Past Medical History:   Diagnosis Date    Acute appendicitis with generalized peritonitis, unspecified whether abscess present, unspecified whether gangrene present, unspecified whether perforation present 2020    Added automatically from request for surgery [id number]    Heart disease     Hypertension     CARROLL (obstructive sleep apnea)     PE (pulmonary embolism)     years ago, apparently no cause found, does smoke    Respiratory complications 2005    Sleep apnea     Squamous cell carcinoma of skin, unspecified      PAST SURGICAL HISTORY:  Past Surgical History:   Procedure Laterality Date    ARTHROPLASTY KNEE  2014    Procedure: ARTHROPLASTY KNEE;  Surgeon: Maik Jeong MD;  Location: [facility]    ARTHROSCOPY KNEE WITH MEDIAL MENISCECTOMY  2013    Procedure: ARTHROSCOPY KNEE WITH MEDIAL MENISCECTOMY;  Left Knee Arthroscopy With Intraarticular Debridement.;  Surgeon: Maik Jeong  MD;  Location: [facility]    ARTHROTOMY KNEE Right 12/16/2020    Procedure: Knee Open Excision of Prepatellar Bursa;  Surgeon: Maik Jeong MD;  Location: [facility]    COLONOSCOPY  2002    COLONOSCOPY N/A 4/18/2018    Procedure: COLONOSCOPY;  colonoscopy;  Surgeon: Nirmal Schneider MD;  Location: [facility]    EXAM UNDER ANESTHESIA, MANIPULATE JOINT (LOCATION)  6/26/2014    Procedure: EXAM UNDER ANESTHESIA, MANIPULATE JOINT (LOCATION);  Surgeon: Maik Jeong MD;  Location: [facility]    EXAM UNDER ANESTHESIA, MANIPULATE JOINT (LOCATION) Left 8/28/2014    Procedure: EXAM UNDER ANESTHESIA, MANIPULATE JOINT (LOCATION);  Surgeon: Maik Jeong MD;  Location: [facility]    EXAM UNDER ANESTHESIA, MANIPULATE JOINT (LOCATION) Left 12/31/2014    Procedure: EXAM UNDER ANESTHESIA, MANIPULATE JOINT (LOCATION);  Surgeon: Maik Jeong MD;  Location: [facility]    H ABLATION SVT  2008    AVNRT    INJECT EPIDURAL LUMBAR  12/16/2011    Procedure:INJECT EPIDURAL LUMBAR; MICKY-Dr. Smith; Surgeon:GENERIC ANESTHESIA PROVIDER; Location:[facility]    INJECT EPIDURAL LUMBAR  1/27/2012    Procedure:INJECT EPIDURAL LUMBAR; MICKY with Flouro--; Surgeon:GENERIC ANESTHESIA PROVIDER; Location:[facility]    LAPAROSCOPIC APPENDECTOMY N/A 4/29/2020    Procedure: APPENDECTOMY, LAPAROSCOPIC;  Surgeon: Alexis Maher MD;  Location: [facility]    LAPAROSCOPIC HERNIORRHAPHY INGUINAL BILATERAL Bilateral 12/29/2015    Procedure: LAPAROSCOPIC HERNIORRHAPHY INGUINAL BILATERAL;  Surgeon: Andrew Prajapati MD;  Location: [facility]    Lumbar back fusion  1995, 1998    first was A&P    Lumbar back procedure  1999    Hardware removal    spinal stimulator insertion  2004    also, revised it that year also. never seemed to help well.      MEDICATIONS:  Prescription Medications as of 2/5/2024         Rx Number Disp Refills Start End Last Dispensed Date Next Fill Date Owning Pharmacy    albuterol (PROAIR HFA) 108 (90 Base) MCG/ACT inhaler  1 Inhaler 11 6/22/2020 --   [facility] -  [place], [place] - [address]    Sig: Inhale 2 puffs into the lungs every 4 hours as needed for shortness of breath / dyspnea or wheezing    Class: E-Prescribe    Notes to Pharmacy: Pharmacy may dispense brand covered by insurance (Proair, or proventil or ventolin or generic albuterol inhaler)    Route: Inhalation    aspirin 81 MG EC tablet  -- -- 10/2/2023 --       Sig: Take 1 tablet (81 mg) by mouth daily    Class: OTC    Route: Oral    atorvastatin (LIPITOR) 20 MG tablet  30 tablet 7 11/29/2023 --   [facility] - [place] - [address]    Sig: Take 1 tablet (20 mg) by mouth daily    Class: E-Prescribe    Notes to Pharmacy: This prescription was filled on 11/8/2023. Any refills authorized will be placed on file.    Route: Oral    carvedilol (COREG) 25 MG tablet  180 tablet 1 12/11/2023 6/8/2024   [facility] - [place] - [address]    Sig: Take 1 tablet (25 mg) by mouth 2 times daily (with meals) for 180 days    Class: E-Prescribe    Route: Oral    hydrALAZINE (APRESOLINE) 10 MG tablet  270 tablet 3 10/31/2023 --   [facility] - [place] - [address]    Sig: Take 1 tablet (10 mg) by mouth 3 times daily    Class: E-Prescribe    Route: Oral    lisinopril (ZESTRIL) 40 MG tablet  90 tablet 3 10/31/2023 --   [facility] - [place] - [address]    Sig: Take 1 tablet (40 mg) by mouth daily    Class: E-Prescribe    Notes to Pharmacy: This prescription was filled on 10/10/2023. Any refills authorized will be placed on file.    Route: Oral    Renewals       Renewal provider: Quentin Hernandez MD            multivitamin, therapeutic (THERA-VIT) TABS tablet  -- --  --       Sig: Take 1 tablet by mouth daily    Class: Historical    Route: Oral    NIFEdipine ER (ADALAT CC) 60 MG 24 hr tablet  180 tablet 1 11/8/2023 5/6/2024   [facility] - [place] - [address]    Sig: Take 1 tablet (60 mg) by mouth two times daily for 180 days    Class: E-Prescribe    Route: Oral     omeprazole (PRILOSEC) 40 MG DR capsule  180 capsule 3 8/21/2023 --   [facility], [place] - [address] [address]    Sig: Take 1 capsule (40 mg) by mouth 2 times daily    Class: E-Prescribe    Route: Oral    order for DME  -- --  --       Sig: Equipment being ordered: CPAP Patient Sarath Gray was set up at [facility]  on April 21, 2016. Patient received a Resmed AirSense 10 Auto. Pressures were set at Auto 5 - 15 cm H2O.   Patient s ramp is 5 cm H2O for Auto and FLEX/EPR is 2.  Patient received a [employer] Mask name: SIMPLUS  Full Face mask Size Large, heated tubing and heated humidifier.  Patient is enrolled in the STM Program and does not need to meet compliance. Patient has a follow up on June 14TH AT 9 AM with YADIRA Resendez.    Jennifer Sam    Class: Historical    ORDER FOR DME  1 Device 0 6/14/2014 --   [facility] - [place], [place] - [address] [facility]    Sig: Equipment being ordered: Other: CPM machine for home use. Set max tolerance and increase 3-5 degrees/day as tolerated. Use up to 6-8 hours/day as tolerated.  Treatment Diagnosis: left TKA    Class: Local Print    spironolactone (ALDACTONE) 100 MG tablet  180 tablet 3 9/12/2023 --   [facility], [place] - [address] [address] [address]    Sig: Take 2 tablets (200 mg) by mouth daily    Class: E-Prescribe    Route: Oral    traZODone (DESYREL) 50 MG tablet  30 tablet 1 4/11/2022 --   [facility] - [place] - [address] [facility]    Sig: Take 1 tablet by mouth daily AT BEDTIME    Class: E-Prescribe    Notes to Pharmacy: This prescription was filled on 3/19/2022. Any refills authorized will be placed on file.          Clinic-Administered Medications as of 2/5/2024         Dose Frequency Start End    iopamidol (ISOVUE-370) 76% solution 80 mL 80 mL ONCE 3/7/2016 --    Route: Intravenous    sodium chloride 0.9 % for CT scan flush dose 80 mL 80 mL ONCE 3/7/2016 --    Route: Intravenous           ALLERGIES:    Allergies   Allergen Reactions     Persantine [Dipyridamole] Other (See Comments)     Nervous and anxiety     REVIEW OF SYSTEMS:    A comprehensive review of systems was performed and found to be negative except as described here or above.  SOCIAL HISTORY:   Social History     Socioeconomic History    Marital status: Single     Spouse name: Not on file    Number of children: Not on file    Years of education: Not on file    Highest education level: Not on file   Occupational History    Not on file   Tobacco Use    Smoking status: Every Day     Packs/day: 1.00     Years: 18.00     Additional pack years: 0.00     Total pack years: 18.00     Types: Cigarettes    Smokeless tobacco: Never    Tobacco comments:     Started at age 30.    Vaping Use    Vaping Use: Never used   Substance and Sexual Activity    Alcohol use: Yes     Alcohol/week: 0.0 standard drinks of alcohol     Comment: occassional beer    Drug use: No    Sexual activity: Yes     Partners: Female     Birth control/protection: Surgical   Other Topics Concern    Parent/sibling w/ CABG, MI or angioplasty before 65F 55M? Not Asked   Social History Narrative    ** Merged History Encounter **          Social Determinants of Health     Financial Resource Strain: Not on file   Food Insecurity: Not on file   Transportation Needs: Not on file   Physical Activity: Not on file   Stress: Not on file   Social Connections: Not on file   Interpersonal Safety: Not on file   Housing Stability: Not on file     FAMILY MEDICAL HISTORY:   Family History   Problem Relation Age of Onset    C.A.D. Father 36        MI at age 36-37    Cerebrovascular Disease Father     Allergies Father     Anesthesia Reaction Father     Cardiovascular Paternal Grandfather     Prostate Cancer No family hx of     Colon Cancer No family hx of      PHYSICAL EXAM:   There were no vitals taken for this visit.  GENERAL APPEARANCE: alert and no distress  EYES: nonicteric  HENT: mouth without ulcers or lesions  NECK: supple, no  adenopathy  RESP: lungs clear to auscultation   CV: regular rhythm, normal rate, no rub  ABDOMEN: soft, nontender, normal bowel sounds, no HSM   Extremities: no clubbing, cyanosis, or edema  MS: no evidence of inflammation in joints, no muscle tenderness  SKIN: no rash  NEURO: mentation intact and speech normal  PSYCH: affect normal/bright   LABS:   Recent Results (from the past 672 hour(s))   Comprehensive metabolic panel    Collection Time: 02/02/24  7:16 AM   Result Value Ref Range    Sodium 139 135 - 145 mmol/L    Potassium 4.1 3.4 - 5.3 mmol/L    Carbon Dioxide (CO2) 30 (H) 22 - 29 mmol/L    Anion Gap 10 7 - 15 mmol/L    Urea Nitrogen 15.5 6.0 - 20.0 mg/dL    Creatinine 1.01 0.67 - 1.17 mg/dL    GFR Estimate 86 >60 mL/min/1.73m2    Calcium 9.6 8.6 - 10.0 mg/dL    Chloride 99 98 - 107 mmol/L    Glucose 99 70 - 99 mg/dL    Alkaline Phosphatase 38 (L) 40 - 150 U/L    AST 27 0 - 45 U/L    ALT 24 0 - 70 U/L    Protein Total 7.2 6.4 - 8.3 g/dL    Albumin 4.3 3.5 - 5.2 g/dL    Bilirubin Total 0.4 <=1.2 mg/dL   Protein  random urine    Collection Time: 02/02/24  7:16 AM   Result Value Ref Range    Total Protein Urine mg/dL <6.0   mg/dL    Total Protein Urine mg/mg Creat      Creatinine Urine mg/dL 14.2 mg/dL     CMP  Recent Labs   Lab Test 02/02/24  0716 12/04/23  1330 11/09/23  1102 11/02/23  1332 01/21/22  0813 12/16/20  1139 12/14/20  1456 05/05/20  0516 05/04/20  0439    142 140 140   < >  --  137 139 139   POTASSIUM 4.1 3.7 3.4 3.9   < > 3.8 3.9 4.2 4.4   CHLORIDE 99 103 99 102   < >  --  104 105 106   CO2 30* 29 28 26   < >  --  30 28 28   ANIONGAP 10 10 13 12   < >  --  3 6 5   GLC 99 87 89 103*   < >  --  90 86 83   BUN 15.5 18.4 11.7 15.1   < >  --  16 14 14   CR 1.01 1.33* 0.95 1.03   < > 0.89 0.98 1.16 1.07   GFRESTIMATED 86 62 >90 84   < > >90 86 71 78   GFRESTBLACK  --   --   --   --   --  >90 >90 82 >90   DEEPIKA 9.6 9.1 10.0 9.8   < >  --  9.5 9.0 8.6   PROTTOTAL 7.2 7.2 7.5 7.1   < >  --   --   --    --    ALBUMIN 4.3 4.2 4.6 4.4   < >  --   --   --   --    BILITOTAL 0.4 0.3 0.4 0.4   < >  --   --   --   --    ALKPHOS 38* 42 43 39*   < >  --   --   --   --    AST 27 28 27 26   < >  --   --   --   --    ALT 24 22 24 23   < >  --   --   --   --     < > = values in this interval not displayed.     CBC  Recent Labs   Lab Test 11/02/23  1332 06/13/23  1158 06/08/23  1328 05/17/23  0728   HGB 14.6 14.4 15.0 14.4   WBC 8.4 7.1 17.4* 8.1   RBC 4.80 4.85 5.07 4.91   HCT 43.7 41.4 42.5 43.5   MCV 91 85 84 89   MCH 30.4 29.7 29.6 29.3   MCHC 33.4 34.8 35.3 33.1   RDW 13.0 12.8 12.9 14.2    230 287 246     INR  Recent Labs   Lab Test 06/08/23  1328   INR 1.06   PTT 29     ABGNo lab results found.   URINE STUDIES  Recent Labs   Lab Test 11/02/23  1332 07/18/23  0743 05/03/20  1901 04/29/20  0930 10/03/17  1036   COLOR Yellow Yellow Yellow Sandra Yellow   APPEARANCE Clear Clear Clear Clear Clear   URINEGLC Negative Negative Negative Negative Negative   URINEBILI Negative Negative Negative Negative Negative   URINEKETONE Negative Negative Negative Negative Negative   SG 1.020 1.015 1.017 1.025 1.015   UBLD Negative Negative Negative Negative Trace*   URINEPH 6.0 7.0 5.0 5.0 6.5   PROTEIN Negative Negative Negative Negative Negative   UROBILINOGEN 0.2 0.2  --   --  0.2   NITRITE Negative Negative Negative Negative Negative   LEUKEST Negative Negative Trace* Negative Negative   RBCU None Seen  --  1 3* O - 2   WBCU None Seen  --  1 2 O - 2     No lab results found.    ASSESSMENT AND PLAN:   #Hypertension  Uncontrolled and exacerbated by heavy smoking and coffee intake. A secondary cause should also be excluded. As I am unable to stop his medications to rule out primary I nonetheless checked a renin aldosterone ratio and the aldosterone was found to be heavily suppressed. The patient is currently on nifedipine 60 mg twice daily, spironolactone 200 mg daily, hydralazine 10 mg tid, carvedilol 25 mg twice daily and lisinopril  40 mg daily. I will increase hydralazine  to 25 mg three times a day. The patient will keep a BP diary and communicate the results at his next visit.  The patient was also instructed to keep the sodium intake around 2400 mg /day, follow a plant-based diet and to avoid NSAIDs    #Elevated creatinine a creatinine level on 12/4 has increased to 1.3 from a baseline of 0.95 with no clear explanation. A creatinine level rechecked is back to baseline at 1.01 mg/dL     #Glycemia  Hba1c is 5.2, not an issue    #CVD/dyslipidemia  On atorvastatin 20 mg daily, LDL is 89 in July 2023     #Blood count  Hemoglobin 14.6 no acute issue    #Acid-base status  CO2 level 28 -> 30 acceptable    #Electrolytes  Na 140 -> 139  K 3.4 -> 3.7 -> 4.1 no acute issue       The total time of this encounter amounted to 30 minutes on the day of the encounter. This time included time spent with the patient, reviewing records, ordering tests, and performing post visit documentation.     The patient will return to follow up in one month    Yashira Fisher MD  Division of Renal Disease and Hypertension        Virtual Visit Details    Type of service:  Telephone Visit   Phone call duration: 15 minutes

## 2024-02-09 ENCOUNTER — MYC MEDICAL ADVICE (OUTPATIENT)
Dept: NEPHROLOGY | Facility: CLINIC | Age: 59
End: 2024-02-09
Payer: COMMERCIAL

## 2024-02-09 NOTE — TELEPHONE ENCOUNTER
Sent patient a My Chart message to inform him of a telephone follow up appointment scheduled with Dr. Natalia lr in basket message. 02.09.2024 MCE

## 2024-03-13 DIAGNOSIS — I10 HYPERTENSION GOAL BP (BLOOD PRESSURE) < 140/90: Primary | ICD-10-CM

## 2024-03-25 ENCOUNTER — VIRTUAL VISIT (OUTPATIENT)
Dept: NEPHROLOGY | Facility: CLINIC | Age: 59
End: 2024-03-25
Attending: INTERNAL MEDICINE
Payer: COMMERCIAL

## 2024-03-25 DIAGNOSIS — I10 HYPERTENSION GOAL BP (BLOOD PRESSURE) < 140/90: Primary | ICD-10-CM

## 2024-03-25 PROCEDURE — 99441 PR PHYSICIAN TELEPHONE EVALUATION 5-10 MIN: CPT | Performed by: INTERNAL MEDICINE

## 2024-03-25 NOTE — NURSING NOTE
Is the patient currently in the state of [place]? YES    Visit mode:TELEPHONE    If the visit is dropped, the patient can be reconnected by: TELEPHONE VISIT: Phone number:   Telephone Information:   Mobile [phone]       Will anyone else be joining the visit? NO  (If patient encounters technical issues they should call [phone]    How would you like to obtain your AVS? MyChart    Are changes needed to the allergy or medication list? No    Reason for visit: Consult    Guillermo MOORE

## 2024-03-25 NOTE — PROGRESS NOTES
Virtual Visit Details    Type of service:  Telephone Visit   Phone call duration: 10 minutes   Originating Location (pt. Location): Home    Distant Location (provider location):  On-site  Nephrology Clinic    Sarath Gray MRN:[id number] YOB: 1965  Date of Service: 03/25/2024  Primary care provider: Traci Radford  Requesting physician: Traci Radford      REASON FOR CONSULT: Hypertension    HISTORY OF PRESENT ILLNESS:   Sarath Gray is a 58 year old male who first presented for evaluation of uncontrolled hypertension on November 8th 2023.  The past medical history is significant for hypertension diagnosed only two years ago, mild to moderate mitral insufficiency and heavy smoking. His hypertension had been difficult to manage and remained uncontrolled on spironolactone 200 mg daily, amlodipine 10 mg daily, lisinopril 40 mg daily and hydralazine 10 mg three times a day which has only been added a few days prior. He had tried hydrochlorothiazide in the past but it has led to COMPA and was stopped. Beta blockers were not given due to concern for low heart rate. Renal artery was ruled out as a secondary cause of hypertension with no abnormality seen at the kidneys level. His renal function was currently intact with a creatinine level at 0.95 mg/dL and had been stable over the past two years. He had no evidence of any albuminuria on a uACR done on 11/02/2023. His TSH level was normal and was 1.06 mg/dL on 7/18/2023. He hadn't been checked for primary aldosteronism. The patient is also a heavy smoker and smokes around 1/2 ppd. He also drinks a pot of coffee daily. No significant alcohol intake.    On his first visit, I switched amlodipine to nifedipine 60 mg twice daily and started him on carvedilol 12.5 mg twice daily. I also instructed him about lifestyle modifications and checked a renin aldosterone ratio which showed a heavily suppressed aldosterone level. He reported that his blood pressure  remains uncontrolled despite taking all his medications and cutting down on smoking. He is going through significant life-stressors as his wife just spent 35 days in the hospital and almost . On his last visit, I increased carvedilol to 25 mg twice daily. The BP control has improved but remains suboptimal with an average of 150/85    Follow up on 3/25/2024  Creatinine level is stable at 1.01 mg  Current medications: carvedilol 25 mg twice daily, hydralazine 25 mg three times daily, lisinopril 40 mg daily, nifedipine 60 mg twice daily and spironolactone 200 mg daily. The blood pressure remains sub optimal with numbers at home around 150/80 most of the time.    Echocardiogram done on 2023  Interpretation Summary  Global and regional left ventricular function is normal with an EF of 55-60%.  Right ventricular function, chamber size, wall motion, and thickness are  normal.  Mild to moderate mitral insufficiency is present.  The inferior vena cava is normal.  No pericardial effusion is present.  There is no prior study for direct comparison.  The patient denies any dysuria, any pollakiuria, any nocturia, any LE edema, any dyspnea on exertion .  The patient denies ever having kidney stones, urinary tract infections, gross hematuria. There is no family history of CKD.  The following portions of the patient's history were reviewed and updated as appropriate: allergies, current medications, past family history, past medical history, past social history, past surgical history and problem list.    PAST MEDICAL HISTORY:  Past Medical History:   Diagnosis Date    Acute appendicitis with generalized peritonitis, unspecified whether abscess present, unspecified whether gangrene present, unspecified whether perforation present 2020    Added automatically from request for surgery [id number]    Heart disease     Hypertension     CARROLL (obstructive sleep apnea)     PE (pulmonary embolism)     years ago, apparently no cause  found, does smoke    Respiratory complications March 2005    Sleep apnea     Squamous cell carcinoma of skin, unspecified      PAST SURGICAL HISTORY:  Past Surgical History:   Procedure Laterality Date    ARTHROPLASTY KNEE  6/11/2014    Procedure: ARTHROPLASTY KNEE;  Surgeon: Maik Jeong MD;  Location: [facility]    ARTHROSCOPY KNEE WITH MEDIAL MENISCECTOMY  12/19/2013    Procedure: ARTHROSCOPY KNEE WITH MEDIAL MENISCECTOMY;  Left Knee Arthroscopy With Intraarticular Debridement.;  Surgeon: Maik Jeong MD;  Location: [facility]    ARTHROTOMY KNEE Right 12/16/2020    Procedure: Knee Open Excision of Prepatellar Bursa;  Surgeon: Maik Jeong MD;  Location: [facility]    COLONOSCOPY  2002    COLONOSCOPY N/A 4/18/2018    Procedure: COLONOSCOPY;  colonoscopy;  Surgeon: Nirmal Schneider MD;  Location: [facility]    EXAM UNDER ANESTHESIA, MANIPULATE JOINT (LOCATION)  6/26/2014    Procedure: EXAM UNDER ANESTHESIA, MANIPULATE JOINT (LOCATION);  Surgeon: Maik Jeong MD;  Location: [facility]    EXAM UNDER ANESTHESIA, MANIPULATE JOINT (LOCATION) Left 8/28/2014    Procedure: EXAM UNDER ANESTHESIA, MANIPULATE JOINT (LOCATION);  Surgeon: Maik Jeong MD;  Location: [facility]    EXAM UNDER ANESTHESIA, MANIPULATE JOINT (LOCATION) Left 12/31/2014    Procedure: EXAM UNDER ANESTHESIA, MANIPULATE JOINT (LOCATION);  Surgeon: Maik Jeong MD;  Location: [facility]    H ABLATION SVT  2008    AVNRT    INJECT EPIDURAL LUMBAR  12/16/2011    Procedure:INJECT EPIDURAL LUMBAR; MICKY-Dr. Smith; Surgeon:GENERIC ANESTHESIA PROVIDER; Location:[facility]    INJECT EPIDURAL LUMBAR  1/27/2012    Procedure:INJECT EPIDURAL LUMBAR; MICKY with Flouro--; Surgeon:GENERIC ANESTHESIA PROVIDER; Location:[facility]    LAPAROSCOPIC APPENDECTOMY N/A 4/29/2020    Procedure: APPENDECTOMY, LAPAROSCOPIC;  Surgeon: Alexis Maher MD;  Location: [facility]    LAPAROSCOPIC HERNIORRHAPHY INGUINAL BILATERAL Bilateral 12/29/2015    Procedure: LAPAROSCOPIC HERNIORRHAPHY  INGUINAL BILATERAL;  Surgeon: Andrew Prajapati MD;  Location: [facility]    Lumbar back fusion  1995, 1998    first was A&P    Lumbar back procedure  1999    Hardware removal    spinal stimulator insertion  2004    also, revised it that year also. never seemed to help well.      MEDICATIONS:  Prescription Medications as of 3/25/2024         Rx Number Disp Refills Start End Last Dispensed Date Next Fill Date Owning Pharmacy    albuterol (PROAIR HFA) 108 (90 Base) MCG/ACT inhaler  1 Inhaler 11 6/22/2020 --   [facility] [place] - [address] [address]    Sig: Inhale 2 puffs into the lungs every 4 hours as needed for shortness of breath / dyspnea or wheezing    Class: E-Prescribe    Notes to Pharmacy: Pharmacy may dispense brand covered by insurance (Proair, or proventil or ventolin or generic albuterol inhaler)    Route: Inhalation    aspirin 81 MG EC tablet  -- -- 10/2/2023 --       Sig: Take 1 tablet (81 mg) by mouth daily    Class: OTC    Route: Oral    atorvastatin (LIPITOR) 20 MG tablet  30 tablet 7 11/29/2023 --   [facility] [place] - [address] [address]    Sig: Take 1 tablet (20 mg) by mouth daily    Class: E-Prescribe    Notes to Pharmacy: This prescription was filled on 11/8/2023. Any refills authorized will be placed on file.    Route: Oral    carvedilol (COREG) 25 MG tablet  180 tablet 1 12/11/2023 6/8/2024   [facility] [place] - [address] [address]    Sig: Take 1 tablet (25 mg) by mouth 2 times daily (with meals) for 180 days    Class: E-Prescribe    Route: Oral    hydrALAZINE (APRESOLINE) 25 MG tablet  270 tablet 3 2/5/2024 1/30/2025   [facility] [place] - [address] [address]    Sig: Take 1 tablet (25 mg) by mouth 3 times daily for 360 days    Class: E-Prescribe    Route: Oral    lisinopril (ZESTRIL) 40 MG tablet  90 tablet 3 10/31/2023 --   [facility] [place] - [address] [address]    Sig: Take 1 tablet (40 mg) by mouth daily    Class: E-Prescribe    Notes to Pharmacy: This  prescription was filled on 10/10/2023. Any refills authorized will be placed on file.    Route: Oral    Renewals       Renewal provider: Quentin Hernandez MD            multivitamin, therapeutic (THERA-VIT) TABS tablet  -- --  --       Sig: Take 1 tablet by mouth daily    Class: Historical    Route: Oral    NIFEdipine ER (ADALAT CC) 60 MG 24 hr tablet  180 tablet 1 11/8/2023 5/6/2024   [facility] [place] - [address] [address]    Sig: Take 1 tablet (60 mg) by mouth two times daily for 180 days    Class: E-Prescribe    Route: Oral    omeprazole (PRILOSEC) 40 MG DR capsule  180 capsule 3 8/21/2023 --   [facility] - [place] - [address] [address]    Sig: Take 1 capsule (40 mg) by mouth 2 times daily    Class: E-Prescribe    Route: Oral    order for DME  -- --  --       Sig: Equipment being ordered: CPAP Patient Sarath Gray was set up at [facility]  on April 21, 2016. Patient received a Resmed AirSense 10 Auto. Pressures were set at Auto 5 - 15 cm H2O.   Patient s ramp is 5 cm H2O for Auto and FLEX/EPR is 2.  Patient received a [employer] Mask name: SIMPLUS  Full Face mask Size Large, heated tubing and heated humidifier.  Patient is enrolled in the STM Program and does not need to meet compliance. Patient has a follow up on June 14TH AT 9 AM with YADIRA Resendez.    Jennifer Sam    Class: Historical    ORDER FOR DME  1 Device 0 6/14/2014 --   [facility] [place], [place] - [address] [facility]    Sig: Equipment being ordered: Other: CPM machine for home use. Set max tolerance and increase 3-5 degrees/day as tolerated. Use up to 6-8 hours/day as tolerated.  Treatment Diagnosis: left TKA    Class: Local Print    spironolactone (ALDACTONE) 100 MG tablet  180 tablet 3 2/5/2024 1/30/2025   [facility] [place] - [address] [address]    Sig: Take 2 tablets (200 mg) by mouth daily for 360 days    Class: E-Prescribe    Route: Oral    traZODone (DESYREL) 50 MG tablet  30 tablet 1 4/11/2022 --   [facility] [place] - [address] [facility]    Sig: Take 1 tablet by mouth daily AT BEDTIME    Class: E-Prescribe    Notes to Pharmacy: This prescription was filled on 3/19/2022. Any refills authorized will be placed on file.          Clinic-Administered Medications as of 3/25/2024         Dose Frequency Start End    iopamidol (ISOVUE-370) 76% solution 80 mL 80 mL ONCE 3/7/2016 --    Route: Intravenous    sodium chloride 0.9 % for CT scan flush dose 80 mL 80 mL ONCE 3/7/2016 --    Route: Intravenous           ALLERGIES:    Allergies   Allergen Reactions    Persantine [Dipyridamole] Other (See Comments)     Nervous and anxiety     REVIEW OF SYSTEMS:    A comprehensive review of systems was performed and found to be negative except as described here or above.  SOCIAL HISTORY:   Social History     Socioeconomic History    Marital status: Single     Spouse name: Not on file    Number of children: Not on file    Years of education: Not on file    Highest education level: Not on file   Occupational History    Not on file   Tobacco Use    Smoking status: Every Day     Packs/day: 1.00     Years: 18.00     Additional pack years: 0.00     Total pack years: 18.00     Types: Cigarettes    Smokeless tobacco: Never    Tobacco comments:     Started at age 30.    Vaping Use    Vaping Use: Never used   Substance and Sexual Activity    Alcohol use: Yes     Alcohol/week: 0.0 standard drinks of alcohol     Comment: occassional beer    Drug use: No    Sexual activity: Yes     Partners: Female     Birth control/protection: Surgical   Other Topics Concern    Parent/sibling w/ CABG, MI or angioplasty before 65F 55M? Not Asked   Social History Narrative    ** Merged History Encounter **          Social Determinants of Health     Financial Resource Strain: Not on file   Food Insecurity: Not on file   Transportation Needs: Not on file   Physical Activity: Not on file   Stress: Not on file   Social Connections: Not on file   Interpersonal Safety:  Not on file   Housing Stability: Not on file     FAMILY MEDICAL HISTORY:   Family History   Problem Relation Age of Onset    C.A.D. Father 36        MI at age 36-37    Cerebrovascular Disease Father     Allergies Father     Anesthesia Reaction Father     Cardiovascular Paternal Grandfather     Prostate Cancer No family hx of     Colon Cancer No family hx of      PHYSICAL EXAM:   There were no vitals taken for this visit.  GENERAL APPEARANCE: alert and no distress  EYES: nonicteric  HENT: mouth without ulcers or lesions  NECK: supple, no adenopathy  RESP: lungs clear to auscultation   CV: regular rhythm, normal rate, no rub  ABDOMEN: soft, nontender, normal bowel sounds, no HSM   Extremities: no clubbing, cyanosis, or edema  MS: no evidence of inflammation in joints, no muscle tenderness  SKIN: no rash  NEURO: mentation intact and speech normal  PSYCH: affect normal/bright   LABS:   No results found for this or any previous visit (from the past 672 hour(s)).    CMP  Recent Labs   Lab Test 02/02/24  0716 12/04/23  1330 11/09/23  1102 11/02/23  1332 01/21/22  0813 12/16/20  1139 12/14/20  1456 05/05/20  0516 05/04/20  0439    142 140 140   < >  --  137 139 139   POTASSIUM 4.1 3.7 3.4 3.9   < > 3.8 3.9 4.2 4.4   CHLORIDE 99 103 99 102   < >  --  104 105 106   CO2 30* 29 28 26   < >  --  30 28 28   ANIONGAP 10 10 13 12   < >  --  3 6 5   GLC 99 87 89 103*   < >  --  90 86 83   BUN 15.5 18.4 11.7 15.1   < >  --  16 14 14   CR 1.01 1.33* 0.95 1.03   < > 0.89 0.98 1.16 1.07   GFRESTIMATED 86 62 >90 84   < > >90 86 71 78   GFRESTBLACK  --   --   --   --   --  >90 >90 82 >90   DEEPIKA 9.6 9.1 10.0 9.8   < >  --  9.5 9.0 8.6   PROTTOTAL 7.2 7.2 7.5 7.1   < >  --   --   --   --    ALBUMIN 4.3 4.2 4.6 4.4   < >  --   --   --   --    BILITOTAL 0.4 0.3 0.4 0.4   < >  --   --   --   --    ALKPHOS 38* 42 43 39*   < >  --   --   --   --    AST 27 28 27 26   < >  --   --   --   --    ALT 24 22 24 23   < >  --   --   --   --     < >  = values in this interval not displayed.     CBC  Recent Labs   Lab Test 11/02/23  1332 06/13/23  1158 06/08/23  1328 05/17/23  0728   HGB 14.6 14.4 15.0 14.4   WBC 8.4 7.1 17.4* 8.1   RBC 4.80 4.85 5.07 4.91   HCT 43.7 41.4 42.5 43.5   MCV 91 85 84 89   MCH 30.4 29.7 29.6 29.3   MCHC 33.4 34.8 35.3 33.1   RDW 13.0 12.8 12.9 14.2    230 287 246     INR  Recent Labs   Lab Test 06/08/23  1328   INR 1.06   PTT 29     ABGNo lab results found.   URINE STUDIES  Recent Labs   Lab Test 11/02/23  1332 07/18/23  0743 05/03/20  1901 04/29/20  0930 10/03/17  1036   COLOR Yellow Yellow Yellow Sandra Yellow   APPEARANCE Clear Clear Clear Clear Clear   URINEGLC Negative Negative Negative Negative Negative   URINEBILI Negative Negative Negative Negative Negative   URINEKETONE Negative Negative Negative Negative Negative   SG 1.020 1.015 1.017 1.025 1.015   UBLD Negative Negative Negative Negative Trace*   URINEPH 6.0 7.0 5.0 5.0 6.5   PROTEIN Negative Negative Negative Negative Negative   UROBILINOGEN 0.2 0.2  --   --  0.2   NITRITE Negative Negative Negative Negative Negative   LEUKEST Negative Negative Trace* Negative Negative   RBCU None Seen  --  1 3* O - 2   WBCU None Seen  --  1 2 O - 2     No lab results found.    ASSESSMENT AND PLAN:   #Hypertension  Uncontrolled and exacerbated by heavy smoking and coffee intake. A secondary cause should also be excluded. As I am unable to stop his medications to rule out primary I nonetheless checked a renin aldosterone ratio and the aldosterone was found to be heavily suppressed. The patient is currently on nifedipine 60 mg twice daily, spironolactone 200 mg daily, hydralazine 10 mg tid, carvedilol 25 mg twice daily, lisinopril 40 mg daily and hydralazine  to 25 mg three times a day. I will add empagliflozin 10 mg for its blood pressure lowering effects and renal protection effect. The patient will keep a BP diary and communicate the results at his next visit.  The patient was also  instructed to keep the sodium intake around 2300 mg /day, follow a plant-based diet and to avoid NSAIDs    #Elevated creatinine a creatinine level on 12/4 has increased to 1.3 from a baseline of 0.95 with no clear explanation. A creatinine level rechecked is back to baseline at 1.01 mg/dL     #Glycemia  Hba1c is 5.2, not an issue    #CVD/dyslipidemia  On atorvastatin 20 mg daily, LDL is 89 in July 2023     #Blood count  Hemoglobin 14.6 no acute issue    #Acid-base status  CO2 level 28 -> 30 acceptable    #Electrolytes  Na 140 -> 139  K 3.4 -> 3.7 -> 4.1 no acute issue       The total time of this encounter amounted to 30 minutes on the day of the encounter. This time included time spent with the patient, reviewing records, ordering tests, and performing post visit documentation.     The patient will return to follow up in 6 weeks from now    Yashira Fisher MD  Division of Renal Diseases and Hypertension

## 2024-03-25 NOTE — LETTER
3/25/2024       RE: Sarath Gray  [id number] [facility] [id number]     Dear Colleague,    Thank you for referring your patient, Sarath Gray, to the [facility] [place] at [facility]. Please see a copy of my visit note below.    Virtual Visit Details    Type of service:  Telephone Visit   Phone call duration: 10 minutes   Originating Location (pt. Location): Home    Distant Location (provider location):  On-site  Nephrology Clinic    Sarath Gray MRN:[id number] YOB: 1965  Date of Service: 03/25/2024  Primary care provider: Traci Radford  Requesting physician: Traci Radford      REASON FOR CONSULT: Hypertension    HISTORY OF PRESENT ILLNESS:   Sarath Gray is a 58 year old male who first presented for evaluation of uncontrolled hypertension on November 8th 2023.  The past medical history is significant for hypertension diagnosed only two years ago, mild to moderate mitral insufficiency and heavy smoking. His hypertension had been difficult to manage and remained uncontrolled on spironolactone 200 mg daily, amlodipine 10 mg daily, lisinopril 40 mg daily and hydralazine 10 mg three times a day which has only been added a few days prior. He had tried hydrochlorothiazide in the past but it has led to COMPA and was stopped. Beta blockers were not given due to concern for low heart rate. Renal artery was ruled out as a secondary cause of hypertension with no abnormality seen at the kidneys level. His renal function was currently intact with a creatinine level at 0.95 mg/dL and had been stable over the past two years. He had no evidence of any albuminuria on a uACR done on 11/02/2023. His TSH level was normal and was 1.06 mg/dL on 7/18/2023. He hadn't been checked for primary aldosteronism. The patient is also a heavy smoker and smokes around 1/2 ppd. He also drinks a pot of coffee daily. No significant  alcohol intake.    On his first visit, I switched amlodipine to nifedipine 60 mg twice daily and started him on carvedilol 12.5 mg twice daily. I also instructed him about lifestyle modifications and checked a renin aldosterone ratio which showed a heavily suppressed aldosterone level. He reported that his blood pressure remains uncontrolled despite taking all his medications and cutting down on smoking. He is going through significant life-stressors as his wife just spent 35 days in the hospital and almost . On his last visit, I increased carvedilol to 25 mg twice daily. The BP control has improved but remains suboptimal with an average of 150/85    Follow up on 3/25/2024  Creatinine level is stable at 1.01 mg  Current medications: carvedilol 25 mg twice daily, hydralazine 25 mg three times daily, lisinopril 40 mg daily, nifedipine 60 mg twice daily and spironolactone 200 mg daily. The blood pressure remains sub optimal with numbers at home around 150/80 most of the time.    Echocardiogram done on 2023  Interpretation Summary  Global and regional left ventricular function is normal with an EF of 55-60%.  Right ventricular function, chamber size, wall motion, and thickness are  normal.  Mild to moderate mitral insufficiency is present.  The inferior vena cava is normal.  No pericardial effusion is present.  There is no prior study for direct comparison.  The patient denies any dysuria, any pollakiuria, any nocturia, any LE edema, any dyspnea on exertion .  The patient denies ever having kidney stones, urinary tract infections, gross hematuria. There is no family history of CKD.  The following portions of the patient's history were reviewed and updated as appropriate: allergies, current medications, past family history, past medical history, past social history, past surgical history and problem list.    PAST MEDICAL HISTORY:  Past Medical History:   Diagnosis Date     Acute appendicitis with generalized  peritonitis, unspecified whether abscess present, unspecified whether gangrene present, unspecified whether perforation present 4/29/2020    Added automatically from request for surgery [id number]     Heart disease      Hypertension      CARROLL (obstructive sleep apnea)      PE (pulmonary embolism)     years ago, apparently no cause found, does smoke     Respiratory complications March 2005     Sleep apnea      Squamous cell carcinoma of skin, unspecified      PAST SURGICAL HISTORY:  Past Surgical History:   Procedure Laterality Date     ARTHROPLASTY KNEE  6/11/2014    Procedure: ARTHROPLASTY KNEE;  Surgeon: Maik Jeong MD;  Location: [facility]     ARTHROSCOPY KNEE WITH MEDIAL MENISCECTOMY  12/19/2013    Procedure: ARTHROSCOPY KNEE WITH MEDIAL MENISCECTOMY;  Left Knee Arthroscopy With Intraarticular Debridement.;  Surgeon: Maik Jeong MD;  Location: [facility]     ARTHROTOMY KNEE Right 12/16/2020    Procedure: Knee Open Excision of Prepatellar Bursa;  Surgeon: Maik Jeong MD;  Location: [facility]     COLONOSCOPY  2002     COLONOSCOPY N/A 4/18/2018    Procedure: COLONOSCOPY;  colonoscopy;  Surgeon: Nirmal Schneider MD;  Location: [facility]     EXAM UNDER ANESTHESIA, MANIPULATE JOINT (LOCATION)  6/26/2014    Procedure: EXAM UNDER ANESTHESIA, MANIPULATE JOINT (LOCATION);  Surgeon: Maik Jeong MD;  Location: [facility]     EXAM UNDER ANESTHESIA, MANIPULATE JOINT (LOCATION) Left 8/28/2014    Procedure: EXAM UNDER ANESTHESIA, MANIPULATE JOINT (LOCATION);  Surgeon: Maik Jeong MD;  Location: [facility]     EXAM UNDER ANESTHESIA, MANIPULATE JOINT (LOCATION) Left 12/31/2014    Procedure: EXAM UNDER ANESTHESIA, MANIPULATE JOINT (LOCATION);  Surgeon: Maik Jeong MD;  Location: [facility]     H ABLATION SVT  2008    AVNRT     INJECT EPIDURAL LUMBAR  12/16/2011    Procedure:INJECT EPIDURAL LUMBAR; MICKY-Dr. Smith; Surgeon:GENERIC ANESTHESIA PROVIDER; Location:[facility]     INJECT EPIDURAL LUMBAR  1/27/2012    Procedure:INJECT  EPIDURAL LUMBAR; MICKY with Flouro--; Surgeon:GENERIC ANESTHESIA PROVIDER; Location:[facility]     LAPAROSCOPIC APPENDECTOMY N/A 4/29/2020    Procedure: APPENDECTOMY, LAPAROSCOPIC;  Surgeon: Alexis Maher MD;  Location: [facility]     LAPAROSCOPIC HERNIORRHAPHY INGUINAL BILATERAL Bilateral 12/29/2015    Procedure: LAPAROSCOPIC HERNIORRHAPHY INGUINAL BILATERAL;  Surgeon: Andrew Prajapati MD;  Location: [facility]     Lumbar back fusion  1995, 1998    first was A&P     Lumbar back procedure  1999    Hardware removal     spinal stimulator insertion  2004    also, revised it that year also. never seemed to help well.      MEDICATIONS:  Prescription Medications as of 3/25/2024         Rx Number Disp Refills Start End Last Dispensed Date Next Fill Date Owning Pharmacy    albuterol (PROAIR HFA) 108 (90 Base) MCG/ACT inhaler  1 Inhaler 11 6/22/2020 --   [facility], [place] - [address] [facility]    Sig: Inhale 2 puffs into the lungs every 4 hours as needed for shortness of breath / dyspnea or wheezing    Class: E-Prescribe    Notes to Pharmacy: Pharmacy may dispense brand covered by insurance (Proair, or proventil or ventolin or generic albuterol inhaler)    Route: Inhalation    aspirin 81 MG EC tablet  -- -- 10/2/2023 --       Sig: Take 1 tablet (81 mg) by mouth daily    Class: OTC    Route: Oral    atorvastatin (LIPITOR) 20 MG tablet  30 tablet 7 11/29/2023 --   [facility], [place] - [address] [facility]    Sig: Take 1 tablet (20 mg) by mouth daily    Class: E-Prescribe    Notes to Pharmacy: This prescription was filled on 11/8/2023. Any refills authorized will be placed on file.    Route: Oral    carvedilol (COREG) 25 MG tablet  180 tablet 1 12/11/2023 6/8/2024   [facility], [place] - [address] [facility]    Sig: Take 1 tablet (25 mg) by mouth 2 times daily (with meals) for 180 days    Class: E-Prescribe    Route: Oral    hydrALAZINE (APRESOLINE) 25 MG tablet  270 tablet 3 2/5/2024 1/30/2025   [facility],  [place] - [address] [facility]    Sig: Take 1 tablet (25 mg) by mouth 3 times daily for 360 days    Class: E-Prescribe    Route: Oral    lisinopril (ZESTRIL) 40 MG tablet  90 tablet 3 10/31/2023 --   [facility], [place] - [address] [facility]    Sig: Take 1 tablet (40 mg) by mouth daily    Class: E-Prescribe    Notes to Pharmacy: This prescription was filled on 10/10/2023. Any refills authorized will be placed on file.    Route: Oral    Renewals       Renewal provider: Quentin Hernandez MD            multivitamin, therapeutic (THERA-VIT) TABS tablet  -- --  --       Sig: Take 1 tablet by mouth daily    Class: Historical    Route: Oral    NIFEdipine ER (ADALAT CC) 60 MG 24 hr tablet  180 tablet 1 11/8/2023 5/6/2024   [facility], [place] - [address] [facility]    Sig: Take 1 tablet (60 mg) by mouth two times daily for 180 days    Class: E-Prescribe    Route: Oral    omeprazole (PRILOSEC) 40 MG DR capsule  180 capsule 3 8/21/2023 --   [facility], [place] - [address] [facility]    Sig: Take 1 capsule (40 mg) by mouth 2 times daily    Class: E-Prescribe    Route: Oral    order for DME  -- --  --       Sig: Equipment being ordered: CPAP Patient Sarath Gray was set up at [facility]  on April 21, 2016. Patient received a Resmed AirSense 10 Auto. Pressures were set at Auto 5 - 15 cm H2O.   Patient s ramp is 5 cm H2O for Auto and FLEX/EPR is 2.  Patient received a [employer] Mask name: SIMPLUS  Full Face mask Size Large, heated tubing and heated humidifier.  Patient is enrolled in the STM Program and does not need to meet compliance. Patient has a follow up on June 14TH AT 9 AM with GARCÍA Resendez    Class: Historical    ORDER FOR DME  1 Device 0 6/14/2014 --   [facility] - [place], [place] - [address] [facility]    Sig: Equipment being ordered: Other: CPM machine for home use. Set max tolerance and increase 3-5 degrees/day as tolerated. Use up to 6-8 hours/day as tolerated.  Treatment  Diagnosis: left TKA    Class: Local Print    spironolactone (ALDACTONE) 100 MG tablet  180 tablet 3 2/5/2024 1/30/2025   [facility] - [place] - [address] [address]    Sig: Take 2 tablets (200 mg) by mouth daily for 360 days    Class: E-Prescribe    Route: Oral    traZODone (DESYREL) 50 MG tablet  30 tablet 1 4/11/2022 --   [facility] - [place] - [address]    Sig: Take 1 tablet by mouth daily AT BEDTIME    Class: E-Prescribe    Notes to Pharmacy: This prescription was filled on 3/19/2022. Any refills authorized will be placed on file.          Clinic-Administered Medications as of 3/25/2024         Dose Frequency Start End    iopamidol (ISOVUE-370) 76% solution 80 mL 80 mL ONCE 3/7/2016 --    Route: Intravenous    sodium chloride 0.9 % for CT scan flush dose 80 mL 80 mL ONCE 3/7/2016 --    Route: Intravenous           ALLERGIES:    Allergies   Allergen Reactions     Persantine [Dipyridamole] Other (See Comments)     Nervous and anxiety     REVIEW OF SYSTEMS:    A comprehensive review of systems was performed and found to be negative except as described here or above.  SOCIAL HISTORY:   Social History     Socioeconomic History     Marital status: Single     Spouse name: Not on file     Number of children: Not on file     Years of education: Not on file     Highest education level: Not on file   Occupational History     Not on file   Tobacco Use     Smoking status: Every Day     Packs/day: 1.00     Years: 18.00     Additional pack years: 0.00     Total pack years: 18.00     Types: Cigarettes     Smokeless tobacco: Never     Tobacco comments:     Started at age 30.    Vaping Use     Vaping Use: Never used   Substance and Sexual Activity     Alcohol use: Yes     Alcohol/week: 0.0 standard drinks of alcohol     Comment: occassional beer     Drug use: No     Sexual activity: Yes     Partners: Female     Birth control/protection: Surgical   Other Topics Concern     Parent/sibling w/ CABG, MI or angioplasty  before 65F 55M? Not Asked   Social History Narrative    ** Merged History Encounter **          Social Determinants of Health     Financial Resource Strain: Not on file   Food Insecurity: Not on file   Transportation Needs: Not on file   Physical Activity: Not on file   Stress: Not on file   Social Connections: Not on file   Interpersonal Safety: Not on file   Housing Stability: Not on file     FAMILY MEDICAL HISTORY:   Family History   Problem Relation Age of Onset     C.A.D. Father 36        MI at age 36-37     Cerebrovascular Disease Father      Allergies Father      Anesthesia Reaction Father      Cardiovascular Paternal Grandfather      Prostate Cancer No family hx of      Colon Cancer No family hx of      PHYSICAL EXAM:   There were no vitals taken for this visit.  GENERAL APPEARANCE: alert and no distress  EYES: nonicteric  HENT: mouth without ulcers or lesions  NECK: supple, no adenopathy  RESP: lungs clear to auscultation   CV: regular rhythm, normal rate, no rub  ABDOMEN: soft, nontender, normal bowel sounds, no HSM   Extremities: no clubbing, cyanosis, or edema  MS: no evidence of inflammation in joints, no muscle tenderness  SKIN: no rash  NEURO: mentation intact and speech normal  PSYCH: affect normal/bright   LABS:   No results found for this or any previous visit (from the past 672 hour(s)).    CMP  Recent Labs   Lab Test 02/02/24  0716 12/04/23  1330 11/09/23  1102 11/02/23  1332 01/21/22  0813 12/16/20  1139 12/14/20  1456 05/05/20  0516 05/04/20  0439    142 140 140   < >  --  137 139 139   POTASSIUM 4.1 3.7 3.4 3.9   < > 3.8 3.9 4.2 4.4   CHLORIDE 99 103 99 102   < >  --  104 105 106   CO2 30* 29 28 26   < >  --  30 28 28   ANIONGAP 10 10 13 12   < >  --  3 6 5   GLC 99 87 89 103*   < >  --  90 86 83   BUN 15.5 18.4 11.7 15.1   < >  --  16 14 14   CR 1.01 1.33* 0.95 1.03   < > 0.89 0.98 1.16 1.07   GFRESTIMATED 86 62 >90 84   < > >90 86 71 78   GFRESTBLACK  --   --   --   --   --  >90 >90 82  >90   DEEPIKA 9.6 9.1 10.0 9.8   < >  --  9.5 9.0 8.6   PROTTOTAL 7.2 7.2 7.5 7.1   < >  --   --   --   --    ALBUMIN 4.3 4.2 4.6 4.4   < >  --   --   --   --    BILITOTAL 0.4 0.3 0.4 0.4   < >  --   --   --   --    ALKPHOS 38* 42 43 39*   < >  --   --   --   --    AST 27 28 27 26   < >  --   --   --   --    ALT 24 22 24 23   < >  --   --   --   --     < > = values in this interval not displayed.     CBC  Recent Labs   Lab Test 11/02/23  1332 06/13/23  1158 06/08/23  1328 05/17/23  0728   HGB 14.6 14.4 15.0 14.4   WBC 8.4 7.1 17.4* 8.1   RBC 4.80 4.85 5.07 4.91   HCT 43.7 41.4 42.5 43.5   MCV 91 85 84 89   MCH 30.4 29.7 29.6 29.3   MCHC 33.4 34.8 35.3 33.1   RDW 13.0 12.8 12.9 14.2    230 287 246     INR  Recent Labs   Lab Test 06/08/23  1328   INR 1.06   PTT 29     ABGNo lab results found.   URINE STUDIES  Recent Labs   Lab Test 11/02/23  1332 07/18/23  0743 05/03/20  1901 04/29/20  0930 10/03/17  1036   COLOR Yellow Yellow Yellow Sandra Yellow   APPEARANCE Clear Clear Clear Clear Clear   URINEGLC Negative Negative Negative Negative Negative   URINEBILI Negative Negative Negative Negative Negative   URINEKETONE Negative Negative Negative Negative Negative   SG 1.020 1.015 1.017 1.025 1.015   UBLD Negative Negative Negative Negative Trace*   URINEPH 6.0 7.0 5.0 5.0 6.5   PROTEIN Negative Negative Negative Negative Negative   UROBILINOGEN 0.2 0.2  --   --  0.2   NITRITE Negative Negative Negative Negative Negative   LEUKEST Negative Negative Trace* Negative Negative   RBCU None Seen  --  1 3* O - 2   WBCU None Seen  --  1 2 O - 2     No lab results found.    ASSESSMENT AND PLAN:   #Hypertension  Uncontrolled and exacerbated by heavy smoking and coffee intake. A secondary cause should also be excluded. As I am unable to stop his medications to rule out primary I nonetheless checked a renin aldosterone ratio and the aldosterone was found to be heavily suppressed. The patient is currently on nifedipine 60 mg twice  daily, spironolactone 200 mg daily, hydralazine 10 mg tid, carvedilol 25 mg twice daily, lisinopril 40 mg daily and hydralazine  to 25 mg three times a day. I will add empagliflozin 10 mg for its blood pressure lowering effects and renal protection effect. The patient will keep a BP diary and communicate the results at his next visit.  The patient was also instructed to keep the sodium intake around 2300 mg /day, follow a plant-based diet and to avoid NSAIDs    #Elevated creatinine a creatinine level on 12/4 has increased to 1.3 from a baseline of 0.95 with no clear explanation. A creatinine level rechecked is back to baseline at 1.01 mg/dL     #Glycemia  Hba1c is 5.2, not an issue    #CVD/dyslipidemia  On atorvastatin 20 mg daily, LDL is 89 in July 2023     #Blood count  Hemoglobin 14.6 no acute issue    #Acid-base status  CO2 level 28 -> 30 acceptable    #Electrolytes  Na 140 -> 139  K 3.4 -> 3.7 -> 4.1 no acute issue       The total time of this encounter amounted to 30 minutes on the day of the encounter. This time included time spent with the patient, reviewing records, ordering tests, and performing post visit documentation.     The patient will return to follow up in 6 weeks from now    Yashira Fisher MD  Division of Renal Diseases and Hypertension               Again, thank you for allowing me to participate in the care of your patient.      Sincerely,    Yashira Fisher MD

## 2024-03-26 ENCOUNTER — TELEPHONE (OUTPATIENT)
Dept: NEPHROLOGY | Facility: CLINIC | Age: 59
End: 2024-03-26
Payer: COMMERCIAL

## 2024-03-26 NOTE — TELEPHONE ENCOUNTER
Left Voicemail (1st Attempt) and Sent Mychart (1st Attempt) for the patient to schedule:    Appointment type: Return Nephrology via TELEPHONE  Provider: Dr. Fisher  Return date: Approx. 5/6  Phone number: [phone]

## 2024-03-28 ENCOUNTER — TELEPHONE (OUTPATIENT)
Dept: NEPHROLOGY | Facility: CLINIC | Age: 59
End: 2024-03-28
Payer: COMMERCIAL

## 2024-03-28 NOTE — TELEPHONE ENCOUNTER
Patient scheduled for:     Appointment type: Return nephrology  Provider: Dr. Fisher  Return date: 5/6 @ 8:30 via phone; labs 5/2 in [place]  Specialty phone number: [phone]

## 2024-04-10 ENCOUNTER — TELEPHONE (OUTPATIENT)
Dept: FAMILY MEDICINE | Facility: CLINIC | Age: 59
End: 2024-04-10
Payer: COMMERCIAL

## 2024-04-10 NOTE — TELEPHONE ENCOUNTER
Patient Quality Outreach    Patient is due for the following:   Hypertension -  BP check    Next Steps:   Schedule a nurse only visit for blood pressure  or call to let us know what home results are.    Type of outreach:    Phone, left message for patient/parent to call back.      Questions for provider review:    None           Roro Lancaster, CMA

## 2024-05-02 ENCOUNTER — LAB (OUTPATIENT)
Dept: LAB | Facility: CLINIC | Age: 59
End: 2024-05-02
Payer: COMMERCIAL

## 2024-05-02 DIAGNOSIS — I10 HYPERTENSION GOAL BP (BLOOD PRESSURE) < 140/90: ICD-10-CM

## 2024-05-02 LAB
ALBUMIN MFR UR ELPH: <6 MG/DL
ALBUMIN SERPL BCG-MCNC: 4.4 G/DL (ref 3.5–5.2)
ALBUMIN UR-MCNC: NEGATIVE MG/DL
ALP SERPL-CCNC: 35 U/L (ref 40–150)
ALT SERPL W P-5'-P-CCNC: 22 U/L (ref 0–70)
ANION GAP SERPL CALCULATED.3IONS-SCNC: 10 MMOL/L (ref 7–15)
APPEARANCE UR: CLEAR
AST SERPL W P-5'-P-CCNC: 23 U/L (ref 0–45)
BILIRUB SERPL-MCNC: 0.4 MG/DL
BILIRUB UR QL STRIP: NEGATIVE
BUN SERPL-MCNC: 19.8 MG/DL (ref 6–20)
CALCIUM SERPL-MCNC: 9.3 MG/DL (ref 8.6–10)
CHLORIDE SERPL-SCNC: 97 MMOL/L (ref 98–107)
COLOR UR AUTO: YELLOW
CREAT SERPL-MCNC: 1.08 MG/DL (ref 0.67–1.17)
CREAT UR-MCNC: 23.6 MG/DL
CREAT UR-MCNC: 23.6 MG/DL
DEPRECATED HCO3 PLAS-SCNC: 27 MMOL/L (ref 22–29)
EGFRCR SERPLBLD CKD-EPI 2021: 80 ML/MIN/1.73M2
ERYTHROCYTE [DISTWIDTH] IN BLOOD BY AUTOMATED COUNT: 13.6 % (ref 10–15)
GLUCOSE SERPL-MCNC: 99 MG/DL (ref 70–99)
GLUCOSE UR STRIP-MCNC: >=1000 MG/DL
HCT VFR BLD AUTO: 41.2 % (ref 40–53)
HGB BLD-MCNC: 13.9 G/DL (ref 13.3–17.7)
HGB UR QL STRIP: NEGATIVE
KETONES UR STRIP-MCNC: NEGATIVE MG/DL
LEUKOCYTE ESTERASE UR QL STRIP: NEGATIVE
MCH RBC QN AUTO: 31 PG (ref 26.5–33)
MCHC RBC AUTO-ENTMCNC: 33.7 G/DL (ref 31.5–36.5)
MCV RBC AUTO: 92 FL (ref 78–100)
MICROALBUMIN UR-MCNC: <12 MG/L
MICROALBUMIN/CREAT UR: NORMAL MG/G{CREAT}
NITRATE UR QL: NEGATIVE
PH UR STRIP: 6 [PH] (ref 5–7)
PHOSPHATE SERPL-MCNC: 3.5 MG/DL (ref 2.5–4.5)
PLATELET # BLD AUTO: 209 10E3/UL (ref 150–450)
POTASSIUM SERPL-SCNC: 3.8 MMOL/L (ref 3.4–5.3)
PROT SERPL-MCNC: 6.7 G/DL (ref 6.4–8.3)
PROT/CREAT 24H UR: NORMAL MG/G{CREAT}
RBC # BLD AUTO: 4.49 10E6/UL (ref 4.4–5.9)
RBC #/AREA URNS AUTO: ABNORMAL /HPF
SODIUM SERPL-SCNC: 134 MMOL/L (ref 135–145)
SP GR UR STRIP: 1.01 (ref 1–1.03)
SQUAMOUS #/AREA URNS AUTO: ABNORMAL /LPF
UROBILINOGEN UR STRIP-ACNC: 0.2 E.U./DL
WBC # BLD AUTO: 8.2 10E3/UL (ref 4–11)
WBC #/AREA URNS AUTO: ABNORMAL /HPF

## 2024-05-02 PROCEDURE — 80053 COMPREHEN METABOLIC PANEL: CPT

## 2024-05-02 PROCEDURE — 82043 UR ALBUMIN QUANTITATIVE: CPT

## 2024-05-02 PROCEDURE — 84156 ASSAY OF PROTEIN URINE: CPT

## 2024-05-02 PROCEDURE — 82570 ASSAY OF URINE CREATININE: CPT

## 2024-05-02 PROCEDURE — 84100 ASSAY OF PHOSPHORUS: CPT

## 2024-05-02 PROCEDURE — 36415 COLL VENOUS BLD VENIPUNCTURE: CPT

## 2024-05-02 PROCEDURE — 81001 URINALYSIS AUTO W/SCOPE: CPT

## 2024-05-02 PROCEDURE — 85027 COMPLETE CBC AUTOMATED: CPT

## 2024-05-08 ENCOUNTER — VIRTUAL VISIT (OUTPATIENT)
Dept: NEPHROLOGY | Facility: CLINIC | Age: 59
End: 2024-05-08
Attending: INTERNAL MEDICINE
Payer: COMMERCIAL

## 2024-05-08 DIAGNOSIS — I10 ESSENTIAL HYPERTENSION WITH GOAL BLOOD PRESSURE LESS THAN 140/90: ICD-10-CM

## 2024-05-08 DIAGNOSIS — R73.09 ELEVATED GLUCOSE LEVEL: Primary | ICD-10-CM

## 2024-05-08 PROCEDURE — 99441 PR PHYSICIAN TELEPHONE EVALUATION 5-10 MIN: CPT | Performed by: INTERNAL MEDICINE

## 2024-05-08 NOTE — PROGRESS NOTES
Virtual Visit Details    Type of service:  Telephone Visit   Phone call duration: 10 minutes   Originating Location (pt. Location): Home    Distant Location (provider location):  On-site  Nephrology Clinic    Sarath Gray MRN:[id number] YOB: 1965  Date of Service: 05/08/2024  Primary care provider: Traci Radford  Requesting physician: Traci Radford      REASON FOR CONSULT: Hypertension    HISTORY OF PRESENT ILLNESS:   Sarath Gray is a 58 year old male who first presented for evaluation of uncontrolled hypertension on November 8th 2023.  The past medical history is significant for hypertension diagnosed only two years ago, mild to moderate mitral insufficiency and heavy smoking. His hypertension had been difficult to manage and remained uncontrolled on spironolactone 200 mg daily, amlodipine 10 mg daily, lisinopril 40 mg daily and hydralazine 10 mg three times a day which has only been added a few days prior. He had tried hydrochlorothiazide in the past but it has led to COMPA and was stopped. Beta blockers were not given due to concern for low heart rate. Renal artery was ruled out as a secondary cause of hypertension with no abnormality seen at the kidneys level. His renal function was currently intact with a creatinine level at 0.95 mg/dL and had been stable over the past two years. He had no evidence of any albuminuria on a uACR done on 11/02/2023. His TSH level was normal and was 1.06 mg/dL on 7/18/2023. He hadn't been checked for primary aldosteronism. The patient is also a heavy smoker and smokes around 1/2 ppd. He also drinks a pot of coffee daily. No significant alcohol intake.    On his first visit, I switched amlodipine to nifedipine 60 mg twice daily and started him on carvedilol 12.5 mg twice daily. I also instructed him about lifestyle modifications and checked a renin aldosterone ratio which showed a heavily suppressed aldosterone level. He reported that his blood pressure  remains uncontrolled despite taking all his medications and cutting down on smoking. He is going through significant life-stressors as his wife just spent 35 days in the hospital and almost . On subsequent visits, I increased carvedilol to 25 mg twice daily, adjusted the spironolactone dose and added empagliflozin 10 mg daily. The BP control has improved but remains suboptimal with an average of 150/70's    Current medications: carvedilol 25 mg twice daily, hydralazine 25 mg three times daily, lisinopril 40 mg daily, nifedipine 60 mg twice daily, spironolactone 200 mg daily and empagliflozin 10 mg daily. He is complaining about the excessive cost of empagliflozin.    Echocardiogram done on 2023  Interpretation Summary  Global and regional left ventricular function is normal with an EF of 55-60%.  Right ventricular function, chamber size, wall motion, and thickness are  normal.  Mild to moderate mitral insufficiency is present.  The inferior vena cava is normal.  No pericardial effusion is present.  There is no prior study for direct comparison.  The patient denies any dysuria, any pollakiuria, any nocturia, any LE edema, any dyspnea on exertion .  The patient denies ever having kidney stones, urinary tract infections, gross hematuria. There is no family history of CKD.  The following portions of the patient's history were reviewed and updated as appropriate: allergies, current medications, past family history, past medical history, past social history, past surgical history and problem list.    PAST MEDICAL HISTORY:  Past Medical History:   Diagnosis Date    Acute appendicitis with generalized peritonitis, unspecified whether abscess present, unspecified whether gangrene present, unspecified whether perforation present 2020    Added automatically from request for surgery [id number]    Heart disease     Hypertension     CARROLL (obstructive sleep apnea)     PE (pulmonary embolism)     years ago, apparently no  cause found, does smoke    Respiratory complications March 2005    Sleep apnea     Squamous cell carcinoma of skin, unspecified      PAST SURGICAL HISTORY:  Past Surgical History:   Procedure Laterality Date    ARTHROPLASTY KNEE  6/11/2014    Procedure: ARTHROPLASTY KNEE;  Surgeon: Maik Jeong MD;  Location: [facility]    ARTHROSCOPY KNEE WITH MEDIAL MENISCECTOMY  12/19/2013    Procedure: ARTHROSCOPY KNEE WITH MEDIAL MENISCECTOMY;  Left Knee Arthroscopy With Intraarticular Debridement.;  Surgeon: Maik Jeong MD;  Location: [facility]    ARTHROTOMY KNEE Right 12/16/2020    Procedure: Knee Open Excision of Prepatellar Bursa;  Surgeon: Maik Jeong MD;  Location: [facility]    COLONOSCOPY  2002    COLONOSCOPY N/A 4/18/2018    Procedure: COLONOSCOPY;  colonoscopy;  Surgeon: Nirmal Schneider MD;  Location: [facility]    EXAM UNDER ANESTHESIA, MANIPULATE JOINT (LOCATION)  6/26/2014    Procedure: EXAM UNDER ANESTHESIA, MANIPULATE JOINT (LOCATION);  Surgeon: Maik Jeong MD;  Location: [facility]    EXAM UNDER ANESTHESIA, MANIPULATE JOINT (LOCATION) Left 8/28/2014    Procedure: EXAM UNDER ANESTHESIA, MANIPULATE JOINT (LOCATION);  Surgeon: Maik Jeong MD;  Location: [facility]    EXAM UNDER ANESTHESIA, MANIPULATE JOINT (LOCATION) Left 12/31/2014    Procedure: EXAM UNDER ANESTHESIA, MANIPULATE JOINT (LOCATION);  Surgeon: Maik Jeong MD;  Location: [facility]    H ABLATION SVT  2008    AVNRT    INJECT EPIDURAL LUMBAR  12/16/2011    Procedure:INJECT EPIDURAL LUMBAR; MICKY-Dr. Smith; Surgeon:GENERIC ANESTHESIA PROVIDER; Location:[facility]    INJECT EPIDURAL LUMBAR  1/27/2012    Procedure:INJECT EPIDURAL LUMBAR; MICKY with Flouro--; Surgeon:GENERIC ANESTHESIA PROVIDER; Location:[facility]    LAPAROSCOPIC APPENDECTOMY N/A 4/29/2020    Procedure: APPENDECTOMY, LAPAROSCOPIC;  Surgeon: Alexis Maher MD;  Location: [facility]    LAPAROSCOPIC HERNIORRHAPHY INGUINAL BILATERAL Bilateral 12/29/2015    Procedure: LAPAROSCOPIC  HERNIORRHAPHY INGUINAL BILATERAL;  Surgeon: Andrew Prajapati MD;  Location: [facility]    Lumbar back fusion  1995, 1998    first was A&P    Lumbar back procedure  1999    Hardware removal    spinal stimulator insertion  2004    also, revised it that year also. never seemed to help well.      MEDICATIONS:  Prescription Medications as of 5/8/2024         Rx Number Disp Refills Start End Last Dispensed Date Next Fill Date Owning Pharmacy    albuterol (PROAIR HFA) 108 (90 Base) MCG/ACT inhaler  1 Inhaler 11 6/22/2020 --   [facility], [place] - [address]    Sig: Inhale 2 puffs into the lungs every 4 hours as needed for shortness of breath / dyspnea or wheezing    Class: E-Prescribe    Notes to Pharmacy: Pharmacy may dispense brand covered by insurance (Proair, or proventil or ventolin or generic albuterol inhaler)    Route: Inhalation    aspirin 81 MG EC tablet  -- -- 10/2/2023 --       Sig: Take 1 tablet (81 mg) by mouth daily    Class: OTC    Route: Oral    atorvastatin (LIPITOR) 20 MG tablet  30 tablet 7 11/29/2023 --   [facility] [place] - [address]    Sig: Take 1 tablet (20 mg) by mouth daily    Class: E-Prescribe    Notes to Pharmacy: This prescription was filled on 11/8/2023. Any refills authorized will be placed on file.    Route: Oral    carvedilol (COREG) 25 MG tablet  180 tablet 1 12/11/2023 6/8/2024   [facility] [place] - [address]    Sig: Take 1 tablet (25 mg) by mouth 2 times daily (with meals) for 180 days    Class: E-Prescribe    Route: Oral    empagliflozin (JARDIANCE) 10 MG TABS tablet  90 tablet 1 3/25/2024 --   [facility], [place] - [address]    Sig: Take 1 tablet (10 mg) by mouth daily    Class: E-Prescribe    Route: Oral    hydrALAZINE (APRESOLINE) 25 MG tablet  270 tablet 3 2/5/2024 1/30/2025   [facility] - [place] - [address] [address]    Sig: Take 1 tablet (25 mg) by mouth 3 times daily for 360 days    Class: E-Prescribe    Route: Oral     lisinopril (ZESTRIL) 40 MG tablet  90 tablet 3 10/31/2023 --   [facility], [place] - [address] [facility] [address] [facility]    Sig: Take 1 tablet (40 mg) by mouth daily    Class: E-Prescribe    Notes to Pharmacy: This prescription was filled on 10/10/2023. Any refills authorized will be placed on file.    Route: Oral    Renewals       Renewal provider: Quentin Hernandez MD            multivitamin, therapeutic (THERA-VIT) TABS tablet  -- --  --       Sig: Take 1 tablet by mouth daily    Class: Historical    Route: Oral    NIFEdipine ER (ADALAT CC) 60 MG 24 hr tablet  180 tablet 1 11/8/2023 5/6/2024   [facility] - [place] - [address] [facility]    Sig: Take 1 tablet (60 mg) by mouth two times daily for 180 days    Class: E-Prescribe    Route: Oral    omeprazole (PRILOSEC) 40 MG DR capsule  180 capsule 3 8/21/2023 --   [facility] [place] - [address] [facility]    Sig: Take 1 capsule (40 mg) by mouth 2 times daily    Class: E-Prescribe    Route: Oral    order for DME  -- --  --       Sig: Equipment being ordered: CPAP Patient Sarath Gray was set up at [facility]  on April 21, 2016. Patient received a Resmed AirSense 10 Auto. Pressures were set at Auto 5 - 15 cm H2O.   Patient s ramp is 5 cm H2O for Auto and FLEX/EPR is 2.  Patient received a [employer] Mask name: SIMPLUS  Full Face mask Size Large, heated tubing and heated humidifier.  Patient is enrolled in the STM Program and does not need to meet compliance. Patient has a follow up on June 14TH AT 9 AM with YADIRA Resendez.    Jennifer Sam    Class: Historical    ORDER FOR DME  1 Device 0 6/14/2014 --   [facility] [place] [address] [facility]    Sig: Equipment being ordered: Other: CPM machine for home use. Set max tolerance and increase 3-5 degrees/day as tolerated. Use up to 6-8 hours/day as tolerated.  Treatment Diagnosis: left TKA    Class: Local Print    spironolactone (ALDACTONE) 100 MG tablet  180 tablet 3 2/5/2024 1/30/2025   [facility] - [place] - [address] [facility]    Sig: Take 2 tablets (200 mg) by mouth daily for 360 days    Class: E-Prescribe    Route: Oral    traZODone (DESYREL) 50 MG tablet  30 tablet 1 4/11/2022 --   [facility] - [place] - [address] [facility]    Sig: Take 1 tablet by mouth daily AT BEDTIME    Class: E-Prescribe    Notes to Pharmacy: This prescription was filled on 3/19/2022. Any refills authorized will be placed on file.          Clinic-Administered Medications as of 5/8/2024         Dose Frequency Start End    iopamidol (ISOVUE-370) 76% solution 80 mL 80 mL ONCE 3/7/2016 --    Route: Intravenous    sodium chloride 0.9 % for CT scan flush dose 80 mL 80 mL ONCE 3/7/2016 --    Route: Intravenous           ALLERGIES:    Allergies   Allergen Reactions    Persantine [Dipyridamole] Other (See Comments)     Nervous and anxiety     REVIEW OF SYSTEMS:    A comprehensive review of systems was performed and found to be negative except as described here or above.  SOCIAL HISTORY:   Social History     Socioeconomic History    Marital status: Single     Spouse name: Not on file    Number of children: Not on file    Years of education: Not on file    Highest education level: Not on file   Occupational History    Not on file   Tobacco Use    Smoking status: Every Day     Current packs/day: 1.00     Average packs/day: 1 pack/day for 18.0 years (18.0 ttl pk-yrs)     Types: Cigarettes    Smokeless tobacco: Never    Tobacco comments:     Started at age 30.    Vaping Use    Vaping status: Never Used   Substance and Sexual Activity    Alcohol use: Yes     Alcohol/week: 0.0 standard drinks of alcohol     Comment: occassional beer    Drug use: No    Sexual activity: Yes     Partners: Female     Birth control/protection: Surgical   Other Topics Concern    Parent/sibling w/ CABG, MI or angioplasty before 65F 55M? Not Asked   Social History Narrative    ** Merged History Encounter **          Social Determinants of Health     Financial  Resource Strain: Not on file   Food Insecurity: Not on file   Transportation Needs: Not on file   Physical Activity: Not on file   Stress: Not on file   Social Connections: Not on file   Interpersonal Safety: Not on file   Housing Stability: Not on file     FAMILY MEDICAL HISTORY:   Family History   Problem Relation Age of Onset    C.A.D. Father 36        MI at age 36-37    Cerebrovascular Disease Father     Allergies Father     Anesthesia Reaction Father     Cardiovascular Paternal Grandfather     Prostate Cancer No family hx of     Colon Cancer No family hx of      PHYSICAL EXAM:   There were no vitals taken for this visit.  GENERAL APPEARANCE: alert and no distress  EYES: nonicteric  HENT: mouth without ulcers or lesions  NECK: supple, no adenopathy  RESP: lungs clear to auscultation   CV: regular rhythm, normal rate, no rub  ABDOMEN: soft, nontender, normal bowel sounds, no HSM   Extremities: no clubbing, cyanosis, or edema  MS: no evidence of inflammation in joints, no muscle tenderness  SKIN: no rash  NEURO: mentation intact and speech normal  PSYCH: affect normal/bright   LABS:   Recent Results (from the past 672 hour(s))   UA with Microscopic    Collection Time: 05/02/24  8:09 AM   Result Value Ref Range    Color Urine Yellow Colorless, Straw, Light Yellow, Yellow    Appearance Urine Clear Clear    Glucose Urine >=1000 (A) Negative mg/dL    Bilirubin Urine Negative Negative    Ketones Urine Negative Negative mg/dL    Specific Gravity Urine 1.010 1.003 - 1.035    Blood Urine Negative Negative    pH Urine 6.0 5.0 - 7.0    Protein Albumin Urine Negative Negative mg/dL    Urobilinogen Urine 0.2 0.2, 1.0 E.U./dL    Nitrite Urine Negative Negative    Leukocyte Esterase Urine Negative Negative   Protein  random urine    Collection Time: 05/02/24  8:09 AM   Result Value Ref Range    Total Protein Urine mg/dL <6.0   mg/dL    Total Protein Urine mg/mg Creat      Creatinine Urine mg/dL 23.6 mg/dL   Albumin Random Urine  Quantitative with Creat Ratio    Collection Time: 05/02/24  8:09 AM   Result Value Ref Range    Creatinine Urine mg/dL 23.6 mg/dL    Albumin Urine mg/L <12.0 mg/L    Albumin Urine mg/g Cr     CBC with platelets    Collection Time: 05/02/24  8:09 AM   Result Value Ref Range    WBC Count 8.2 4.0 - 11.0 10e3/uL    RBC Count 4.49 4.40 - 5.90 10e6/uL    Hemoglobin 13.9 13.3 - 17.7 g/dL    Hematocrit 41.2 40.0 - 53.0 %    MCV 92 78 - 100 fL    MCH 31.0 26.5 - 33.0 pg    MCHC 33.7 31.5 - 36.5 g/dL    RDW 13.6 10.0 - 15.0 %    Platelet Count 209 150 - 450 10e3/uL   Comprehensive metabolic panel (BMP + Alb, Alk Phos, ALT, AST, Total. Bili, TP)    Collection Time: 05/02/24  8:09 AM   Result Value Ref Range    Sodium 134 (L) 135 - 145 mmol/L    Potassium 3.8 3.4 - 5.3 mmol/L    Carbon Dioxide (CO2) 27 22 - 29 mmol/L    Anion Gap 10 7 - 15 mmol/L    Urea Nitrogen 19.8 6.0 - 20.0 mg/dL    Creatinine 1.08 0.67 - 1.17 mg/dL    GFR Estimate 80 >60 mL/min/1.73m2    Calcium 9.3 8.6 - 10.0 mg/dL    Chloride 97 (L) 98 - 107 mmol/L    Glucose 99 70 - 99 mg/dL    Alkaline Phosphatase 35 (L) 40 - 150 U/L    AST 23 0 - 45 U/L    ALT 22 0 - 70 U/L    Protein Total 6.7 6.4 - 8.3 g/dL    Albumin 4.4 3.5 - 5.2 g/dL    Bilirubin Total 0.4 <=1.2 mg/dL   Phosphorus    Collection Time: 05/02/24  8:09 AM   Result Value Ref Range    Phosphorus 3.5 2.5 - 4.5 mg/dL   Urine Microscopic Exam    Collection Time: 05/02/24  8:09 AM   Result Value Ref Range    RBC Urine None Seen 0-2 /HPF /HPF    WBC Urine None Seen 0-5 /HPF /HPF    Squamous Epithelials Urine Few (A) None Seen /LPF       CMP  Recent Labs   Lab Test 05/02/24  0809 02/02/24  0716 12/04/23  1330 11/09/23  1102 01/21/22  0813 12/16/20  1139 12/14/20  1456 05/05/20  0516 05/04/20  0439   * 139 142 140   < >  --  137 139 139   POTASSIUM 3.8 4.1 3.7 3.4   < > 3.8 3.9 4.2 4.4   CHLORIDE 97* 99 103 99   < >  --  104 105 106   CO2 27 30* 29 28   < >  --  30 28 28   ANIONGAP 10 10 10 13   < >   --  3 6 5   GLC 99 99 87 89   < >  --  90 86 83   BUN 19.8 15.5 18.4 11.7   < >  --  16 14 14   CR 1.08 1.01 1.33* 0.95   < > 0.89 0.98 1.16 1.07   GFRESTIMATED 80 86 62 >90   < > >90 86 71 78   GFRESTBLACK  --   --   --   --   --  >90 >90 82 >90   DEEPIKA 9.3 9.6 9.1 10.0   < >  --  9.5 9.0 8.6   PHOS 3.5  --   --   --   --   --   --   --   --    PROTTOTAL 6.7 7.2 7.2 7.5   < >  --   --   --   --    ALBUMIN 4.4 4.3 4.2 4.6   < >  --   --   --   --    BILITOTAL 0.4 0.4 0.3 0.4   < >  --   --   --   --    ALKPHOS 35* 38* 42 43   < >  --   --   --   --    AST 23 27 28 27   < >  --   --   --   --    ALT 22 24 22 24   < >  --   --   --   --     < > = values in this interval not displayed.     CBC  Recent Labs   Lab Test 05/02/24  0809 11/02/23  1332 06/13/23  1158 06/08/23  1328   HGB 13.9 14.6 14.4 15.0   WBC 8.2 8.4 7.1 17.4*   RBC 4.49 4.80 4.85 5.07   HCT 41.2 43.7 41.4 42.5   MCV 92 91 85 84   MCH 31.0 30.4 29.7 29.6   MCHC 33.7 33.4 34.8 35.3   RDW 13.6 13.0 12.8 12.9    235 230 287     INR  Recent Labs   Lab Test 06/08/23  1328   INR 1.06   PTT 29     ABGNo lab results found.   URINE STUDIES  Recent Labs   Lab Test 05/02/24  0809 11/02/23  1332 07/18/23  0743 05/03/20  1901 04/29/20  0930 10/03/17  1036   COLOR Yellow Yellow Yellow Yellow Sandra Yellow   APPEARANCE Clear Clear Clear Clear Clear Clear   URINEGLC >=1000* Negative Negative Negative Negative Negative   URINEBILI Negative Negative Negative Negative Negative Negative   URINEKETONE Negative Negative Negative Negative Negative Negative   SG 1.010 1.020 1.015 1.017 1.025 1.015   UBLD Negative Negative Negative Negative Negative Trace*   URINEPH 6.0 6.0 7.0 5.0 5.0 6.5   PROTEIN Negative Negative Negative Negative Negative Negative   UROBILINOGEN 0.2 0.2 0.2  --   --  0.2   NITRITE Negative Negative Negative Negative Negative Negative   LEUKEST Negative Negative Negative Trace* Negative Negative   RBCU None Seen None Seen  --  1 3* O - 2   WBCU None Seen  None Seen  --  1 2 O - 2     No lab results found.    ASSESSMENT AND PLAN:   #Hypertension  Uncontrolled and exacerbated by heavy smoking and coffee intake. A secondary cause should also be excluded. As I am unable to stop his medications to rule out primary I nonetheless checked a renin aldosterone ratio and the aldosterone was found to be heavily suppressed. The patient is currently on nifedipine 60 mg twice daily, spironolactone 200 mg daily, hydralazine 10 mg tid, carvedilol 25 mg twice daily, lisinopril 40 mg daily hydralazine  to 25 mg three times a day and empagliflozin 10 mg for its blood pressure lowering effects and renal protection effect. Due to its cost, I will try to switch empagliflozin to canagliflozin and see if the co-pay is lower. The patient will keep a BP diary and communicate the results at his next visit.  The patient was also instructed to keep the sodium intake around 2300 mg /day, follow a plant-based diet and to avoid NSAIDs    #Elevated creatinine a creatinine level on 12/4/23 has increased to 1.3 from a baseline of 0.95 with no clear explanation. A creatinine level rechecked is back to baseline at 1.01 mg/dL     #Glycemia  Hba1c is 5.2, not an issue    #CVD/dyslipidemia  On atorvastatin 20 mg daily, LDL is 89 in July 2023     #Blood count  Hemoglobin 14.6 no acute issue    #Acid-base status  CO2 level 28 -> 30 acceptable    #Electrolytes  Na 140 -> 139-> 134 to be monitored  K 3.4 -> 3.7 -> 4.1-> 3.8 no acute issue       The total time of this encounter amounted to 30 minutes on the day of the encounter. This time included time spent with the patient, reviewing records, ordering tests, and performing post visit documentation.     The patient will return to follow up in 3 months from now    Yashira Fisher MD  Division of Renal Diseases and Hypertension

## 2024-05-08 NOTE — NURSING NOTE
Is the patient currently in the state of [place]? YES    Visit mode:TELEPHONE    If the visit is dropped, the patient can be reconnected by: TELEPHONE VISIT: Phone number: [phone]    Will anyone else be joining the visit? NO  (If patient encounters technical issues they should call [phone])    How would you like to obtain your AVS? MyChart    Are changes needed to the allergy or medication list? No    Are refills needed on medications prescribed by this physician? NO    Reason for visit: RECHECK    Ligia MOORE

## 2024-05-08 NOTE — PATIENT INSTRUCTIONS
-Please switch empagliflozin 10 mg daily to canagliflozin 300 mg daily and do labs in 2 weeks from now and in 3 months before your next appointment

## 2024-05-08 NOTE — LETTER
5/8/2024       RE: Sarath Gray  [address] [facility] [address]     Dear Colleague,    Thank you for referring your patient, Sarath Gray, to the [facility] [place] at [facility]. Please see a copy of my visit note below.    Virtual Visit Details    Type of service:  Telephone Visit   Phone call duration: 10 minutes   Originating Location (pt. Location): Home    Distant Location (provider location):  On-site  Nephrology Clinic    Sarath Gray MRN:[id number] YOB: 1965  Date of Service: 05/08/2024  Primary care provider: Traci Radford  Requesting physician: Traci Radford      REASON FOR CONSULT: Hypertension    HISTORY OF PRESENT ILLNESS:   Sarath Gray is a 58 year old male who first presented for evaluation of uncontrolled hypertension on November 8th 2023.  The past medical history is significant for hypertension diagnosed only two years ago, mild to moderate mitral insufficiency and heavy smoking. His hypertension had been difficult to manage and remained uncontrolled on spironolactone 200 mg daily, amlodipine 10 mg daily, lisinopril 40 mg daily and hydralazine 10 mg three times a day which has only been added a few days prior. He had tried hydrochlorothiazide in the past but it has led to COMPA and was stopped. Beta blockers were not given due to concern for low heart rate. Renal artery was ruled out as a secondary cause of hypertension with no abnormality seen at the kidneys level. His renal function was currently intact with a creatinine level at 0.95 mg/dL and had been stable over the past two years. He had no evidence of any albuminuria on a uACR done on 11/02/2023. His TSH level was normal and was 1.06 mg/dL on 7/18/2023. He hadn't been checked for primary aldosteronism. The patient is also a heavy smoker and smokes around 1/2 ppd. He also drinks a pot of coffee daily. No significant  alcohol intake.    On his first visit, I switched amlodipine to nifedipine 60 mg twice daily and started him on carvedilol 12.5 mg twice daily. I also instructed him about lifestyle modifications and checked a renin aldosterone ratio which showed a heavily suppressed aldosterone level. He reported that his blood pressure remains uncontrolled despite taking all his medications and cutting down on smoking. He is going through significant life-stressors as his wife just spent 35 days in the hospital and almost . On subsequent visits, I increased carvedilol to 25 mg twice daily, adjusted the spironolactone dose and added empagliflozin 10 mg daily. The BP control has improved but remains suboptimal with an average of 150/70's    Current medications: carvedilol 25 mg twice daily, hydralazine 25 mg three times daily, lisinopril 40 mg daily, nifedipine 60 mg twice daily, spironolactone 200 mg daily and empagliflozin 10 mg daily. He is complaining about the excessive cost of empagliflozin.    Echocardiogram done on 2023  Interpretation Summary  Global and regional left ventricular function is normal with an EF of 55-60%.  Right ventricular function, chamber size, wall motion, and thickness are  normal.  Mild to moderate mitral insufficiency is present.  The inferior vena cava is normal.  No pericardial effusion is present.  There is no prior study for direct comparison.  The patient denies any dysuria, any pollakiuria, any nocturia, any LE edema, any dyspnea on exertion .  The patient denies ever having kidney stones, urinary tract infections, gross hematuria. There is no family history of CKD.  The following portions of the patient's history were reviewed and updated as appropriate: allergies, current medications, past family history, past medical history, past social history, past surgical history and problem list.    PAST MEDICAL HISTORY:  Past Medical History:   Diagnosis Date     Acute appendicitis with  generalized peritonitis, unspecified whether abscess present, unspecified whether gangrene present, unspecified whether perforation present 4/29/2020    Added automatically from request for surgery [id number]     Heart disease      Hypertension      CARROLL (obstructive sleep apnea)      PE (pulmonary embolism)     years ago, apparently no cause found, does smoke     Respiratory complications March 2005     Sleep apnea      Squamous cell carcinoma of skin, unspecified      PAST SURGICAL HISTORY:  Past Surgical History:   Procedure Laterality Date     ARTHROPLASTY KNEE  6/11/2014    Procedure: ARTHROPLASTY KNEE;  Surgeon: Maik Jeong MD;  Location: [facility]     ARTHROSCOPY KNEE WITH MEDIAL MENISCECTOMY  12/19/2013    Procedure: ARTHROSCOPY KNEE WITH MEDIAL MENISCECTOMY;  Left Knee Arthroscopy With Intraarticular Debridement.;  Surgeon: Maik Jeong MD;  Location: [facility]     ARTHROTOMY KNEE Right 12/16/2020    Procedure: Knee Open Excision of Prepatellar Bursa;  Surgeon: Maik Jeong MD;  Location: [facility]     COLONOSCOPY  2002     COLONOSCOPY N/A 4/18/2018    Procedure: COLONOSCOPY;  colonoscopy;  Surgeon: Nirmal Schneider MD;  Location: [facility]     EXAM UNDER ANESTHESIA, MANIPULATE JOINT (LOCATION)  6/26/2014    Procedure: EXAM UNDER ANESTHESIA, MANIPULATE JOINT (LOCATION);  Surgeon: Maik Jeong MD;  Location: [facility]     EXAM UNDER ANESTHESIA, MANIPULATE JOINT (LOCATION) Left 8/28/2014    Procedure: EXAM UNDER ANESTHESIA, MANIPULATE JOINT (LOCATION);  Surgeon: Maik Jeong MD;  Location: [facility]     EXAM UNDER ANESTHESIA, MANIPULATE JOINT (LOCATION) Left 12/31/2014    Procedure: EXAM UNDER ANESTHESIA, MANIPULATE JOINT (LOCATION);  Surgeon: Maik Jeong MD;  Location: [facility]     H ABLATION SVT  2008    AVNRT     INJECT EPIDURAL LUMBAR  12/16/2011    Procedure:INJECT EPIDURAL LUMBAR; MICKY-Dr. Smith; Surgeon:GENERIC ANESTHESIA PROVIDER; Location:[facility]     INJECT EPIDURAL LUMBAR  1/27/2012     Procedure:INJECT EPIDURAL LUMBAR; MICKY with Flouro--; Surgeon:GENERIC ANESTHESIA PROVIDER; Location:[facility]     LAPAROSCOPIC APPENDECTOMY N/A 4/29/2020    Procedure: APPENDECTOMY, LAPAROSCOPIC;  Surgeon: Alexis Maher MD;  Location: [facility]     LAPAROSCOPIC HERNIORRHAPHY INGUINAL BILATERAL Bilateral 12/29/2015    Procedure: LAPAROSCOPIC HERNIORRHAPHY INGUINAL BILATERAL;  Surgeon: Andrew Prajapati MD;  Location: [facility]     Lumbar back fusion  1995, 1998    first was A&P     Lumbar back procedure  1999    Hardware removal     spinal stimulator insertion  2004    also, revised it that year also. never seemed to help well.      MEDICATIONS:  Prescription Medications as of 5/8/2024         Rx Number Disp Refills Start End Last Dispensed Date Next Fill Date Owning Pharmacy    albuterol (PROAIR HFA) 108 (90 Base) MCG/ACT inhaler  1 Inhaler 11 6/22/2020 --   [facility] [place] - [address] [facility]    Sig: Inhale 2 puffs into the lungs every 4 hours as needed for shortness of breath / dyspnea or wheezing    Class: E-Prescribe    Notes to Pharmacy: Pharmacy may dispense brand covered by insurance (Proair, or proventil or ventolin or generic albuterol inhaler)    Route: Inhalation    aspirin 81 MG EC tablet  -- -- 10/2/2023 --       Sig: Take 1 tablet (81 mg) by mouth daily    Class: OTC    Route: Oral    atorvastatin (LIPITOR) 20 MG tablet  30 tablet 7 11/29/2023 --   [facility] - [place] - [address] [facility]    Sig: Take 1 tablet (20 mg) by mouth daily    Class: E-Prescribe    Notes to Pharmacy: This prescription was filled on 11/8/2023. Any refills authorized will be placed on file.    Route: Oral    carvedilol (COREG) 25 MG tablet  180 tablet 1 12/11/2023 6/8/2024   [facility] - [place] - [address] [facility]    Sig: Take 1 tablet (25 mg) by mouth 2 times daily (with meals) for 180 days    Class: E-Prescribe    Route: Oral    empagliflozin (JARDIANCE) 10 MG TABS tablet  90 tablet 1 3/25/2024 --   [place]  [facility] [address] [facility]    Sig: Take 1 tablet (10 mg) by mouth daily    Class: E-Prescribe    Route: Oral    hydrALAZINE (APRESOLINE) 25 MG tablet  270 tablet 3 2/5/2024 1/30/2025   [facility] [place], [place] - [address] [facility]    Sig: Take 1 tablet (25 mg) by mouth 3 times daily for 360 days    Class: E-Prescribe    Route: Oral    lisinopril (ZESTRIL) 40 MG tablet  90 tablet 3 10/31/2023 --   [facility] [place] [address] [facility]    Sig: Take 1 tablet (40 mg) by mouth daily    Class: E-Prescribe    Notes to Pharmacy: This prescription was filled on 10/10/2023. Any refills authorized will be placed on file.    Route: Oral    Renewals       Renewal provider: Quentin Hernandez MD            multivitamin, therapeutic (THERA-VIT) TABS tablet  -- --  --       Sig: Take 1 tablet by mouth daily    Class: Historical    Route: Oral    NIFEdipine ER (ADALAT CC) 60 MG 24 hr tablet  180 tablet 1 11/8/2023 5/6/2024   [place], [place] - [address] [facility]    Sig: Take 1 tablet (60 mg) by mouth two times daily for 180 days    Class: E-Prescribe    Route: Oral    omeprazole (PRILOSEC) 40 MG DR capsule  180 capsule 3 8/21/2023 --   [place], [place] - [address] [facility]    Sig: Take 1 capsule (40 mg) by mouth 2 times daily    Class: E-Prescribe    Route: Oral    order for DME  -- --  --       Sig: Equipment being ordered: CPAP Patient Sarath Gray was set up at [facility]  on April 21, 2016. Patient received a Resmed AirSense 10 Auto. Pressures were set at Auto 5 - 15 cm H2O.   Patient s ramp is 5 cm H2O for Auto and FLEX/EPR is 2.  Patient received a [employer] Mask name: SIMPLUS  Full Face mask Size Large, heated tubing and heated humidifier.  Patient is enrolled in the STM Program and does not need to meet compliance. Patient has a follow up on June 14TH AT 9 AM with GARCÍA Resendez    Class: Historical    ORDER FOR DME  1 Device 0 6/14/2014 --   [facility]  [place], [place] - [address]    Sig: Equipment being ordered: Other: CPM machine for home use. Set max tolerance and increase 3-5 degrees/day as tolerated. Use up to 6-8 hours/day as tolerated.  Treatment Diagnosis: left TKA    Class: Local Print    spironolactone (ALDACTONE) 100 MG tablet  180 tablet 3 2/5/2024 1/30/2025   [facility] - [place] - [address] [address]    Sig: Take 2 tablets (200 mg) by mouth daily for 360 days    Class: E-Prescribe    Route: Oral    traZODone (DESYREL) 50 MG tablet  30 tablet 1 4/11/2022 --   [facility]  [place] - [address]    Sig: Take 1 tablet by mouth daily AT BEDTIME    Class: E-Prescribe    Notes to Pharmacy: This prescription was filled on 3/19/2022. Any refills authorized will be placed on file.          Clinic-Administered Medications as of 5/8/2024         Dose Frequency Start End    iopamidol (ISOVUE-370) 76% solution 80 mL 80 mL ONCE 3/7/2016 --    Route: Intravenous    sodium chloride 0.9 % for CT scan flush dose 80 mL 80 mL ONCE 3/7/2016 --    Route: Intravenous           ALLERGIES:    Allergies   Allergen Reactions     Persantine [Dipyridamole] Other (See Comments)     Nervous and anxiety     REVIEW OF SYSTEMS:    A comprehensive review of systems was performed and found to be negative except as described here or above.  SOCIAL HISTORY:   Social History     Socioeconomic History     Marital status: Single     Spouse name: Not on file     Number of children: Not on file     Years of education: Not on file     Highest education level: Not on file   Occupational History     Not on file   Tobacco Use     Smoking status: Every Day     Current packs/day: 1.00     Average packs/day: 1 pack/day for 18.0 years (18.0 ttl pk-yrs)     Types: Cigarettes     Smokeless tobacco: Never     Tobacco comments:     Started at age 30.    Vaping Use     Vaping status: Never Used   Substance and Sexual Activity     Alcohol use: Yes     Alcohol/week: 0.0 standard  drinks of alcohol     Comment: occassional beer     Drug use: No     Sexual activity: Yes     Partners: Female     Birth control/protection: Surgical   Other Topics Concern     Parent/sibling w/ CABG, MI or angioplasty before 65F 55M? Not Asked   Social History Narrative    ** Merged History Encounter **          Social Determinants of Health     Financial Resource Strain: Not on file   Food Insecurity: Not on file   Transportation Needs: Not on file   Physical Activity: Not on file   Stress: Not on file   Social Connections: Not on file   Interpersonal Safety: Not on file   Housing Stability: Not on file     FAMILY MEDICAL HISTORY:   Family History   Problem Relation Age of Onset     C.A.D. Father 36        MI at age 36-37     Cerebrovascular Disease Father      Allergies Father      Anesthesia Reaction Father      Cardiovascular Paternal Grandfather      Prostate Cancer No family hx of      Colon Cancer No family hx of      PHYSICAL EXAM:   There were no vitals taken for this visit.  GENERAL APPEARANCE: alert and no distress  EYES: nonicteric  HENT: mouth without ulcers or lesions  NECK: supple, no adenopathy  RESP: lungs clear to auscultation   CV: regular rhythm, normal rate, no rub  ABDOMEN: soft, nontender, normal bowel sounds, no HSM   Extremities: no clubbing, cyanosis, or edema  MS: no evidence of inflammation in joints, no muscle tenderness  SKIN: no rash  NEURO: mentation intact and speech normal  PSYCH: affect normal/bright   LABS:   Recent Results (from the past 672 hour(s))   UA with Microscopic    Collection Time: 05/02/24  8:09 AM   Result Value Ref Range    Color Urine Yellow Colorless, Straw, Light Yellow, Yellow    Appearance Urine Clear Clear    Glucose Urine >=1000 (A) Negative mg/dL    Bilirubin Urine Negative Negative    Ketones Urine Negative Negative mg/dL    Specific Gravity Urine 1.010 1.003 - 1.035    Blood Urine Negative Negative    pH Urine 6.0 5.0 - 7.0    Protein Albumin Urine  Negative Negative mg/dL    Urobilinogen Urine 0.2 0.2, 1.0 E.U./dL    Nitrite Urine Negative Negative    Leukocyte Esterase Urine Negative Negative   Protein  random urine    Collection Time: 05/02/24  8:09 AM   Result Value Ref Range    Total Protein Urine mg/dL <6.0   mg/dL    Total Protein Urine mg/mg Creat      Creatinine Urine mg/dL 23.6 mg/dL   Albumin Random Urine Quantitative with Creat Ratio    Collection Time: 05/02/24  8:09 AM   Result Value Ref Range    Creatinine Urine mg/dL 23.6 mg/dL    Albumin Urine mg/L <12.0 mg/L    Albumin Urine mg/g Cr     CBC with platelets    Collection Time: 05/02/24  8:09 AM   Result Value Ref Range    WBC Count 8.2 4.0 - 11.0 10e3/uL    RBC Count 4.49 4.40 - 5.90 10e6/uL    Hemoglobin 13.9 13.3 - 17.7 g/dL    Hematocrit 41.2 40.0 - 53.0 %    MCV 92 78 - 100 fL    MCH 31.0 26.5 - 33.0 pg    MCHC 33.7 31.5 - 36.5 g/dL    RDW 13.6 10.0 - 15.0 %    Platelet Count 209 150 - 450 10e3/uL   Comprehensive metabolic panel (BMP + Alb, Alk Phos, ALT, AST, Total. Bili, TP)    Collection Time: 05/02/24  8:09 AM   Result Value Ref Range    Sodium 134 (L) 135 - 145 mmol/L    Potassium 3.8 3.4 - 5.3 mmol/L    Carbon Dioxide (CO2) 27 22 - 29 mmol/L    Anion Gap 10 7 - 15 mmol/L    Urea Nitrogen 19.8 6.0 - 20.0 mg/dL    Creatinine 1.08 0.67 - 1.17 mg/dL    GFR Estimate 80 >60 mL/min/1.73m2    Calcium 9.3 8.6 - 10.0 mg/dL    Chloride 97 (L) 98 - 107 mmol/L    Glucose 99 70 - 99 mg/dL    Alkaline Phosphatase 35 (L) 40 - 150 U/L    AST 23 0 - 45 U/L    ALT 22 0 - 70 U/L    Protein Total 6.7 6.4 - 8.3 g/dL    Albumin 4.4 3.5 - 5.2 g/dL    Bilirubin Total 0.4 <=1.2 mg/dL   Phosphorus    Collection Time: 05/02/24  8:09 AM   Result Value Ref Range    Phosphorus 3.5 2.5 - 4.5 mg/dL   Urine Microscopic Exam    Collection Time: 05/02/24  8:09 AM   Result Value Ref Range    RBC Urine None Seen 0-2 /HPF /HPF    WBC Urine None Seen 0-5 /HPF /HPF    Squamous Epithelials Urine Few (A) None Seen /LPF        CMP  Recent Labs   Lab Test 05/02/24  0809 02/02/24  0716 12/04/23  1330 11/09/23  1102 01/21/22  0813 12/16/20  1139 12/14/20  1456 05/05/20  0516 05/04/20  0439   * 139 142 140   < >  --  137 139 139   POTASSIUM 3.8 4.1 3.7 3.4   < > 3.8 3.9 4.2 4.4   CHLORIDE 97* 99 103 99   < >  --  104 105 106   CO2 27 30* 29 28   < >  --  30 28 28   ANIONGAP 10 10 10 13   < >  --  3 6 5   GLC 99 99 87 89   < >  --  90 86 83   BUN 19.8 15.5 18.4 11.7   < >  --  16 14 14   CR 1.08 1.01 1.33* 0.95   < > 0.89 0.98 1.16 1.07   GFRESTIMATED 80 86 62 >90   < > >90 86 71 78   GFRESTBLACK  --   --   --   --   --  >90 >90 82 >90   DEEPIKA 9.3 9.6 9.1 10.0   < >  --  9.5 9.0 8.6   PHOS 3.5  --   --   --   --   --   --   --   --    PROTTOTAL 6.7 7.2 7.2 7.5   < >  --   --   --   --    ALBUMIN 4.4 4.3 4.2 4.6   < >  --   --   --   --    BILITOTAL 0.4 0.4 0.3 0.4   < >  --   --   --   --    ALKPHOS 35* 38* 42 43   < >  --   --   --   --    AST 23 27 28 27   < >  --   --   --   --    ALT 22 24 22 24   < >  --   --   --   --     < > = values in this interval not displayed.     CBC  Recent Labs   Lab Test 05/02/24  0809 11/02/23  1332 06/13/23  1158 06/08/23  1328   HGB 13.9 14.6 14.4 15.0   WBC 8.2 8.4 7.1 17.4*   RBC 4.49 4.80 4.85 5.07   HCT 41.2 43.7 41.4 42.5   MCV 92 91 85 84   MCH 31.0 30.4 29.7 29.6   MCHC 33.7 33.4 34.8 35.3   RDW 13.6 13.0 12.8 12.9    235 230 287     INR  Recent Labs   Lab Test 06/08/23  1328   INR 1.06   PTT 29     ABGNo lab results found.   URINE STUDIES  Recent Labs   Lab Test 05/02/24  0809 11/02/23  1332 07/18/23  0743 05/03/20  1901 04/29/20  0930 10/03/17  1036   COLOR Yellow Yellow Yellow Yellow Sandra Yellow   APPEARANCE Clear Clear Clear Clear Clear Clear   URINEGLC >=1000* Negative Negative Negative Negative Negative   URINEBILI Negative Negative Negative Negative Negative Negative   URINEKETONE Negative Negative Negative Negative Negative Negative   SG 1.010 1.020 1.015 1.017 1.025 1.015    UBLD Negative Negative Negative Negative Negative Trace*   URINEPH 6.0 6.0 7.0 5.0 5.0 6.5   PROTEIN Negative Negative Negative Negative Negative Negative   UROBILINOGEN 0.2 0.2 0.2  --   --  0.2   NITRITE Negative Negative Negative Negative Negative Negative   LEUKEST Negative Negative Negative Trace* Negative Negative   RBCU None Seen None Seen  --  1 3* O - 2   WBCU None Seen None Seen  --  1 2 O - 2     No lab results found.    ASSESSMENT AND PLAN:   #Hypertension  Uncontrolled and exacerbated by heavy smoking and coffee intake. A secondary cause should also be excluded. As I am unable to stop his medications to rule out primary I nonetheless checked a renin aldosterone ratio and the aldosterone was found to be heavily suppressed. The patient is currently on nifedipine 60 mg twice daily, spironolactone 200 mg daily, hydralazine 10 mg tid, carvedilol 25 mg twice daily, lisinopril 40 mg daily hydralazine  to 25 mg three times a day and empagliflozin 10 mg for its blood pressure lowering effects and renal protection effect. Due to its cost, I will try to switch empagliflozin to canagliflozin and see if the co-pay is lower. The patient will keep a BP diary and communicate the results at his next visit.  The patient was also instructed to keep the sodium intake around 2300 mg /day, follow a plant-based diet and to avoid NSAIDs    #Elevated creatinine a creatinine level on 12/4/23 has increased to 1.3 from a baseline of 0.95 with no clear explanation. A creatinine level rechecked is back to baseline at 1.01 mg/dL     #Glycemia  Hba1c is 5.2, not an issue    #CVD/dyslipidemia  On atorvastatin 20 mg daily, LDL is 89 in July 2023     #Blood count  Hemoglobin 14.6 no acute issue    #Acid-base status  CO2 level 28 -> 30 acceptable    #Electrolytes  Na 140 -> 139-> 134 to be monitored  K 3.4 -> 3.7 -> 4.1-> 3.8 no acute issue       The total time of this encounter amounted to 30 minutes on the day of the encounter. This  time included time spent with the patient, reviewing records, ordering tests, and performing post visit documentation.     The patient will return to follow up in 3 months from now    Yashira Fisher MD  Division of Renal Diseases and Hypertension               Again, thank you for allowing me to participate in the care of your patient.      Sincerely,    Yashira Fisher MD

## 2024-05-11 ENCOUNTER — MYC MEDICAL ADVICE (OUTPATIENT)
Dept: NEPHROLOGY | Facility: CLINIC | Age: 59
End: 2024-05-11
Payer: COMMERCIAL

## 2024-05-13 DIAGNOSIS — I10 HYPERTENSION GOAL BP (BLOOD PRESSURE) < 140/90: ICD-10-CM

## 2024-06-06 ENCOUNTER — TELEPHONE (OUTPATIENT)
Dept: NEPHROLOGY | Facility: CLINIC | Age: 59
End: 2024-06-06
Payer: COMMERCIAL

## 2024-06-06 NOTE — TELEPHONE ENCOUNTER
Left Voicemail (1st Attempt) and Sent Mychart (1st Attempt) for the patient to schedule:    Appointment type: Return Nephrology  Provider: Dr. Fisher  Return date: Approx. 8/8  Phone number: [phone]  Add'l appt(s) needed: Lab 1-hour prior to clinic visit (or 1-week if video visit)  Notes:       No

## 2024-06-18 NOTE — TELEPHONE ENCOUNTER
Left Voicemail (2nd Attempt) for the patient to schedule:    Appointment type: Return Nephrology  Provider: Dr. Fisher  Return date: Approx. 8/8  Phone number: [phone]  Add'l appt(s) needed: Lab 1-hour prior to clinic visit (or 1-week if video visit)  Notes:

## 2024-06-19 DIAGNOSIS — I10 ESSENTIAL HYPERTENSION WITH GOAL BLOOD PRESSURE LESS THAN 140/90: ICD-10-CM

## 2024-06-19 RX ORDER — CARVEDILOL 25 MG/1
25 TABLET ORAL 2 TIMES DAILY WITH MEALS
Qty: 180 TABLET | Refills: 1 | Status: SHIPPED | OUTPATIENT
Start: 2024-06-19

## 2024-06-19 NOTE — TELEPHONE ENCOUNTER
Received refill request for Nifedipine ER 60 mg tablet Carvedilol 25 mg tablet from [facility].  Diana Petit,  Nephrology Clinic Navigator  [facility]  Direct: [phone].

## 2024-08-01 ENCOUNTER — OFFICE VISIT (OUTPATIENT)
Dept: FAMILY MEDICINE | Facility: CLINIC | Age: 59
End: 2024-08-01
Payer: COMMERCIAL

## 2024-08-01 VITALS
BODY MASS INDEX: 28.16 KG/M2 | TEMPERATURE: 97 F | RESPIRATION RATE: 22 BRPM | OXYGEN SATURATION: 95 % | HEIGHT: 78 IN | HEART RATE: 77 BPM | SYSTOLIC BLOOD PRESSURE: 150 MMHG | WEIGHT: 243.38 LBS | DIASTOLIC BLOOD PRESSURE: 92 MMHG

## 2024-08-01 DIAGNOSIS — E78.5 HYPERLIPIDEMIA LDL GOAL <100: ICD-10-CM

## 2024-08-01 DIAGNOSIS — N63.41 SUBAREOLAR MASS OF RIGHT BREAST: ICD-10-CM

## 2024-08-01 DIAGNOSIS — I10 HYPERTENSION GOAL BP (BLOOD PRESSURE) < 140/90: ICD-10-CM

## 2024-08-01 DIAGNOSIS — L05.91 PILONIDAL CYST: Primary | ICD-10-CM

## 2024-08-01 DIAGNOSIS — Z12.11 SCREEN FOR COLON CANCER: ICD-10-CM

## 2024-08-01 LAB
ANION GAP SERPL CALCULATED.3IONS-SCNC: 13 MMOL/L (ref 7–15)
BUN SERPL-MCNC: 17.8 MG/DL (ref 6–20)
CALCIUM SERPL-MCNC: 9.4 MG/DL (ref 8.8–10.4)
CHLORIDE SERPL-SCNC: 101 MMOL/L (ref 98–107)
CHOLEST SERPL-MCNC: 166 MG/DL
CREAT SERPL-MCNC: 1.09 MG/DL (ref 0.67–1.17)
EGFRCR SERPLBLD CKD-EPI 2021: 79 ML/MIN/1.73M2
ERYTHROCYTE [DISTWIDTH] IN BLOOD BY AUTOMATED COUNT: 13.6 % (ref 10–15)
FASTING STATUS PATIENT QL REPORTED: NO
FASTING STATUS PATIENT QL REPORTED: NO
GLUCOSE SERPL-MCNC: 83 MG/DL (ref 70–99)
HCO3 SERPL-SCNC: 26 MMOL/L (ref 22–29)
HCT VFR BLD AUTO: 42.2 % (ref 40–53)
HDLC SERPL-MCNC: 74 MG/DL
HGB BLD-MCNC: 14.2 G/DL (ref 13.3–17.7)
LDLC SERPL CALC-MCNC: 75 MG/DL
MCH RBC QN AUTO: 31.1 PG (ref 26.5–33)
MCHC RBC AUTO-ENTMCNC: 33.6 G/DL (ref 31.5–36.5)
MCV RBC AUTO: 93 FL (ref 78–100)
NONHDLC SERPL-MCNC: 92 MG/DL
PLATELET # BLD AUTO: 202 10E3/UL (ref 150–450)
POTASSIUM SERPL-SCNC: 3.8 MMOL/L (ref 3.4–5.3)
RBC # BLD AUTO: 4.56 10E6/UL (ref 4.4–5.9)
SODIUM SERPL-SCNC: 140 MMOL/L (ref 135–145)
TRIGL SERPL-MCNC: 84 MG/DL
WBC # BLD AUTO: 8.2 10E3/UL (ref 4–11)

## 2024-08-01 PROCEDURE — 80048 BASIC METABOLIC PNL TOTAL CA: CPT | Performed by: NURSE PRACTITIONER

## 2024-08-01 PROCEDURE — 36415 COLL VENOUS BLD VENIPUNCTURE: CPT | Performed by: NURSE PRACTITIONER

## 2024-08-01 PROCEDURE — 80061 LIPID PANEL: CPT | Performed by: NURSE PRACTITIONER

## 2024-08-01 PROCEDURE — 99214 OFFICE O/P EST MOD 30 MIN: CPT | Performed by: NURSE PRACTITIONER

## 2024-08-01 PROCEDURE — 85027 COMPLETE CBC AUTOMATED: CPT | Performed by: NURSE PRACTITIONER

## 2024-08-01 ASSESSMENT — PAIN SCALES - GENERAL: PAINLEVEL: NO PAIN (0)

## 2024-08-01 NOTE — PROGRESS NOTES
"  Assessment & Plan     Pilonidal cyst  Acutely tender lesion at gluteal fold. Open lesion present. Will treat with oral antibiotic. Surgery referral placed for evaluation of excision of lesion.   - amoxicillin-clavulanate (AUGMENTIN) 875-125 MG tablet; Take 1 tablet by mouth 2 times daily for 7 days  - Adult Gen Surg  Referral; Future  - CBC with platelets; Future  - CBC with platelets    Subareolar mass of left breast  Likely   - MA Diagnostic Digital Bilateral; Future  - US Breast Left Limited 1-3 Quadrants; Future    Hypertension goal BP (blood pressure) < 140/90  Gynecomastia. Ultrasound and mammogram ordered for further evaluation..  - Basic metabolic panel  (Ca, Cl, CO2, Creat, Gluc, K, Na, BUN); Future  - Basic metabolic panel  (Ca, Cl, CO2, Creat, Gluc, K, Na, BUN)    Screen for colon cancer  - Colonoscopy Screening  Referral; Future    Hyperlipidemia LDL goal <100  - Lipid panel reflex to direct LDL Non-fasting; Future  - Lipid panel reflex to direct LDL Non-fasting          Nicotine/Tobacco Cessation  He reports that he has been smoking cigarettes. He has a 18 pack-year smoking history. He has never used smokeless tobacco.  Nicotine/Tobacco Cessation Plan  Information offered: Patient not interested at this time      BMI  Estimated body mass index is 25.45 kg/m  as calculated from the following:    Height as of this encounter: 2.083 m (6' 10\").    Weight as of this encounter: 110.4 kg (243 lb 6 oz).         Sadaf Clemens is a 58 year old, presenting for the following health issues:  Cyst        8/1/2024    12:58 PM   Additional Questions   Roomed by Edna ROMANO CMA   Accompanied by Self       History of Present Illness       Reason for visit:  Cyst  Symptom onset:  More than a month  Symptom intensity:  Severe  Symptom progression:  Worsening  Had these symptoms before:  No    He eats 2-3 servings of fruits and vegetables daily.He consumes 2 sweetened beverage(s) daily.He exercises " "with enough effort to increase his heart rate 9 or less minutes per day.  He exercises with enough effort to increase his heart rate 3 or less days per week.   He is taking medications regularly.       - Eraser size mass on tailbone x1 year. Area is red, swollen, painful and he notes odor if rubbing area. Symptoms persistently present. Will wake him up at night due to discomfort/ pain. He will use some lidocaine patches to help. He has not had this evaluated in the past.     - Lump and pain around right areola. Recently identified. No family history of breast cancer.         Review of Systems  Constitutional, HEENT, cardiovascular, pulmonary, gi and gu systems are negative, except as otherwise noted.      Objective    BP (!) 154/90   Pulse 77   Temp 97  F (36.1  C) (Tympanic)   Resp 22   Ht 2.083 m (6' 10\")   Wt 110.4 kg (243 lb 6 oz)   SpO2 95%   BMI 25.45 kg/m    Body mass index is 25.45 kg/m .  Physical Exam   GENERAL: alert and no distress  BREAST: mass right breast 2cm mobile soft lump sub areola. No skin changes.   MS: no gross musculoskeletal defects noted, no edema  SKIN: open erythematous lesion at gluteal folds consistent with pilonidal cyst. Mild odor noted. Tender to palpation around the lesion            Signed Electronically by: MADDIE Carlisle CNP    "

## 2024-08-06 ENCOUNTER — OFFICE VISIT (OUTPATIENT)
Dept: SURGERY | Facility: CLINIC | Age: 59
End: 2024-08-06
Attending: NURSE PRACTITIONER
Payer: COMMERCIAL

## 2024-08-06 VITALS
TEMPERATURE: 98.2 F | DIASTOLIC BLOOD PRESSURE: 83 MMHG | BODY MASS INDEX: 28.11 KG/M2 | SYSTOLIC BLOOD PRESSURE: 158 MMHG | HEART RATE: 87 BPM | WEIGHT: 243 LBS | OXYGEN SATURATION: 96 % | HEIGHT: 78 IN

## 2024-08-06 DIAGNOSIS — Z96.652 STATUS POST TOTAL LEFT KNEE REPLACEMENT: ICD-10-CM

## 2024-08-06 DIAGNOSIS — F17.200 TOBACCO USE DISORDER: ICD-10-CM

## 2024-08-06 DIAGNOSIS — M54.50 CHRONIC LOW BACK PAIN, UNSPECIFIED BACK PAIN LATERALITY, UNSPECIFIED WHETHER SCIATICA PRESENT: ICD-10-CM

## 2024-08-06 DIAGNOSIS — G89.29 CHRONIC LOW BACK PAIN, UNSPECIFIED BACK PAIN LATERALITY, UNSPECIFIED WHETHER SCIATICA PRESENT: ICD-10-CM

## 2024-08-06 DIAGNOSIS — L05.91 PILONIDAL CYST: Primary | ICD-10-CM

## 2024-08-06 PROCEDURE — 99203 OFFICE O/P NEW LOW 30 MIN: CPT | Performed by: STUDENT IN AN ORGANIZED HEALTH CARE EDUCATION/TRAINING PROGRAM

## 2024-08-06 ASSESSMENT — PAIN SCALES - GENERAL: PAINLEVEL: NO PAIN (0)

## 2024-08-06 NOTE — PROGRESS NOTES
"Surgical Consultation/History and Physical  [facility]    Sarath is seen in consultation for pilonidal cyst, at the request of Traci Radford MD.    Chief Complaint:  pilonidal cyst    History of Present Illness: Sarath Gray is a 58 year old male presents with pain over his gluteal cleft.  He states he has noticed a lump over that area for about a year, but over the past few weeks it has been getting larger and more tender.  No associated fevers, no nausea emesis.  He has noted some scant drainage which he describes as \"bloody\" at times.  He works construction, primarily as a supervisor.  He was previously seen by his primary care provider last week and was provided with a course of augmentin.    Patient Active Problem List   Diagnosis    Esophageal reflux    Tobacco use disorder    Impotence of organic origin    Low back pain    Hyperlipidemia LDL goal <100    History of PSVT (paroxysmal supraventricular tachycardia)    S/P total knee arthroplasty    Essential hypertension with goal blood pressure less than 140/90    CARROLL (obstructive sleep apnea)       Past Medical History:   Diagnosis Date    Acute appendicitis with generalized peritonitis, unspecified whether abscess present, unspecified whether gangrene present, unspecified whether perforation present 4/29/2020    Added automatically from request for surgery [id number]    Heart disease     Hypertension     CARROLL (obstructive sleep apnea)     PE (pulmonary embolism)     years ago, apparently no cause found, does smoke    Respiratory complications March 2005    Sleep apnea     Squamous cell carcinoma of skin, unspecified        Past Surgical History:   Procedure Laterality Date    ARTHROPLASTY KNEE  6/11/2014    Procedure: ARTHROPLASTY KNEE;  Surgeon: Maik Jeong MD;  Location: [facility]    ARTHROSCOPY KNEE WITH MEDIAL MENISCECTOMY  12/19/2013    Procedure: ARTHROSCOPY KNEE WITH MEDIAL MENISCECTOMY;  Left Knee Arthroscopy With " Intraarticular Debridement.;  Surgeon: Maik Jeong MD;  Location: [facility]    ARTHROTOMY KNEE Right 12/16/2020    Procedure: Knee Open Excision of Prepatellar Bursa;  Surgeon: Maik Jeogn MD;  Location: [facility]    COLONOSCOPY  2002    COLONOSCOPY N/A 4/18/2018    Procedure: COLONOSCOPY;  colonoscopy;  Surgeon: Nirmal Schneider MD;  Location: [facility]    EXAM UNDER ANESTHESIA, MANIPULATE JOINT (LOCATION)  6/26/2014    Procedure: EXAM UNDER ANESTHESIA, MANIPULATE JOINT (LOCATION);  Surgeon: Maik Jeong MD;  Location: [facility]    EXAM UNDER ANESTHESIA, MANIPULATE JOINT (LOCATION) Left 8/28/2014    Procedure: EXAM UNDER ANESTHESIA, MANIPULATE JOINT (LOCATION);  Surgeon: Maik Jeong MD;  Location: [facility]    EXAM UNDER ANESTHESIA, MANIPULATE JOINT (LOCATION) Left 12/31/2014    Procedure: EXAM UNDER ANESTHESIA, MANIPULATE JOINT (LOCATION);  Surgeon: Maik Jeong MD;  Location: [facility]    H ABLATION SVT  2008    AVNRT    INJECT EPIDURAL LUMBAR  12/16/2011    Procedure:INJECT EPIDURAL LUMBAR; MICKY-Dr. Smith; Surgeon:GENERIC ANESTHESIA PROVIDER; Location:[facility]    INJECT EPIDURAL LUMBAR  1/27/2012    Procedure:INJECT EPIDURAL LUMBAR; MICKY with Flouro--; Surgeon:GENERIC ANESTHESIA PROVIDER; Location:[facility]    LAPAROSCOPIC APPENDECTOMY N/A 4/29/2020    Procedure: APPENDECTOMY, LAPAROSCOPIC;  Surgeon: Alexis Maher MD;  Location: [facility]    LAPAROSCOPIC HERNIORRHAPHY INGUINAL BILATERAL Bilateral 12/29/2015    Procedure: LAPAROSCOPIC HERNIORRHAPHY INGUINAL BILATERAL;  Surgeon: Andrew Prajapati MD;  Location: [facility]    Lumbar back fusion  1995, 1998    first was A&P    Lumbar back procedure  1999    Hardware removal    spinal stimulator insertion  2004    also, revised it that year also. never seemed to help well.        Family History   Problem Relation Age of Onset    C.A.D. Father 36        MI at age 36-37    Cerebrovascular Disease Father     Allergies Father     Anesthesia Reaction Father      Cardiovascular Paternal Grandfather     Prostate Cancer No family hx of     Colon Cancer No family hx of        Social History     Tobacco Use    Smoking status: Every Day     Current packs/day: 1.00     Average packs/day: 1 pack/day for 18.0 years (18.0 ttl pk-yrs)     Types: Cigarettes    Smokeless tobacco: Never    Tobacco comments:     Started at age 30.    Substance Use Topics    Alcohol use: Yes     Alcohol/week: 0.0 standard drinks of alcohol     Comment: occassional beer        History   Drug Use No       Current Outpatient Medications   Medication Sig Dispense Refill    albuterol (PROAIR HFA) 108 (90 Base) MCG/ACT inhaler Inhale 2 puffs into the lungs every 4 hours as needed for shortness of breath / dyspnea or wheezing 1 Inhaler 11    amoxicillin-clavulanate (AUGMENTIN) 875-125 MG tablet Take 1 tablet by mouth 2 times daily for 7 days 14 tablet 0    aspirin 81 MG EC tablet Take 1 tablet (81 mg) by mouth daily      atorvastatin (LIPITOR) 20 MG tablet Take 1 tablet (20 mg) by mouth daily 30 tablet 7    carvedilol (COREG) 25 MG tablet Take 1 tablet (25 mg) by mouth 2 times daily (with meals) 180 tablet 1    empagliflozin (JARDIANCE) 10 MG TABS tablet Take 1 tablet (10 mg) by mouth daily 90 tablet 1    hydrALAZINE (APRESOLINE) 25 MG tablet Take 1 tablet (25 mg) by mouth 3 times daily for 360 days 270 tablet 3    lisinopril (ZESTRIL) 40 MG tablet Take 1 tablet (40 mg) by mouth daily 90 tablet 3    multivitamin, therapeutic (THERA-VIT) TABS tablet Take 1 tablet by mouth daily      NIFEdipine ER (ADALAT CC) 60 MG 24 hr tablet Take 1 tablet (60 mg) by mouth two times daily 180 tablet 1    omeprazole (PRILOSEC) 40 MG DR capsule Take 1 capsule (40 mg) by mouth 2 times daily 180 capsule 3    order for DME Equipment being ordered: CPAP Patient Sarath Gray was set up at [facility]  on April 21, 2016. Patient received a [employer] AirSense 10 Auto. Pressures were set at Auto 5 - 15 cm H2O.   Patient s ramp is 5 cm H2O for  "Auto and FLEX/EPR is 2.  Patient received a [employer] Mask name: SIMPLUS  Full Face mask Size Large, heated tubing and heated humidifier.  Patient is enrolled in the STM Program and does not need to meet compliance. Patient has a follow up on June 14TH AT 9 AM with YADIRA Resendez.    Jennifer Sam      ORDER FOR DME Equipment being ordered: Other: CPM machine for home use. Set max tolerance and increase 3-5 degrees/day as tolerated. Use up to 6-8 hours/day as tolerated.  Treatment Diagnosis: left TKA 1 Device 0    spironolactone (ALDACTONE) 100 MG tablet Take 2 tablets (200 mg) by mouth daily for 360 days 180 tablet 3       Allergies   Allergen Reactions    Persantine [Dipyridamole] Other (See Comments)     Nervous and anxiety       Review of Systems:   10 point ROS negative other than was listed in HPI  Physical Exam:  BP (!) 158/83   Pulse 87   Temp 98.2  F (36.8  C) (Tympanic)   Ht 2.083 m (6' 10\")   Wt 110.2 kg (243 lb)   SpO2 96%   BMI 25.41 kg/m      Constitutional- No acute distress, well nourished, non-toxic  Eyes: Anicteric, no injection.  PERRL  ENT:  Normocephalic, atraumatic, Nose midline, moist mucus membranes  Neck - supple, no LAD  Respiratory- Nonlabored breathing on room air  Cardiovascular - Extremities warm and well perfused.  Abdomen - Soft, non-tender, no hepatosplenomegaly, no palpable masses  Rectal - External exam shows fluctuant area on his gluteal cleft with one small central opening. Draining serosanguinous fluid, surrounding excoriations but no erythema.   Neuro - No focal neuro deficits, Alert and oriented x 3  Psych: Appropriate mood and affect  Musculoskeletal: Normal gait, symmetric strength.  FROM upper and lower extremities.  Skin: Warm, Dry    Assessment:  Sarath Gray is an 58 year old male who presents to surgery clinic for evaluation of pain and swelling in the gluteal cleft. They were found to have pilonidal cysts. Per history, these were likely infected " previously, now currently on antibiotics with improving symptoms. We discussed the pathophysiology and treatment options. I explained that frequent sitting or bending causes the diomedes cleft to stretch, which damages hair follicles and can open pores. Debris from hair and dead skin cells can then enter the pore causing inflammation and eventually infections and sinus tract formation. I recommend surgical removal of the pilonidal cysts with a minimally invasive approach in the operating room (Gips procedure). We discussed potential for disease recurrence is estimated at about 20%, and discussed post-operative cares and the need for twice daily sitz baths for 2 weeks. We also covered inherent risks of procedure, including risk of bleeding, infection, and iatrogenic injury to nearby structures. If disease persists, patient may require continued treatment with silver nitrate treatment in clinic, potentially further excision in the operating room. Patient expressed understanding and is amenable to the proposed plan      Plan:   1. Plan for elective minimally invasive pilonidal cyst excision in the operating room once antibiotic course completed  2. Will need pre-op H&P with PCP prior to procedure         John James MD on 2024 at 1:09 PM

## 2024-08-06 NOTE — LETTER
"8/6/2024      Sarath Gray  [address] [facility] [address]      Dear Colleague,    Thank you for referring your patient, aSrath Gray, to the [facility]. Please see a copy of my visit note below.    Surgical Consultation/History and Physical  [facility]    Sarath is seen in consultation for pilonidal cyst, at the request of Traci Radford MD.    Chief Complaint:  pilonidal cyst    History of Present Illness: Sarath Gray is a 58 year old male presents with pain over his gluteal cleft.  He states he has noticed a lump over that area for about a year, but over the past few weeks it has been getting larger and more tender.  No associated fevers, no nausea emesis.  He has noted some scant drainage which he describes as \"bloody\" at times.  He works construction, primarily as a supervisor.  He was previously seen by his primary care provider last week and was provided with a course of augmentin.    Patient Active Problem List   Diagnosis     Esophageal reflux     Tobacco use disorder     Impotence of organic origin     Low back pain     Hyperlipidemia LDL goal <100     History of PSVT (paroxysmal supraventricular tachycardia)     S/P total knee arthroplasty     Essential hypertension with goal blood pressure less than 140/90     CARROLL (obstructive sleep apnea)       Past Medical History:   Diagnosis Date     Acute appendicitis with generalized peritonitis, unspecified whether abscess present, unspecified whether gangrene present, unspecified whether perforation present 4/29/2020    Added automatically from request for surgery [id number]     Heart disease      Hypertension      CARROLL (obstructive sleep apnea)      PE (pulmonary embolism)     years ago, apparently no cause found, does smoke     Respiratory complications March 2005     Sleep apnea      Squamous cell carcinoma of skin, unspecified        Past Surgical History:   Procedure Laterality Date     " ARTHROPLASTY KNEE  6/11/2014    Procedure: ARTHROPLASTY KNEE;  Surgeon: Maik Jeong MD;  Location: [facility]     ARTHROSCOPY KNEE WITH MEDIAL MENISCECTOMY  12/19/2013    Procedure: ARTHROSCOPY KNEE WITH MEDIAL MENISCECTOMY;  Left Knee Arthroscopy With Intraarticular Debridement.;  Surgeon: Maik Jeong MD;  Location: [facility]     ARTHROTOMY KNEE Right 12/16/2020    Procedure: Knee Open Excision of Prepatellar Bursa;  Surgeon: Maik Jeong MD;  Location: [facility]     COLONOSCOPY  2002     COLONOSCOPY N/A 4/18/2018    Procedure: COLONOSCOPY;  colonoscopy;  Surgeon: Nirmal Schneider MD;  Location: [facility]     EXAM UNDER ANESTHESIA, MANIPULATE JOINT (LOCATION)  6/26/2014    Procedure: EXAM UNDER ANESTHESIA, MANIPULATE JOINT (LOCATION);  Surgeon: Maik Jeong MD;  Location: [facility]     EXAM UNDER ANESTHESIA, MANIPULATE JOINT (LOCATION) Left 8/28/2014    Procedure: EXAM UNDER ANESTHESIA, MANIPULATE JOINT (LOCATION);  Surgeon: Maik Jeong MD;  Location: [facility]     EXAM UNDER ANESTHESIA, MANIPULATE JOINT (LOCATION) Left 12/31/2014    Procedure: EXAM UNDER ANESTHESIA, MANIPULATE JOINT (LOCATION);  Surgeon: Maik Jeong MD;  Location: [facility]     H ABLATION SVT  2008    AVNRT     INJECT EPIDURAL LUMBAR  12/16/2011    Procedure:INJECT EPIDURAL LUMBAR; MICKY-Dr. Smith; Surgeon:GENERIC ANESTHESIA PROVIDER; Location:[facility]     INJECT EPIDURAL LUMBAR  1/27/2012    Procedure:INJECT EPIDURAL LUMBAR; MICKY with Flouro--; Surgeon:GENERIC ANESTHESIA PROVIDER; Location:[facility]     LAPAROSCOPIC APPENDECTOMY N/A 4/29/2020    Procedure: APPENDECTOMY, LAPAROSCOPIC;  Surgeon: Alexis Maher MD;  Location: [facility]     LAPAROSCOPIC HERNIORRHAPHY INGUINAL BILATERAL Bilateral 12/29/2015    Procedure: LAPAROSCOPIC HERNIORRHAPHY INGUINAL BILATERAL;  Surgeon: Andrew Prajapati MD;  Location: [facility]     Lumbar back fusion  1995, 1998    first was A&P     Lumbar back procedure  1999    Hardware removal     spinal stimulator  insertion  2004    also, revised it that year also. never seemed to help well.        Family History   Problem Relation Age of Onset     C.A.D. Father 36        MI at age 36-37     Cerebrovascular Disease Father      Allergies Father      Anesthesia Reaction Father      Cardiovascular Paternal Grandfather      Prostate Cancer No family hx of      Colon Cancer No family hx of        Social History     Tobacco Use     Smoking status: Every Day     Current packs/day: 1.00     Average packs/day: 1 pack/day for 18.0 years (18.0 ttl pk-yrs)     Types: Cigarettes     Smokeless tobacco: Never     Tobacco comments:     Started at age 30.    Substance Use Topics     Alcohol use: Yes     Alcohol/week: 0.0 standard drinks of alcohol     Comment: occassional beer        History   Drug Use No       Current Outpatient Medications   Medication Sig Dispense Refill     albuterol (PROAIR HFA) 108 (90 Base) MCG/ACT inhaler Inhale 2 puffs into the lungs every 4 hours as needed for shortness of breath / dyspnea or wheezing 1 Inhaler 11     amoxicillin-clavulanate (AUGMENTIN) 875-125 MG tablet Take 1 tablet by mouth 2 times daily for 7 days 14 tablet 0     aspirin 81 MG EC tablet Take 1 tablet (81 mg) by mouth daily       atorvastatin (LIPITOR) 20 MG tablet Take 1 tablet (20 mg) by mouth daily 30 tablet 7     carvedilol (COREG) 25 MG tablet Take 1 tablet (25 mg) by mouth 2 times daily (with meals) 180 tablet 1     empagliflozin (JARDIANCE) 10 MG TABS tablet Take 1 tablet (10 mg) by mouth daily 90 tablet 1     hydrALAZINE (APRESOLINE) 25 MG tablet Take 1 tablet (25 mg) by mouth 3 times daily for 360 days 270 tablet 3     lisinopril (ZESTRIL) 40 MG tablet Take 1 tablet (40 mg) by mouth daily 90 tablet 3     multivitamin, therapeutic (THERA-VIT) TABS tablet Take 1 tablet by mouth daily       NIFEdipine ER (ADALAT CC) 60 MG 24 hr tablet Take 1 tablet (60 mg) by mouth two times daily 180 tablet 1     omeprazole (PRILOSEC) 40 MG DR capsule Take  "1 capsule (40 mg) by mouth 2 times daily 180 capsule 3     order for DME Equipment being ordered: CPAP Patient Sarath Gray was set up at [facility]  on April 21, 2016. Patient received a [employer] AirSense 10 Auto. Pressures were set at Auto 5 - 15 cm H2O.   Patient s ramp is 5 cm H2O for Auto and FLEX/EPR is 2.  Patient received a [employer] Mask name: SIMPLUS  Full Face mask Size Large, heated tubing and heated humidifier.  Patient is enrolled in the STM Program and does not need to meet compliance. Patient has a follow up on June 14TH AT 9 AM with YADIRA Resendez.    Jennifer Sam       ORDER FOR DME Equipment being ordered: Other: CPM machine for home use. Set max tolerance and increase 3-5 degrees/day as tolerated. Use up to 6-8 hours/day as tolerated.  Treatment Diagnosis: left TKA 1 Device 0     spironolactone (ALDACTONE) 100 MG tablet Take 2 tablets (200 mg) by mouth daily for 360 days 180 tablet 3       Allergies   Allergen Reactions     Persantine [Dipyridamole] Other (See Comments)     Nervous and anxiety       Review of Systems:   10 point ROS negative other than was listed in HPI  Physical Exam:  BP (!) 158/83   Pulse 87   Temp 98.2  F (36.8  C) (Tympanic)   Ht 2.083 m (6' 10\")   Wt 110.2 kg (243 lb)   SpO2 96%   BMI 25.41 kg/m      Constitutional- No acute distress, well nourished, non-toxic  Eyes: Anicteric, no injection.  PERRL  ENT:  Normocephalic, atraumatic, Nose midline, moist mucus membranes  Neck - supple, no LAD  Respiratory- Nonlabored breathing on room air  Cardiovascular - Extremities warm and well perfused.  Abdomen - Soft, non-tender, no hepatosplenomegaly, no palpable masses  Rectal - External exam shows fluctuant area on his gluteal cleft with one small central opening. Draining serosanguinous fluid, surrounding excoriations but no erythema.   Neuro - No focal neuro deficits, Alert and oriented x 3  Psych: Appropriate mood and affect  Musculoskeletal: Normal gait, symmetric " strength.  FROM upper and lower extremities.  Skin: Warm, Dry    Assessment:  Sarath Gray is an 58 year old male who presents to surgery clinic for evaluation of pain and swelling in the gluteal cleft. They were found to have pilonidal cysts. Per history, these were likely infected previously, now currently on antibiotics with improving symptoms. We discussed the pathophysiology and treatment options. I explained that frequent sitting or bending causes the diomedes cleft to stretch, which damages hair follicles and can open pores. Debris from hair and dead skin cells can then enter the pore causing inflammation and eventually infections and sinus tract formation. I recommend surgical removal of the pilonidal cysts with a minimally invasive approach in the operating room (Gips procedure). We discussed potential for disease recurrence is estimated at about 20%, and discussed post-operative cares and the need for twice daily sitz baths for 2 weeks. We also covered inherent risks of procedure, including risk of bleeding, infection, and iatrogenic injury to nearby structures. If disease persists, patient may require continued treatment with silver nitrate treatment in clinic, potentially further excision in the operating room. Patient expressed understanding and is amenable to the proposed plan      Plan:   1. Plan for elective minimally invasive pilonidal cyst excision in the operating room once antibiotic course completed  2. Will need pre-op H&P with PCP prior to procedure         John James MD on 2024 at 1:09 PM      Again, thank you for allowing me to participate in the care of your patient.        Sincerely,        John James MD

## 2024-08-07 ENCOUNTER — TELEPHONE (OUTPATIENT)
Dept: SURGERY | Facility: CLINIC | Age: 59
End: 2024-08-07
Payer: COMMERCIAL

## 2024-08-07 DIAGNOSIS — R10.13 EPIGASTRIC PAIN: ICD-10-CM

## 2024-08-07 DIAGNOSIS — E78.00 ELEVATED LDL CHOLESTEROL LEVEL: ICD-10-CM

## 2024-08-07 RX ORDER — OMEPRAZOLE 40 MG/1
40 CAPSULE, DELAYED RELEASE ORAL 2 TIMES DAILY
Qty: 60 CAPSULE | Refills: 0 | Status: SHIPPED | OUTPATIENT
Start: 2024-08-07 | End: 2024-09-16

## 2024-08-07 RX ORDER — ATORVASTATIN CALCIUM 20 MG/1
20 TABLET, FILM COATED ORAL DAILY
Qty: 30 TABLET | Refills: 2 | Status: SHIPPED | OUTPATIENT
Start: 2024-08-07

## 2024-08-07 NOTE — TELEPHONE ENCOUNTER
Requested Prescriptions   Pending Prescriptions Disp Refills    omeprazole (PRILOSEC) 40 MG DR capsule [Pharmacy Med Name: omeprazole 40 mg capsule,delayed release] 60 capsule 11     Sig: Take 1 capsule (40 mg) by mouth 2 times daily       PPI Protocol Failed - 8/7/2024  8:02 AM        Failed - Medication indicated for associated diagnosis     The medication is prescribed for one or more of the following conditions:     Erosive esophagitis    Gastritis   Gastric hypersecretion   Gastric ulcer   Gastroesophageal reflux disease   Helicobacter pylori gastrointestinal tract infection   Ulcer of duodenum   Drug-induced peptic ulcer   Esophageal stricture   Gastrointestinal hemorrhage   Indigestion   Stress ulcer   Zollinger-Guthrie syndrome   Solitario s esophagus   Laryngopharyngeal reflux          Passed - Medication is active on med list        Passed - Recent (12 mo) or future (90 days) visit within the authorizing provider's specialty     The patient must have completed an in-person or virtual visit within the past 12 months or has a future visit scheduled within the next 90 days with the authorizing provider s specialty.  Urgent care and e-visits do not quality as an office visit for this protocol.          Passed - Patient is age 18 or older          Last Written Prescription Date:  8/21/23  Last Fill Quantity: 90,  # refills: 3   Last office visit: 8/21/2023 ; last virtual visit: Visit date not found with prescribing provider:     Future Office Visit:   Next 5 appointments (look out 90 days)      Aug 15, 2024 11:30 AM  (Arrive by 11:10 AM)  Pre-Operative Physical with Traci Radford MD  [facility] ([facility] ) [address] [place] [id number] [phone]         Julie Behrendt RN

## 2024-08-07 NOTE — TELEPHONE ENCOUNTER
Type of surgery: EXCISION, PILONIDAL CYST   Location of surgery: [place] [facility]  Date and time of surgery: 8/16  Surgeon: varil  Pre-Op Appt Date: 8/15  Post-Op Appt Date: 9/3   Packet sent out: Yes  Pre-cert/Authorization completed:  Not Applicable  Date:

## 2024-08-10 ENCOUNTER — HEALTH MAINTENANCE LETTER (OUTPATIENT)
Age: 59
End: 2024-08-10

## 2024-08-15 ENCOUNTER — OFFICE VISIT (OUTPATIENT)
Dept: FAMILY MEDICINE | Facility: CLINIC | Age: 59
End: 2024-08-15
Payer: COMMERCIAL

## 2024-08-15 ENCOUNTER — ANESTHESIA EVENT (OUTPATIENT)
Dept: SURGERY | Facility: CLINIC | Age: 59
End: 2024-08-15
Payer: COMMERCIAL

## 2024-08-15 VITALS
HEART RATE: 82 BPM | BODY MASS INDEX: 27.77 KG/M2 | TEMPERATURE: 97.4 F | RESPIRATION RATE: 18 BRPM | OXYGEN SATURATION: 96 % | HEIGHT: 78 IN | WEIGHT: 240 LBS | SYSTOLIC BLOOD PRESSURE: 148 MMHG | DIASTOLIC BLOOD PRESSURE: 86 MMHG

## 2024-08-15 DIAGNOSIS — H61.23 BILATERAL IMPACTED CERUMEN: ICD-10-CM

## 2024-08-15 DIAGNOSIS — I70.1 RENAL ARTERY STENOSIS (H): ICD-10-CM

## 2024-08-15 DIAGNOSIS — L05.91 PILONIDAL CYST: ICD-10-CM

## 2024-08-15 DIAGNOSIS — Z01.818 PREOP GENERAL PHYSICAL EXAM: Primary | ICD-10-CM

## 2024-08-15 PROCEDURE — 99214 OFFICE O/P EST MOD 30 MIN: CPT | Performed by: STUDENT IN AN ORGANIZED HEALTH CARE EDUCATION/TRAINING PROGRAM

## 2024-08-15 PROCEDURE — 93000 ELECTROCARDIOGRAM COMPLETE: CPT | Performed by: STUDENT IN AN ORGANIZED HEALTH CARE EDUCATION/TRAINING PROGRAM

## 2024-08-15 ASSESSMENT — LIFESTYLE VARIABLES: TOBACCO_USE: 1

## 2024-08-15 ASSESSMENT — PAIN SCALES - GENERAL: PAINLEVEL: NO PAIN (0)

## 2024-08-15 NOTE — H&P (VIEW-ONLY)
Preoperative Evaluation  [facility]  [address] [place] [id number]  Phone: [phone]  Fax: [phone]  Primary Provider: Traci Radford MD  Pre-op Performing Provider: Traci Radford MD  Aug 15, 2024             8/15/2024   Surgical Information   What procedure is being done? Cyst removal   Facility or Hospital where procedure/surgery will be performed: [facility]   Who is doing the procedure / surgery? John mackenzie   Date of surgery / procedure: 8/16   Time of surgery / procedure: 9:30   Where do you plan to recover after surgery? at home with family        Fax number for surgical facility: Note does not need to be faxed, will be available electronically in Epic.    Assessment & Plan     The proposed surgical procedure is considered LOW risk.    Preop general physical exam  Pilonidal cyst  Patient is a very pleasant 58-year-old gentleman who presents today for preop evaluation prior to anesthesia for pilonidal cyst excision.  Previous nonspecific findings on EKG, completed today and have resolved.  Blood pressure remains elevated, though better now compared to previously.  Close to goal of less than 140/90.  Continue with current blood pressure regimen, follow-up with nephrology.  Approval given for anesthesia without further workup.  - EKG 12-lead complete w/read - Clinics    Renal artery stenosis (H24)  Noted right stenosis on renal ultrasound August 2023, likely a contributing factor to current elevated blood pressures. Recommended follow up with nephrology at least one more visit until blood pressures are better under control. I'm happy to take over refills at that point due to patient's difficulty with travel down to the [facility] for visits.     Bilateral impacted cerumen  Will return for future ear flush.   -     ME REMOVAL IMPACTED CERUMEN IRRIGATION/LVG UNILAT; Standing      Risks and Recommendations  The patient has the following additional  risks and recommendations for perioperative complications:  Obstructive Sleep Apnea:   Known CARROLL.   Social and Substance:    - Active nicotine user, advised smoking cessation    Antiplatelet or Anticoagulation Medication Instructions   - aspirin: Discontinue aspirin 7-10 days prior to procedure to reduce bleeding risk. It should be resumed postoperatively.     Additional Medication Instructions  Take all scheduled medications on the day of surgery EXCEPT for modifications listed below:   - ACE/ARB: DO NOT TAKE on day of surgery (minimum 11 hours for general anesthesia).   - Beta Blockers: Continue taking on the day of surgery.   - Calcium Channel Blockers: May be continued on the day of surgery.   - Diuretics: DO NOT TAKE on the day of surgery.   - Statins: Continue taking on the day of surgery.    - SGLT2 Inhibitor (canagliflozin, dapagliflozin, or empagliflozin): DO NOT TAKE 3 days before surgery.     Recommendation  Approval given to proceed with proposed procedure, without further diagnostic evaluation.      I spent a total of 31 minutes on the day of the visit.   Time spent by me doing chart review, history and exam, documentation and further activities per the note    Subjective   Sarath is a 58 year old, presenting for the following:  Pre-Op Exam        8/15/2024    11:15 AM   Additional Questions   Roomed by ashlyn   Accompanied by self     HPI related to upcoming procedure:   Deep pilonidal cyst requiring sedation.         8/15/2024   Pre-Op Questionnaire   Have you ever had a heart attack or stroke? No   Have you ever had surgery on your heart or blood vessels, such as a stent placement, a coronary artery bypass, or surgery on an artery in your head, neck, heart, or legs? (!) YES, ablation. No stents.   Do you have chest pain with activity? No   Do you have a history of heart failure? No   Do you currently have a cold, bronchitis or symptoms of other infection? No   Do you have a cough, shortness of  breath, or wheezing? No   Do you or anyone in your family have previous history of blood clots? (!) YES, he has had blood clots in the past after a back surgery (was under anesthesia about 12+ hours as well) and his dad has had blood clots    Do you or does anyone in your family have a serious bleeding problem such as prolonged bleeding following surgeries or cuts? No   Have you ever had problems with anemia or been told to take iron pills? No   Have you had any abnormal blood loss such as black, tarry or bloody stools? No   Have you ever had a blood transfusion? (!) YES after back surgery   Have you ever had a transfusion reaction? No   Are you willing to have a blood transfusion if it is medically needed before, during, or after your surgery? Yes   Have you or any of your relatives ever had problems with anesthesia? No   Do you have sleep apnea, excessive snoring or daytime drowsiness? (!) YES   Do you have a CPAP machine? Yes   Do you have any artifical heart valves or other implanted medical devices like a pacemaker, defibrillator, or continuous glucose monitor? No   Do you have artificial joints? (!) YES, left knee    Are you allergic to latex? No           Health Care Directive  Patient does not have a Health Care Directive or Living Will: Discussed advance care planning with patient; information given to patient to review.    Preoperative Review of    reviewed - no record of controlled substances prescribed.      See problem list for active medical problems.  Problems all longstanding and stable, except as noted/documented.  See ROS for pertinent symptoms related to these conditions.    HYPERTENSION - Patient has longstanding history of HTN , currently denies any symptoms referable to elevated blood pressure. Specifically denies chest pain, palpitations, dyspnea, orthopnea, PND or peripheral edema. Blood pressure readings have not been in normal range, but are much improved from previous. Current  medication regimen is as listed below. Patient denies any side effects of medication.     Patient Active Problem List    Diagnosis Date Noted    CARROLL (obstructive sleep apnea) 03/29/2016     Priority: Medium     4/11/2016. AHI 57, positional effect noted, lowest oxygen saturation was 79, time below 88% was 32 minutes  1/25/2001. AHI was 8 events per hour. Respiratory arousal index was 45, lowest oxygen saturation was 85%. He weighed 235 lbs      Essential hypertension with goal blood pressure less than 140/90 01/08/2016     Priority: Medium    S/P total knee arthroplasty 06/11/2014     Priority: Medium    History of PSVT (paroxysmal supraventricular tachycardia) 05/01/2012     Priority: Medium     WITH ABLATION 2008      Hyperlipidemia LDL goal <100 10/31/2010     Priority: Medium    Low back pain 01/23/2009     Priority: Medium     12/5/2011:Three prior low back surgeries.         Impotence of organic origin 04/02/2008     Priority: Medium    Esophageal reflux 02/24/2005     Priority: Medium    Tobacco use disorder 02/24/2005     Priority: Medium      Past Medical History:   Diagnosis Date    Acute appendicitis with generalized peritonitis, unspecified whether abscess present, unspecified whether gangrene present, unspecified whether perforation present 4/29/2020    Added automatically from request for surgery [id number]    Heart disease     Hypertension     CARROLL (obstructive sleep apnea)     PE (pulmonary embolism)     years ago, apparently no cause found, does smoke    Respiratory complications March 2005    Sleep apnea     Squamous cell carcinoma of skin, unspecified      Past Surgical History:   Procedure Laterality Date    ARTHROPLASTY KNEE  6/11/2014    Procedure: ARTHROPLASTY KNEE;  Surgeon: Maik Jeong MD;  Location: [facility]    ARTHROSCOPY KNEE WITH MEDIAL MENISCECTOMY  12/19/2013    Procedure: ARTHROSCOPY KNEE WITH MEDIAL MENISCECTOMY;  Left Knee Arthroscopy With Intraarticular Debridement.;  Surgeon:  Maik Jeong MD;  Location: [facility]    ARTHROTOMY KNEE Right 12/16/2020    Procedure: Knee Open Excision of Prepatellar Bursa;  Surgeon: Maik Jeong MD;  Location: [facility]    COLONOSCOPY  2002    COLONOSCOPY N/A 4/18/2018    Procedure: COLONOSCOPY;  colonoscopy;  Surgeon: Nirmal Schneider MD;  Location: [facility]    EXAM UNDER ANESTHESIA, MANIPULATE JOINT (LOCATION)  6/26/2014    Procedure: EXAM UNDER ANESTHESIA, MANIPULATE JOINT (LOCATION);  Surgeon: Maik Jeong MD;  Location: [facility]    EXAM UNDER ANESTHESIA, MANIPULATE JOINT (LOCATION) Left 8/28/2014    Procedure: EXAM UNDER ANESTHESIA, MANIPULATE JOINT (LOCATION);  Surgeon: Maik Jeong MD;  [facility]: [facility]    EXAM UNDER ANESTHESIA, MANIPULATE JOINT (LOCATION) Left 12/31/2014    Procedure: EXAM UNDER ANESTHESIA, MANIPULATE JOINT (LOCATION);  Surgeon: Maik Jeong MD;  Location: [facility]    H ABLATION SVT  2008    AVNRT    INJECT EPIDURAL LUMBAR  12/16/2011    Procedure:INJECT EPIDURAL LUMBAR; MICKY-Dr. Smith; Surgeon:GENERIC ANESTHESIA PROVIDER; Location:[facility]    INJECT EPIDURAL LUMBAR  1/27/2012    Procedure:INJECT EPIDURAL LUMBAR; MICKY with Flouro--; Surgeon:GENERIC ANESTHESIA PROVIDER; [facility]    LAPAROSCOPIC APPENDECTOMY N/A 4/29/2020    Procedure: APPENDECTOMY, LAPAROSCOPIC;  Surgeon: Alexis Maher MD;  [facility]: [facility]    LAPAROSCOPIC HERNIORRHAPHY INGUINAL BILATERAL Bilateral 12/29/2015    Procedure: LAPAROSCOPIC HERNIORRHAPHY INGUINAL BILATERAL;  Surgeon: Andrew Prajapati MD;  Location: [facility]    Lumbar back fusion  1995, 1998    first was A&P    Lumbar back procedure  1999    Hardware removal    spinal stimulator insertion  2004    also, revised it that year also. never seemed to help well.      Current Outpatient Medications   Medication Sig Dispense Refill    albuterol (PROAIR HFA) 108 (90 Base) MCG/ACT inhaler Inhale 2 puffs into the lungs every 4 hours as needed for shortness of breath / dyspnea or wheezing 1  Inhaler 11    atorvastatin (LIPITOR) 20 MG tablet Take 1 tablet (20 mg) by mouth daily 30 tablet 2    carvedilol (COREG) 25 MG tablet Take 1 tablet (25 mg) by mouth 2 times daily (with meals) 180 tablet 1    hydrALAZINE (APRESOLINE) 25 MG tablet Take 1 tablet (25 mg) by mouth 3 times daily for 360 days 270 tablet 3    lisinopril (ZESTRIL) 40 MG tablet Take 1 tablet (40 mg) by mouth daily 90 tablet 3    multivitamin, therapeutic (THERA-VIT) TABS tablet Take 1 tablet by mouth daily      NIFEdipine ER (ADALAT CC) 60 MG 24 hr tablet Take 1 tablet (60 mg) by mouth two times daily 180 tablet 1    omeprazole (PRILOSEC) 40 MG DR capsule Take 1 capsule (40 mg) by mouth 2 times daily 60 capsule 0    order for DME Equipment being ordered: CPAP Patient Sarath Gray was set up at [facility]  on April 21, 2016. Patient received a [employer] AirSense 10 Auto. Pressures were set at Auto 5 - 15 cm H2O.   Patient s ramp is 5 cm H2O for Auto and FLEX/EPR is 2.  Patient received a [employer] Mask name: SIMPLUS  Full Face mask Size Large, heated tubing and heated humidifier.  Patient is enrolled in the STM Program and does not need to meet compliance. Patient has a follow up on June 14TH AT 9 AM with YADIRA Resendez.    Jennifer Sam      ORDER FOR DME Equipment being ordered: Other: CPM machine for home use. Set max tolerance and increase 3-5 degrees/day as tolerated. Use up to 6-8 hours/day as tolerated.  Treatment Diagnosis: left TKA 1 Device 0    spironolactone (ALDACTONE) 100 MG tablet Take 2 tablets (200 mg) by mouth daily for 360 days 180 tablet 3    aspirin 81 MG EC tablet Take 1 tablet (81 mg) by mouth daily (Patient not taking: Reported on 8/15/2024)      empagliflozin (JARDIANCE) 10 MG TABS tablet Take 1 tablet (10 mg) by mouth daily (Patient not taking: Reported on 8/15/2024) 90 tablet 1       Allergies   Allergen Reactions    Persantine [Dipyridamole] Other (See Comments)     Nervous and anxiety        Social History  "    Tobacco Use    Smoking status: Every Day     Current packs/day: 1.00     Average packs/day: 1 pack/day for 18.0 years (18.0 ttl pk-yrs)     Types: Cigarettes    Smokeless tobacco: Never    Tobacco comments:     Started at age 30.    Substance Use Topics    Alcohol use: Yes     Alcohol/week: 0.0 standard drinks of alcohol     Comment: occassional beer     Family History   Problem Relation Age of Onset    C.A.D. Father 36        MI at age 36-37    Cerebrovascular Disease Father     Allergies Father     Anesthesia Reaction Father     Cardiovascular Paternal Grandfather     Prostate Cancer No family hx of     Colon Cancer No family hx of      History   Drug Use No             Review of Systems  Constitutional, HEENT, cardiovascular, pulmonary, gi and gu systems are negative, except as otherwise noted.    Objective    BP (!) 148/86   Pulse 82   Temp 97.4  F (36.3  C) (Tympanic)   Resp 18   Ht 2.083 m (6' 10\")   Wt 108.9 kg (240 lb)   SpO2 96%   BMI 25.09 kg/m     Estimated body mass index is 25.09 kg/m  as calculated from the following:    Height as of this encounter: 2.083 m (6' 10\").    Weight as of this encounter: 108.9 kg (240 lb).  Physical Exam  GENERAL: alert and no distress  EYES: Eyes grossly normal to inspection, PERRL and conjunctivae and sclerae normal  HENT: ear canals with impacted cerumen bilaterally, nose and mouth without ulcers or lesions  NECK: no adenopathy, no asymmetry, masses, or scars  RESP: lungs clear to auscultation - no rales, rhonchi or wheezes  CV: regular rate and rhythm, normal S1 S2, no S3 or S4, no murmur, click or rub, no peripheral edema  ABDOMEN: soft, nontender, no hepatosplenomegaly, no masses and bowel sounds hyperactive  MS: no gross musculoskeletal defects noted, no edema  SKIN: no suspicious lesions or rashes  NEURO: Normal strength and tone, mentation intact and speech normal  PSYCH: mentation appears normal, affect normal/bright    Recent Labs   Lab Test " 08/01/24  1338 05/02/24  0809 02/02/24  0716 12/04/23  1330   HGB 14.2 13.9  --   --     209  --   --     134*   < > 142   POTASSIUM 3.8 3.8   < > 3.7   CR 1.09 1.08   < > 1.33*   A1C  --   --   --  5.2    < > = values in this interval not displayed.        Diagnostics  No labs were ordered during this visit.   EKG required for previous abnormal EKG and not completed in the last 90 days.     Revised Cardiac Risk Index (RCRI)  The patient has the following serious cardiovascular risks for perioperative complications:   - No serious cardiac risks = 0 points     RCRI Interpretation: 0 points: Class I (very low risk - 0.4% complication rate)         Signed Electronically by: Traci Radford MD  A copy of this evaluation report is provided to the requesting physician.

## 2024-08-15 NOTE — ANESTHESIA PREPROCEDURE EVALUATION
Anesthesia Pre-Procedure Evaluation    Patient: Sarath Gray   MRN: [id number] : 1965        Procedure : Procedure(s):  EXCISION, PILONIDAL CYST          Past Medical History:   Diagnosis Date    Acute appendicitis with generalized peritonitis, unspecified whether abscess present, unspecified whether gangrene present, unspecified whether perforation present 2020    Added automatically from request for surgery [id number]    Heart disease     Hypertension     CARROLL (obstructive sleep apnea)     PE (pulmonary embolism)     years ago, apparently no cause found, does smoke    Respiratory complications 2005    Sleep apnea     Squamous cell carcinoma of skin, unspecified       Past Surgical History:   Procedure Laterality Date    ARTHROPLASTY KNEE  2014    Procedure: ARTHROPLASTY KNEE;  Surgeon: Maik Jeong MD;  Location: [facility]    ARTHROSCOPY KNEE WITH MEDIAL MENISCECTOMY  2013    Procedure: ARTHROSCOPY KNEE WITH MEDIAL MENISCECTOMY;  Left Knee Arthroscopy With Intraarticular Debridement.;  Surgeon: Maik Jeong MD;  Location: [facility]    ARTHROTOMY KNEE Right 2020    Procedure: Knee Open Excision of Prepatellar Bursa;  Surgeon: Maik Jeong MD;  Location: [facility]    COLONOSCOPY      COLONOSCOPY N/A 2018    Procedure: COLONOSCOPY;  colonoscopy;  Surgeon: Nirmal Schneider MD;  [facility]: [facility]    EXAM UNDER ANESTHESIA, MANIPULATE JOINT (LOCATION)  2014    Procedure: EXAM UNDER ANESTHESIA, MANIPULATE JOINT (LOCATION);  Surgeon: Maik Jeong MD;  [facility]: [facility]    EXAM UNDER ANESTHESIA, MANIPULATE JOINT (LOCATION) Left 2014    Procedure: EXAM UNDER ANESTHESIA, MANIPULATE JOINT (LOCATION);  Surgeon: Maik Jeong MD;  [facility]    EXAM UNDER ANESTHESIA, MANIPULATE JOINT (LOCATION) Left 2014    Procedure: EXAM UNDER ANESTHESIA, MANIPULATE JOINT (LOCATION);  Surgeon: Maik Jeong MD;  Location: [facility]    H ABLATION SVT       AVNRT    INJECT EPIDURAL LUMBAR  12/16/2011    Procedure:INJECT EPIDURAL LUMBAR; MICKY-Dr. Smith; Surgeon:GENERIC ANESTHESIA PROVIDER; [facility]    INJECT EPIDURAL LUMBAR  1/27/2012    Procedure:INJECT EPIDURAL LUMBAR; MICKY with Flouro--; Surgeon:GENERIC ANESTHESIA PROVIDER; [facility]    LAPAROSCOPIC APPENDECTOMY N/A 4/29/2020    Procedure: APPENDECTOMY, LAPAROSCOPIC;  Surgeon: Alexis Maher MD;  [facility]: [facility]    LAPAROSCOPIC HERNIORRHAPHY INGUINAL BILATERAL Bilateral 12/29/2015    Procedure: LAPAROSCOPIC HERNIORRHAPHY INGUINAL BILATERAL;  Surgeon: Andrew Prajapati MD;  Location: [facility]    Lumbar back fusion  1995, 1998    first was A&P    Lumbar back procedure  1999    Hardware removal    spinal stimulator insertion  2004    also, revised it that year also. never seemed to help well.       Allergies   Allergen Reactions    Persantine [Dipyridamole] Other (See Comments)     Nervous and anxiety      Social History     Tobacco Use    Smoking status: Every Day     Current packs/day: 1.00     Average packs/day: 1 pack/day for 18.0 years (18.0 ttl pk-yrs)     Types: Cigarettes    Smokeless tobacco: Never    Tobacco comments:     Started at age 30.    Substance Use Topics    Alcohol use: Yes     Alcohol/week: 0.0 standard drinks of alcohol     Comment: occassional beer      Wt Readings from Last 1 Encounters:   08/06/24 110.2 kg (243 lb)        Anesthesia Evaluation   Pt has had prior anesthetic. Type: General and MAC.        ROS/MED HX  ENT/Pulmonary:     (+) sleep apnea,               tobacco use, Current use,                       Neurologic:       Cardiovascular: Comment: History of PSVT (paroxysmal supraventricular tachycardia)    (+) Dyslipidemia hypertension- -   -  - -                                 Previous cardiac testing   Echo: Date: 7/23 Results:  Procedure  Echocardiogram with two-dimensional, color and spectral Doppler  performed.  ______________________________________________________________________________  Interpretation Summary  Global and regional left ventricular function is normal with an EF of 55-60%.  Right ventricular function, chamber size, wall motion, and thickness are  normal.  Mild to moderate mitral insufficiency is present.  The inferior vena cava is normal.  No pericardial effusion is present.  There is no prior study for direct comparison.  ______________________________________________________________________________  Left Ventricle  Global and regional left ventricular function is normal with an EF of 55-60%.  Left ventricular wall thickness is normal. Left ventricular size is normal.  Left ventricular diastolic function is normal. No regional wall motion  abnormalities are seen.     Right Ventricle  Right ventricular function, chamber size, wall motion, and thickness are  normal.     Atria  Both atria appear normal. The atrial septum is intact as assessed by color  Doppler .     Mitral Valve  Mild to moderate mitral insufficiency is present.     Aortic Valve  Aortic valve is normal in structure and function.     Tricuspid Valve  The tricuspid valve is normal. Trace tricuspid insufficiency is present.  Pulmonary artery systolic pressure cannot be assessed.     Pulmonic Valve  The pulmonic valve is normal. Trace pulmonic insufficiency is present.     Vessels  The thoracic aorta is normal. The pulmonary artery is normal. The inferior  vena cava is normal. Sinuses of Valsalva 4.1 cm. Ascending aorta 4 cm.     Pericardium  No pericardial effusion is present.     Compared to Previous Study  There is no prior study for direct comparison.  _____________________________________________________    Stress Test:  Date: Results:    ECG Reviewed:  Date: 6/23 Results:    Cath:  Date: Results:      METS/Exercise Tolerance: >4 METS    Hematologic:       Musculoskeletal:       GI/Hepatic:     (+) GERD,                  "  Renal/Genitourinary:       Endo:       Psychiatric/Substance Use:       Infectious Disease:       Malignancy: Comment: Squamous cell carcinoma of skin, unspecified      Other:            Physical Exam    Airway  airway exam normal      Mallampati: I   TM distance: > 3 FB   Neck ROM: full   Mouth opening: > 3 cm    Respiratory Devices and Support         Dental  no notable dental history     (+) Minor Abnormalities - some fillings, tiny chips      Cardiovascular   cardiovascular exam normal          Pulmonary   pulmonary exam normal                OUTSIDE LABS:  CBC:   Lab Results   Component Value Date    WBC 8.2 08/01/2024    WBC 8.2 05/02/2024    HGB 14.2 08/01/2024    HGB 13.9 05/02/2024    HCT 42.2 08/01/2024    HCT 41.2 05/02/2024     08/01/2024     05/02/2024     BMP:   Lab Results   Component Value Date     08/01/2024     (L) 05/02/2024    POTASSIUM 3.8 08/01/2024    POTASSIUM 3.8 05/02/2024    CHLORIDE 101 08/01/2024    CHLORIDE 97 (L) 05/02/2024    CO2 26 08/01/2024    CO2 27 05/02/2024    BUN 17.8 08/01/2024    BUN 19.8 05/02/2024    CR 1.09 08/01/2024    CR 1.08 05/02/2024    GLC 83 08/01/2024    GLC 99 05/02/2024     COAGS:   Lab Results   Component Value Date    PTT 29 06/08/2023    INR 1.06 06/08/2023     POC: No results found for: \"BGM\", \"HCG\", \"HCGS\"  HEPATIC:   Lab Results   Component Value Date    ALBUMIN 4.4 05/02/2024    PROTTOTAL 6.7 05/02/2024    ALT 22 05/02/2024    AST 23 05/02/2024    ALKPHOS 35 (L) 05/02/2024    BILITOTAL 0.4 05/02/2024     OTHER:   Lab Results   Component Value Date    A1C 5.2 12/04/2023    DEEPIKA 9.4 08/01/2024    PHOS 3.5 05/02/2024    LIPASE 67 (L) 04/29/2020    TSH 1.06 07/18/2023    CRP <2.9 11/05/2014    SED 5 11/05/2014       Anesthesia Plan    ASA Status:  2    NPO Status:  NPO Appropriate    Anesthesia Type: General.   Induction: Propofol, Intravenous.   Maintenance: Balanced.        Consents    Anesthesia Plan(s) and associated risks, " "benefits, and realistic alternatives discussed. Questions answered and patient/representative(s) expressed understanding.     - Discussed: Risks, Benefits and Alternatives for BOTH SEDATION and the PROCEDURE were discussed     - Discussed with:  Patient            Postoperative Care    Pain management: Multi-modal analgesia, IV analgesics, Oral pain medications.   PONV prophylaxis: Ondansetron (or other 5HT-3), Dexamethasone or Solumedrol, Background Propofol Infusion     Comments:               MADDIE Chao CRNA    I have reviewed the pertinent notes and labs in the chart from the past 30 days and (re)examined the patient.  Any updates or changes from those notes are reflected in this note.              # Overweight: Estimated body mass index is 25.41 kg/m  as calculated from the following:    Height as of 8/6/24: 2.083 m (6' 10\").    Weight as of 8/6/24: 110.2 kg (243 lb).      "

## 2024-08-15 NOTE — PROGRESS NOTES
Preoperative Evaluation  [facility]  [address] [place] [id number]  Phone: [phone]  Fax: [phone]  Primary Provider: Traci Radford MD  Pre-op Performing Provider: Traci Radford MD  Aug 15, 2024             8/15/2024   Surgical Information   What procedure is being done? Cyst removal   Facility or Hospital where procedure/surgery will be performed: [facility]   Who is doing the procedure / surgery? John mackenzie   Date of surgery / procedure: 8/16   Time of surgery / procedure: 9:30   Where do you plan to recover after surgery? at home with family        Fax number for surgical facility: Note does not need to be faxed, will be available electronically in Epic.    Assessment & Plan     The proposed surgical procedure is considered LOW risk.    Preop general physical exam  Pilonidal cyst  Patient is a very pleasant 58-year-old gentleman who presents today for preop evaluation prior to anesthesia for pilonidal cyst excision.  Previous nonspecific findings on EKG, completed today and have resolved.  Blood pressure remains elevated, though better now compared to previously.  Close to goal of less than 140/90.  Continue with current blood pressure regimen, follow-up with nephrology.  Approval given for anesthesia without further workup.  - EKG 12-lead complete w/read - Clinics    Renal artery stenosis (H24)  Noted right stenosis on renal ultrasound August 2023, likely a contributing factor to current elevated blood pressures. Recommended follow up with nephrology at least one more visit until blood pressures are better under control. I'm happy to take over refills at that point due to patient's difficulty with travel down to the [facility] for visits.     Bilateral impacted cerumen  Will return for future ear flush.   -     CA REMOVAL IMPACTED CERUMEN IRRIGATION/LVG UNILAT; Standing      Risks and Recommendations  The patient has the following additional  risks and recommendations for perioperative complications:  Obstructive Sleep Apnea:   Known CARROLL.   Social and Substance:    - Active nicotine user, advised smoking cessation    Antiplatelet or Anticoagulation Medication Instructions   - aspirin: Discontinue aspirin 7-10 days prior to procedure to reduce bleeding risk. It should be resumed postoperatively.     Additional Medication Instructions  Take all scheduled medications on the day of surgery EXCEPT for modifications listed below:   - ACE/ARB: DO NOT TAKE on day of surgery (minimum 11 hours for general anesthesia).   - Beta Blockers: Continue taking on the day of surgery.   - Calcium Channel Blockers: May be continued on the day of surgery.   - Diuretics: DO NOT TAKE on the day of surgery.   - Statins: Continue taking on the day of surgery.    - SGLT2 Inhibitor (canagliflozin, dapagliflozin, or empagliflozin): DO NOT TAKE 3 days before surgery.     Recommendation  Approval given to proceed with proposed procedure, without further diagnostic evaluation.      I spent a total of 31 minutes on the day of the visit.   Time spent by me doing chart review, history and exam, documentation and further activities per the note    Subjective   Sarath is a 58 year old, presenting for the following:  Pre-Op Exam        8/15/2024    11:15 AM   Additional Questions   Roomed by ashlyn   Accompanied by self     HPI related to upcoming procedure:   Deep pilonidal cyst requiring sedation.         8/15/2024   Pre-Op Questionnaire   Have you ever had a heart attack or stroke? No   Have you ever had surgery on your heart or blood vessels, such as a stent placement, a coronary artery bypass, or surgery on an artery in your head, neck, heart, or legs? (!) YES, ablation. No stents.   Do you have chest pain with activity? No   Do you have a history of heart failure? No   Do you currently have a cold, bronchitis or symptoms of other infection? No   Do you have a cough, shortness of  breath, or wheezing? No   Do you or anyone in your family have previous history of blood clots? (!) YES, he has had blood clots in the past after a back surgery (was under anesthesia about 12+ hours as well) and his dad has had blood clots    Do you or does anyone in your family have a serious bleeding problem such as prolonged bleeding following surgeries or cuts? No   Have you ever had problems with anemia or been told to take iron pills? No   Have you had any abnormal blood loss such as black, tarry or bloody stools? No   Have you ever had a blood transfusion? (!) YES after back surgery   Have you ever had a transfusion reaction? No   Are you willing to have a blood transfusion if it is medically needed before, during, or after your surgery? Yes   Have you or any of your relatives ever had problems with anesthesia? No   Do you have sleep apnea, excessive snoring or daytime drowsiness? (!) YES   Do you have a CPAP machine? Yes   Do you have any artifical heart valves or other implanted medical devices like a pacemaker, defibrillator, or continuous glucose monitor? No   Do you have artificial joints? (!) YES, left knee    Are you allergic to latex? No           Health Care Directive  Patient does not have a Health Care Directive or Living Will: Discussed advance care planning with patient; information given to patient to review.    Preoperative Review of    reviewed - no record of controlled substances prescribed.      See problem list for active medical problems.  Problems all longstanding and stable, except as noted/documented.  See ROS for pertinent symptoms related to these conditions.    HYPERTENSION - Patient has longstanding history of HTN , currently denies any symptoms referable to elevated blood pressure. Specifically denies chest pain, palpitations, dyspnea, orthopnea, PND or peripheral edema. Blood pressure readings have not been in normal range, but are much improved from previous. Current  medication regimen is as listed below. Patient denies any side effects of medication.     Patient Active Problem List    Diagnosis Date Noted    CARROLL (obstructive sleep apnea) 03/29/2016     Priority: Medium     4/11/2016. AHI 57, positional effect noted, lowest oxygen saturation was 79, time below 88% was 32 minutes  1/25/2001. AHI was 8 events per hour. Respiratory arousal index was 45, lowest oxygen saturation was 85%. He weighed 235 lbs      Essential hypertension with goal blood pressure less than 140/90 01/08/2016     Priority: Medium    S/P total knee arthroplasty 06/11/2014     Priority: Medium    History of PSVT (paroxysmal supraventricular tachycardia) 05/01/2012     Priority: Medium     WITH ABLATION 2008      Hyperlipidemia LDL goal <100 10/31/2010     Priority: Medium    Low back pain 01/23/2009     Priority: Medium     12/5/2011:Three prior low back surgeries.         Impotence of organic origin 04/02/2008     Priority: Medium    Esophageal reflux 02/24/2005     Priority: Medium    Tobacco use disorder 02/24/2005     Priority: Medium      Past Medical History:   Diagnosis Date    Acute appendicitis with generalized peritonitis, unspecified whether abscess present, unspecified whether gangrene present, unspecified whether perforation present 4/29/2020    Added automatically from request for surgery [id number]    Heart disease     Hypertension     CARROLL (obstructive sleep apnea)     PE (pulmonary embolism)     years ago, apparently no cause found, does smoke    Respiratory complications March 2005    Sleep apnea     Squamous cell carcinoma of skin, unspecified      Past Surgical History:   Procedure Laterality Date    ARTHROPLASTY KNEE  6/11/2014    Procedure: ARTHROPLASTY KNEE;  Surgeon: Maik Jeong MD;  Location: [facility]    ARTHROSCOPY KNEE WITH MEDIAL MENISCECTOMY  12/19/2013    Procedure: ARTHROSCOPY KNEE WITH MEDIAL MENISCECTOMY;  Left Knee Arthroscopy With Intraarticular Debridement.;  Surgeon:  Maik Jeong MD;  Location: [facility]    ARTHROTOMY KNEE Right 12/16/2020    Procedure: Knee Open Excision of Prepatellar Bursa;  Surgeon: Maik Jeong MD;  Location: [facility]    COLONOSCOPY  2002    COLONOSCOPY N/A 4/18/2018    Procedure: COLONOSCOPY;  colonoscopy;  Surgeon: Nirmal Schneider MD;  Location: [facility]    EXAM UNDER ANESTHESIA, MANIPULATE JOINT (LOCATION)  6/26/2014    Procedure: EXAM UNDER ANESTHESIA, MANIPULATE JOINT (LOCATION);  Surgeon: Maik Jeong MD;  Location: [facility]    EXAM UNDER ANESTHESIA, MANIPULATE JOINT (LOCATION) Left 8/28/2014    Procedure: EXAM UNDER ANESTHESIA, MANIPULATE JOINT (LOCATION);  Surgeon: Maik Jeong MD;  Location: [facility]    EXAM UNDER ANESTHESIA, MANIPULATE JOINT (LOCATION) Left 12/31/2014    Procedure: EXAM UNDER ANESTHESIA, MANIPULATE JOINT (LOCATION);  Surgeon: Maik Jeong MD;  Location: [facility]    H ABLATION SVT  2008    AVNRT    INJECT EPIDURAL LUMBAR  12/16/2011    Procedure:INJECT EPIDURAL LUMBAR; MICKY-Dr. Smith; Surgeon:GENERIC ANESTHESIA PROVIDER; Location:[facility]    INJECT EPIDURAL LUMBAR  1/27/2012    Procedure:INJECT EPIDURAL LUMBAR; MICKY with Flouro--; Surgeon:GENERIC ANESTHESIA PROVIDER; Location:[facility]    LAPAROSCOPIC APPENDECTOMY N/A 4/29/2020    Procedure: APPENDECTOMY, LAPAROSCOPIC;  Surgeon: Alexis Maher MD;  Location: [facility]    LAPAROSCOPIC HERNIORRHAPHY INGUINAL BILATERAL Bilateral 12/29/2015    Procedure: LAPAROSCOPIC HERNIORRHAPHY INGUINAL BILATERAL;  Surgeon: Andrew Prajapati MD;  Location: [facility]    Lumbar back fusion  1995, 1998    first was A&P    Lumbar back procedure  1999    Hardware removal    spinal stimulator insertion  2004    also, revised it that year also. never seemed to help well.      Current Outpatient Medications   Medication Sig Dispense Refill    albuterol (PROAIR HFA) 108 (90 Base) MCG/ACT inhaler Inhale 2 puffs into the lungs every 4 hours as needed for shortness of breath / dyspnea or wheezing 1  Inhaler 11    atorvastatin (LIPITOR) 20 MG tablet Take 1 tablet (20 mg) by mouth daily 30 tablet 2    carvedilol (COREG) 25 MG tablet Take 1 tablet (25 mg) by mouth 2 times daily (with meals) 180 tablet 1    hydrALAZINE (APRESOLINE) 25 MG tablet Take 1 tablet (25 mg) by mouth 3 times daily for 360 days 270 tablet 3    lisinopril (ZESTRIL) 40 MG tablet Take 1 tablet (40 mg) by mouth daily 90 tablet 3    multivitamin, therapeutic (THERA-VIT) TABS tablet Take 1 tablet by mouth daily      NIFEdipine ER (ADALAT CC) 60 MG 24 hr tablet Take 1 tablet (60 mg) by mouth two times daily 180 tablet 1    omeprazole (PRILOSEC) 40 MG DR capsule Take 1 capsule (40 mg) by mouth 2 times daily 60 capsule 0    order for DME Equipment being ordered: CPAP Patient Sarath Gray was set up at [facility]  on April 21, 2016. Patient received a [employer] AirSense 10 Auto. Pressures were set at Auto 5 - 15 cm H2O.   Patient s ramp is 5 cm H2O for Auto and FLEX/EPR is 2.  Patient received a [employer] Mask name: SIMPLUS  Full Face mask Size Large, heated tubing and heated humidifier.  Patient is enrolled in the STM Program and does not need to meet compliance. Patient has a follow up on June 14TH AT 9 AM with YADIRA Resendez.    Jennifer Sam      ORDER FOR DME Equipment being ordered: Other: CPM machine for home use. Set max tolerance and increase 3-5 degrees/day as tolerated. Use up to 6-8 hours/day as tolerated.  Treatment Diagnosis: left TKA 1 Device 0    spironolactone (ALDACTONE) 100 MG tablet Take 2 tablets (200 mg) by mouth daily for 360 days 180 tablet 3    aspirin 81 MG EC tablet Take 1 tablet (81 mg) by mouth daily (Patient not taking: Reported on 8/15/2024)      empagliflozin (JARDIANCE) 10 MG TABS tablet Take 1 tablet (10 mg) by mouth daily (Patient not taking: Reported on 8/15/2024) 90 tablet 1       Allergies   Allergen Reactions    Persantine [Dipyridamole] Other (See Comments)     Nervous and anxiety        Social History  "    Tobacco Use    Smoking status: Every Day     Current packs/day: 1.00     Average packs/day: 1 pack/day for 18.0 years (18.0 ttl pk-yrs)     Types: Cigarettes    Smokeless tobacco: Never    Tobacco comments:     Started at age 30.    Substance Use Topics    Alcohol use: Yes     Alcohol/week: 0.0 standard drinks of alcohol     Comment: occassional beer     Family History   Problem Relation Age of Onset    C.A.D. Father 36        MI at age 36-37    Cerebrovascular Disease Father     Allergies Father     Anesthesia Reaction Father     Cardiovascular Paternal Grandfather     Prostate Cancer No family hx of     Colon Cancer No family hx of      History   Drug Use No             Review of Systems  Constitutional, HEENT, cardiovascular, pulmonary, gi and gu systems are negative, except as otherwise noted.    Objective    BP (!) 148/86   Pulse 82   Temp 97.4  F (36.3  C) (Tympanic)   Resp 18   Ht 2.083 m (6' 10\")   Wt 108.9 kg (240 lb)   SpO2 96%   BMI 25.09 kg/m     Estimated body mass index is 25.09 kg/m  as calculated from the following:    Height as of this encounter: 2.083 m (6' 10\").    Weight as of this encounter: 108.9 kg (240 lb).  Physical Exam  GENERAL: alert and no distress  EYES: Eyes grossly normal to inspection, PERRL and conjunctivae and sclerae normal  HENT: ear canals with impacted cerumen bilaterally, nose and mouth without ulcers or lesions  NECK: no adenopathy, no asymmetry, masses, or scars  RESP: lungs clear to auscultation - no rales, rhonchi or wheezes  CV: regular rate and rhythm, normal S1 S2, no S3 or S4, no murmur, click or rub, no peripheral edema  ABDOMEN: soft, nontender, no hepatosplenomegaly, no masses and bowel sounds hyperactive  MS: no gross musculoskeletal defects noted, no edema  SKIN: no suspicious lesions or rashes  NEURO: Normal strength and tone, mentation intact and speech normal  PSYCH: mentation appears normal, affect normal/bright    Recent Labs   Lab Test " 08/01/24  1338 05/02/24  0809 02/02/24  0716 12/04/23  1330   HGB 14.2 13.9  --   --     209  --   --     134*   < > 142   POTASSIUM 3.8 3.8   < > 3.7   CR 1.09 1.08   < > 1.33*   A1C  --   --   --  5.2    < > = values in this interval not displayed.        Diagnostics  No labs were ordered during this visit.   EKG required for previous abnormal EKG and not completed in the last 90 days.     Revised Cardiac Risk Index (RCRI)  The patient has the following serious cardiovascular risks for perioperative complications:   - No serious cardiac risks = 0 points     RCRI Interpretation: 0 points: Class I (very low risk - 0.4% complication rate)         Signed Electronically by: Traci Radford MD  A copy of this evaluation report is provided to the requesting physician.

## 2024-08-15 NOTE — PATIENT INSTRUCTIONS
How to Take Your Medication Before Surgery  Preoperative Medication Instructions   Hold aspirin x7 days  Hold jardiance x3 days  Hold lisinopril and spironolactone the morning of surgery.        Patient Education   Preparing for Your Surgery  Getting started  A nurse will call you to review your health history and instructions. They will give you an arrival time based on your scheduled surgery time. Please be ready to share:  Your doctor's clinic name and phone number  Your medical, surgical, and anesthesia history  A list of allergies and sensitivities  A list of medicines, including herbal treatments and over-the-counter drugs  Whether the patient has a legal guardian (ask how to send us the papers in advance)  Please tell us if you're pregnant--or if there's any chance you might be pregnant. Some surgeries may injure a fetus (unborn baby), so they require a pregnancy test. Surgeries that are safe for a fetus don't always need a test, and you can choose whether to have one.   If you have a child who's having surgery, please ask for a copy of Preparing for Your Child's Surgery.    Preparing for surgery  Within 10 to 30 days of surgery: Have a pre-op exam (sometimes called an H&P, or History and Physical). This can be done at a clinic or pre-operative center.  If you're having a , you may not need this exam. Talk to your care team.  At your pre-op exam, talk to your care team about all medicines you take. If you need to stop any medicines before surgery, ask when to start taking them again.  We do this for your safety. Many medicines can make you bleed too much during surgery. Some change how well surgery (anesthesia) drugs work.  Call your insurance company to let them know you're having surgery. (If you don't have insurance, call [phone].)  Call your clinic if there's any change in your health. This includes signs of a cold or flu (sore throat, runny nose, cough, rash, fever). It also includes a  scrape or scratch near the surgery site.  If you have questions on the day of surgery, call your hospital or surgery center.  Eating and drinking guidelines  For your safety: Unless your surgeon tells you otherwise, follow the guidelines below.  Eat and drink as usual until 8 hours before you arrive for surgery. After that, no food or milk.  Drink clear liquids until 2 hours before you arrive. These are liquids you can see through, like water, Gatorade, and Propel Water. They also include plain black coffee and tea (no cream or milk), candy, and breath mints. You can spit out gum when you arrive.  If you drink alcohol: Stop drinking it the night before surgery.  If your care team tells you to take medicine on the morning of surgery, it's okay to take it with a sip of water.  Preventing infection  Shower or bathe the night before and morning of your surgery. Follow the instructions your clinic gave you. (If no instructions, use regular soap.)  Don't shave or clip hair near your surgery site. We'll remove the hair if needed.  Don't smoke or vape the morning of surgery. You may chew nicotine gum up to 2 hours before surgery. A nicotine patch is okay.  Note: Some surgeries require you to completely quit smoking and nicotine. Check with your surgeon.  Your care team will make every effort to keep you safe from infection. We will:  Clean our hands often with soap and water (or an alcohol-based hand rub).  Clean the skin at your surgery site with a special soap that kills germs.  Give you a special gown to keep you warm. (Cold raises the risk of infection.)  Wear special hair covers, masks, gowns and gloves during surgery.  Give antibiotic medicine, if prescribed. Not all surgeries need antibiotics.  What to bring on the day of surgery  Photo ID and insurance card  Copy of your health care directive, if you have one  Glasses and hearing aids (bring cases)  You can't wear contacts during surgery  Inhaler and eye drops, if  you use them (tell us about these when you arrive)  CPAP machine or breathing device, if you use them  A few personal items, if spending the night  If you have . . .  A pacemaker, ICD (cardiac defibrillator) or other implant: Bring the ID card.  An implanted stimulator: Bring the remote control.  A legal guardian: Bring a copy of the certified (court-stamped) guardianship papers.  Please remove any jewelry, including body piercings. Leave jewelry and other valuables at home.  If you're going home the day of surgery  You must have a responsible adult drive you home. They should stay with you overnight as well.  If you don't have someone to stay with you, and you aren't safe to go home alone, we may keep you overnight. Insurance often won't pay for this.  After surgery  If it's hard to control your pain or you need more pain medicine, please call your surgeon's office.  Questions?   If you have any questions for your care team, list them here: _________________________________________________________________________________________________________________________________________________________________________ ____________________________________ ____________________________________ ____________________________________  For informational purposes only. Not to replace the advice of your health care provider. Copyright   2003, 2019 [facility]. All rights reserved. Clinically reviewed by Nahomi Pretty MD. [employer] 737330 - REV 12/22.

## 2024-08-16 ENCOUNTER — HOSPITAL ENCOUNTER (OUTPATIENT)
Facility: CLINIC | Age: 59
Discharge: HOME | End: 2024-08-16
Attending: STUDENT IN AN ORGANIZED HEALTH CARE EDUCATION/TRAINING PROGRAM | Admitting: STUDENT IN AN ORGANIZED HEALTH CARE EDUCATION/TRAINING PROGRAM
Payer: COMMERCIAL

## 2024-08-16 ENCOUNTER — ANESTHESIA (OUTPATIENT)
Dept: SURGERY | Facility: CLINIC | Age: 59
End: 2024-08-16
Payer: COMMERCIAL

## 2024-08-16 VITALS
OXYGEN SATURATION: 98 % | TEMPERATURE: 96.4 F | HEART RATE: 59 BPM | DIASTOLIC BLOOD PRESSURE: 89 MMHG | WEIGHT: 240 LBS | HEIGHT: 78 IN | SYSTOLIC BLOOD PRESSURE: 142 MMHG | BODY MASS INDEX: 27.77 KG/M2 | RESPIRATION RATE: 16 BRPM

## 2024-08-16 DIAGNOSIS — L05.91 PILONIDAL CYST: Primary | ICD-10-CM

## 2024-08-16 PROCEDURE — 710N000012 HC RECOVERY PHASE 2, PER MINUTE: Performed by: STUDENT IN AN ORGANIZED HEALTH CARE EDUCATION/TRAINING PROGRAM

## 2024-08-16 PROCEDURE — 11770 EXC PILONIDAL CYST SIMPLE: CPT | Performed by: STUDENT IN AN ORGANIZED HEALTH CARE EDUCATION/TRAINING PROGRAM

## 2024-08-16 PROCEDURE — 258N000003 HC RX IP 258 OP 636: Performed by: NURSE ANESTHETIST, CERTIFIED REGISTERED

## 2024-08-16 PROCEDURE — 250N000025 HC SEVOFLURANE, PER MIN: Performed by: STUDENT IN AN ORGANIZED HEALTH CARE EDUCATION/TRAINING PROGRAM

## 2024-08-16 PROCEDURE — 370N000017 HC ANESTHESIA TECHNICAL FEE, PER MIN: Performed by: STUDENT IN AN ORGANIZED HEALTH CARE EDUCATION/TRAINING PROGRAM

## 2024-08-16 PROCEDURE — 250N000013 HC RX MED GY IP 250 OP 250 PS 637: Performed by: NURSE ANESTHETIST, CERTIFIED REGISTERED

## 2024-08-16 PROCEDURE — 360N000075 HC SURGERY LEVEL 2, PER MIN: Performed by: STUDENT IN AN ORGANIZED HEALTH CARE EDUCATION/TRAINING PROGRAM

## 2024-08-16 PROCEDURE — 250N000009 HC RX 250: Performed by: STUDENT IN AN ORGANIZED HEALTH CARE EDUCATION/TRAINING PROGRAM

## 2024-08-16 PROCEDURE — 250N000011 HC RX IP 250 OP 636: Performed by: NURSE ANESTHETIST, CERTIFIED REGISTERED

## 2024-08-16 PROCEDURE — 999N000141 HC STATISTIC PRE-PROCEDURE NURSING ASSESSMENT: Performed by: STUDENT IN AN ORGANIZED HEALTH CARE EDUCATION/TRAINING PROGRAM

## 2024-08-16 PROCEDURE — 250N000009 HC RX 250: Performed by: NURSE ANESTHETIST, CERTIFIED REGISTERED

## 2024-08-16 PROCEDURE — 710N000010 HC RECOVERY PHASE 1, LEVEL 2, PER MIN: Performed by: STUDENT IN AN ORGANIZED HEALTH CARE EDUCATION/TRAINING PROGRAM

## 2024-08-16 PROCEDURE — 88304 TISSUE EXAM BY PATHOLOGIST: CPT | Mod: TC | Performed by: STUDENT IN AN ORGANIZED HEALTH CARE EDUCATION/TRAINING PROGRAM

## 2024-08-16 PROCEDURE — 250N000011 HC RX IP 250 OP 636: Performed by: STUDENT IN AN ORGANIZED HEALTH CARE EDUCATION/TRAINING PROGRAM

## 2024-08-16 PROCEDURE — 272N000001 HC OR GENERAL SUPPLY STERILE: Performed by: STUDENT IN AN ORGANIZED HEALTH CARE EDUCATION/TRAINING PROGRAM

## 2024-08-16 RX ORDER — ACETAMINOPHEN 325 MG/1
975 TABLET ORAL ONCE
Status: DISCONTINUED | OUTPATIENT
Start: 2024-08-16 | End: 2024-08-16

## 2024-08-16 RX ORDER — PROPOFOL 10 MG/ML
INJECTION, EMULSION INTRAVENOUS PRN
Status: DISCONTINUED | OUTPATIENT
Start: 2024-08-16 | End: 2024-08-16

## 2024-08-16 RX ORDER — POLYETHYLENE GLYCOL 3350 17 G/17G
17 POWDER, FOR SOLUTION ORAL DAILY
Qty: 510 G | Refills: 1 | Status: SHIPPED | OUTPATIENT
Start: 2024-08-16

## 2024-08-16 RX ORDER — HEPARIN SODIUM 5000 [USP'U]/.5ML
5000 INJECTION, SOLUTION INTRAVENOUS; SUBCUTANEOUS
Status: COMPLETED | OUTPATIENT
Start: 2024-08-16 | End: 2024-08-16

## 2024-08-16 RX ORDER — ONDANSETRON 4 MG/1
4 TABLET, ORALLY DISINTEGRATING ORAL EVERY 30 MIN PRN
Status: DISCONTINUED | OUTPATIENT
Start: 2024-08-16 | End: 2024-08-16 | Stop reason: HOSPADM

## 2024-08-16 RX ORDER — LIDOCAINE 40 MG/G
CREAM TOPICAL
Status: DISCONTINUED | OUTPATIENT
Start: 2024-08-16 | End: 2024-08-16 | Stop reason: HOSPADM

## 2024-08-16 RX ORDER — ACETAMINOPHEN 325 MG/1
650 TABLET ORAL
Status: DISCONTINUED | OUTPATIENT
Start: 2024-08-16 | End: 2024-08-16 | Stop reason: HOSPADM

## 2024-08-16 RX ORDER — GLYCOPYRROLATE 0.2 MG/ML
INJECTION, SOLUTION INTRAMUSCULAR; INTRAVENOUS PRN
Status: DISCONTINUED | OUTPATIENT
Start: 2024-08-16 | End: 2024-08-16

## 2024-08-16 RX ORDER — DEXAMETHASONE SODIUM PHOSPHATE 4 MG/ML
4 INJECTION, SOLUTION INTRA-ARTICULAR; INTRALESIONAL; INTRAMUSCULAR; INTRAVENOUS; SOFT TISSUE
Status: DISCONTINUED | OUTPATIENT
Start: 2024-08-16 | End: 2024-08-16 | Stop reason: HOSPADM

## 2024-08-16 RX ORDER — SODIUM CHLORIDE, SODIUM LACTATE, POTASSIUM CHLORIDE, CALCIUM CHLORIDE 600; 310; 30; 20 MG/100ML; MG/100ML; MG/100ML; MG/100ML
INJECTION, SOLUTION INTRAVENOUS CONTINUOUS
Status: DISCONTINUED | OUTPATIENT
Start: 2024-08-16 | End: 2024-08-16 | Stop reason: HOSPADM

## 2024-08-16 RX ORDER — OXYCODONE HYDROCHLORIDE 5 MG/1
5 TABLET ORAL
Status: DISCONTINUED | OUTPATIENT
Start: 2024-08-16 | End: 2024-08-16 | Stop reason: HOSPADM

## 2024-08-16 RX ORDER — ONDANSETRON 2 MG/ML
4 INJECTION INTRAMUSCULAR; INTRAVENOUS EVERY 30 MIN PRN
Status: DISCONTINUED | OUTPATIENT
Start: 2024-08-16 | End: 2024-08-16 | Stop reason: HOSPADM

## 2024-08-16 RX ORDER — BUPIVACAINE HYDROCHLORIDE 2.5 MG/ML
INJECTION, SOLUTION INFILTRATION; PERINEURAL PRN
Status: DISCONTINUED | OUTPATIENT
Start: 2024-08-16 | End: 2024-08-16 | Stop reason: HOSPADM

## 2024-08-16 RX ORDER — ALBUTEROL SULFATE 90 UG/1
AEROSOL, METERED RESPIRATORY (INHALATION) PRN
Status: DISCONTINUED | OUTPATIENT
Start: 2024-08-16 | End: 2024-08-16

## 2024-08-16 RX ORDER — HYDROMORPHONE HCL IN WATER/PF 6 MG/30 ML
0.2 PATIENT CONTROLLED ANALGESIA SYRINGE INTRAVENOUS EVERY 5 MIN PRN
Status: DISCONTINUED | OUTPATIENT
Start: 2024-08-16 | End: 2024-08-16 | Stop reason: HOSPADM

## 2024-08-16 RX ORDER — OXYCODONE HYDROCHLORIDE 5 MG/1
5-10 TABLET ORAL EVERY 4 HOURS PRN
Qty: 6 TABLET | Refills: 0 | Status: SHIPPED | OUTPATIENT
Start: 2024-08-16

## 2024-08-16 RX ORDER — IBUPROFEN 600 MG/1
600 TABLET, FILM COATED ORAL EVERY 6 HOURS PRN
Qty: 30 TABLET | Refills: 0 | Status: SHIPPED | OUTPATIENT
Start: 2024-08-16

## 2024-08-16 RX ORDER — CEFAZOLIN SODIUM/WATER 2 G/20 ML
2 SYRINGE (ML) INTRAVENOUS
Status: COMPLETED | OUTPATIENT
Start: 2024-08-16 | End: 2024-08-16

## 2024-08-16 RX ORDER — NALOXONE HYDROCHLORIDE 0.4 MG/ML
0.1 INJECTION, SOLUTION INTRAMUSCULAR; INTRAVENOUS; SUBCUTANEOUS
Status: DISCONTINUED | OUTPATIENT
Start: 2024-08-16 | End: 2024-08-16 | Stop reason: HOSPADM

## 2024-08-16 RX ORDER — HYDROMORPHONE HCL IN WATER/PF 6 MG/30 ML
0.4 PATIENT CONTROLLED ANALGESIA SYRINGE INTRAVENOUS EVERY 5 MIN PRN
Status: DISCONTINUED | OUTPATIENT
Start: 2024-08-16 | End: 2024-08-16 | Stop reason: HOSPADM

## 2024-08-16 RX ORDER — FENTANYL CITRATE 50 UG/ML
25 INJECTION, SOLUTION INTRAMUSCULAR; INTRAVENOUS EVERY 5 MIN PRN
Status: DISCONTINUED | OUTPATIENT
Start: 2024-08-16 | End: 2024-08-16 | Stop reason: HOSPADM

## 2024-08-16 RX ORDER — LIDOCAINE HYDROCHLORIDE 20 MG/ML
INJECTION, SOLUTION INFILTRATION; PERINEURAL PRN
Status: DISCONTINUED | OUTPATIENT
Start: 2024-08-16 | End: 2024-08-16

## 2024-08-16 RX ORDER — GABAPENTIN 300 MG/1
300 CAPSULE ORAL
Status: COMPLETED | OUTPATIENT
Start: 2024-08-16 | End: 2024-08-16

## 2024-08-16 RX ORDER — FENTANYL CITRATE 50 UG/ML
50 INJECTION, SOLUTION INTRAMUSCULAR; INTRAVENOUS EVERY 5 MIN PRN
Status: DISCONTINUED | OUTPATIENT
Start: 2024-08-16 | End: 2024-08-16 | Stop reason: HOSPADM

## 2024-08-16 RX ORDER — AMOXICILLIN 250 MG
1-2 CAPSULE ORAL 2 TIMES DAILY
Qty: 30 TABLET | Refills: 0 | Status: SHIPPED | OUTPATIENT
Start: 2024-08-16

## 2024-08-16 RX ORDER — ACETAMINOPHEN 325 MG/1
975 TABLET ORAL ONCE
Status: COMPLETED | OUTPATIENT
Start: 2024-08-16 | End: 2024-08-16

## 2024-08-16 RX ORDER — LIDOCAINE HYDROCHLORIDE AND EPINEPHRINE 10; 10 MG/ML; UG/ML
INJECTION, SOLUTION INFILTRATION; PERINEURAL PRN
Status: DISCONTINUED | OUTPATIENT
Start: 2024-08-16 | End: 2024-08-16 | Stop reason: HOSPADM

## 2024-08-16 RX ORDER — DEXAMETHASONE SODIUM PHOSPHATE 4 MG/ML
INJECTION, SOLUTION INTRA-ARTICULAR; INTRALESIONAL; INTRAMUSCULAR; INTRAVENOUS; SOFT TISSUE PRN
Status: DISCONTINUED | OUTPATIENT
Start: 2024-08-16 | End: 2024-08-16

## 2024-08-16 RX ORDER — ACETAMINOPHEN 325 MG/1
650 TABLET ORAL EVERY 4 HOURS PRN
Qty: 50 TABLET | Refills: 0 | Status: SHIPPED | OUTPATIENT
Start: 2024-08-16

## 2024-08-16 RX ORDER — FENTANYL CITRATE 50 UG/ML
INJECTION, SOLUTION INTRAMUSCULAR; INTRAVENOUS PRN
Status: DISCONTINUED | OUTPATIENT
Start: 2024-08-16 | End: 2024-08-16

## 2024-08-16 RX ORDER — CEFAZOLIN SODIUM/WATER 2 G/20 ML
2 SYRINGE (ML) INTRAVENOUS SEE ADMIN INSTRUCTIONS
Status: DISCONTINUED | OUTPATIENT
Start: 2024-08-16 | End: 2024-08-16 | Stop reason: HOSPADM

## 2024-08-16 RX ADMIN — HEPARIN SODIUM 5000 UNITS: 10000 INJECTION, SOLUTION INTRAVENOUS; SUBCUTANEOUS at 11:25

## 2024-08-16 RX ADMIN — MIDAZOLAM 2 MG: 1 INJECTION INTRAMUSCULAR; INTRAVENOUS at 11:12

## 2024-08-16 RX ADMIN — ALBUTEROL SULFATE 6 PUFF: 90 AEROSOL, METERED RESPIRATORY (INHALATION) at 11:16

## 2024-08-16 RX ADMIN — LIDOCAINE HYDROCHLORIDE 0.1 ML: 10 INJECTION, SOLUTION EPIDURAL; INFILTRATION; INTRACAUDAL; PERINEURAL at 10:12

## 2024-08-16 RX ADMIN — DEXAMETHASONE SODIUM PHOSPHATE 8 MG: 4 INJECTION, SOLUTION INTRA-ARTICULAR; INTRALESIONAL; INTRAMUSCULAR; INTRAVENOUS; SOFT TISSUE at 11:31

## 2024-08-16 RX ADMIN — FENTANYL CITRATE 100 MCG: 50 INJECTION INTRAMUSCULAR; INTRAVENOUS at 11:18

## 2024-08-16 RX ADMIN — GLYCOPYRROLATE 0.1 MG: 0.2 INJECTION, SOLUTION INTRAMUSCULAR; INTRAVENOUS at 11:31

## 2024-08-16 RX ADMIN — ROCURONIUM BROMIDE 70 MG: 50 INJECTION, SOLUTION INTRAVENOUS at 11:18

## 2024-08-16 RX ADMIN — SUGAMMADEX 200 MG: 100 INJECTION, SOLUTION INTRAVENOUS at 11:51

## 2024-08-16 RX ADMIN — LIDOCAINE HYDROCHLORIDE 150 MG: 20 INJECTION, SOLUTION INFILTRATION; PERINEURAL at 11:18

## 2024-08-16 RX ADMIN — GLYCOPYRROLATE 0.1 MG: 0.2 INJECTION, SOLUTION INTRAMUSCULAR; INTRAVENOUS at 11:37

## 2024-08-16 RX ADMIN — HYDROMORPHONE HYDROCHLORIDE 0.5 MG: 1 INJECTION, SOLUTION INTRAMUSCULAR; INTRAVENOUS; SUBCUTANEOUS at 11:59

## 2024-08-16 RX ADMIN — ACETAMINOPHEN 975 MG: 325 TABLET, FILM COATED ORAL at 09:57

## 2024-08-16 RX ADMIN — Medication 2 G: at 11:10

## 2024-08-16 RX ADMIN — SODIUM CHLORIDE, POTASSIUM CHLORIDE, SODIUM LACTATE AND CALCIUM CHLORIDE 1000 ML: 600; 310; 30; 20 INJECTION, SOLUTION INTRAVENOUS at 10:11

## 2024-08-16 RX ADMIN — GABAPENTIN 300 MG: 300 CAPSULE ORAL at 09:57

## 2024-08-16 RX ADMIN — PROPOFOL 250 MG: 10 INJECTION, EMULSION INTRAVENOUS at 11:18

## 2024-08-16 RX ADMIN — HYDROMORPHONE HYDROCHLORIDE 0.5 MG: 1 INJECTION, SOLUTION INTRAMUSCULAR; INTRAVENOUS; SUBCUTANEOUS at 11:30

## 2024-08-16 ASSESSMENT — ACTIVITIES OF DAILY LIVING (ADL)
ADLS_ACUITY_SCORE: 31

## 2024-08-16 NOTE — OP NOTE
OPERATIVE REPORT - STAFF        Sarath Gray   : 1965   Sex: male   MRN: [id number]  2024 11:58 AM         Procedures Performed  Excision of pilonidal cysts - minimally invasive (Gips procedure)     Indications: Sarath Gray was seen in surgery clinic for evaluation of symptomatic pilonidal disease. After a discussion with the patient regarding therapeutic options, risks, and benefits, excision of the pilonidal cysts was recommended. Patient was amenable to the proposed plan    Pre-operative Diagnosis: Pilonidal cysts - noninfected    Post-operative Diagnosis: Same    Surgeon(s):  John James MD  Assistant:  Priyanka June PA-C - Expert assistance was necessary for retraction and exposure during the procedure    Anesthesia: General    Procedure Details  The patient was seen in the Holding Room. The risks, benefits, indications, potential complications, treatment options, and expected outcomes were discussed with the patient. The possibilities of reaction to medication, bleeding, infection, the need for additional procedures, failure to diagnose a condition, and creating a complication requiring transfusion or further operation were discussed with the patient. The patient concurred with the proposed plan, giving informed consent.  The site of surgery was properly noted/marked. The patient was taken to the operating room, identified as Sarath Gray and the procedure verified as minimally invasive pilonidal cyst excision. A Time Out was held and the above information confirmed.    General anesthetic was administered and the patient was positioned in the prone-jackknife position. Pre-operative antibiotics were administered.  The buttocks and perianal skin were prepped and draped in the standard fashion     Multiple pits were noted in the intergluteal cleft. Local anesthetic was applied. Using a 4-mm punch biopsy, the pits and overlying skin were cored out and excised down to a depth of  subcutaneous fat. These were sent to pathology for further evaluation. Hair and debris were removed from the pits and the cyst cavity was sharply debrided to healthy subcutaneous fat using a curette. The pits were found to be in communication. The common subcutaneous pits were then irrigated with hydrogen peroxide. Hemostasis was achieved using electrocautery.     The wounds were left open to heal by secondary intention. Sterile dressings with gauze and tape were then applied. the patient was awakened and extubated by Anesthesia and transported to Postanesthesia Care Unit (PACU) in good condition. Instrument, sponge, and needle counts were correct at the conclusion of the case.     Findings: pilonidal cysts, 3 identified and excised    Estimated Blood Loss:  3 ml        Drains: None        Total IV Fluids:  See anesthesia        Specimens: pilonidal cysts    Complications:  None        Disposition: PACU          John James MD

## 2024-08-16 NOTE — ANESTHESIA CARE TRANSFER NOTE
Patient: Sarath Gray    Procedure: Procedure(s):  EXCISION, PILONIDAL CYST       Diagnosis: Pilonidal cyst [L05.91]  Diagnosis Additional Information: No value filed.    Anesthesia Type:   General     Note:    Oropharynx: oropharynx clear of all foreign objects  Level of Consciousness: awake  Oxygen Supplementation: room air    Independent Airway: airway patency satisfactory and stable  Dentition: dentition unchanged  Vital Signs Stable: post-procedure vital signs reviewed and stable  Report to RN Given: handoff report given  Patient transferred to: PACU    Handoff Report: Identifed the Patient, Identified the Reponsible Provider, Reviewed the pertinent medical history, Discussed the surgical course, Reviewed Intra-OP anesthesia mangement and issues during anesthesia, Set expectations for post-procedure period and Allowed opportunity for questions and acknowledgement of understanding      Vitals:  Vitals Value Taken Time   /87 08/16/24 1215   Temp 35.8  C (96.44  F) 08/16/24 1221   Pulse 60 08/16/24 1220   Resp 16 08/16/24 1221   SpO2 97 % 08/16/24 1221   Vitals shown include unfiled device data.    Electronically Signed By: MADDIE Le CRNA  August 16, 2024  12:22 PM

## 2024-08-16 NOTE — INTERVAL H&P NOTE
"I have reviewed the surgical (or preoperative) H&P that is linked to this encounter, and examined the patient. There are no significant changes    Clinical Conditions Present on Arrival:  Clinically Significant Risk Factors Present on Admission                 # Drug Induced Platelet Defect: home medication list includes an antiplatelet medication      # Overweight: Estimated body mass index is 25.09 kg/m  as calculated from the following:    Height as of 8/15/24: 2.083 m (6' 10\").    Weight as of 8/15/24: 108.9 kg (240 lb).       "

## 2024-08-16 NOTE — ANESTHESIA PROCEDURE NOTES
Airway       Patient location during procedure: OR       Procedure Start/Stop Times: 8/16/2024 11:22 AM  Staff -        CRNA: Cleveland Childs APRN CRNA       Performed By: CRNA  Consent for Airway        Urgency: elective  Indications and Patient Condition       Indications for airway management: laura-procedural       Induction type:intravenous       Mask difficulty assessment: 2 - vent by mask + OA or adjuvant +/- NMBA    Final Airway Details       Final airway type: endotracheal airway       Successful airway: ETT - single and Oral  Endotracheal Airway Details        ETT size (mm): 8.0       Cuffed: yes       Successful intubation technique: video laryngoscopy       VL Blade Size: [place] 4       Grade View of Cords: 1       Adjucts: stylet       Position: Right       Measured from: lips       Secured at (cm): 23       Bite block used: None    Post intubation assessment        Placement verified by: capnometry, equal breath sounds and chest rise        Number of attempts at approach: 1       Number of other approaches attempted: 0       Secured with: tape       Ease of procedure: easy       Dentition: Intact and Unchanged    Medication(s) Administered   Medication Administration Time: 8/16/2024 11:22 AM

## 2024-08-16 NOTE — DISCHARGE INSTRUCTIONS
Same Day Surgery Discharge Instructions  Special Precautions After Surgery - Adult    It is not unusual to feel lightheaded or faint, up to 24 hours after surgery or while taking pain medication.  If you have these symptoms; sit for a few minutes before standing and have someone assist you when getting up.  You should rest and relax for the next 24 hours and must have someone stay with you for at least 24 hours after your discharge.  DO NOT DRIVE any vehicle or operate mechanical equipment for 24 hours following the end of your surgery.  DO NOT DRIVE while taking narcotic pain medications that have been prescribed by your physician.  If you had a limb operated on, you must be able to use it fully to drive.  DO NOT drink alcoholic beverages for 24 hours following surgery or while taking prescription pain medication.  Drink clear liquids (apple juice, ginger ale, broth, 7-Up, etc.).  Progress to your regular diet as you feel able.  Any questions call your physician and do not make important decisions for 24 hours.      __________________________________________________________________________________________________________________________________  IMPORTANT NUMBERS:    [facility] [place]  [phone], [phone]  Pharmacy:  [phone]  Same Day Surgery:  [phone], Monday - Friday until 8:30 p.m.  Urgent Care:  [phone]  Emergency Room:  [phone]      [facility]:  [phone]                                                                             [facility]:  [phone] option 1  [facility]:  [phone]     [facility]:  [phone]   [facility]:  [phone]   Home Medical Equipment: [phone]  [facility]:  [phone]

## 2024-08-17 NOTE — ANESTHESIA POSTPROCEDURE EVALUATION
Patient: Sarath Gray    Procedure: Procedure(s):  EXCISION, PILONIDAL CYST       Anesthesia Type:  General    Note:  Disposition: Outpatient   Postop Pain Control: Uneventful            Sign Out: Well controlled pain   PONV: No   Neuro/Psych: Uneventful            Sign Out: Acceptable/Baseline neuro status   Airway/Respiratory: Uneventful            Sign Out: Acceptable/Baseline resp. status   CV/Hemodynamics: Uneventful            Sign Out: Acceptable CV status; No obvious hypovolemia; No obvious fluid overload   Other NRE:    DID A NON-ROUTINE EVENT OCCUR?            Last vitals:  Vitals Value Taken Time   /89 08/16/24 1230   Temp 35.8  C (96.4  F) 08/16/24 1230   Pulse 66 08/16/24 1231   Resp 15 08/16/24 1231   SpO2 97 % 08/16/24 1231   Vitals shown include unfiled device data.    Electronically Signed By: MADDIE Locke CRNA  August 16, 2024  7:02 PM

## 2024-08-21 LAB
PATH REPORT.COMMENTS IMP SPEC: NORMAL
PATH REPORT.FINAL DX SPEC: NORMAL
PATH REPORT.GROSS SPEC: NORMAL
PATH REPORT.MICROSCOPIC SPEC OTHER STN: NORMAL
PATH REPORT.RELEVANT HX SPEC: NORMAL
PHOTO IMAGE: NORMAL

## 2024-08-21 PROCEDURE — 88305 TISSUE EXAM BY PATHOLOGIST: CPT | Mod: 26 | Performed by: PATHOLOGY

## 2024-08-23 ENCOUNTER — HOSPITAL ENCOUNTER (OUTPATIENT)
Dept: MAMMOGRAPHY | Facility: CLINIC | Age: 59
Discharge: HOME | End: 2024-08-23
Attending: NURSE PRACTITIONER
Payer: COMMERCIAL

## 2024-08-23 DIAGNOSIS — N63.41 SUBAREOLAR MASS OF RIGHT BREAST: ICD-10-CM

## 2024-08-23 PROCEDURE — 77066 DX MAMMO INCL CAD BI: CPT

## 2024-09-03 ENCOUNTER — OFFICE VISIT (OUTPATIENT)
Dept: SURGERY | Facility: CLINIC | Age: 59
End: 2024-09-03
Payer: COMMERCIAL

## 2024-09-03 VITALS
WEIGHT: 239.42 LBS | HEART RATE: 65 BPM | HEIGHT: 78 IN | DIASTOLIC BLOOD PRESSURE: 78 MMHG | BODY MASS INDEX: 27.7 KG/M2 | SYSTOLIC BLOOD PRESSURE: 131 MMHG | TEMPERATURE: 97.8 F

## 2024-09-03 DIAGNOSIS — L05.91 PILONIDAL CYST: Primary | ICD-10-CM

## 2024-09-03 PROCEDURE — 99024 POSTOP FOLLOW-UP VISIT: CPT | Performed by: STUDENT IN AN ORGANIZED HEALTH CARE EDUCATION/TRAINING PROGRAM

## 2024-09-03 ASSESSMENT — PAIN SCALES - GENERAL: PAINLEVEL: MODERATE PAIN (4)

## 2024-09-03 NOTE — NURSING NOTE
"Initial /78 (BP Location: Left arm, Patient Position: Sitting, Cuff Size: Adult Large)   Pulse 65   Temp 97.8  F (36.6  C) (Tympanic)   Ht 2.083 m (6' 10.01\")   Wt 108.6 kg (239 lb 6.7 oz)   BMI 25.03 kg/m   Estimated body mass index is 25.03 kg/m  as calculated from the following:    Height as of this encounter: 2.083 m (6' 10.01\").    Weight as of this encounter: 108.6 kg (239 lb 6.7 oz). .  Terri Funk MA    "

## 2024-09-03 NOTE — LETTER
"9/3/2024      Sarath Gray  [address] [place] [phone]      Dear Colleague,    Thank you for referring your patient, Sarath Gray, to the [facility]. Please see a copy of my visit note below.    Surgery Post-op Note  [facility]  [address] [place] [address]  T: [phone]  F: [phone]    Subjective:     Sarath Gray presents to the clinic after undergoing a minimally-invasive pilonidal cyst excision on 8/16/24.  Patient is here for follow up. The patient reports mild incisional pain.  Patient is currently tolerating a regular diet.  Patient states bowel habits are regular and soft. Patient denies significant nausea or vomiting.        Objective:     Vitals:   /78 (BP Location: Left arm, Patient Position: Sitting, Cuff Size: Adult Large)   Pulse 65   Temp 97.8  F (36.6  C) (Tympanic)   Ht 2.083 m (6' 10.01\")   Wt 108.6 kg (239 lb 6.7 oz)   BMI 25.03 kg/m     General Appearance:    Sarath is a well-appearing male who is in no acute distress.   Cardiac:    RRR, extremities warm and well perfused    Pulmonary:  Nonlabored breathing on room air   Incision: The incision sites are healing within gluteal cleft are healing well. There are no signs of cellulitis or drainage. No induration, no fluctuance, no appreciable undrained cysts. Skin overlying cysts appears dry and excoriated.        Labs/Imaging:     No new labs/imaging       Pathology:     Final Diagnosis   Pilonidal cyst, excision-  - Skin with mild hyperkeratosis and superficial dermal fibrosis. (please see comment)        Assessment:     Mr.Stephen DAVID Gray is status post pilonidal cyst excision, doing well      Plan:   1. The patient is instructed to call the office if there is any increasing incisional pain, swelling, redness, drainage, or any other problems.   2. Ok to return to normal activities  3. Follow up in surgery clinic as needed if any further questions " or concerns    John James MD on 9/3/2024 at 1:28 PM       Again, thank you for allowing me to participate in the care of your patient.        Sincerely,        John James MD

## 2024-09-03 NOTE — PROGRESS NOTES
"Surgery Post-op Note  [facility]  [address] [place] [facility] [place] [address]  T: [phone]  F: [phone]    Subjective:     Sarath Gray presents to the clinic after undergoing a minimally-invasive pilonidal cyst excision on 8/16/24.  Patient is here for follow up. The patient reports mild incisional pain.  Patient is currently tolerating a regular diet.  Patient states bowel habits are regular and soft. Patient denies significant nausea or vomiting.        Objective:     Vitals:   /78 (BP Location: Left arm, Patient Position: Sitting, Cuff Size: Adult Large)   Pulse 65   Temp 97.8  F (36.6  C) (Tympanic)   Ht 2.083 m (6' 10.01\")   Wt 108.6 kg (239 lb 6.7 oz)   BMI 25.03 kg/m     General Appearance:    Sarath is a well-appearing male who is in no acute distress.   Cardiac:    RRR, extremities warm and well perfused    Pulmonary:  Nonlabored breathing on room air   Incision: The incision sites are healing within gluteal cleft are healing well. There are no signs of cellulitis or drainage. No induration, no fluctuance, no appreciable undrained cysts. Skin overlying cysts appears dry and excoriated.        Labs/Imaging:     No new labs/imaging       Pathology:     Final Diagnosis   Pilonidal cyst, excision-  - Skin with mild hyperkeratosis and superficial dermal fibrosis. (please see comment)        Assessment:     Mr.Stephen DAVID Gray is status post pilonidal cyst excision, doing well      Plan:   1. The patient is instructed to call the office if there is any increasing incisional pain, swelling, redness, drainage, or any other problems.   2. Ok to return to normal activities  3. Follow up in surgery clinic as needed if any further questions or concerns    John James MD on 9/3/2024 at 1:28 PM     "

## 2024-09-13 DIAGNOSIS — R10.13 EPIGASTRIC PAIN: ICD-10-CM

## 2024-09-16 RX ORDER — OMEPRAZOLE 40 MG/1
40 CAPSULE, DELAYED RELEASE ORAL 2 TIMES DAILY
Qty: 180 CAPSULE | Refills: 2 | Status: SHIPPED | OUTPATIENT
Start: 2024-09-16

## 2024-10-24 ENCOUNTER — OFFICE VISIT (OUTPATIENT)
Dept: FAMILY MEDICINE | Facility: CLINIC | Age: 59
End: 2024-10-24
Payer: COMMERCIAL

## 2024-10-24 VITALS
DIASTOLIC BLOOD PRESSURE: 82 MMHG | OXYGEN SATURATION: 96 % | HEIGHT: 78 IN | WEIGHT: 239 LBS | BODY MASS INDEX: 27.65 KG/M2 | HEART RATE: 54 BPM | TEMPERATURE: 97.3 F | SYSTOLIC BLOOD PRESSURE: 138 MMHG | RESPIRATION RATE: 18 BRPM

## 2024-10-24 DIAGNOSIS — H61.23 BILATERAL IMPACTED CERUMEN: ICD-10-CM

## 2024-10-24 DIAGNOSIS — E78.00 ELEVATED LDL CHOLESTEROL LEVEL: ICD-10-CM

## 2024-10-24 DIAGNOSIS — I10 ESSENTIAL HYPERTENSION WITH GOAL BLOOD PRESSURE LESS THAN 140/90: ICD-10-CM

## 2024-10-24 DIAGNOSIS — K52.9 CHRONIC DIARRHEA: Primary | ICD-10-CM

## 2024-10-24 DIAGNOSIS — R10.13 EPIGASTRIC PAIN: ICD-10-CM

## 2024-10-24 LAB
CRP SERPL-MCNC: <3 MG/L
ERYTHROCYTE [SEDIMENTATION RATE] IN BLOOD BY WESTERGREN METHOD: 5 MM/HR (ref 0–20)

## 2024-10-24 PROCEDURE — 99214 OFFICE O/P EST MOD 30 MIN: CPT | Mod: 25 | Performed by: STUDENT IN AN ORGANIZED HEALTH CARE EDUCATION/TRAINING PROGRAM

## 2024-10-24 PROCEDURE — 90472 IMMUNIZATION ADMIN EACH ADD: CPT | Performed by: STUDENT IN AN ORGANIZED HEALTH CARE EDUCATION/TRAINING PROGRAM

## 2024-10-24 PROCEDURE — 86140 C-REACTIVE PROTEIN: CPT | Performed by: STUDENT IN AN ORGANIZED HEALTH CARE EDUCATION/TRAINING PROGRAM

## 2024-10-24 PROCEDURE — 69209 REMOVE IMPACTED EAR WAX UNI: CPT | Mod: 50 | Performed by: STUDENT IN AN ORGANIZED HEALTH CARE EDUCATION/TRAINING PROGRAM

## 2024-10-24 PROCEDURE — 91320 SARSCV2 VAC 30MCG TRS-SUC IM: CPT | Performed by: STUDENT IN AN ORGANIZED HEALTH CARE EDUCATION/TRAINING PROGRAM

## 2024-10-24 PROCEDURE — 85652 RBC SED RATE AUTOMATED: CPT | Performed by: STUDENT IN AN ORGANIZED HEALTH CARE EDUCATION/TRAINING PROGRAM

## 2024-10-24 PROCEDURE — 90480 ADMN SARSCOV2 VAC 1/ONLY CMP: CPT | Performed by: STUDENT IN AN ORGANIZED HEALTH CARE EDUCATION/TRAINING PROGRAM

## 2024-10-24 PROCEDURE — 36415 COLL VENOUS BLD VENIPUNCTURE: CPT | Performed by: STUDENT IN AN ORGANIZED HEALTH CARE EDUCATION/TRAINING PROGRAM

## 2024-10-24 PROCEDURE — 90471 IMMUNIZATION ADMIN: CPT | Performed by: STUDENT IN AN ORGANIZED HEALTH CARE EDUCATION/TRAINING PROGRAM

## 2024-10-24 PROCEDURE — 90677 PCV20 VACCINE IM: CPT | Performed by: STUDENT IN AN ORGANIZED HEALTH CARE EDUCATION/TRAINING PROGRAM

## 2024-10-24 PROCEDURE — 90673 RIV3 VACCINE NO PRESERV IM: CPT | Performed by: STUDENT IN AN ORGANIZED HEALTH CARE EDUCATION/TRAINING PROGRAM

## 2024-10-24 RX ORDER — SPIRONOLACTONE 100 MG/1
200 TABLET, FILM COATED ORAL DAILY
Qty: 180 TABLET | Refills: 3 | Status: SHIPPED | OUTPATIENT
Start: 2024-10-24

## 2024-10-24 RX ORDER — OMEPRAZOLE 40 MG/1
40 CAPSULE, DELAYED RELEASE ORAL 2 TIMES DAILY
Qty: 180 CAPSULE | Refills: 3 | Status: SHIPPED | OUTPATIENT
Start: 2024-10-24

## 2024-10-24 RX ORDER — LOPERAMIDE HYDROCHLORIDE 2 MG/1
2-4 TABLET ORAL 3 TIMES DAILY PRN
Qty: 90 TABLET | Refills: 3 | Status: SHIPPED | OUTPATIENT
Start: 2024-10-24

## 2024-10-24 RX ORDER — LISINOPRIL 40 MG/1
40 TABLET ORAL DAILY
Qty: 90 TABLET | Refills: 3 | Status: SHIPPED | OUTPATIENT
Start: 2024-10-24

## 2024-10-24 RX ORDER — HYDRALAZINE HYDROCHLORIDE 25 MG/1
25 TABLET, FILM COATED ORAL 3 TIMES DAILY
Qty: 270 TABLET | Refills: 3 | Status: SHIPPED | OUTPATIENT
Start: 2024-10-24

## 2024-10-24 RX ORDER — ATORVASTATIN CALCIUM 20 MG/1
20 TABLET, FILM COATED ORAL DAILY
Qty: 90 TABLET | Refills: 3 | Status: SHIPPED | OUTPATIENT
Start: 2024-10-24

## 2024-10-24 RX ORDER — CARVEDILOL 25 MG/1
25 TABLET ORAL 2 TIMES DAILY WITH MEALS
Qty: 180 TABLET | Refills: 3 | Status: SHIPPED | OUTPATIENT
Start: 2024-10-24

## 2024-10-24 ASSESSMENT — PAIN SCALES - GENERAL: PAINLEVEL_OUTOF10: MODERATE PAIN (5)

## 2024-10-24 NOTE — NURSING NOTE
"Chief Complaint   Patient presents with    Hypertension    Skin Spots     On back    Gastrointestinal Problem       Initial /82 (BP Location: Right arm, Patient Position: Sitting, Cuff Size: Adult Large)   Pulse 54   Temp 97.3  F (36.3  C) (Tympanic)   Resp 18   Ht 2.083 m (6' 10\")   Wt 108.4 kg (239 lb)   SpO2 96%   BMI 24.99 kg/m   Estimated body mass index is 24.99 kg/m  as calculated from the following:    Height as of this encounter: 2.083 m (6' 10\").    Weight as of this encounter: 108.4 kg (239 lb).    Patient presents to the clinic using No DME    Is there anyone who you would like to be able to receive your results? No  If yes have patient fill out ARIS    Sarath Gray is a 59 year old male who presents in clinic with complaint of impacted ear wax (cerumen).  Per the order of [place], ear wax was removed from both sides by flushing with warm water and manual debridement has been performed. Patient denies pain/dizziness/discharge/drainage  (if yes, stop procedure and huddle with provider).  Ear wax has been successfully removed. (If not, huddle with provider).   Liz Lopez CMA          "

## 2024-10-24 NOTE — PROGRESS NOTES
Assessment & Plan     Essential hypertension with goal blood pressure less than 140/90  Patient is a pleasant 59-year-old gentleman who presents today for multiple concerns.  First of these, wants to review blood pressure.  His blood pressure has been well-controlled on multiple agents.  Refilled for 1 year.  - spironolactone (ALDACTONE) 100 MG tablet; Take 2 tablets (200 mg) by mouth daily.  - hydrALAZINE (APRESOLINE) 25 MG tablet; Take 1 tablet (25 mg) by mouth 3 times daily.  - empagliflozin (JARDIANCE) 10 MG TABS tablet; Take 1 tablet (10 mg) by mouth daily.  - NIFEdipine ER (ADALAT CC) 60 MG 24 hr tablet; Take 1 tablet (60 mg) by mouth two times daily.  - lisinopril (ZESTRIL) 40 MG tablet; Take 1 tablet (40 mg) by mouth daily.  - carvedilol (COREG) 25 MG tablet; Take 1 tablet (25 mg) by mouth 2 times daily (with meals).    Epigastric pain  refill  - omeprazole (PRILOSEC) 40 MG DR capsule; Take 1 capsule (40 mg) by mouth 2 times daily.    Elevated LDL cholesterol level  Refill.   - atorvastatin (LIPITOR) 20 MG tablet; Take 1 tablet (20 mg) by mouth daily.    Chronic diarrhea  Patient notes ongoing diarrhea, very watery, for the past at least 4 months.  Reviewed medications administered in the past year or so, no clear cause of diarrhea.  We discussed that atorvastatin may be contributing, however has been on that for 6 years.  For now, continue Imodium, add psyllium, ESR and CRP completed for early evaluation of IBD.  - loperamide (IMODIUM A-D) 2 MG tablet; Take 1-2 tablets (2-4 mg) by mouth 3 times daily as needed for diarrhea.  - CRP inflammation; Future  - Erythrocyte sedimentation rate auto; Future  - psyllium (METAMUCIL/KONSYL) capsule; Take 1 capsule by mouth daily.  - CRP inflammation  - Erythrocyte sedimentation rate auto    Bilateral impacted cerumen  Bilateral impacted cerumen today, successfully flushed by MA, improved significantly on recheck, now able to evaluate the TMs, both normal.  - ID  "REMOVAL IMPACTED CERUMEN IRRIGATION/LVG UNILAT        Subjective   Sarath is a 59 year old, presenting for the following health issues:  Hypertension, Skin Spots (On back), and Gastrointestinal Problem        10/24/2024     9:56 AM   Additional Questions   Roomed by Radha LOGAN   Accompanied by Self     History of Present Illness       Hypertension: He presents for follow up of hypertension.  He does not check blood pressure  regularly outside of the clinic. Outside blood pressures have been over 140/90. He does not follow a low salt diet.     Reason for visit:  Blood pressure spot on back    He eats 2-3 servings of fruits and vegetables daily.He consumes 1 sweetened beverage(s) daily.He exercises with enough effort to increase his heart rate 10 to 19 minutes per day.  He exercises with enough effort to increase his heart rate 3 or less days per week.      Lymphedema in the left leg in particular, wears compression socks.   Wondering why fluid when on all the diuretics.     Stomach:   Last few months 3-4 of imodiums, then not so bad, sometimes plugged up and then just water afterwards.     Some days no bowel movement when taking imodium.   If doesn't, then just water. Couple times didn't make it to the bathroom in time, hits him that fast.   Has to be cautious with certain job sites because of this.     Review of Systems  Constitutional, HEENT, cardiovascular, pulmonary, gi and gu systems are negative, except as otherwise noted.      Objective    /82 (BP Location: Right arm, Patient Position: Sitting, Cuff Size: Adult Large)   Pulse 54   Temp 97.3  F (36.3  C) (Tympanic)   Resp 18   Ht 2.083 m (6' 10\")   Wt 108.4 kg (239 lb)   SpO2 96%   BMI 24.99 kg/m    Body mass index is 24.99 kg/m .  Physical Exam  Constitutional:       Appearance: Normal appearance.   HENT:      Head: Normocephalic.      Right Ear: Tympanic membrane normal. There is impacted cerumen.      Left Ear: Tympanic membrane normal. There " is impacted cerumen.      Ears:      Comments: TM normal on recheck after successful ear flush  Eyes:      General: No scleral icterus.     Extraocular Movements: Extraocular movements intact.      Conjunctiva/sclera: Conjunctivae normal.   Cardiovascular:      Rate and Rhythm: Normal rate.   Pulmonary:      Effort: Pulmonary effort is normal.   Neurological:      General: No focal deficit present.      Mental Status: He is alert and oriented to person, place, and time.                  Signed Electronically by: Traci Radford MD     Admission

## 2025-02-17 ENCOUNTER — TELEPHONE (OUTPATIENT)
Dept: FAMILY MEDICINE | Facility: CLINIC | Age: 60
End: 2025-02-17
Payer: COMMERCIAL

## 2025-02-17 NOTE — TELEPHONE ENCOUNTER
Forms/Letter Request    Type of form/letter: OTHER: Medical Records Request        Do we have the form/letter: Yes: Sent to medical records However there is a signature log page- Need signature (Yellow folder)    Who is the form from? Insurance comp    Where did/will the form come from? form was faxed in    When is form/letter needed by: ASAP    How would you like the form/letter returned: Fax : [phone]    Patient Notified form requests are processed in 5-7 business days:N/A    Sent the packet to medical records for them to gather this information, put packet in yellow folder for signature.    Jana Frances/ Patient

## 2025-02-18 NOTE — TELEPHONE ENCOUNTER
Sent signature page to \[facility]\"" [phone].     Also Sent requested medical records information to medical records for them to send them the requested information on 1/17/25     Jana Frances/ Patient

## 2025-07-14 ENCOUNTER — NURSE TRIAGE (OUTPATIENT)
Dept: FAMILY MEDICINE | Facility: CLINIC | Age: 60
End: 2025-07-14
Payer: COMMERCIAL

## 2025-07-14 NOTE — TELEPHONE ENCOUNTER
Nurse Triage SBAR    Is this a 2nd Level Triage? YES, LICENSED PRACTITIONER REVIEW IS REQUIRED    Situation: Patient calling with complaints of shortness of breath with exertion    Background: Patient has had a cold for 1 week.     Assessment: Patient states he denies shortness of breath when resting, only when exerting himself. Denies fever. Does not have oximeter to check oxygen level. Denies chest pain.    Protocol Recommended Disposition:   Go To Office Now    Recommendation: Patient requesting an appointment tomorrow. Scheduled with PCP tomorrow. Patient advised to seek care at the emergency room if any increase in shortness of breath. Patient voiced understanding.     Reason for Disposition   MILD difficulty breathing (e.g., minimal/no SOB at rest, SOB with walking, pulse < 100) of new-onset or worse than normal    Additional Information   Negative: SEVERE difficulty breathing (e.g., struggling for each breath, speaks in single words, pulse > 120)   Negative: Breathing stopped and hasn't returned   Negative: Choking on something   Negative: Bluish (or gray) lips or face   Negative: Difficult to awaken or acting confused (e.g., disoriented, slurred speech)   Negative: Passed out (e.g., fainted, lost consciousness, blacked out and was not responding)   Negative: Wheezing started suddenly after medicine, an allergic food, or bee sting   Negative: Stridor (harsh sound while breathing in)   Negative: Slow, shallow and weak breathing   Negative: Sounds like a life-threatening emergency to the triager   Negative: Chest pain   Negative: Wheezing (high pitched whistling sound) and previous asthma attacks or use of asthma medicines   Negative: Breathing difficulty and within 14 days of COVID-19 EXPOSURE (close contact) with someone diagnosed with COVID-19 (e.g., COVID test positive)   Negative: Breathing difficulty and COVID-19 is widespread in the community   Negative: Breathing diffculty and only present when  "coughing   Negative: Breathing difficulty and only from stuffy nose   Negative: Breathing diffculty and only from stuffy nose or runny nose from common cold   Negative: MODERATE difficulty breathing (e.g., speaks in phrases, SOB even at rest, pulse 100-120) of new-onset or worse than normal   Negative: Oxygen level (e.g., pulse oximetry) 90% or lower   Negative: Wheezing can be heard across the room   Negative: Drooling or spitting out saliva (because can't swallow)   Negative: Any history of prior \"blood clot\" in leg or lungs   Negative: Illness requiring prolonged bedrest in past month (e.g., immobilization, long hospital stay)   Negative: Hip or leg fracture (broken bone) in past month (or had cast on leg or ankle in past month)   Negative: Major surgery in the past month   Negative: Long-distance travel in past month (e.g., car, bus, train, plane; with trip lasting 6 or more hours)   Negative: Cancer treatment in past six months (or has cancer now)   Negative: Extra heartbeats, irregular heart beating, or heart is beating very fast (i.e., 'palpitations')   Negative: Fever > 103 F (39.4 C)   Negative: Fever > 101 F (38.3 C) and over 60 years of age   Negative: Fever > 100 F (37.8 C) and bedridden (e.g., nursing home patient, stroke, chronic illness, recovering from surgery)   Negative: Fever > 100 F (37.8 C) and diabetes mellitus or weak immune system (e.g., HIV positive, cancer chemo, splenectomy, organ transplant, chronic steroids)   Negative: Periods where breathing stops and then resumes normally and bedridden (e.g., nursing home patient, CVA)   Negative: Pregnant or postpartum (from 0 to 6 weeks after delivery)   Negative: Patient sounds very sick or weak to the triager    Answer Assessment - Initial Assessment Questions  1. RESPIRATORY STATUS: \"Describe your breathing?\" (e.g., wheezing, shortness of breath, unable to speak, severe coughing)       Shortness of breath when exerting  2. ONSET: \"When did this " "breathing problem begin?\"       Head cold has been 1 week. Yesterday breathing problem began  3. PATTERN \"Does the difficult breathing come and go, or has it been constant since it started?\"       If I am sitting I am fine, when I over exert I notice it  4. SEVERITY: \"How bad is your breathing?\" (e.g., mild, moderate, severe)       Exerting moderate  5. RECURRENT SYMPTOM: \"Have you had difficulty breathing before?\" If Yes, ask: \"When was the last time?\" and \"What happened that time?\"       Yes, when I have had pneumonia  6. CARDIAC HISTORY: \"Do you have any history of heart disease?\" (e.g., heart attack, angina, bypass surgery, angioplasty)       No  7. LUNG HISTORY: \"Do you have any history of lung disease?\"  (e.g., pulmonary embolus, asthma, emphysema)      No  8. CAUSE: \"What do you think is causing the breathing problem?\"       Possible pneumonia  9. OTHER SYMPTOMS: \"Do you have any other symptoms?\" (e.g., chest pain, cough, dizziness, fever, runny nose)      No  10. O2 SATURATION MONITOR:  \"Do you use an oxygen saturation monitor (pulse oximeter) at home?\" If Yes, ask: \"What is your reading (oxygen level) today?\" \"What is your usual oxygen saturation reading?\" (e.g., 95%)        No  11. PREGNANCY: \"Is there any chance you are pregnant?\" \"When was your last menstrual period?\"        N/A  12. TRAVEL: \"Have you traveled out of the country in the last month?\" (e.g., travel history, exposures)        No    Protocols used: Breathing Difficulty-A-[place]    Can COOK RN  [facility]    "

## 2025-07-15 ENCOUNTER — ANCILLARY PROCEDURE (OUTPATIENT)
Dept: GENERAL RADIOLOGY | Facility: CLINIC | Age: 60
End: 2025-07-15
Attending: FAMILY MEDICINE
Payer: COMMERCIAL

## 2025-07-15 ENCOUNTER — OFFICE VISIT (OUTPATIENT)
Dept: FAMILY MEDICINE | Facility: CLINIC | Age: 60
End: 2025-07-15
Payer: COMMERCIAL

## 2025-07-15 VITALS
SYSTOLIC BLOOD PRESSURE: 130 MMHG | DIASTOLIC BLOOD PRESSURE: 86 MMHG | WEIGHT: 238 LBS | RESPIRATION RATE: 24 BRPM | BODY MASS INDEX: 27.54 KG/M2 | HEIGHT: 78 IN | OXYGEN SATURATION: 95 % | HEART RATE: 56 BPM | TEMPERATURE: 99.1 F

## 2025-07-15 DIAGNOSIS — R06.02 SHORTNESS OF BREATH: Primary | ICD-10-CM

## 2025-07-15 DIAGNOSIS — R06.02 SHORTNESS OF BREATH: ICD-10-CM

## 2025-07-15 DIAGNOSIS — J44.1 COPD EXACERBATION (H): ICD-10-CM

## 2025-07-15 PROCEDURE — 71046 X-RAY EXAM CHEST 2 VIEWS: CPT | Mod: TC | Performed by: RADIOLOGY

## 2025-07-15 RX ORDER — PREDNISONE 20 MG/1
40 TABLET ORAL DAILY
Qty: 10 TABLET | Refills: 0 | Status: SHIPPED | OUTPATIENT
Start: 2025-07-15 | End: 2025-07-20

## 2025-07-15 RX ORDER — AZITHROMYCIN 250 MG/1
TABLET, FILM COATED ORAL
Qty: 6 TABLET | Refills: 0 | Status: SHIPPED | OUTPATIENT
Start: 2025-07-15 | End: 2025-07-20

## 2025-07-15 RX ORDER — ALBUTEROL SULFATE 90 UG/1
2 INHALANT RESPIRATORY (INHALATION) EVERY 6 HOURS PRN
Qty: 8.5 G | Refills: 0 | Status: SHIPPED | OUTPATIENT
Start: 2025-07-15

## 2025-07-15 NOTE — PROGRESS NOTES
"  {PROVIDER CHARTING PREFERENCE:130610}    Subjective   Sarath is a 59 year old, presenting for the following health issues:  Illness        7/15/2025     9:08 AM   Additional Questions   Roomed by Mirta COOK   Accompanied by self     History of Present Illness       Reason for visit:  Fever and possible pneumonia  Symptom onset:  3-7 days ago  Symptoms include:  Shortness of breath  Symptom intensity:  Severe  Symptom progression:  Worsening  Had these symptoms before:  Yes  Has tried/received treatment for these symptoms:  Yes  Previous treatment was successful:  Yes  Prior treatment description:  Antibiotics and steroids  What makes it worse:  Getting up to fast causes shortness of breath worse He is missing 1 dose(s) of medications per week.  He is not taking prescribed medications regularly due to other.        {MA/LPN/RN Pre-Provider Visit Orders- hCG/UA/Strep (Optional):445857}  {SUPERLIST (Optional):623128}  {additonal problems for provider to add (Optional):753293}    {ROS Picklists (Optional):131902}      Objective    /86   Pulse 56   Temp 99.1  F (37.3  C) (Tympanic)   Resp 24   Ht 2.083 m (6' 10\")   Wt 108 kg (238 lb)   SpO2 95%   BMI 24.89 kg/m    Body mass index is 24.89 kg/m .  Physical Exam   {Exam List (Optional):352878}    {Diagnostic Test Results (Optional):716239}        Signed Electronically by: Natacha Muniz MD  {Email feedback regarding this note to [email or web address]   :449509}  "

## 2025-07-15 NOTE — NURSING NOTE
"Chief Complaint   Patient presents with    Illness       Initial /86   Pulse 56   Temp 99.1  F (37.3  C) (Tympanic)   Resp 24   Ht 2.083 m (6' 10\")   Wt 108 kg (238 lb)   SpO2 95%   BMI 24.89 kg/m   Estimated body mass index is 24.89 kg/m  as calculated from the following:    Height as of this encounter: 2.083 m (6' 10\").    Weight as of this encounter: 108 kg (238 lb).    Patient presents to the clinic using No DME    Is there anyone who you would like to be able to receive your results? No  If yes have patient fill out ARIS      "

## 2025-07-16 ENCOUNTER — RESULTS FOLLOW-UP (OUTPATIENT)
Dept: FAMILY MEDICINE | Facility: CLINIC | Age: 60
End: 2025-07-16
Payer: COMMERCIAL

## 2025-07-16 DIAGNOSIS — R06.02 SHORTNESS OF BREATH: ICD-10-CM

## 2025-07-16 DIAGNOSIS — J44.1 COPD EXACERBATION (H): Primary | ICD-10-CM

## 2025-07-16 DIAGNOSIS — R91.8 PULMONARY NODULES: ICD-10-CM

## 2025-07-22 ENCOUNTER — OFFICE VISIT (OUTPATIENT)
Dept: FAMILY MEDICINE | Facility: CLINIC | Age: 60
End: 2025-07-22
Payer: COMMERCIAL

## 2025-07-22 VITALS
TEMPERATURE: 97.8 F | HEART RATE: 78 BPM | HEIGHT: 78 IN | OXYGEN SATURATION: 94 % | RESPIRATION RATE: 22 BRPM | SYSTOLIC BLOOD PRESSURE: 124 MMHG | WEIGHT: 244 LBS | DIASTOLIC BLOOD PRESSURE: 58 MMHG | BODY MASS INDEX: 28.23 KG/M2

## 2025-07-22 DIAGNOSIS — I10 ESSENTIAL HYPERTENSION WITH GOAL BLOOD PRESSURE LESS THAN 140/90: ICD-10-CM

## 2025-07-22 DIAGNOSIS — F17.200 TOBACCO USE DISORDER: ICD-10-CM

## 2025-07-22 DIAGNOSIS — R06.02 SOB (SHORTNESS OF BREATH): Primary | ICD-10-CM

## 2025-07-22 DIAGNOSIS — I34.0 MITRAL VALVE INSUFFICIENCY, UNSPECIFIED ETIOLOGY: ICD-10-CM

## 2025-07-22 DIAGNOSIS — J44.1 COPD EXACERBATION (H): ICD-10-CM

## 2025-07-22 LAB
ALBUMIN SERPL BCG-MCNC: 3.8 G/DL (ref 3.5–5.2)
ALP SERPL-CCNC: 50 U/L (ref 40–150)
ALT SERPL W P-5'-P-CCNC: 18 U/L (ref 0–70)
ANION GAP SERPL CALCULATED.3IONS-SCNC: 11 MMOL/L (ref 7–15)
AST SERPL W P-5'-P-CCNC: 21 U/L (ref 0–45)
BILIRUB SERPL-MCNC: 0.3 MG/DL
BUN SERPL-MCNC: 16.5 MG/DL (ref 8–23)
CALCIUM SERPL-MCNC: 9.8 MG/DL (ref 8.8–10.4)
CHLORIDE SERPL-SCNC: 101 MMOL/L (ref 98–107)
CREAT SERPL-MCNC: 1.09 MG/DL (ref 0.67–1.17)
CRP SERPL-MCNC: 5.68 MG/L
EGFRCR SERPLBLD CKD-EPI 2021: 78 ML/MIN/1.73M2
ERYTHROCYTE [DISTWIDTH] IN BLOOD BY AUTOMATED COUNT: 13.6 % (ref 10–15)
ERYTHROCYTE [SEDIMENTATION RATE] IN BLOOD BY WESTERGREN METHOD: 14 MM/HR (ref 0–20)
GLUCOSE SERPL-MCNC: 99 MG/DL (ref 70–99)
HCO3 SERPL-SCNC: 29 MMOL/L (ref 22–29)
HCT VFR BLD AUTO: 39.7 % (ref 40–53)
HGB BLD-MCNC: 13.4 G/DL (ref 13.3–17.7)
MCH RBC QN AUTO: 30.9 PG (ref 26.5–33)
MCHC RBC AUTO-ENTMCNC: 33.8 G/DL (ref 31.5–36.5)
MCV RBC AUTO: 92 FL (ref 78–100)
PLATELET # BLD AUTO: 286 10E3/UL (ref 150–450)
POTASSIUM SERPL-SCNC: 3.9 MMOL/L (ref 3.4–5.3)
PROT SERPL-MCNC: 6.8 G/DL (ref 6.4–8.3)
RBC # BLD AUTO: 4.33 10E6/UL (ref 4.4–5.9)
SODIUM SERPL-SCNC: 141 MMOL/L (ref 135–145)
WBC # BLD AUTO: 10.7 10E3/UL (ref 4–11)

## 2025-07-22 PROCEDURE — G2211 COMPLEX E/M VISIT ADD ON: HCPCS | Performed by: FAMILY MEDICINE

## 2025-07-22 PROCEDURE — 99214 OFFICE O/P EST MOD 30 MIN: CPT | Performed by: FAMILY MEDICINE

## 2025-07-22 PROCEDURE — 36415 COLL VENOUS BLD VENIPUNCTURE: CPT | Performed by: FAMILY MEDICINE

## 2025-07-22 PROCEDURE — 3078F DIAST BP <80 MM HG: CPT | Performed by: FAMILY MEDICINE

## 2025-07-22 PROCEDURE — 80053 COMPREHEN METABOLIC PANEL: CPT | Performed by: FAMILY MEDICINE

## 2025-07-22 PROCEDURE — 3074F SYST BP LT 130 MM HG: CPT | Performed by: FAMILY MEDICINE

## 2025-07-22 PROCEDURE — 85027 COMPLETE CBC AUTOMATED: CPT | Performed by: FAMILY MEDICINE

## 2025-07-22 PROCEDURE — 86140 C-REACTIVE PROTEIN: CPT | Performed by: FAMILY MEDICINE

## 2025-07-22 PROCEDURE — 85652 RBC SED RATE AUTOMATED: CPT | Performed by: FAMILY MEDICINE

## 2025-07-22 PROCEDURE — 1125F AMNT PAIN NOTED PAIN PRSNT: CPT | Performed by: FAMILY MEDICINE

## 2025-07-22 RX ORDER — FLUTICASONE PROPIONATE AND SALMETEROL 250; 50 UG/1; UG/1
1 POWDER RESPIRATORY (INHALATION) 2 TIMES DAILY
Qty: 1 EACH | Refills: 0 | Status: SHIPPED | OUTPATIENT
Start: 2025-07-22

## 2025-07-22 RX ORDER — ALBUTEROL SULFATE 90 UG/1
2 INHALANT RESPIRATORY (INHALATION) EVERY 6 HOURS PRN
Qty: 8.5 G | Refills: 3 | Status: SHIPPED | OUTPATIENT
Start: 2025-07-22

## 2025-07-22 RX ORDER — HYDRALAZINE HYDROCHLORIDE 25 MG/1
25 TABLET, FILM COATED ORAL 2 TIMES DAILY
Status: SHIPPED
Start: 2025-07-22

## 2025-07-22 ASSESSMENT — PAIN SCALES - GENERAL: PAINLEVEL_OUTOF10: SEVERE PAIN (7)

## 2025-07-22 NOTE — PROGRESS NOTES
"  Assessment & Plan     SOB (shortness of breath)  Follow up from 7/15/2025 for shortness of breath, cough, suspected underlying Copd, exacerbation - treated with prednisone 40mg for 5 days, albuterol prn, azithromycin, Augmentin. Symptoms mildly improved, however, SOB still significant. CXR showed \" Increased linear opacities in the right lung base and perihilar left upper lobe, may be infectious or inflammatory. Redemonstration of a left upper lobe 2.4 cm nodule slightly increased in size since 2019 when it measured 1.9 cm increased adjacent linear opacities\". CT chest with contrast scheduled for 7/28/2025. Due to persistent SOB and history of mitral valve insufficiency noted echo 2023, will plan for repeat. He has bilateral pedal edema, worsened for the past couple of weeks. Follow up pending CT chest results  - Echocardiogram Complete; Future  - albuterol (PROAIR HFA/PROVENTIL HFA/VENTOLIN HFA) 108 (90 Base) MCG/ACT inhaler; Inhale 2 puffs into the lungs every 6 hours as needed for shortness of breath.  - ESR: Erythrocyte sedimentation rate  - CRP, inflammation    COPD exacerbation (H)  Start management as below  Complete PFT when symptoms are stable  - fluticasone-salmeterol (ADVAIR) 250-50 MCG/ACT inhaler; Inhale 1 puff into the lungs 2 times daily.    Mitral valve insufficiency, unspecified etiology  - Echocardiogram Complete; Future    Essential hypertension with goal blood pressure less than 140/90  On multiple medications including lisinopril 40, spironolactone 200, nifedipine 60 BID, carvedilol 25 BID, hydralazine 25mg TID - patient would like to try tapering down due to recent illness, he measures his bp at home states that it has been below 120/80, will adjust slowly, trial decreasing hydralazine to 25mg BID  - hydrALAZINE (APRESOLINE) 25 MG tablet; Take 1 tablet (25 mg) by mouth 2 times daily.  - Comprehensive metabolic panel (BMP + Alb, Alk Phos, ALT, AST, Total. Bili, TP)  - CBC with " "platelets    Tobacco use disorder  Discussed smoking cessation  Patient has cut down due to SOB however he is not interested in complete cessation          The longitudinal plan of care for the diagnosis(es)/condition(s) as documented were addressed during this visit. Due to the added complexity in care, I will continue to support Sarath in the subsequent management and with ongoing continuity of care.    BMI  Estimated body mass index is 25.51 kg/m  as calculated from the following:    Height as of this encounter: 2.083 m (6' 10\").    Weight as of this encounter: 110.7 kg (244 lb).           Subjective   Sarath is a 59 year old, presenting for the following health issues:  RECHECK (Breathing is better, other symptoms same)        7/22/2025    10:46 AM   Additional Questions   Roomed by Radha LOGAN   Accompanied by Self     History of Present Illness       Reason for visit:  Fever and possible pneumonia  Symptom onset:  3-7 days ago  Symptoms include:  Shortness of breath  Symptom intensity:  Severe  Symptom progression:  Worsening  Had these symptoms before:  Yes  Has tried/received treatment for these symptoms:  Yes  Previous treatment was successful:  Yes  Prior treatment description:  Antibiotics and steroids  What makes it worse:  Getting up to fast causes shortness of breath worse He is missing 1 dose(s) of medications per week.  He is not taking prescribed medications regularly due to other.                    Objective    /58 (BP Location: Right arm, Patient Position: Sitting, Cuff Size: Adult Regular)   Pulse 78   Temp 97.8  F (36.6  C) (Tympanic)   Resp 22   Ht 2.083 m (6' 10\")   Wt 110.7 kg (244 lb)   SpO2 94%   BMI 25.51 kg/m    Body mass index is 25.51 kg/m .  Physical Exam  Vitals and nursing note reviewed.   Cardiovascular:      Rate and Rhythm: Normal rate and regular rhythm.   Pulmonary:      Breath sounds: Normal breath sounds. No wheezing.   Musculoskeletal:      Right lower leg: " Edema present.      Left lower leg: Edema present.   Neurological:      Mental Status: He is alert.                    Signed Electronically by: Natacha Muniz MD

## 2025-08-16 ENCOUNTER — HEALTH MAINTENANCE LETTER (OUTPATIENT)
Age: 60
End: 2025-08-16

## (undated) DEVICE — ENDO TROCAR BLUNT TIP KII BALLOON 12X100MM C0R47

## (undated) DEVICE — PREP CHLORAPREP 26ML TINTED ORANGE  260815

## (undated) DEVICE — ENDO POUCH UNIV RETRIEVAL SYSTEM INZII 10MM CD001

## (undated) DEVICE — SU MONOCRYL 4-0 PS-2 18" UND Y496G

## (undated) DEVICE — SUCTION MANIFOLD NEPTUNE 2 SYS 1 PORT 702-025-000

## (undated) DEVICE — NDL 18GA 1.5" 305196

## (undated) DEVICE — LUBRICATING JELLY 4.25OZ

## (undated) DEVICE — BLADE CLIPPER 4406

## (undated) DEVICE — SU MONOCRYL 3-0 PS-2 18" UND Y497G

## (undated) DEVICE — SOL BENZOIN SPRAY 4OZ

## (undated) DEVICE — SOL NACL 0.9% IRRIG 3000ML BAG 07972-08

## (undated) DEVICE — SOL NACL 0.9% IRRIG 1000ML BOTTLE 07138-09

## (undated) DEVICE — DRAPE STERI U 1015

## (undated) DEVICE — GLOVE BIOGEL PI MICRO SZ 7.5 48575

## (undated) DEVICE — ENDO TROCAR SLEEVE KII ADV FIXATION 05X100MM CFS02

## (undated) DEVICE — DRAIN JACKSON PRATT RESERVOIR 100ML SU130-1305

## (undated) DEVICE — BLADE KNIFE SURG 11 371111

## (undated) DEVICE — Device

## (undated) DEVICE — ESU LIGASURE MARYLAND LAPAROSCOPIC SLR/DVDR 5MMX37CM LF1937

## (undated) DEVICE — SU VICRYL 3-0 SH 27" J316H

## (undated) DEVICE — GOWN XLG DISP 9545

## (undated) DEVICE — DRSG TELFA 3"X8" NON25720

## (undated) DEVICE — PUNCH SKIN 4MM P450

## (undated) DEVICE — GLOVE PROTEXIS W/NEU-THERA 8.0  2D73TE80

## (undated) DEVICE — SUCTION IRR STRYKERFLOW II W/TIP 250-070-520

## (undated) DEVICE — PREP DURAPREP 26ML APL 8630

## (undated) DEVICE — NEEDLE HYPO 23GA 1.5IN BD REG BVL STRL 305194

## (undated) DEVICE — DRSG KERLIX FLUFFS X5

## (undated) DEVICE — ENDO TROCAR FIRST ENTRY KII FIOS ADV FIX 05X100MM CFF03

## (undated) DEVICE — SYR 10ML FINGER CONTROL W/O NDL 309695

## (undated) DEVICE — STOCKING SLEEVE COMPRESSION CALF MED

## (undated) DEVICE — SYR 50ML LL W/O NDL 309653

## (undated) DEVICE — DRAPE POUCH INSTRUMENT 3 POCKET 1018L

## (undated) DEVICE — ESU PENCIL W/COATED BLADE E2450H

## (undated) DEVICE — SYR EAR BULB 2OZ

## (undated) DEVICE — STPL SKIN 35W 6.9MM  PXW35

## (undated) DEVICE — GLOVE PROTEXIS W/NEU-THERA 7.0  2D73TE70

## (undated) DEVICE — DECANTER VIAL 2006S

## (undated) DEVICE — PREP BALL KERLIX ROUND LATEX

## (undated) DEVICE — DRAPE SHEET REV FOLD 3/4 9349

## (undated) DEVICE — GLOVE BIOGEL PI MICRO INDICATOR UNDERGLOVE SZ 8.0 48980

## (undated) DEVICE — ESU HOLSTER PLASTIC DISP E2400

## (undated) DEVICE — SU VICRYL 2-0 CT-1 36" UND J945H

## (undated) DEVICE — STPL POWERED ECHELON 45MM PSEE45A

## (undated) DEVICE — SUCTION TIP FLEXI CLEAR TIP DISP K62

## (undated) DEVICE — BNDG COBAN 6"X5YDS STERILE

## (undated) DEVICE — PACK LAPAROTOMY CUSTOM LAKES

## (undated) DEVICE — TAPE DURAPORE 3" SILK 1538-3

## (undated) DEVICE — BLADE KNIFE SURG 15 371115

## (undated) DEVICE — SOL NACL 0.9% INJ 1000ML BAG 07983-09

## (undated) DEVICE — SU VICRYL 2-0 SH 27" UND J417H

## (undated) DEVICE — APPLICATORS COTTON TIP 6" X2

## (undated) DEVICE — GLOVE PROTEXIS BLUE W/NEU-THERA 8.5  2D73EB85

## (undated) DEVICE — DRSG AQUACEL AG 3.5X9.75" HYDROFIBER 412011

## (undated) DEVICE — TUBING SUCTION 12"X1/4" N612

## (undated) DEVICE — GOWN IMPERVIOUS SPECIALTY XLG/XLONG 32474

## (undated) DEVICE — STOCKING SLEEVE COMPRESSION CALF LG

## (undated) DEVICE — SU VICRYL 0 CT-2 27" J334H

## (undated) DEVICE — SOL WATER IRRIG 1000ML BOTTLE 07139-09

## (undated) DEVICE — ADH SKIN CLOSURE PREMIERPRO EXOFIN 1.0ML 3470

## (undated) DEVICE — DRAIN JACKSON PRATT 10FR ROUND SU130-1321

## (undated) DEVICE — GLOVE PROTEXIS BLUE W/NEU-THERA 7.5  2D73EB75

## (undated) DEVICE — PANTIES MESH LG/XLG 2PK 706M2

## (undated) DEVICE — SUCTION TIP YANKAUER STR K87

## (undated) DEVICE — ESU PENCIL SMOKE EVAC W/ROCKER SWITCH 0703-047-000

## (undated) DEVICE — NDL ANGIOCATH 18GA 1.25" 4055

## (undated) RX ORDER — GLYCOPYRROLATE 0.2 MG/ML
INJECTION, SOLUTION INTRAMUSCULAR; INTRAVENOUS
Status: DISPENSED
Start: 2020-04-29

## (undated) RX ORDER — GLYCOPYRROLATE 0.2 MG/ML
INJECTION, SOLUTION INTRAMUSCULAR; INTRAVENOUS
Status: DISPENSED
Start: 2024-08-16

## (undated) RX ORDER — ACETAMINOPHEN 325 MG/1
TABLET ORAL
Status: DISPENSED
Start: 2020-12-16

## (undated) RX ORDER — DEXAMETHASONE SODIUM PHOSPHATE 4 MG/ML
INJECTION, SOLUTION INTRA-ARTICULAR; INTRALESIONAL; INTRAMUSCULAR; INTRAVENOUS; SOFT TISSUE
Status: DISPENSED
Start: 2020-04-29

## (undated) RX ORDER — LIDOCAINE HYDROCHLORIDE 10 MG/ML
INJECTION, SOLUTION EPIDURAL; INFILTRATION; INTRACAUDAL; PERINEURAL
Status: DISPENSED
Start: 2020-12-16

## (undated) RX ORDER — PROPOFOL 10 MG/ML
INJECTION, EMULSION INTRAVENOUS
Status: DISPENSED
Start: 2024-08-16

## (undated) RX ORDER — GABAPENTIN 300 MG/1
CAPSULE ORAL
Status: DISPENSED
Start: 2024-08-16

## (undated) RX ORDER — ALBUTEROL SULFATE 90 UG/1
AEROSOL, METERED RESPIRATORY (INHALATION)
Status: DISPENSED
Start: 2024-08-16

## (undated) RX ORDER — ACETAMINOPHEN 325 MG/1
TABLET ORAL
Status: DISPENSED
Start: 2024-08-16

## (undated) RX ORDER — GABAPENTIN 300 MG/1
CAPSULE ORAL
Status: DISPENSED
Start: 2020-12-16

## (undated) RX ORDER — FENTANYL CITRATE 50 UG/ML
INJECTION, SOLUTION INTRAMUSCULAR; INTRAVENOUS
Status: DISPENSED
Start: 2020-04-29

## (undated) RX ORDER — LIDOCAINE HYDROCHLORIDE 10 MG/ML
INJECTION, SOLUTION EPIDURAL; INFILTRATION; INTRACAUDAL; PERINEURAL
Status: DISPENSED
Start: 2024-08-16

## (undated) RX ORDER — FENTANYL CITRATE 50 UG/ML
INJECTION, SOLUTION INTRAMUSCULAR; INTRAVENOUS
Status: DISPENSED
Start: 2020-12-16

## (undated) RX ORDER — PROPOFOL 10 MG/ML
INJECTION, EMULSION INTRAVENOUS
Status: DISPENSED
Start: 2020-12-16

## (undated) RX ORDER — DEXAMETHASONE SODIUM PHOSPHATE 4 MG/ML
INJECTION, SOLUTION INTRA-ARTICULAR; INTRALESIONAL; INTRAMUSCULAR; INTRAVENOUS; SOFT TISSUE
Status: DISPENSED
Start: 2024-08-16

## (undated) RX ORDER — CEFAZOLIN SODIUM 2 G/100ML
INJECTION, SOLUTION INTRAVENOUS
Status: DISPENSED
Start: 2020-12-16

## (undated) RX ORDER — GLYCOPYRROLATE 0.2 MG/ML
INJECTION, SOLUTION INTRAMUSCULAR; INTRAVENOUS
Status: DISPENSED
Start: 2020-12-16

## (undated) RX ORDER — FENTANYL CITRATE-0.9 % NACL/PF 10 MCG/ML
PLASTIC BAG, INJECTION (ML) INTRAVENOUS
Status: DISPENSED
Start: 2020-12-16

## (undated) RX ORDER — PROPOFOL 10 MG/ML
INJECTION, EMULSION INTRAVENOUS
Status: DISPENSED
Start: 2020-04-29

## (undated) RX ORDER — MEPERIDINE HYDROCHLORIDE 50 MG/ML
INJECTION INTRAMUSCULAR; INTRAVENOUS; SUBCUTANEOUS
Status: DISPENSED
Start: 2020-04-29

## (undated) RX ORDER — LIDOCAINE HYDROCHLORIDE AND EPINEPHRINE 10; 10 MG/ML; UG/ML
INJECTION, SOLUTION INFILTRATION; PERINEURAL
Status: DISPENSED
Start: 2024-08-16

## (undated) RX ORDER — BUPIVACAINE HYDROCHLORIDE 5 MG/ML
INJECTION, SOLUTION EPIDURAL; INTRACAUDAL
Status: DISPENSED
Start: 2024-08-16

## (undated) RX ORDER — HYDROMORPHONE HYDROCHLORIDE 1 MG/ML
INJECTION, SOLUTION INTRAMUSCULAR; INTRAVENOUS; SUBCUTANEOUS
Status: DISPENSED
Start: 2020-04-29

## (undated) RX ORDER — DEXAMETHASONE SODIUM PHOSPHATE 4 MG/ML
INJECTION, SOLUTION INTRA-ARTICULAR; INTRALESIONAL; INTRAMUSCULAR; INTRAVENOUS; SOFT TISSUE
Status: DISPENSED
Start: 2020-12-16

## (undated) RX ORDER — FENTANYL CITRATE 50 UG/ML
INJECTION, SOLUTION INTRAMUSCULAR; INTRAVENOUS
Status: DISPENSED
Start: 2024-08-16

## (undated) RX ORDER — LIDOCAINE HYDROCHLORIDE 10 MG/ML
INJECTION, SOLUTION EPIDURAL; INFILTRATION; INTRACAUDAL; PERINEURAL
Status: DISPENSED
Start: 2020-04-29

## (undated) RX ORDER — ONDANSETRON 2 MG/ML
INJECTION INTRAMUSCULAR; INTRAVENOUS
Status: DISPENSED
Start: 2020-04-29

## (undated) RX ORDER — NEOSTIGMINE METHYLSULFATE 1 MG/ML
VIAL (ML) INJECTION
Status: DISPENSED
Start: 2020-04-29

## (undated) RX ORDER — HEPARIN SODIUM 5000 [USP'U]/.5ML
INJECTION, SOLUTION INTRAVENOUS; SUBCUTANEOUS
Status: DISPENSED
Start: 2024-08-16

## (undated) RX ORDER — ONDANSETRON 2 MG/ML
INJECTION INTRAMUSCULAR; INTRAVENOUS
Status: DISPENSED
Start: 2020-12-16

## (undated) RX ORDER — BUPIVACAINE HYDROCHLORIDE AND EPINEPHRINE 5; 5 MG/ML; UG/ML
INJECTION, SOLUTION EPIDURAL; INTRACAUDAL; PERINEURAL
Status: DISPENSED
Start: 2020-04-29

## (undated) RX ORDER — KETOROLAC TROMETHAMINE 30 MG/ML
INJECTION, SOLUTION INTRAMUSCULAR; INTRAVENOUS
Status: DISPENSED
Start: 2020-12-16